# Patient Record
Sex: MALE | Race: BLACK OR AFRICAN AMERICAN | NOT HISPANIC OR LATINO | Employment: OTHER | URBAN - METROPOLITAN AREA
[De-identification: names, ages, dates, MRNs, and addresses within clinical notes are randomized per-mention and may not be internally consistent; named-entity substitution may affect disease eponyms.]

---

## 2017-01-31 ENCOUNTER — HOSPITAL ENCOUNTER (EMERGENCY)
Facility: HOSPITAL | Age: 62
Discharge: HOME | End: 2017-01-31
Attending: EMERGENCY MEDICINE
Payer: MEDICARE

## 2017-01-31 VITALS
RESPIRATION RATE: 18 BRPM | BODY MASS INDEX: 27.35 KG/M2 | WEIGHT: 220 LBS | OXYGEN SATURATION: 100 % | TEMPERATURE: 99 F | SYSTOLIC BLOOD PRESSURE: 141 MMHG | HEART RATE: 82 BPM | DIASTOLIC BLOOD PRESSURE: 78 MMHG | HEIGHT: 75 IN

## 2017-01-31 DIAGNOSIS — N18.6 ESRD (END STAGE RENAL DISEASE) ON DIALYSIS: ICD-10-CM

## 2017-01-31 DIAGNOSIS — R50.9 FEVER: ICD-10-CM

## 2017-01-31 DIAGNOSIS — N18.5 CHRONIC RENAL FAILURE SYNDROME, STAGE 5: Primary | ICD-10-CM

## 2017-01-31 DIAGNOSIS — Z99.2 ESRD (END STAGE RENAL DISEASE) ON DIALYSIS: ICD-10-CM

## 2017-01-31 DIAGNOSIS — F03.90 DEMENTIA WITHOUT BEHAVIORAL DISTURBANCE, UNSPECIFIED DEMENTIA TYPE: ICD-10-CM

## 2017-01-31 LAB
ALBUMIN SERPL BCP-MCNC: 3.7 G/DL
ALP SERPL-CCNC: 75 U/L
ALT SERPL W/O P-5'-P-CCNC: 5 U/L
ANION GAP SERPL CALC-SCNC: 14 MMOL/L
AST SERPL-CCNC: 16 U/L
BASOPHILS # BLD AUTO: 0.01 K/UL
BASOPHILS NFR BLD: 0.2 %
BILIRUB SERPL-MCNC: 1.4 MG/DL
BUN SERPL-MCNC: 19 MG/DL
CALCIUM SERPL-MCNC: 8.8 MG/DL
CHLORIDE SERPL-SCNC: 94 MMOL/L
CO2 SERPL-SCNC: 27 MMOL/L
CREAT SERPL-MCNC: 6.5 MG/DL
DIFFERENTIAL METHOD: ABNORMAL
EOSINOPHIL # BLD AUTO: 0 K/UL
EOSINOPHIL NFR BLD: 0 %
ERYTHROCYTE [DISTWIDTH] IN BLOOD BY AUTOMATED COUNT: 18.1 %
EST. GFR  (AFRICAN AMERICAN): 10 ML/MIN/1.73 M^2
EST. GFR  (NON AFRICAN AMERICAN): 8 ML/MIN/1.73 M^2
FLUAV AG SPEC QL IA: NEGATIVE
FLUBV AG SPEC QL IA: NEGATIVE
GLUCOSE SERPL-MCNC: 74 MG/DL
HCT VFR BLD AUTO: 33.2 %
HGB BLD-MCNC: 10.7 G/DL
LACTATE SERPL-SCNC: 1.1 MMOL/L
LYMPHOCYTES # BLD AUTO: 0.4 K/UL
LYMPHOCYTES NFR BLD: 6.6 %
MCH RBC QN AUTO: 30.2 PG
MCHC RBC AUTO-ENTMCNC: 32.2 %
MCV RBC AUTO: 94 FL
MONOCYTES # BLD AUTO: 0.4 K/UL
MONOCYTES NFR BLD: 7 %
NEUTROPHILS # BLD AUTO: 4.8 K/UL
NEUTROPHILS NFR BLD: 86.2 %
PLATELET # BLD AUTO: 133 K/UL
PMV BLD AUTO: 9.6 FL
POTASSIUM SERPL-SCNC: 4.3 MMOL/L
PROT SERPL-MCNC: 8.5 G/DL
RBC # BLD AUTO: 3.54 M/UL
SODIUM SERPL-SCNC: 135 MMOL/L
SPECIMEN SOURCE: NORMAL
TROPONIN I SERPL DL<=0.01 NG/ML-MCNC: 0.55 NG/ML
WBC # BLD AUTO: 5.57 K/UL

## 2017-01-31 PROCEDURE — 87077 CULTURE AEROBIC IDENTIFY: CPT

## 2017-01-31 PROCEDURE — 96365 THER/PROPH/DIAG IV INF INIT: CPT

## 2017-01-31 PROCEDURE — 87400 INFLUENZA A/B EACH AG IA: CPT | Mod: 59

## 2017-01-31 PROCEDURE — 99285 EMERGENCY DEPT VISIT HI MDM: CPT | Mod: 25

## 2017-01-31 PROCEDURE — 84484 ASSAY OF TROPONIN QUANT: CPT

## 2017-01-31 PROCEDURE — 63600175 PHARM REV CODE 636 W HCPCS: Performed by: EMERGENCY MEDICINE

## 2017-01-31 PROCEDURE — 85025 COMPLETE CBC W/AUTO DIFF WBC: CPT

## 2017-01-31 PROCEDURE — 87040 BLOOD CULTURE FOR BACTERIA: CPT | Mod: 59

## 2017-01-31 PROCEDURE — 87186 SC STD MICRODIL/AGAR DIL: CPT

## 2017-01-31 PROCEDURE — 80053 COMPREHEN METABOLIC PANEL: CPT

## 2017-01-31 PROCEDURE — 83605 ASSAY OF LACTIC ACID: CPT

## 2017-01-31 RX ADMIN — CEFTRIAXONE 1 G: 1 INJECTION, SOLUTION INTRAVENOUS at 09:01

## 2017-01-31 NOTE — ED AVS SNAPSHOT
[facility]  [address] [facility] [address]               David Hadley   2017  5:12 PM   ED    Description:  Male : 1955   Department:  [facility]            Your Care was Coordinated By:     Provider Role From To    Hans Hadley Jr., MD Attending Provider 17 1716 --      Reason for Visit     Fatigue           Diagnoses this Visit        Comments    Chronic renal failure syndrome, stage 5    -  Primary     Fever         ESRD (end stage renal disease) on dialysis         Dementia without behavioral disturbance, unspecified dementia type           ED Disposition     None           To Do List           Follow-up Information     Follow up with Isaac Fitch MD. Schedule an appointment as soon as possible for a visit in 1 day.    Specialty:  Family Medicine    Why:  for recheck as needed if unabel to make appt with your kidney specialist.- go to dialysis as previously scheduled this thursday     Contact information:    [address] [address] [address]  [phone]        [facility]     [facility] -  Assistance  Registered nurses in the [facility] provide clinical advisement, health education, appointment booking, and other advisory services.  Call for this free service at [phone].             Medications           Message regarding Medications     Verify the changes and/or additions to your medication regime listed below are the same as discussed with your clinician today.  If any of these changes or additions are incorrect, please notify your healthcare provider.             Verify that the below list of medications is an accurate representation of the medications you are currently taking.  If none reported, the list may be blank. If incorrect, please contact your healthcare provider. Carry this list with you in case of emergency.           Current Medications      "atorvastatin (LIPITOR) 20 MG tablet Take 1 tablet (20 mg total) by mouth once daily.    gabapentin (NEURONTIN) 100 MG capsule Take 1 capsule (100 mg total) by mouth 3 (three) times daily.    hydrocodone-acetaminophen 5-325mg (NORCO) 5-325 mg per tablet Take 1 tablet by mouth every 6 to 8 hours as needed for Pain.    losartan (COZAAR) 50 MG tablet Take by mouth. 1 tablet Oral Every day    metoprolol tartrate (LOPRESSOR) 25 MG tablet Take 1 tablet (25 mg total) by mouth 2 (two) times daily.    rivastigmine (EXELON) 4.6 mg/24 hr PT24 Place 1 patch onto the skin once daily.    SENSIPAR 30 mg Tab Take 1 tablet by mouth once daily.    sertraline (ZOLOFT) 50 MG tablet Take 1 tablet (50 mg total) by mouth once daily.    SPRYCEL 100 mg Tab Take 100 mg by mouth once daily.     trazodone (DESYREL) 50 MG tablet Take 1 tablet (50 mg total) by mouth every evening.           Clinical Reference Information           Your Vitals Were     BP Pulse Temp Resp Height Weight    153/75 (BP Location: Right arm, Patient Position: Sitting, BP Method: Automatic) 84 99.8 °F (37.7 °C) (Oral) 18 6' 3" (1.905 m) 99.8 kg (220 lb)    SpO2 BMI             99% 27.5 kg/m2         Allergies as of 1/31/2017        Reactions    Benadryl Decongestant Itching    Nothing with benadryl       Immunizations Administered on Date of Encounter - 1/31/2017     None      ED Micro, Lab, POCT     Start Ordered       Status Ordering Provider    01/31/17 1804 01/31/17 1811  Blood culture  Once      In process     01/31/17 1804 01/31/17 1811  Blood Culture #2 **CANNOT BE ORDERED STAT**  Once      In process     01/31/17 1804 01/31/17 1811  Lactic acid, plasma  STAT      Final result     01/31/17 1734 01/31/17 1734  Influenza antigen Nasopharyngeal Swab  STAT      Final result     01/31/17 1733 01/31/17 1734  Urinalysis  STAT      Acknowledged     01/31/17 1704 01/31/17 1704  CBC auto differential  STAT      Final result     01/31/17 1704 01/31/17 1704  Comprehensive " metabolic panel  STAT      Final result     01/31/17 1704 01/31/17 1704  Troponin I  STAT      Final result       ED Imaging Orders     Start Ordered       Status Ordering Provider    01/31/17 1733 01/31/17 1734  X-Ray Chest PA And Lateral  1 time imaging      Final result         Discharge Instructions         Chronic Kidney Disease (CKD)    The role of the kidneys is to remove waste products and excess water from the blood.  When the kidneys do not function normally and waste products begin to build up in the blood, this is called chronic kidney disease (CKD). CKD is defined as the presence of kidney damage or a decrease in kidney function lasting 3 months or more. CKD allows excess water, waste, and toxic substances to build up in the body. This can eventually become life-threatening, requiring dialysis or a kidney transplant to stay alive. This most severe form is called end stage renal disease or ESRD.  Diabetes is the leading causes of chronic renal failure. Other causes include high blood pressure, hardening of the arteries (atherosclerosis), lupus, inflammation of the blood vessels (vasculitis), prior viral and bacterial infections, and others. Certain over-the-counter pain medicines can cause renal failure when taken often over a long period of time. These include aspirin, ibuprofen, and related anti-inflammatory medicines called NSAIDs (nonsteroidal anti-inflammatory drugs).  Home care  The following guidelines will help you care for yourself at home:  1. If you have diabetes, talk to your doctor about the quality of your blood sugar control and any adjustments needed to your diet, lifestyle, or medicines.  2. If you have high blood pressure:  ¨ Take prescribed medicine to lower your blood pressure to the recommended goal of less than 130/80.  ¨ Take up a regular exercise program that you enjoy. Check with your doctor to be sure your planned exercise program is right for you.  ¨ Reduce your salt (sodium)  intake. Your doctor can tell you how much salt per day is safe for you.  3. If you are overweight, talk to your doctor about a weight loss plan.  4. If you smoke, you must quit. Smoking worsens kidney disease. Talk to your doctor about ways to help you quit.  For more information, visit the following links:  ¨ www.[place].gov/sites/default/files/pdf/clearing-the-air-accessible.pdf  ¨ www.smokefree.gov  ¨ www.cancer.org/healthy/stayawayfromtobacco/guidetoquittingsmoking/  5. Most patients with chronic renal failure need to follow a special diet.  Be sure you understand yours. In general, you will need to restrict protein, salt, potassium, and phosphorus. You also need to limit fluid intake. A calcium supplement may be prescribed to protect your bones from osteoporosis.  6. CKD is a risk factor for heart disease. Talk to your doctor about any other risk factors you might have and what you can do to lessen them.  7. Talk to your doctor about any medicines you are taking to determine if they need to be reduced or stopped.  8. Avoid the following over-the-counter medicines, or consult your doctor before using:  ¨ Aspirin and nonsteroidal anti-inflammatory drugs (NSAIDs) such as ibuprofen or naproxen (short-term use of acetaminophen for fever or pain is okay)  ¨ Laxatives and antacids containing magnesium or aluminum  ¨ Fleet or phospho soda enemas containing phosphorus  ¨ Certain stomach acid-blocking medicine such as cimetidine or ranitidine   ¨ Decongestants containing pseudoephedrine   ¨ Herbal supplements  Follow-up care  Follow up with your doctor or as advised by our staff. Contact one of the following for more information:  · American Association of Kidney Patients (568) 080-3723 www.aakp.org  · National Kidney Foundation (818) 192-4677 www.kidney.org  · American Kidney Fund (058) 857-7706 www.kidneyfund.org  · National Kidney Disease Education Program (476) 4WOJYZD www.nkdep.nih.gov  [NOTE: If an X-ray, EKG  (cardiogram), or other diagnostic test was taken, another specialist will review it. You will be notified of any new findings that may affect your care.]  When to seek medical care  Get prompt medical attention or contact your doctor if any of the following occur:  · Nausea or vomiting  · Fever greater than 100.4°F (38°C)  · Severe weakness, dizziness, fainting, drowsiness, or confusion  · Chest pain or shortness of breath  · Unexpected weight gain or swelling in the legs, ankles, or around the eyes  · Heart beating fast, slow, or irregularly  · Decrease or absent urine output  © 3230-0374 [employer]. [address] [place] [address], [place] [address]. All rights reserved. This information is not intended as a substitute for professional medical care. Always follow your healthcare professional's instructions.          For Caregivers: Daily Care for Dementia Patients  Over time, people with dementia will need more and more help with daily tasks. These include eating meals, taking medicines, and getting enough exercise. They also include personal care needs, such as bathing and dressing. To reduce stress, make these activities part of a routine. Ask family and friends to lend a hand. And be aware that your loved ones abilities can change from day to day. If you have problems meeting your loved ones needs, its time to get help. Talk to a  or local support agency--such as a local Alzheimers Association chapter.      Gardening can be a pleasant way to keep your loved one active.   Activity and exercise  Regular activity is good for your loved ones body and mind. It may even help slow the progression of the disease. Keep to your loved ones old routines when possible. It also helps to:  · Do things together. Go for a walk, garden, or bake a cake. Basic, repetitive activities are good choices.  · Be active as often as possible. This releases pent-up energy, which can reduce restlessness and improve  sleep.  · Include social activities. Take your loved one to see friends and family. But try to keep things simple. Loud noises, crowds, or too many people talking at once can be upsetting.  Taking medicines  Be sure all prescribed medicines are taken as directed. These tips can help:  · Provide supervision if your loved one cannot safely take medicines alone.  · Set a routine so medicines are taken at the same time each day.  · Ensure that ALL medicines are taken. A pillbox can help you keep track.  · Plan ahead. Be sure to refill prescriptions before they run out.  Eating meals  At mealtime, serve healthy foods with plenty of fluids. These tips can also help:  · Keep meals simple. Too many choices can be overwhelming. Try to maintain a calm, quiet atmosphere while you eat.  · Place healthy snacks, such as fresh fruit, out where they can be seen.  · Watch eating habits. People with dementia may eat too little or too much. Talk to the healthcare provider if you have concerns.  · Try finger foods if regular meals become too difficult for your loved one to eat.  Dressing  People with dementia may have trouble choosing what to wear. Its OK if clothes dont always match. But if help is needed:  · Choose clothing that is easy to put on and take off. Use Velcro shoes or slippers.  · Lay out a fresh outfit each day. Place clothes in the order they should be put on.  · If more help is needed, hand over clothing items one at a time. Explain how each item should be put on.  · Put dirty clothes away so theyre not worn again.  Bathing and grooming  Getting your loved one to bathe can be a real challenge. Try these tips:  · Treat bathing as a routine activity. But be flexible. A daily bath is probably unrealistic.  · Prepare bath items ahead of time, and be sure to test the water temperature.  · Avoid leaving your loved one alone in the bath or shower.             · Try visiting a barbershop or beauty salon for help with hair  washing, hair styling, and shaving.  Using the toilet  In later stages, dementia patients may develop incontinence (trouble controlling the bladder or bowel). To ease problems:  · Set a routine for using the toilet (for example, every 3 hours) and stick to it.  · Limit beverages before bedtime to prevent accidents. A bedside commode may also help.  · Be understanding if accidents happen. Your loved one may be as upset as you.  · At one point it may be necessary to have them wear an adult diaper.    · Talk to a healthcare provider if incontinence develops suddenly. It may signal other health issues that can be treated.  When to call the healthcare provider  Call the healthcare provider if you notice a sudden change in your loved ones behavior or emotions. These changes may be due to dementia. But they could also signal other health problems that can be treated.   NOTE:This sheet is a summary. It may not cover all possible information. If you have questions about this medicine, talk to your doctor, pharmacist, or health care provider. Copyright© 2016 [employer]          For Caregivers: Safety Tips for Dementia Patients  The symptoms of dementia can sometimes lead to unsafe situations. Areas of special concern include driving and wandering away from home. You may also need to make changes in your loved ones living space. These can help protect your loved one even when you arent around.     Prevent driving by keeping your loved ones car keys in a secure place.   Discourage driving  Driving is often not safe for a person with dementia. It may even be illegal. Ask the healthcare provider whether its safe for your loved one to drive. If safety is in question, use these tips to prevent him or her from getting behind the wheel:  · Limit access to the car. Keep the keys with you or lock them away.  · Ask an authority figure, such as a healthcare provider or , to tell your loved one not to drive.  · Ask  your healthcare provider about contacting the Department of Motor Vehicles.  Watch for wandering  People with dementia may wander away from the house and get lost. To keep your loved one safer, try these tips:  · Have your loved one wear an ID bracelet at all times. You can also enroll your loved one in the Alzheimers Associations Safe Return program.  · Install door chimes so you know when exterior doors are being opened.  · Ask neighbors to call you if they see your loved one out alone.  · Go with your loved one if he or she insists on leaving the house. Dont argue or yell. Instead, use distraction or gentle hints to get him or her to return home.  · People with dementia often have a reversed sleep-wake cycle, meaning that they can be up all night and wander away when you least expect it.    Make living spaces safe  Keep your loved one safe by simplifying his or her living space. This means reducing clutter and removing hazards. You may also want to get advice from an occupational therapist (home safety expert). Keep in mind that some changes may not be needed right away. Focus on major safety concerns first.  In living spaces  Suggested safety steps include:  · Install smoke alarms and nightlights.  · Display emergency numbers and the home address near all phones.  · Install an answering machine so important messages arent missed.  · Reduce tripping hazards. Move electrical and phone cords out of the way. Place colored tape on the edges of steps.  In the bathroom  Suggested safety steps include:  · Store hair dryers, razors, and curling irons in a secure area.  · Remove poisons, such as  and nail polish remover.  · Keep medicines in a secure area, not in the medicine cabinet.  · Remove inside door locks so your loved one doesnt get locked inside.  In the kitchen  Suggested safety steps include:  · Unplug toasters and other appliances when not in use.  · Limit access to alcoholic beverages.  These can make symptoms much worse.  · Remove or cover knobs on stoves and other appliances.  · Check food for spoilage. Your loved one may not know when food has gone bad.  Other areas of the home  Suggested safety steps include:  · Lock up hazardous substances, such as bleach, pesticides, and paint thinners.  · Keep pool or hot tub areas closed off.  · Set the hot water heater below 120°F (48.8°C).  · Keep a spare key outside the house in case your loved one locks you out.  Prevent fraud  People with dementia may be easy prey for dishonest salespeople or money scams. Try placing a No Solicitations sign on your loved ones front door. Add his or her phone number to the [employer]s Do Not Call list ([phone]). You should also limit access to credit cards and cash.  Can my loved one live alone?  Early on, people with dementia can often handle daily tasks with little or no help. At some point, though, it will no longer be safe for them to be on their own. The timing for this is different for each person. But problems such as forgetting to eat, bathe, or take medicines can all be signs that more supervision is needed. If you have concerns, be sure to talk with your loved ones healthcare provider.      © 3776-6788 [employer]. [address] [place] [address]. All rights reserved. This information is not intended as a substitute for professional medical care. Always follow your healthcare professional's instructions.          Discharge References/Attachments     WEAKNESS (UNCERTAIN CAUSE) (ENGLISH)      [facility] Sign-Up     Activating your [facility] account is as easy as 1-2-3!     1) Visit [email or web address], select Sign Up Now, enter this activation code and your date of birth, then select Next.  [email or web address]  Expires: 3/17/2017  9:45 PM      2) Create a username and password to use when you visit [facility] in the future and select a security question in case you lose  your password and select Next.    3) Enter your e-mail address and click Sign Up!    Additional Information  If you have questions, please e-mail [email or web address] or call [phone] to talk to our [facility] staff. Remember, [facility] is NOT to be used for urgent needs. For medical emergencies, dial 911.         Smoking Cessation     If you would like to quit smoking:   You may be eligible for free services if you are a [place] resident and started smoking cigarettes before September 1, 1988.  Call the Smoking Cessation Trust (SCT) toll free at (789) 733-5749 or (292) 043-9620.   Call 0-312-QUIT-NOW if you do not meet the above criteria.             [facility] complies with applicable Federal civil rights laws and does not discriminate on the basis of race, color, national origin, age, disability, or sex.        Language Assistance Services     ATTENTION: Language assistance services are available, free of charge. Please call [phone].      ATENCIÓN: Si habla español, tiene a dsa disposición servicios gratuitos de asistencia lingüística. Llame al [phone].     CHÚ Ý: N?u b?n nói Ti?ng Vi?t, có các d?ch v? h? tr? ngôn ng? mi?n phí dành cho b?n. G?i s? [phone].

## 2017-01-31 NOTE — ED NOTES
Bed: 09  Expected date:   Expected time:   Means of arrival:   Comments:  David brasher when clean

## 2017-01-31 NOTE — ED PROVIDER NOTES
SCRIBE #1 NOTE: I, Dickson Vigil, am scribing for, and in the presence of, Hans Hadley Jr., MD. I have scribed the entire note.      History      Chief Complaint   Patient presents with    Fatigue     pt sent by family for eval of weakness per [facility] report; CBG 60       Review of patient's allergies indicates:   Allergen Reactions    Benadryl decongestant Itching     Nothing with benadryl         HPI   HPI    1/31/2017, 5:32 PM   History obtained from the patient      History of Present Illness: David Hadley is a 61 y.o. male patient who presents to the Emergency Department for fatigue which onset gradually PTA. Symptoms are constant and moderate in severity. Pt was sent here by the family for evaluation of weakness per [facility] report. No mitigating or exacerbating factors reported. Associated sxs include fever. Patient denies any abdominal pain, n/v/d, cough, congestion, CP, SOB, numbness, HA, lightheadedness, dizziness, and all other sxs at this time. No further complaints or concerns at this time.         Arrival mode: [facility]    PCP: Isaac Fitch MD       Past Medical History:  Past Medical History   Diagnosis Date    After-cataract, unspecified - Left Eye 11/27/2013    Allergy     Arthritis     Cancer     CHF (congestive heart failure)     Chronic kidney disease      chemo/ dialias    Diabetes mellitus     Hypertension     Myocardial infarction        Past Surgical History:  Past Surgical History   Procedure Laterality Date    Cataract extraction      Coronary artery bypass graft  3-2014         Family History:  Family History   Problem Relation Age of Onset    Colon cancer Sister     Cancer Brother      colon    Pancreatic cancer Brother     Prostate cancer Father     Glaucoma Mother        Social History:  Social History     Social History Main Topics    Smoking status: Former Smoker     Types: Cigarettes     Quit date: 1/28/2000    Smokeless tobacco: Never Used    Alcohol use No    Drug use:  No    Sexual activity: Not Currently     Birth control/ protection: None       ROS   Review of Systems   Constitutional: Positive for fatigue and fever. Negative for chills.   HENT: Negative for sore throat.    Respiratory: Negative for shortness of breath.    Cardiovascular: Negative for chest pain.   Gastrointestinal: Negative for abdominal pain, diarrhea, nausea and vomiting.   Genitourinary: Negative for dysuria.   Musculoskeletal: Negative for back pain.   Skin: Negative for rash.   Neurological: Negative for dizziness, weakness, light-headedness and headaches.   Hematological: Does not bruise/bleed easily.       Physical Exam    Initial Vitals   BP Pulse Resp Temp SpO2   01/31/17 1656 01/31/17 1656 01/31/17 1656 01/31/17 1656 01/31/17 1656   170/80 98 16 100.3 °F (37.9 °C) 98 %      Physical Exam  Nursing Notes and Vital Signs Reviewed.  Constitutional: Patient is in no acute distress. Awake and alert. Well-developed and well-nourished.  Head: Atraumatic. Normocephalic.  Eyes: PERRL. EOM intact. Conjunctivae are not pale. No scleral icterus.  ENT: Mucous membranes are moist. Oropharynx is clear and symmetric.    Neck: Supple. Full ROM. No lymphadenopathy.  Cardiovascular: Regular rate. Regular rhythm. No murmurs, rubs, or gallops. Distal pulses are 2+ and symmetric.  Pulmonary/Chest: No respiratory distress. Clear to auscultation bilaterally. No wheezing, rales, or rhonchi.  Abdominal: Soft and non-distended.  There is no tenderness.  No rebound, guarding, or rigidity.  Good bowel sounds.    Genitourinary: No CVA tenderness  Musculoskeletal: Moves all extremities. No obvious deformities. No edema. No calf tenderness.  Skin: Warm and dry.  Neurological:  Alert, awake, and appropriate.  Normal speech.  No acute focal neurological deficits are appreciated.  Psychiatric: Normal affect. Good eye contact. Appropriate in content.    ED Course    Procedures  ED Vital Signs:  Vitals:    01/31/17 1656 01/31/17 2006  "  BP: (!) 170/80 (!) 157/86   Pulse: 98 87   Resp: 16 18   Temp: 100.3 °F (37.9 °C) 99.8 °F (37.7 °C)   TempSrc: Oral Oral   SpO2: 98% 100%   Weight:  99.8 kg (220 lb)   Height:  6' 3" (1.905 m)       Abnormal Lab Results:  Labs Reviewed   CBC W/ AUTO DIFFERENTIAL - Abnormal; Notable for the following:        Result Value    RBC 3.54 (*)     Hemoglobin 10.7 (*)     Hematocrit 33.2 (*)     RDW 18.1 (*)     Platelets 133 (*)     Lymph # 0.4 (*)     Gran% 86.2 (*)     Lymph% 6.6 (*)     All other components within normal limits   COMPREHENSIVE METABOLIC PANEL - Abnormal; Notable for the following:     Sodium 135 (*)     Chloride 94 (*)     Creatinine 6.5 (*)     Total Protein 8.5 (*)     Total Bilirubin 1.4 (*)     ALT 5 (*)     eGFR if  10 (*)     eGFR if non  8 (*)     All other components within normal limits   TROPONIN I - Abnormal; Notable for the following:     Troponin I 0.551 (*)     All other components within normal limits   CULTURE, BLOOD   CULTURE, BLOOD   INFLUENZA A AND B ANTIGEN   LACTIC ACID, PLASMA   URINALYSIS        All Lab Results:  Results for orders placed or performed during the hospital encounter of 01/31/17   CBC auto differential   Result Value Ref Range    WBC 5.57 3.90 - 12.70 K/uL    RBC 3.54 (L) 4.60 - 6.20 M/uL    Hemoglobin 10.7 (L) 14.0 - 18.0 g/dL    Hematocrit 33.2 (L) 40.0 - 54.0 %    MCV 94 82 - 98 fL    MCH 30.2 27.0 - 31.0 pg    MCHC 32.2 32.0 - 36.0 %    RDW 18.1 (H) 11.5 - 14.5 %    Platelets 133 (L) 150 - 350 K/uL    MPV 9.6 9.2 - 12.9 fL    Gran # 4.8 1.8 - 7.7 K/uL    Lymph # 0.4 (L) 1.0 - 4.8 K/uL    Mono # 0.4 0.3 - 1.0 K/uL    Eos # 0.0 0.0 - 0.5 K/uL    Baso # 0.01 0.00 - 0.20 K/uL    Gran% 86.2 (H) 38.0 - 73.0 %    Lymph% 6.6 (L) 18.0 - 48.0 %    Mono% 7.0 4.0 - 15.0 %    Eosinophil% 0.0 0.0 - 8.0 %    Basophil% 0.2 0.0 - 1.9 %    Differential Method Automated    Comprehensive metabolic panel   Result Value Ref Range    Sodium 135 (L) 136 " - 145 mmol/L    Potassium 4.3 3.5 - 5.1 mmol/L    Chloride 94 (L) 95 - 110 mmol/L    CO2 27 23 - 29 mmol/L    Glucose 74 70 - 110 mg/dL    BUN, Bld 19 8 - 23 mg/dL    Creatinine 6.5 (H) 0.5 - 1.4 mg/dL    Calcium 8.8 8.7 - 10.5 mg/dL    Total Protein 8.5 (H) 6.0 - 8.4 g/dL    Albumin 3.7 3.5 - 5.2 g/dL    Total Bilirubin 1.4 (H) 0.1 - 1.0 mg/dL    Alkaline Phosphatase 75 55 - 135 U/L    AST 16 10 - 40 U/L    ALT 5 (L) 10 - 44 U/L    Anion Gap 14 8 - 16 mmol/L    eGFR if African American 10 (A) >60 mL/min/1.73 m^2    eGFR if non African American 8 (A) >60 mL/min/1.73 m^2   Troponin I   Result Value Ref Range    Troponin I 0.551 (H) 0.000 - 0.026 ng/mL   Influenza antigen Nasopharyngeal Swab   Result Value Ref Range    Influenza A Ag, EIA Negative Negative    Influenza B Ag, EIA Negative Negative    Flu A & B Source Nasopharyngeal Swab    Lactic acid, plasma   Result Value Ref Range    Lactate (Lactic Acid) 1.1 0.5 - 2.2 mmol/L         Imaging Results:  Imaging Results         X-Ray Chest PA And Lateral (Final result) Result time:  01/31/17 18:43:59    Final result by Elton Dodson Jr., MD (01/31/17 18:43:59)    Impression:     Lower lung volumes compared to the previous exam.  There is cardiomegaly with a small amount left-sided pleural-parenchymal disease.  This could relate to a pneumonic process although heart failure may also be responsible.      Electronically signed by: ELTON DODSON M.D.  Date:     01/31/17  Time:    18:43     Narrative:    EXAM: [id number] CHEST PA AND LATERAL    CLINICAL HISTORY: Fever.    COMPARISON: Previous PA and lateral views of the chest from February 2016 were reviewed.    FINDINGS: Again demonstrated are postoperative changes from a prior median sternotomy.  Lung volumes are diminished compared to the previous study.  The heart is mildly enlarged.  There is abnormal opacity involving the left lower chest with a small left pleural effusion.  No pneumothorax.  No acute osseous  abnormalities are evident.                      The Emergency Provider reviewed the vital signs and test results, which are outlined above.    ED Discussion     9:40 PM: Reassessed pt at this time.  Pt states his condition has improved at this time. Pt is a dialysis pt with [facility]. Instructed pt to f/u with [facility]. Discussed with pt all pertinent ED information and results. Discussed pt dx and plan of tx. Gave pt all f/u and return to the ED instructions. All questions and concerns were addressed at this time. Pt expresses understanding of information and instructions, and is comfortable with plan to discharge. Pt is stable for discharge.    Pre-hypertension/Hypertension: The pt has been informed that they may have pre-hypertension or hypertension based on a blood pressure reading in the ED. I recommend that the pt call the PCP listed on their discharge instructions or a physician of their choice this week to arrange f/u for further evaluation of possible pre-hypertension or hypertension.       ED Medication(s):  Medications - No data to display    New Prescriptions    No medications on file             Medical Decision Making    Medical Decision Making:   Clinical Tests:   Lab Tests: Ordered and Reviewed  Radiological Study: Ordered and Reviewed           Scribe Attestation:   Scribe #1: I performed the above scribed service and the documentation accurately describes the services I performed. I attest to the accuracy of the note.    Attending:   Physician Attestation Statement for Scribe #1: I, Hans Hadley Jr., MD, personally performed the services described in this documentation, as scribed by Dickson Vigil, in my presence, and it is both accurate and complete.          Clinical Impression       ICD-10-CM ICD-9-CM   1. Chronic renal failure syndrome, stage 5 N18.5 585.5   2. Fever R50.9 780.60   3. ESRD (end stage renal disease) on dialysis N18.6 585.6    Z99.2 V45.11   4. Dementia without  behavioral disturbance, unspecified dementia type F03.90 294.20       Disposition:   Disposition: Discharged  Condition: Stable         Hans Hadley Jr., MD  02/01/17 0043

## 2017-02-01 ENCOUNTER — HOSPITAL ENCOUNTER (EMERGENCY)
Facility: HOSPITAL | Age: 62
Discharge: HOME | End: 2017-02-02
Attending: EMERGENCY MEDICINE
Payer: MEDICARE

## 2017-02-01 ENCOUNTER — TELEPHONE (OUTPATIENT)
Dept: EMERGENCY MEDICINE | Facility: HOSPITAL | Age: 62
End: 2017-02-01

## 2017-02-01 DIAGNOSIS — N18.6 ESRD (END STAGE RENAL DISEASE) ON DIALYSIS: Primary | ICD-10-CM

## 2017-02-01 DIAGNOSIS — Z99.2 ESRD (END STAGE RENAL DISEASE) ON DIALYSIS: Primary | ICD-10-CM

## 2017-02-01 DIAGNOSIS — R78.81 BACTEREMIA: ICD-10-CM

## 2017-02-01 LAB
ANION GAP SERPL CALC-SCNC: 14 MMOL/L
BASOPHILS # BLD AUTO: 0 K/UL
BASOPHILS NFR BLD: 0 %
BUN SERPL-MCNC: 41 MG/DL
CALCIUM SERPL-MCNC: 9.1 MG/DL
CHLORIDE SERPL-SCNC: 93 MMOL/L
CO2 SERPL-SCNC: 26 MMOL/L
CREAT SERPL-MCNC: 9 MG/DL
DIFFERENTIAL METHOD: ABNORMAL
EOSINOPHIL # BLD AUTO: 0 K/UL
EOSINOPHIL NFR BLD: 0.3 %
ERYTHROCYTE [DISTWIDTH] IN BLOOD BY AUTOMATED COUNT: 18 %
EST. GFR  (AFRICAN AMERICAN): 6 ML/MIN/1.73 M^2
EST. GFR  (NON AFRICAN AMERICAN): 6 ML/MIN/1.73 M^2
GLUCOSE SERPL-MCNC: 136 MG/DL
HCT VFR BLD AUTO: 31.9 %
HGB BLD-MCNC: 10.5 G/DL
LACTATE SERPL-SCNC: 0.8 MMOL/L
LYMPHOCYTES # BLD AUTO: 0.8 K/UL
LYMPHOCYTES NFR BLD: 12 %
MCH RBC QN AUTO: 30.4 PG
MCHC RBC AUTO-ENTMCNC: 32.9 %
MCV RBC AUTO: 93 FL
MONOCYTES # BLD AUTO: 1.1 K/UL
MONOCYTES NFR BLD: 15.2 %
NEUTROPHILS # BLD AUTO: 5 K/UL
NEUTROPHILS NFR BLD: 72.5 %
PLATELET # BLD AUTO: 125 K/UL
PMV BLD AUTO: 10.4 FL
POTASSIUM SERPL-SCNC: 4.5 MMOL/L
RBC # BLD AUTO: 3.45 M/UL
SODIUM SERPL-SCNC: 133 MMOL/L
WBC # BLD AUTO: 6.93 K/UL

## 2017-02-01 PROCEDURE — 96365 THER/PROPH/DIAG IV INF INIT: CPT

## 2017-02-01 PROCEDURE — 83605 ASSAY OF LACTIC ACID: CPT

## 2017-02-01 PROCEDURE — 85025 COMPLETE CBC W/AUTO DIFF WBC: CPT

## 2017-02-01 PROCEDURE — 25000003 PHARM REV CODE 250: Performed by: EMERGENCY MEDICINE

## 2017-02-01 PROCEDURE — 80048 BASIC METABOLIC PNL TOTAL CA: CPT

## 2017-02-01 PROCEDURE — 63600175 PHARM REV CODE 636 W HCPCS: Performed by: EMERGENCY MEDICINE

## 2017-02-01 PROCEDURE — 99284 EMERGENCY DEPT VISIT MOD MDM: CPT | Mod: 25

## 2017-02-01 RX ADMIN — VANCOMYCIN HYDROCHLORIDE 1250 MG: 1 INJECTION, POWDER, LYOPHILIZED, FOR SOLUTION INTRAVENOUS at 10:02

## 2017-02-01 NOTE — ED AVS SNAPSHOT
[facility] -   [address] [facility] [address]               David Hadley   2017  9:15 PM   ED    Description:  Male : 1955   Department:  [facility]           Your Care was Coordinated By:     Provider Role From To    Hans Hadley Jr., MD Attending Provider 17 [id number] --      Reason for Visit     Abnormal Lab           Diagnoses this Visit        Comments    ESRD (end stage renal disease) on dialysis    -  Primary     Bacteremia           ED Disposition     None           To Do List           Follow-up Information     Go to Rinku Sarmiento MD.    Specialty:  Nephrology    Why:  Go to dialysis in [facility] as previously scheduled notfiy Dr. Guardado office that you recievbed a dose of vancomycin here tonight    Contact information:    [address]  [facility] [address]  [phone]        [facility]     [facility] Care [facility] -  Assistance  Registered nurses in the [facility] provide clinical advisement, health education, appointment booking, and other advisory services.  Call for this free service at [phone].             Medications           Message regarding Medications     Verify the changes and/or additions to your medication regime listed below are the same as discussed with your clinician today.  If any of these changes or additions are incorrect, please notify your healthcare provider.        These medications were administered today        Dose Freq    vancomycin (VANCOCIN) 1,250 mg in dextrose 5 % 250 mL IVPB 1,250 mg Once    Sig: Inject 1,250 mg into the vein once.    Class: Normal    Route: Intravenous           Verify that the below list of medications is an accurate representation of the medications you are currently taking.  If none reported, the list may be blank. If incorrect, please contact your healthcare provider. Carry this list with you in case of  "emergency.           Current Medications     atorvastatin (LIPITOR) 20 MG tablet Take 1 tablet (20 mg total) by mouth once daily.    gabapentin (NEURONTIN) 100 MG capsule Take 1 capsule (100 mg total) by mouth 3 (three) times daily.    hydrocodone-acetaminophen 5-325mg (NORCO) 5-325 mg per tablet Take 1 tablet by mouth every 6 to 8 hours as needed for Pain.    losartan (COZAAR) 50 MG tablet Take by mouth. 1 tablet Oral Every day    metoprolol tartrate (LOPRESSOR) 25 MG tablet Take 1 tablet (25 mg total) by mouth 2 (two) times daily.    rivastigmine (EXELON) 4.6 mg/24 hr PT24 Place 1 patch onto the skin once daily.    SENSIPAR 30 mg Tab Take 1 tablet by mouth once daily.    sertraline (ZOLOFT) 50 MG tablet Take 1 tablet (50 mg total) by mouth once daily.    SPRYCEL 100 mg Tab Take 100 mg by mouth once daily.     trazodone (DESYREL) 50 MG tablet Take 1 tablet (50 mg total) by mouth every evening.    vancomycin (VANCOCIN) 1,250 mg in dextrose 5 % 250 mL IVPB Inject 1,250 mg into the vein once.           Clinical Reference Information           Your Vitals Were     BP Pulse Temp Resp Height Weight    175/80 80 99.1 °F (37.3 °C) (Oral) 18 6' 2" (1.88 m) 99.8 kg (220 lb)    SpO2 BMI             99% 28.25 kg/m2         Allergies as of 2/1/2017        Reactions    Benadryl Decongestant Itching    Nothing with benadryl       Immunizations Administered on Date of Encounter - 2/1/2017     None      ED Micro, Lab, POCT     Start Ordered       Status Ordering Provider    02/01/17 2258 02/01/17 2258  CBC auto differential  STAT      Final result     02/01/17 2206 02/01/17 2205  Basic metabolic panel  STAT      Final result     02/01/17 2206 02/01/17 2205  Lactic acid, plasma  STAT      Final result     02/01/17 2205 02/01/17 2205    STAT,   Status:  Canceled      Canceled       ED Imaging Orders     None        Discharge Instructions         Chronic Kidney Disease (CKD)    The role of the kidneys is to remove waste products and " excess water from the blood.  When the kidneys do not function normally and waste products begin to build up in the blood, this is called chronic kidney disease (CKD). CKD is defined as the presence of kidney damage or a decrease in kidney function lasting 3 months or more. CKD allows excess water, waste, and toxic substances to build up in the body. This can eventually become life-threatening, requiring dialysis or a kidney transplant to stay alive. This most severe form is called end stage renal disease or ESRD.  Diabetes is the leading causes of chronic renal failure. Other causes include high blood pressure, hardening of the arteries (atherosclerosis), lupus, inflammation of the blood vessels (vasculitis), prior viral and bacterial infections, and others. Certain over-the-counter pain medicines can cause renal failure when taken often over a long period of time. These include aspirin, ibuprofen, and related anti-inflammatory medicines called NSAIDs (nonsteroidal anti-inflammatory drugs).  Home care  The following guidelines will help you care for yourself at home:  1. If you have diabetes, talk to your doctor about the quality of your blood sugar control and any adjustments needed to your diet, lifestyle, or medicines.  2. If you have high blood pressure:  ¨ Take prescribed medicine to lower your blood pressure to the recommended goal of less than 130/80.  ¨ Take up a regular exercise program that you enjoy. Check with your doctor to be sure your planned exercise program is right for you.  ¨ Reduce your salt (sodium) intake. Your doctor can tell you how much salt per day is safe for you.  3. If you are overweight, talk to your doctor about a weight loss plan.  4. If you smoke, you must quit. Smoking worsens kidney disease. Talk to your doctor about ways to help you quit.  For more information, visit the following  links:  ¨ www.[employer].gov/sites/default/files/pdf/clearing-the-air-accessible.pdf  ¨ www.smokefree.gov  ¨ www.cancer.org/healthy/stayawayfromtobacco/guidetoquittingsmoking/  5. Most patients with chronic renal failure need to follow a special diet.  Be sure you understand yours. In general, you will need to restrict protein, salt, potassium, and phosphorus. You also need to limit fluid intake. A calcium supplement may be prescribed to protect your bones from osteoporosis.  6. CKD is a risk factor for heart disease. Talk to your doctor about any other risk factors you might have and what you can do to lessen them.  7. Talk to your doctor about any medicines you are taking to determine if they need to be reduced or stopped.  8. Avoid the following over-the-counter medicines, or consult your doctor before using:  ¨ Aspirin and nonsteroidal anti-inflammatory drugs (NSAIDs) such as ibuprofen or naproxen (short-term use of acetaminophen for fever or pain is okay)  ¨ Laxatives and antacids containing magnesium or aluminum  ¨ Fleet or phospho soda enemas containing phosphorus  ¨ Certain stomach acid-blocking medicine such as cimetidine or ranitidine   ¨ Decongestants containing pseudoephedrine   ¨ Herbal supplements  Follow-up care  Follow up with your doctor or as advised by our staff. Contact one of the following for more information:  · American Association of Kidney Patients (050) 465-7723 www.aakp.org  · National Kidney Foundation (857) 771-6432 www.kidney.org  · American Kidney Fund (895) 153-8474 www.kidneyfund.org  · National Kidney Disease Education Program (460) 4WGZVOR www.nkdep.nih.gov  [NOTE: If an X-ray, EKG (cardiogram), or other diagnostic test was taken, another specialist will review it. You will be notified of any new findings that may affect your care.]  When to seek medical care  Get prompt medical attention or contact your doctor if any of the following occur:  · Nausea or vomiting  · Fever greater  than 100.4°F (38°C)  · Severe weakness, dizziness, fainting, drowsiness, or confusion  · Chest pain or shortness of breath  · Unexpected weight gain or swelling in the legs, ankles, or around the eyes  · Heart beating fast, slow, or irregularly  · Decrease or absent urine output  © 20007711-6095 [employer]. [address] [place] [address]. All rights reserved. This information is not intended as a substitute for professional medical care. Always follow your healthcare professional's instructions.          Bacteremia, Suspected (Adult)  Your fever today is probably due to a viral illness. However, sometimes fever can be an early sign of a more serious bacterial infection. Bacteremia is a bacterial infection that has spread to the bloodstream. This is serious because it can spread to other organs (including the kidneys, brain, and lungs).  Your healthcare provider will perform tests (cultures) to check for bacteremia. Until the test results are known you should watch for the signs listed below.  Causes  Bacteremia usually starts with a typical infection, but it then spreads to the blood. Almost any type of infection can cause bacteremia, including a urinary tract infection, skin infection, gastrointestinal problem, surgical complication, or pneumonia.  Symptoms  At first symptoms may seem like any typical infection or illness, but then they worsen. Symptoms of bacteremia can include:  · Fever and chills  · Loss of appetite  · Nausea or vomiting  · Trouble breathing or fast breathing  · Fast heart rate  · Feeling lightheaded or faint  · Skin rashes or blotches  Home care  Follow these guidelines when caring for yourself at home.  · Rest at home for the first 2 to 3 days. When resuming activity, don't let yourself become overly tired.  · You can take acetaminophen or ibuprofen for pain, unless you were given a different pain medicine to use. (Note: If you have chronic liver or kidney disease or have  ever had a stomach ulcer or gastrointestinal bleeding, talk with your healthcare provider before using these medicines. Also talk to your provider if you are taking medicine to prevent blood clots.) Aspirin should never be given to anyone younger than 18 years of age who is ill with a viral infection or fever. It may cause severe liver or brain damage.  · If you were given antibiotics, take them until they are used up, or your healthcare provider tells you to stop. It is important to finish the antibiotics even though you feel better. This is to make sure the infection has cleared.  · Your appetite may be poor, so a light diet is fine. Avoid dehydration by drinking 6 to 8 glasses of fluid per day (such as water, soft drinks, sports drinks, juices, tea, or soup).  Follow-up care  Follow up with your healthcare provider, or as advised.  · If a culture was done, you will be notified if your treatment needs to be changed. You can call as directed for the results.  · If X-rays, a CT, or an ultrasound were done, a specialist will review them. You will be notified of any findings that may affect your care.  Call 911  Contact emergency services right away if any of these occur:  · Trouble breathing or swallowing, or wheezing  · Chest pain  · Confusion or sudden change in behavior  · Extreme drowsiness or trouble awakening  · Fainting or loss of consciousness  · Rapid heart rate  · Low blood pressure  · Vomiting blood, or large amounts of blood in stool  · Seizure  When to seek medical advice  Call your health care provider right away if any of these occur:  · Cough with lots of colored sputum (mucus), or blood in your sputum  · Severe headache  · Severe face, neck, throat, or ear pain  · Pain in the right lower abdomen  · Weakness, dizziness, repeated vomiting, or diarrhea  · Joint pain or a new rash  · Burning when urinating  · Fever of 100.4°F (38°C) or higher  © 0639-8289 [employer]. [address] [place] [address]  Road, [place] [address]. All rights reserved. This information is not intended as a substitute for professional medical care. Always follow your healthcare professional's instructions.          [facility] Sign-Up     Activating your [facility] account is as easy as 1-2-3!     1) Visit [email or web address], select Sign Up Now, enter this activation code and your date of birth, then select Next.  [email or web address]  Expires: 3/17/2017  9:45 PM      2) Create a username and password to use when you visit [facility] in the future and select a security question in case you lose your password and select Next.    3) Enter your e-mail address and click Sign Up!    Additional Information  If you have questions, please e-mail [email or web address] or call [phone] to talk to our [facility] staff. Remember, [facility] is NOT to be used for urgent needs. For medical emergencies, dial 911.         Smoking Cessation     If you would like to quit smoking:   You may be eligible for free services if you are a [place] resident and started smoking cigarettes before September 1, 1988.  Call the Smoking Cessation Trust ([facility]) toll free at (000) 743-9657 or (766) 453-4021.   Call 5-075-QUIT-NOW if you do not meet the above criteria.             [facility] complies with applicable Federal civil rights laws and does not discriminate on the basis of race, color, national origin, age, disability, or sex.        Language Assistance Services     ATTENTION: Language assistance services are available, free of charge. Please call [phone].      ATENCIÓN: Si habla español, tiene a das disposición servicios gratuitos de asistencia lingüística. Llame al [phone].     CHÚ Ý: N?u b?n nói Ti?ng Vi?t, có các d?ch v? h? tr? ngôn ng? mi?n phí dành cho b?n. G?i s? [phone].

## 2017-02-01 NOTE — DISCHARGE INSTRUCTIONS
Chronic Kidney Disease (CKD)    The role of the kidneys is to remove waste products and excess water from the blood.  When the kidneys do not function normally and waste products begin to build up in the blood, this is called chronic kidney disease (CKD). CKD is defined as the presence of kidney damage or a decrease in kidney function lasting 3 months or more. CKD allows excess water, waste, and toxic substances to build up in the body. This can eventually become life-threatening, requiring dialysis or a kidney transplant to stay alive. This most severe form is called end stage renal disease or ESRD.  Diabetes is the leading causes of chronic renal failure. Other causes include high blood pressure, hardening of the arteries (atherosclerosis), lupus, inflammation of the blood vessels (vasculitis), prior viral and bacterial infections, and others. Certain over-the-counter pain medicines can cause renal failure when taken often over a long period of time. These include aspirin, ibuprofen, and related anti-inflammatory medicines called NSAIDs (nonsteroidal anti-inflammatory drugs).  Home care  The following guidelines will help you care for yourself at home:  1. If you have diabetes, talk to your doctor about the quality of your blood sugar control and any adjustments needed to your diet, lifestyle, or medicines.  2. If you have high blood pressure:  ¨ Take prescribed medicine to lower your blood pressure to the recommended goal of less than 130/80.  ¨ Take up a regular exercise program that you enjoy. Check with your doctor to be sure your planned exercise program is right for you.  ¨ Reduce your salt (sodium) intake. Your doctor can tell you how much salt per day is safe for you.  3. If you are overweight, talk to your doctor about a weight loss plan.  4. If you smoke, you must quit. Smoking worsens kidney disease. Talk to your doctor about ways to help you quit.  For more information, visit the following  links:  ¨ www.[employer].gov/sites/default/files/pdf/clearing-the-air-accessible.pdf  ¨ www.smokefree.gov  ¨ www.cancer.org/healthy/stayawayfromtobacco/guidetoquittingsmoking/  5. Most patients with chronic renal failure need to follow a special diet.  Be sure you understand yours. In general, you will need to restrict protein, salt, potassium, and phosphorus. You also need to limit fluid intake. A calcium supplement may be prescribed to protect your bones from osteoporosis.  6. CKD is a risk factor for heart disease. Talk to your doctor about any other risk factors you might have and what you can do to lessen them.  7. Talk to your doctor about any medicines you are taking to determine if they need to be reduced or stopped.  8. Avoid the following over-the-counter medicines, or consult your doctor before using:  ¨ Aspirin and nonsteroidal anti-inflammatory drugs (NSAIDs) such as ibuprofen or naproxen (short-term use of acetaminophen for fever or pain is okay)  ¨ Laxatives and antacids containing magnesium or aluminum  ¨ Fleet or phospho soda enemas containing phosphorus  ¨ Certain stomach acid-blocking medicine such as cimetidine or ranitidine   ¨ Decongestants containing pseudoephedrine   ¨ Herbal supplements  Follow-up care  Follow up with your doctor or as advised by our staff. Contact one of the following for more information:  · American Association of Kidney Patients (053) 183-3939 www.aakp.org  · National Kidney Foundation (319) 768-8823 www.kidney.org  · American Kidney Fund (460) 720-8098 www.kidneyfund.org  · National Kidney Disease Education Program (649) 4IJDNUW www.nkdep.nih.gov  [NOTE: If an X-ray, EKG (cardiogram), or other diagnostic test was taken, another specialist will review it. You will be notified of any new findings that may affect your care.]  When to seek medical care  Get prompt medical attention or contact your doctor if any of the following occur:  · Nausea or vomiting  · Fever greater  than 100.4°F (38°C)  · Severe weakness, dizziness, fainting, drowsiness, or confusion  · Chest pain or shortness of breath  · Unexpected weight gain or swelling in the legs, ankles, or around the eyes  · Heart beating fast, slow, or irregularly  · Decrease or absent urine output  © [id number] [employer]. [address] [place] [address], [place] [address]. All rights reserved. This information is not intended as a substitute for professional medical care. Always follow your healthcare professional's instructions.          For Caregivers: Daily Care for Dementia Patients  Over time, people with dementia will need more and more help with daily tasks. These include eating meals, taking medicines, and getting enough exercise. They also include personal care needs, such as bathing and dressing. To reduce stress, make these activities part of a routine. Ask family and friends to lend a hand. And be aware that your loved ones abilities can change from day to day. If you have problems meeting your loved ones needs, its time to get help. Talk to a  or local support agency--such as a local Alzheimers Association chapter.      Gardening can be a pleasant way to keep your loved one active.   Activity and exercise  Regular activity is good for your loved ones body and mind. It may even help slow the progression of the disease. Keep to your loved ones old routines when possible. It also helps to:  · Do things together. Go for a walk, garden, or bake a cake. Basic, repetitive activities are good choices.  · Be active as often as possible. This releases pent-up energy, which can reduce restlessness and improve sleep.  · Include social activities. Take your loved one to see friends and family. But try to keep things simple. Loud noises, crowds, or too many people talking at once can be upsetting.  Taking medicines  Be sure all prescribed medicines are taken as directed. These tips can help:  · Provide  supervision if your loved one cannot safely take medicines alone.  · Set a routine so medicines are taken at the same time each day.  · Ensure that ALL medicines are taken. A pillbox can help you keep track.  · Plan ahead. Be sure to refill prescriptions before they run out.  Eating meals  At mealtime, serve healthy foods with plenty of fluids. These tips can also help:  · Keep meals simple. Too many choices can be overwhelming. Try to maintain a calm, quiet atmosphere while you eat.  · Place healthy snacks, such as fresh fruit, out where they can be seen.  · Watch eating habits. People with dementia may eat too little or too much. Talk to the healthcare provider if you have concerns.  · Try finger foods if regular meals become too difficult for your loved one to eat.  Dressing  People with dementia may have trouble choosing what to wear. Its OK if clothes dont always match. But if help is needed:  · Choose clothing that is easy to put on and take off. Use Velcro shoes or slippers.  · Lay out a fresh outfit each day. Place clothes in the order they should be put on.  · If more help is needed, hand over clothing items one at a time. Explain how each item should be put on.  · Put dirty clothes away so theyre not worn again.  Bathing and grooming  Getting your loved one to bathe can be a real challenge. Try these tips:  · Treat bathing as a routine activity. But be flexible. A daily bath is probably unrealistic.  · Prepare bath items ahead of time, and be sure to test the water temperature.  · Avoid leaving your loved one alone in the bath or shower.             · Try visiting a barbershop or [employer] salon for help with hair washing, hair styling, and shaving.  Using the toilet  In later stages, dementia patients may develop incontinence (trouble controlling the bladder or bowel). To ease problems:  · Set a routine for using the toilet (for example, every 3 hours) and stick to it.  · Limit beverages before bedtime to  prevent accidents. A bedside commode may also help.  · Be understanding if accidents happen. Your loved one may be as upset as you.  · At one point it may be necessary to have them wear an adult diaper.    · Talk to a healthcare provider if incontinence develops suddenly. It may signal other health issues that can be treated.  When to call the healthcare provider  Call the healthcare provider if you notice a sudden change in your loved ones behavior or emotions. These changes may be due to dementia. But they could also signal other health problems that can be treated.   NOTE:This sheet is a summary. It may not cover all possible information. If you have questions about this medicine, talk to your doctor, pharmacist, or health care provider. Copyright© 2016 [employer]          For Caregivers: Safety Tips for Dementia Patients  The symptoms of dementia can sometimes lead to unsafe situations. Areas of special concern include driving and wandering away from home. You may also need to make changes in your loved ones living space. These can help protect your loved one even when you arent around.     Prevent driving by keeping your loved ones car keys in a secure place.   Discourage driving  Driving is often not safe for a person with dementia. It may even be illegal. Ask the healthcare provider whether its safe for your loved one to drive. If safety is in question, use these tips to prevent him or her from getting behind the wheel:  · Limit access to the car. Keep the keys with you or lock them away.  · Ask an authority figure, such as a healthcare provider or , to tell your loved one not to drive.  · Ask your healthcare provider about contacting the Department of Motor Vehicles.  Watch for wandering  People with dementia may wander away from the house and get lost. To keep your loved one safer, try these tips:  · Have your loved one wear an ID bracelet at all times. You can also enroll your loved  one in the Alzheimers Associations Safe Return program.  · Install door chimes so you know when exterior doors are being opened.  · Ask neighbors to call you if they see your loved one out alone.  · Go with your loved one if he or she insists on leaving the house. Dont argue or yell. Instead, use distraction or gentle hints to get him or her to return home.  · People with dementia often have a reversed sleep-wake cycle, meaning that they can be up all night and wander away when you least expect it.    Make living spaces safe  Keep your loved one safe by simplifying his or her living space. This means reducing clutter and removing hazards. You may also want to get advice from an occupational therapist (home safety expert). Keep in mind that some changes may not be needed right away. Focus on major safety concerns first.  In living spaces  Suggested safety steps include:  · Install smoke alarms and nightlights.  · Display emergency numbers and the home address near all phones.  · Install an answering machine so important messages arent missed.  · Reduce tripping hazards. Move electrical and phone cords out of the way. Place colored tape on the edges of steps.  In the bathroom  Suggested safety steps include:  · Store hair dryers, razors, and curling irons in a secure area.  · Remove poisons, such as  and nail polish remover.  · Keep medicines in a secure area, not in the medicine cabinet.  · Remove inside door locks so your loved one doesnt get locked inside.  In the kitchen  Suggested safety steps include:  · Unplug toasters and other appliances when not in use.  · Limit access to alcoholic beverages. These can make symptoms much worse.  · Remove or cover knobs on stoves and other appliances.  · Check food for spoilage. Your loved one may not know when food has gone bad.  Other areas of the home  Suggested safety steps include:  · Lock up hazardous substances, such as bleach, pesticides, and paint  thinners.  · Keep pool or hot tub areas closed off.  · Set the hot water heater below 120°F (48.8°C).  · Keep a spare key outside the house in case your loved one locks you out.  Prevent fraud  People with dementia may be easy prey for dishonest salespeople or money scams. Try placing a No Solicitations sign on your loved ones front door. Add his or her phone number to the [employer]s Do Not Call list ([phone]). You should also limit access to credit cards and cash.  Can my loved one live alone?  Early on, people with dementia can often handle daily tasks with little or no help. At some point, though, it will no longer be safe for them to be on their own. The timing for this is different for each person. But problems such as forgetting to eat, bathe, or take medicines can all be signs that more supervision is needed. If you have concerns, be sure to talk with your loved ones healthcare provider.      © 0809-2714 [employer]. [address] [place] [address], [place] [address]. All rights reserved. This information is not intended as a substitute for professional medical care. Always follow your healthcare professional's instructions.

## 2017-02-02 VITALS
OXYGEN SATURATION: 100 % | RESPIRATION RATE: 18 BRPM | DIASTOLIC BLOOD PRESSURE: 99 MMHG | HEIGHT: 74 IN | HEART RATE: 82 BPM | TEMPERATURE: 99 F | WEIGHT: 220 LBS | SYSTOLIC BLOOD PRESSURE: 180 MMHG | BODY MASS INDEX: 28.23 KG/M2

## 2017-02-02 NOTE — ED NOTES
MD Hadley notified of BP. Pt states that he did not take his nightly BP medicine. Pt educated to take nightly BP medicine when he gets home. MD Hadley states to continue with discharge.

## 2017-02-02 NOTE — DISCHARGE INSTRUCTIONS
Chronic Kidney Disease (CKD)    The role of the kidneys is to remove waste products and excess water from the blood.  When the kidneys do not function normally and waste products begin to build up in the blood, this is called chronic kidney disease (CKD). CKD is defined as the presence of kidney damage or a decrease in kidney function lasting 3 months or more. CKD allows excess water, waste, and toxic substances to build up in the body. This can eventually become life-threatening, requiring dialysis or a kidney transplant to stay alive. This most severe form is called end stage renal disease or ESRD.  Diabetes is the leading causes of chronic renal failure. Other causes include high blood pressure, hardening of the arteries (atherosclerosis), lupus, inflammation of the blood vessels (vasculitis), prior viral and bacterial infections, and others. Certain over-the-counter pain medicines can cause renal failure when taken often over a long period of time. These include aspirin, ibuprofen, and related anti-inflammatory medicines called NSAIDs (nonsteroidal anti-inflammatory drugs).  Home care  The following guidelines will help you care for yourself at home:  1. If you have diabetes, talk to your doctor about the quality of your blood sugar control and any adjustments needed to your diet, lifestyle, or medicines.  2. If you have high blood pressure:  ¨ Take prescribed medicine to lower your blood pressure to the recommended goal of less than 130/80.  ¨ Take up a regular exercise program that you enjoy. Check with your doctor to be sure your planned exercise program is right for you.  ¨ Reduce your salt (sodium) intake. Your doctor can tell you how much salt per day is safe for you.  3. If you are overweight, talk to your doctor about a weight loss plan.  4. If you smoke, you must quit. Smoking worsens kidney disease. Talk to your doctor about ways to help you quit.  For more information, visit the following  links:  ¨ www.[employer].gov/sites/default/files/pdf/clearing-the-air-accessible.pdf  ¨ www.smokefree.gov  ¨ www.cancer.org/healthy/stayawayfromtobacco/guidetoquittingsmoking/  5. Most patients with chronic renal failure need to follow a special diet.  Be sure you understand yours. In general, you will need to restrict protein, salt, potassium, and phosphorus. You also need to limit fluid intake. A calcium supplement may be prescribed to protect your bones from osteoporosis.  6. CKD is a risk factor for heart disease. Talk to your doctor about any other risk factors you might have and what you can do to lessen them.  7. Talk to your doctor about any medicines you are taking to determine if they need to be reduced or stopped.  8. Avoid the following over-the-counter medicines, or consult your doctor before using:  ¨ Aspirin and nonsteroidal anti-inflammatory drugs (NSAIDs) such as ibuprofen or naproxen (short-term use of acetaminophen for fever or pain is okay)  ¨ Laxatives and antacids containing magnesium or aluminum  ¨ Fleet or phospho soda enemas containing phosphorus  ¨ Certain stomach acid-blocking medicine such as cimetidine or ranitidine   ¨ Decongestants containing pseudoephedrine   ¨ Herbal supplements  Follow-up care  Follow up with your doctor or as advised by our staff. Contact one of the following for more information:  · American Association of Kidney Patients (629) 970-2510 www.aakp.org  · National Kidney Foundation (074) 033-5675 www.kidney.org  · American Kidney Fund (753) 920-3918 www.kidneyfund.org  · National Kidney Disease Education Program (263) 4WLJHZV www.nkdep.nih.gov  [NOTE: If an X-ray, EKG (cardiogram), or other diagnostic test was taken, another specialist will review it. You will be notified of any new findings that may affect your care.]  When to seek medical care  Get prompt medical attention or contact your doctor if any of the following occur:  · Nausea or vomiting  · Fever greater  than 100.4°F (38°C)  · Severe weakness, dizziness, fainting, drowsiness, or confusion  · Chest pain or shortness of breath  · Unexpected weight gain or swelling in the legs, ankles, or around the eyes  · Heart beating fast, slow, or irregularly  · Decrease or absent urine output  © [id number] [employer]. [address] [place] [address]. All rights reserved. This information is not intended as a substitute for professional medical care. Always follow your healthcare professional's instructions.          Bacteremia, Suspected (Adult)  Your fever today is probably due to a viral illness. However, sometimes fever can be an early sign of a more serious bacterial infection. Bacteremia is a bacterial infection that has spread to the bloodstream. This is serious because it can spread to other organs (including the kidneys, brain, and lungs).  Your healthcare provider will perform tests (cultures) to check for bacteremia. Until the test results are known you should watch for the signs listed below.  Causes  Bacteremia usually starts with a typical infection, but it then spreads to the blood. Almost any type of infection can cause bacteremia, including a urinary tract infection, skin infection, gastrointestinal problem, surgical complication, or pneumonia.  Symptoms  At first symptoms may seem like any typical infection or illness, but then they worsen. Symptoms of bacteremia can include:  · Fever and chills  · Loss of appetite  · Nausea or vomiting  · Trouble breathing or fast breathing  · Fast heart rate  · Feeling lightheaded or faint  · Skin rashes or blotches  Home care  Follow these guidelines when caring for yourself at home.  · Rest at home for the first 2 to 3 days. When resuming activity, don't let yourself become overly tired.  · You can take acetaminophen or ibuprofen for pain, unless you were given a different pain medicine to use. (Note: If you have chronic liver or kidney disease or have  ever had a stomach ulcer or gastrointestinal bleeding, talk with your healthcare provider before using these medicines. Also talk to your provider if you are taking medicine to prevent blood clots.) Aspirin should never be given to anyone younger than 18 years of age who is ill with a viral infection or fever. It may cause severe liver or brain damage.  · If you were given antibiotics, take them until they are used up, or your healthcare provider tells you to stop. It is important to finish the antibiotics even though you feel better. This is to make sure the infection has cleared.  · Your appetite may be poor, so a light diet is fine. Avoid dehydration by drinking 6 to 8 glasses of fluid per day (such as water, soft drinks, sports drinks, juices, tea, or soup).  Follow-up care  Follow up with your healthcare provider, or as advised.  · If a culture was done, you will be notified if your treatment needs to be changed. You can call as directed for the results.  · If X-rays, a CT, or an ultrasound were done, a specialist will review them. You will be notified of any findings that may affect your care.  Call 911  Contact emergency services right away if any of these occur:  · Trouble breathing or swallowing, or wheezing  · Chest pain  · Confusion or sudden change in behavior  · Extreme drowsiness or trouble awakening  · Fainting or loss of consciousness  · Rapid heart rate  · Low blood pressure  · Vomiting blood, or large amounts of blood in stool  · Seizure  When to seek medical advice  Call your health care provider right away if any of these occur:  · Cough with lots of colored sputum (mucus), or blood in your sputum  · Severe headache  · Severe face, neck, throat, or ear pain  · Pain in the right lower abdomen  · Weakness, dizziness, repeated vomiting, or diarrhea  · Joint pain or a new rash  · Burning when urinating  · Fever of 100.4°F (38°C) or higher  © 7089-9928 [employer]. [address] [place] [address]  Road, [place] [address]. All rights reserved. This information is not intended as a substitute for professional medical care. Always follow your healthcare professional's instructions.

## 2017-02-02 NOTE — ED NOTES
Pt is resting in bed. NAD noted. AAO x 3. Bed in locked and low position, side rails up x 2, call light within reach, will continue to monitor.

## 2017-02-02 NOTE — ED PROVIDER NOTES
SCRIBE #1 NOTE: I, Chaparro Franklin, am scribing for, and in the presence of, Hans Hadley Jr., MD. I have scribed the entire note.      History      Chief Complaint   Patient presents with    Abnormal Lab     called and was told to go back to ER for + blood cultures       Review of patient's allergies indicates:   Allergen Reactions    Benadryl decongestant Itching     Nothing with benadryl         HPI   HPI    2/1/2017, 9:51 PM   History obtained from the patient      History of Present Illness: David Hadley is a 61 y.o. male patient who presents to the Emergency Department for positive blood culture. Pt was seen here yesterday for fatigue and instructed to f/u with [facility]. Pt's blood culture came back today positive for Staph and pt was called by ROSENDO Cotton to [facility] to be re-evaluated. Pt has no complaints of pain or sxs at this time. Pt also reports he is seen by Dr. Guardado (Nephrology) in [place]. No mitigating or exacerbating factors reported. No associated sxs reported. Patient denies any fever, chills, fatigue, CP, SOB, N/V/D, dysuria, weakness/numbness, dizziness, and all other sxs at this time. No further complaints or concerns at this time.       Arrival mode: Personal vehicle     PCP: Isaac Fitch MD       Past Medical History:  Past Medical History   Diagnosis Date    After-cataract, unspecified - Left Eye 11/27/2013    Allergy     Arthritis     Cancer     CHF (congestive heart failure)     Chronic kidney disease      chemo/ dialias    Diabetes mellitus     Hypertension     Myocardial infarction        Past Surgical History:  Past Surgical History   Procedure Laterality Date    Cataract extraction      Coronary artery bypass graft  3-2014         Family History:  Family History   Problem Relation Age of Onset    Colon cancer Sister     Cancer Brother      colon    Pancreatic cancer Brother     Prostate cancer Father     Glaucoma Mother        Social History:  Social History      Social History Main Topics    Smoking status: Former Smoker     Types: Cigarettes     Quit date: 1/28/2000    Smokeless tobacco: Never Used    Alcohol use No    Drug use: No    Sexual activity: Not Currently     Birth control/ protection: None       ROS   Review of Systems   Constitutional: Negative for chills and fever.   HENT: Negative for sore throat.    Respiratory: Negative for shortness of breath.    Cardiovascular: Negative for chest pain.   Gastrointestinal: Negative for nausea.   Genitourinary: Negative for dysuria.   Musculoskeletal: Negative for back pain.   Skin: Negative for rash.   Neurological: Negative for dizziness, weakness and numbness.   Hematological: Does not bruise/bleed easily.   All other systems reviewed and are negative.    Physical Exam    Initial Vitals   BP Pulse Resp Temp SpO2   02/01/17 2029 02/01/17 2029 02/01/17 2029 02/01/17 2029 02/01/17 2029   144/76 97 20 99.1 °F (37.3 °C) 98 %      Physical Exam  Nursing Notes and Vital Signs Reviewed.  Constitutional: Patient is in no acute distress. Awake and alert. Well-developed and well-nourished.  Head: Atraumatic. Normocephalic.  Eyes: PERRL. EOM intact. Conjunctivae are not pale. No scleral icterus.  ENT: Mucous membranes are moist. Oropharynx is clear and symmetric.    Neck: Supple. Full ROM. No lymphadenopathy.  Cardiovascular: Regular rate. Regular rhythm. No murmurs, rubs, or gallops. Distal pulses are 2+ and symmetric.  Pulmonary/Chest: No respiratory distress. Clear to auscultation bilaterally. No wheezing, rales, or rhonchi.  Abdominal: Soft and non-distended.  There is no tenderness.  No rebound, guarding, or rigidity.  Good bowel sounds.    Musculoskeletal: Moves all extremities. No obvious deformities. No edema. No calf tenderness.  LUE: AV graft to LUE with positive bruit.   Skin: Warm and dry.  Neurological:  Alert, awake, and appropriate.  Normal speech.  No acute focal neurological deficits are  "appreciated.  Psychiatric: Normal affect. Good eye contact. Appropriate in content.    ED Course    Procedures  ED Vital Signs:  Vitals:    02/01/17 2029 02/01/17 2230   BP: (!) 144/76 (!) 175/80   Pulse: 97 80   Resp: 20 18   Temp: 99.1 °F (37.3 °C)    TempSrc: Oral    SpO2: 98% 99%   Weight: 99.8 kg (220 lb)    Height: 6' 2" (1.88 m)        Abnormal Lab Results:  Labs Reviewed   BASIC METABOLIC PANEL - Abnormal; Notable for the following:        Result Value    Sodium 133 (*)     Chloride 93 (*)     Glucose 136 (*)     BUN, Bld 41 (*)     Creatinine 9.0 (*)     eGFR if  6 (*)     eGFR if non  6 (*)     All other components within normal limits   CBC W/ AUTO DIFFERENTIAL - Abnormal; Notable for the following:     RBC 3.45 (*)     Hemoglobin 10.5 (*)     Hematocrit 31.9 (*)     RDW 18.0 (*)     Platelets 125 (*)     Lymph # 0.8 (*)     Mono # 1.1 (*)     Lymph% 12.0 (*)     Mono% 15.2 (*)     All other components within normal limits   LACTIC ACID, PLASMA        All Lab Results:  Results for orders placed or performed during the hospital encounter of 02/01/17   Basic metabolic panel   Result Value Ref Range    Sodium 133 (L) 136 - 145 mmol/L    Potassium 4.5 3.5 - 5.1 mmol/L    Chloride 93 (L) 95 - 110 mmol/L    CO2 26 23 - 29 mmol/L    Glucose 136 (H) 70 - 110 mg/dL    BUN, Bld 41 (H) 8 - 23 mg/dL    Creatinine 9.0 (H) 0.5 - 1.4 mg/dL    Calcium 9.1 8.7 - 10.5 mg/dL    Anion Gap 14 8 - 16 mmol/L    eGFR if African American 6 (A) >60 mL/min/1.73 m^2    eGFR if non African American 6 (A) >60 mL/min/1.73 m^2   Lactic acid, plasma   Result Value Ref Range    Lactate (Lactic Acid) 0.8 0.5 - 2.2 mmol/L   CBC auto differential   Result Value Ref Range    WBC 6.93 3.90 - 12.70 K/uL    RBC 3.45 (L) 4.60 - 6.20 M/uL    Hemoglobin 10.5 (L) 14.0 - 18.0 g/dL    Hematocrit 31.9 (L) 40.0 - 54.0 %    MCV 93 82 - 98 fL    MCH 30.4 27.0 - 31.0 pg    MCHC 32.9 32.0 - 36.0 %    RDW 18.0 (H) 11.5 - 14.5 " %    Platelets 125 (L) 150 - 350 K/uL    MPV 10.4 9.2 - 12.9 fL    Gran # 5.0 1.8 - 7.7 K/uL    Lymph # 0.8 (L) 1.0 - 4.8 K/uL    Mono # 1.1 (H) 0.3 - 1.0 K/uL    Eos # 0.0 0.0 - 0.5 K/uL    Baso # 0.00 0.00 - 0.20 K/uL    Gran% 72.5 38.0 - 73.0 %    Lymph% 12.0 (L) 18.0 - 48.0 %    Mono% 15.2 (H) 4.0 - 15.0 %    Eosinophil% 0.3 0.0 - 8.0 %    Basophil% 0.0 0.0 - 1.9 %    Differential Method Automated                 The Emergency Provider reviewed the vital signs and test results, which are outlined above.    ED Discussion     10:15 PM: Dr. Hadley discussed pt's case with Rinku Guardado MD ([facility]) who recommends giving a dose of Vancomycin here in the ED and will dialyze patient tomorrow.     11:27 PM: Reassessed pt at this time. Pt is AAO x3, VSS, shows no signs of sepsis, and is in NAD. Pt's lactic acid and WBC lab results were within normal limits. Discussed with pt all pertinent ED information and results. Discussed pt dx of  and plan of tx. Gave pt all f/u and return to the ED instructions. All questions and concerns were addressed at this time. Pt expresses understanding of information and instructions, and is comfortable with plan to discharge. Pt is stable for discharge.      I discussed with patient and/or family/caretaker that evaluation in the ED does not suggest any emergent or life threatening medical conditions requiring immediate intervention beyond what was provided in the ED, and I believe patient is safe for discharge.  Regardless, an unremarkable evaluation in the ED does not preclude the development or presence of a serious of life threatening condition. As such, patient was instructed to return immediately for any worsening or change in current symptoms.  ED Medication(s):  Medications   vancomycin (VANCOCIN) 1,250 mg in dextrose 5 % 250 mL IVPB (1,250 mg Intravenous New Bag 2/1/17 1350)       New Prescriptions    No medications on file       Follow-up Information     Go to Rinku Sarmiento,  MD.    Specialty:  Nephrology    Why:  Go to dialysis in [facility] as previously scheduled justin Guardado office that you recievbed a dose of vancomycin here tonight    Contact information:    [address]  [facility] [place] [place] [facility] [address]  [phone]              Medical Decision Making    Medical Decision Making:   Clinical Tests:   Lab Tests: Ordered and Reviewed           Scribe Attestation:   Scribe #1: I performed the above scribed service and the documentation accurately describes the services I performed. I attest to the accuracy of the note.    Attending:   Physician Attestation Statement for Scribe #1: I, Hans Hadley Jr., MD, personally performed the services described in this documentation, as scribed by Chaparro Franklin, in my presence, and it is both accurate and complete.          Clinical Impression       ICD-10-CM ICD-9-CM   1. ESRD (end stage renal disease) on dialysis N18.6 585.6    Z99.2 V45.11   2. Bacteremia R78.81 790.7       Disposition:   Disposition: Discharged  Condition: Stable         Hans Hadley Jr., MD  02/02/17 0104

## 2017-02-03 LAB
BACTERIA BLD CULT: NORMAL

## 2017-04-03 ENCOUNTER — HOSPITAL ENCOUNTER (INPATIENT)
Facility: HOSPITAL | Age: 62
LOS: 11 days | DRG: 871 | End: 2017-04-14
Attending: EMERGENCY MEDICINE | Admitting: INTERNAL MEDICINE
Payer: MEDICARE

## 2017-04-03 DIAGNOSIS — E87.5 HYPERKALEMIA: ICD-10-CM

## 2017-04-03 DIAGNOSIS — N18.6 ESRD (END STAGE RENAL DISEASE) ON DIALYSIS: Primary | ICD-10-CM

## 2017-04-03 DIAGNOSIS — Z86.39 HISTORY OF DIABETES MELLITUS, TYPE II: ICD-10-CM

## 2017-04-03 DIAGNOSIS — E11.9 TYPE 2 DIABETES MELLITUS WITHOUT COMPLICATION, UNSPECIFIED LONG TERM INSULIN USE STATUS: ICD-10-CM

## 2017-04-03 DIAGNOSIS — I38 ENDOCARDITIS: ICD-10-CM

## 2017-04-03 DIAGNOSIS — Z99.2 ENCOUNTER FOR EXTRACORPOREAL DIALYSIS: ICD-10-CM

## 2017-04-03 DIAGNOSIS — H35.373 BILATERAL EPIRETINAL MEMBRANE: ICD-10-CM

## 2017-04-03 DIAGNOSIS — I10 ESSENTIAL HYPERTENSION: ICD-10-CM

## 2017-04-03 DIAGNOSIS — H47.9 CORTICAL VISUAL IMPAIRMENT: ICD-10-CM

## 2017-04-03 DIAGNOSIS — H04.129 DRY EYE: ICD-10-CM

## 2017-04-03 DIAGNOSIS — Z12.11 COLON CANCER SCREENING: ICD-10-CM

## 2017-04-03 DIAGNOSIS — F03.90 DEMENTIA WITHOUT BEHAVIORAL DISTURBANCE, UNSPECIFIED DEMENTIA TYPE: ICD-10-CM

## 2017-04-03 DIAGNOSIS — C92.11 CML IN REMISSION: ICD-10-CM

## 2017-04-03 DIAGNOSIS — N25.81 SECONDARY HYPERPARATHYROIDISM, RENAL: ICD-10-CM

## 2017-04-03 DIAGNOSIS — R78.81 BACTEREMIA: ICD-10-CM

## 2017-04-03 DIAGNOSIS — H26.40: ICD-10-CM

## 2017-04-03 DIAGNOSIS — Z99.2 ESRD (END STAGE RENAL DISEASE) ON DIALYSIS: Primary | ICD-10-CM

## 2017-04-03 DIAGNOSIS — R78.81 MRSA BACTEREMIA: ICD-10-CM

## 2017-04-03 DIAGNOSIS — E78.5 HYPERLIPIDEMIA, UNSPECIFIED HYPERLIPIDEMIA TYPE: ICD-10-CM

## 2017-04-03 DIAGNOSIS — Z95.1 S/P CABG X 1: ICD-10-CM

## 2017-04-03 DIAGNOSIS — Z98.49: ICD-10-CM

## 2017-04-03 DIAGNOSIS — B95.62 MRSA BACTEREMIA: ICD-10-CM

## 2017-04-03 LAB
ALBUMIN SERPL BCP-MCNC: 3.1 G/DL
ALBUMIN SERPL BCP-MCNC: 3.3 G/DL
ALP SERPL-CCNC: 84 U/L
ALP SERPL-CCNC: 87 U/L
ALT SERPL W/O P-5'-P-CCNC: 7 U/L
ALT SERPL W/O P-5'-P-CCNC: 9 U/L
ANION GAP SERPL CALC-SCNC: 13 MMOL/L
APTT BLDCRRT: 32.7 SEC
AST SERPL-CCNC: 13 U/L
AST SERPL-CCNC: 13 U/L
BACTERIA #/AREA URNS HPF: ABNORMAL /HPF
BASOPHILS # BLD AUTO: 0.02 K/UL
BASOPHILS # BLD AUTO: 0.03 K/UL
BASOPHILS NFR BLD: 0.2 %
BASOPHILS NFR BLD: 0.4 %
BILIRUB SERPL-MCNC: 0.8 MG/DL
BILIRUB SERPL-MCNC: 0.9 MG/DL
BILIRUB UR QL STRIP: ABNORMAL
BUN SERPL-MCNC: 28 MG/DL
BUN SERPL-MCNC: 33 MG/DL
BUN SERPL-MCNC: 33 MG/DL
CALCIUM SERPL-MCNC: 8.8 MG/DL
CALCIUM SERPL-MCNC: 9 MG/DL
CALCIUM SERPL-MCNC: 9 MG/DL
CHLORIDE SERPL-SCNC: 95 MMOL/L
CHLORIDE SERPL-SCNC: 95 MMOL/L
CHLORIDE SERPL-SCNC: 96 MMOL/L
CLARITY UR: CLEAR
CO2 SERPL-SCNC: 25 MMOL/L
CO2 SERPL-SCNC: 26 MMOL/L
CO2 SERPL-SCNC: 26 MMOL/L
COLOR UR: YELLOW
CREAT SERPL-MCNC: 8.1 MG/DL
CREAT SERPL-MCNC: 8.7 MG/DL
CREAT SERPL-MCNC: 8.7 MG/DL
DIASTOLIC DYSFUNCTION: YES
DIFFERENTIAL METHOD: ABNORMAL
DIFFERENTIAL METHOD: ABNORMAL
EOSINOPHIL # BLD AUTO: 0 K/UL
EOSINOPHIL # BLD AUTO: 0 K/UL
EOSINOPHIL NFR BLD: 0.1 %
EOSINOPHIL NFR BLD: 0.1 %
ERYTHROCYTE [DISTWIDTH] IN BLOOD BY AUTOMATED COUNT: 18.2 %
ERYTHROCYTE [DISTWIDTH] IN BLOOD BY AUTOMATED COUNT: 18.4 %
EST. GFR  (AFRICAN AMERICAN): 7 ML/MIN/1.73 M^2
EST. GFR  (NON AFRICAN AMERICAN): 6 ML/MIN/1.73 M^2
ESTIMATED PA SYSTOLIC PRESSURE: 24.72
GLOBAL PERICARDIAL EFFUSION: ABNORMAL
GLUCOSE SERPL-MCNC: 115 MG/DL
GLUCOSE SERPL-MCNC: 115 MG/DL
GLUCOSE SERPL-MCNC: 80 MG/DL
GLUCOSE UR QL STRIP: NEGATIVE
HCT VFR BLD AUTO: 40.7 %
HCT VFR BLD AUTO: 40.8 %
HGB BLD-MCNC: 13.1 G/DL
HGB BLD-MCNC: 13.2 G/DL
HGB UR QL STRIP: ABNORMAL
HYALINE CASTS #/AREA URNS LPF: 0 /LPF
INR PPP: 1.2
KETONES UR QL STRIP: NEGATIVE
LEUKOCYTE ESTERASE UR QL STRIP: ABNORMAL
LYMPHOCYTES # BLD AUTO: 0.6 K/UL
LYMPHOCYTES # BLD AUTO: 1.2 K/UL
LYMPHOCYTES NFR BLD: 12.5 %
LYMPHOCYTES NFR BLD: 7.7 %
MAGNESIUM SERPL-MCNC: 2 MG/DL
MCH RBC QN AUTO: 28.5 PG
MCH RBC QN AUTO: 28.8 PG
MCHC RBC AUTO-ENTMCNC: 32.1 %
MCHC RBC AUTO-ENTMCNC: 32.4 %
MCV RBC AUTO: 89 FL
MCV RBC AUTO: 89 FL
MICROSCOPIC COMMENT: ABNORMAL
MONOCYTES # BLD AUTO: 0.6 K/UL
MONOCYTES # BLD AUTO: 0.9 K/UL
MONOCYTES NFR BLD: 8.1 %
MONOCYTES NFR BLD: 9.4 %
NEUTROPHILS # BLD AUTO: 6.1 K/UL
NEUTROPHILS # BLD AUTO: 7.2 K/UL
NEUTROPHILS NFR BLD: 77.8 %
NEUTROPHILS NFR BLD: 83.7 %
NITRITE UR QL STRIP: NEGATIVE
PH UR STRIP: 7 [PH] (ref 5–8)
PHOSPHATE SERPL-MCNC: 3.7 MG/DL
PLATELET # BLD AUTO: 160 K/UL
PLATELET # BLD AUTO: 183 K/UL
PMV BLD AUTO: 10 FL
PMV BLD AUTO: 10 FL
POTASSIUM SERPL-SCNC: 4.5 MMOL/L
POTASSIUM SERPL-SCNC: 4.5 MMOL/L
POTASSIUM SERPL-SCNC: 4.9 MMOL/L
PROT SERPL-MCNC: 8.4 G/DL
PROT SERPL-MCNC: 8.7 G/DL
PROT UR QL STRIP: ABNORMAL
PROTHROMBIN TIME: 12.7 SEC
RBC # BLD AUTO: 4.58 M/UL
RBC # BLD AUTO: 4.59 M/UL
RBC #/AREA URNS HPF: 2 /HPF (ref 0–4)
RETIRED EF AND QEF - SEE NOTES: 45 (ref 55–65)
SODIUM SERPL-SCNC: 134 MMOL/L
SP GR UR STRIP: 1.01 (ref 1–1.03)
TRICUSPID VALVE REGURGITATION: ABNORMAL
URN SPEC COLLECT METH UR: ABNORMAL
UROBILINOGEN UR STRIP-ACNC: 1 EU/DL
WBC # BLD AUTO: 7.31 K/UL
WBC # BLD AUTO: 9.19 K/UL
WBC #/AREA URNS HPF: 10 /HPF (ref 0–5)

## 2017-04-03 PROCEDURE — 83735 ASSAY OF MAGNESIUM: CPT

## 2017-04-03 PROCEDURE — 25000003 PHARM REV CODE 250: Performed by: NURSE PRACTITIONER

## 2017-04-03 PROCEDURE — 84100 ASSAY OF PHOSPHORUS: CPT

## 2017-04-03 PROCEDURE — 85730 THROMBOPLASTIN TIME PARTIAL: CPT

## 2017-04-03 PROCEDURE — 99285 EMERGENCY DEPT VISIT HI MDM: CPT | Mod: 25

## 2017-04-03 PROCEDURE — 96367 TX/PROPH/DG ADDL SEQ IV INF: CPT

## 2017-04-03 PROCEDURE — 63600175 PHARM REV CODE 636 W HCPCS: Performed by: EMERGENCY MEDICINE

## 2017-04-03 PROCEDURE — 36415 COLL VENOUS BLD VENIPUNCTURE: CPT

## 2017-04-03 PROCEDURE — 11000001 HC ACUTE MED/SURG PRIVATE ROOM

## 2017-04-03 PROCEDURE — 93306 TTE W/DOPPLER COMPLETE: CPT

## 2017-04-03 PROCEDURE — 25000003 PHARM REV CODE 250: Performed by: EMERGENCY MEDICINE

## 2017-04-03 PROCEDURE — 93306 TTE W/DOPPLER COMPLETE: CPT | Mod: 26,,, | Performed by: INTERNAL MEDICINE

## 2017-04-03 PROCEDURE — 85025 COMPLETE CBC W/AUTO DIFF WBC: CPT | Mod: 91

## 2017-04-03 PROCEDURE — 81000 URINALYSIS NONAUTO W/SCOPE: CPT

## 2017-04-03 PROCEDURE — 87040 BLOOD CULTURE FOR BACTERIA: CPT | Mod: 59

## 2017-04-03 PROCEDURE — 87077 CULTURE AEROBIC IDENTIFY: CPT

## 2017-04-03 PROCEDURE — 96365 THER/PROPH/DIAG IV INF INIT: CPT

## 2017-04-03 PROCEDURE — 80053 COMPREHEN METABOLIC PANEL: CPT

## 2017-04-03 PROCEDURE — 99223 1ST HOSP IP/OBS HIGH 75: CPT | Mod: ,,, | Performed by: INTERNAL MEDICINE

## 2017-04-03 PROCEDURE — 80053 COMPREHEN METABOLIC PANEL: CPT | Mod: 91

## 2017-04-03 PROCEDURE — 85610 PROTHROMBIN TIME: CPT

## 2017-04-03 PROCEDURE — 87186 SC STD MICRODIL/AGAR DIL: CPT

## 2017-04-03 RX ORDER — ATORVASTATIN CALCIUM 10 MG/1
20 TABLET, FILM COATED ORAL DAILY
Status: DISCONTINUED | OUTPATIENT
Start: 2017-04-04 | End: 2017-04-14 | Stop reason: HOSPADM

## 2017-04-03 RX ORDER — ONDANSETRON 2 MG/ML
4 INJECTION INTRAMUSCULAR; INTRAVENOUS EVERY 12 HOURS PRN
Status: DISCONTINUED | OUTPATIENT
Start: 2017-04-03 | End: 2017-04-14 | Stop reason: HOSPADM

## 2017-04-03 RX ORDER — GABAPENTIN 100 MG/1
100 CAPSULE ORAL 3 TIMES DAILY
Status: DISCONTINUED | OUTPATIENT
Start: 2017-04-03 | End: 2017-04-14 | Stop reason: HOSPADM

## 2017-04-03 RX ORDER — POLYETHYLENE GLYCOL 3350 17 G/17G
17 POWDER, FOR SOLUTION ORAL DAILY
Status: DISCONTINUED | OUTPATIENT
Start: 2017-04-04 | End: 2017-04-14 | Stop reason: HOSPADM

## 2017-04-03 RX ORDER — IBUPROFEN 200 MG
24 TABLET ORAL
Status: DISCONTINUED | OUTPATIENT
Start: 2017-04-03 | End: 2017-04-14 | Stop reason: HOSPADM

## 2017-04-03 RX ORDER — IBUPROFEN 200 MG
16 TABLET ORAL
Status: DISCONTINUED | OUTPATIENT
Start: 2017-04-03 | End: 2017-04-14 | Stop reason: HOSPADM

## 2017-04-03 RX ORDER — POLYETHYLENE GLYCOL 3350 17 G/17G
17 POWDER, FOR SOLUTION ORAL DAILY
Status: DISCONTINUED | OUTPATIENT
Start: 2017-04-04 | End: 2017-04-03 | Stop reason: SDUPTHER

## 2017-04-03 RX ORDER — LOSARTAN POTASSIUM 50 MG/1
50 TABLET ORAL DAILY
Status: DISCONTINUED | OUTPATIENT
Start: 2017-04-04 | End: 2017-04-12

## 2017-04-03 RX ORDER — AMOXICILLIN 250 MG
1 CAPSULE ORAL 2 TIMES DAILY
Status: DISCONTINUED | OUTPATIENT
Start: 2017-04-03 | End: 2017-04-14 | Stop reason: HOSPADM

## 2017-04-03 RX ORDER — ACETAMINOPHEN 325 MG/1
650 TABLET ORAL EVERY 8 HOURS PRN
Status: DISCONTINUED | OUTPATIENT
Start: 2017-04-03 | End: 2017-04-14 | Stop reason: HOSPADM

## 2017-04-03 RX ORDER — METOPROLOL TARTRATE 25 MG/1
25 TABLET, FILM COATED ORAL 2 TIMES DAILY
Status: DISCONTINUED | OUTPATIENT
Start: 2017-04-03 | End: 2017-04-14 | Stop reason: HOSPADM

## 2017-04-03 RX ORDER — PANTOPRAZOLE SODIUM 40 MG/1
40 TABLET, DELAYED RELEASE ORAL DAILY
Status: DISCONTINUED | OUTPATIENT
Start: 2017-04-04 | End: 2017-04-14 | Stop reason: HOSPADM

## 2017-04-03 RX ORDER — HEPARIN SODIUM 5000 [USP'U]/ML
5000 INJECTION, SOLUTION INTRAVENOUS; SUBCUTANEOUS EVERY 8 HOURS
Status: DISCONTINUED | OUTPATIENT
Start: 2017-04-03 | End: 2017-04-14 | Stop reason: HOSPADM

## 2017-04-03 RX ORDER — RIVASTIGMINE 4.6 MG/24H
1 PATCH, EXTENDED RELEASE TRANSDERMAL DAILY
Status: DISCONTINUED | OUTPATIENT
Start: 2017-04-03 | End: 2017-04-14 | Stop reason: HOSPADM

## 2017-04-03 RX ORDER — SERTRALINE HYDROCHLORIDE 50 MG/1
50 TABLET, FILM COATED ORAL DAILY
Status: DISCONTINUED | OUTPATIENT
Start: 2017-04-04 | End: 2017-04-14 | Stop reason: HOSPADM

## 2017-04-03 RX ORDER — GLUCAGON 1 MG
1 KIT INJECTION
Status: DISCONTINUED | OUTPATIENT
Start: 2017-04-03 | End: 2017-04-14 | Stop reason: HOSPADM

## 2017-04-03 RX ORDER — TRAZODONE HYDROCHLORIDE 50 MG/1
50 TABLET ORAL NIGHTLY PRN
Status: DISCONTINUED | OUTPATIENT
Start: 2017-04-03 | End: 2017-04-14 | Stop reason: HOSPADM

## 2017-04-03 RX ADMIN — TRAZODONE HYDROCHLORIDE 50 MG: 50 TABLET ORAL at 09:04

## 2017-04-03 RX ADMIN — RIVASTIGMINE TRANSDERMAL SYSTEM 1 PATCH: 4.6 PATCH, EXTENDED RELEASE TRANSDERMAL at 08:04

## 2017-04-03 RX ADMIN — CEFTAZIDIME 500 MG: 1 INJECTION, POWDER, FOR SOLUTION INTRAMUSCULAR; INTRAVENOUS at 02:04

## 2017-04-03 RX ADMIN — STANDARDIZED SENNA CONCENTRATE AND DOCUSATE SODIUM 1 TABLET: 8.6; 5 TABLET, FILM COATED ORAL at 09:04

## 2017-04-03 RX ADMIN — GABAPENTIN 100 MG: 100 CAPSULE ORAL at 09:04

## 2017-04-03 RX ADMIN — METOPROLOL TARTRATE 25 MG: 25 TABLET ORAL at 09:04

## 2017-04-03 RX ADMIN — HEPARIN SODIUM 5000 UNITS: 5000 INJECTION, SOLUTION INTRAVENOUS; SUBCUTANEOUS at 09:04

## 2017-04-03 RX ADMIN — VANCOMYCIN HYDROCHLORIDE 1500 MG: 100 INJECTION, POWDER, LYOPHILIZED, FOR SOLUTION INTRAVENOUS at 01:04

## 2017-04-03 NOTE — CONSULTS
Nephrology consult note:  Date of consult: 4/3/17  Reason for consult: ESRD on HD, being admitted for positive blood cx's  Referring physician: Dr. Maximilian Mendoza    David Hadley is a 61 y.o. male for whom nephrology consult has been requested to evaluate and give opinion.     HPI: Pt was seen and examined. H/o reviewed. Discussed with ER and with Dr. Choudhury (ID specialist). Discussed also with pt's wife on the phone. Pt is a 62 y/o male with ESRD on chronic HD q TTS, who was called in by Dr. Choudhury to come to ER to be admitted for positive blood cx's c/w GPC in clusters. Records were reviewed. Pt had MRSA on blood Cx in Jan/Feb 2017. Source is not known. No cough, no SOB, no diarrhea, no open wounds, no catheters, no wounds, no lesions or abscesses. No fever, no bone pain, no sx's at all. No tooth problems. Pt has been on HD about 10 years, and gets dialyzed regularly in [place], [place] (Dr. Rinku Guardado). His L UE AVF is not bothering him.    PAST MEDICAL HISTORY:  He  has a past medical history of After-cataract, unspecified - Left Eye (11/27/2013); Allergy; Arthritis; Cancer; CHF (congestive heart failure); ESRD on TTS HD, Dementia; Diabetes mellitus; Hypertension; and Myocardial infarction.    PAST SURGICAL HISTORY:  He  has a past surgical history that includes Cataract extraction and Coronary artery bypass graft (3-2014).    SOCIAL HISTORY:  He  reports that he quit smoking about 17 years ago. His smoking use included Cigarettes. He has never used smokeless tobacco. He reports that he does not drink alcohol or use illicit drugs.    FAMILY MEDICAL HISTORY:  His family history includes Cancer in his brother; Colon cancer in his sister; Glaucoma in his mother; Pancreatic cancer in his brother; Prostate cancer in his father.    Review of patient's allergies indicates:   Allergen Reactions    Benadryl decongestant Itching     Nothing with benadryl         ceftAZIDime (FORTAZ) IVPB  500 mg Intravenous Q24H    [START ON  "4/5/2017] vancomycin 1 g in dextrose 5 % 250 mL IVPB (ready to mix system)  1 g Intravenous Q48H       Prior to Admission medications    Medication Sig Start Date End Date Taking? Authorizing Provider   atorvastatin (LIPITOR) 20 MG tablet Take 1 tablet (20 mg total) by mouth once daily. 10/28/16  Yes Isaac Fitch MD   FOLIC ACID/VIT BCOMP,C (JOSAFAT-JANICE ORAL) Take by mouth.   Yes Historical Provider, MD   gabapentin (NEURONTIN) 100 MG capsule Take 1 capsule (100 mg total) by mouth 3 (three) times daily. 10/28/16 4/3/17 Yes Isaac Fitch MD   hydrocodone-acetaminophen 5-325mg (NORCO) 5-325 mg per tablet Take 1 tablet by mouth every 6 to 8 hours as needed for Pain. 10/28/16  Yes Isaac Fitch MD   losartan (COZAAR) 50 MG tablet Take by mouth. 1 tablet Oral Every day   Yes Historical Provider, MD   metoprolol tartrate (LOPRESSOR) 25 MG tablet Take 1 tablet (25 mg total) by mouth 2 (two) times daily. 6/29/16 6/29/17 Yes Isaac Fitch MD   rivastigmine (EXELON) 4.6 mg/24 hr PT24 Place 1 patch onto the skin once daily. 12/21/16 12/21/17 Yes Isaac Fitch MD   SENSIPAR 30 mg Tab Take 1 tablet by mouth once daily. 6/29/16  Yes Historical Provider, MD   sertraline (ZOLOFT) 50 MG tablet Take 1 tablet (50 mg total) by mouth once daily. 12/21/16 12/21/17 Yes Isaac Fitch MD   SPRYCEL 100 mg Tab Take 100 mg by mouth once daily.  4/20/15  Yes Historical Provider, MD   trazodone (DESYREL) 50 MG tablet Take 1 tablet (50 mg total) by mouth every evening. 10/28/16 10/28/17 Yes Isaac Fitch MD        REVIEW OF SYSTEMS:  Patient has no fever, fatigue, visual changes, chest pain, edema, cough, dyspnea, nausea, vomiting, constipation, diarrhea, arthralgias, pruritis, dizziness, weakness, depression, confusion.    No intake or output data in the 24 hours ending 04/03/17 1634    PHYSICAL EXAM:   height is 6' 2" (1.88 m) and weight is 97.1 kg (214 lb). His oral temperature is 97.4 °F (36.3 °C). His blood pressure is 153/94 (abnormal) and " his pulse is 66. His respiration is 18 and oxygen saturation is 98%.   Gen: WDWN male in no apparent distress  Psych: Normal mood and affect  Skin: No rashes or ulcers  Eyes: Normal conjunctiva and lids, PERRLA  ENT: Normal hearing   Neck: No JVD  Chest: Clear with no rales, rhonchi, wheezing with normal effort  CV: Regular with no murmurs, gallops or rubs  Abd: Soft, nontender, no distension, positive bowel sounds  Ext: No cyanosis, clubbing or edema  L UE AVF: + aneurysm, no erythema, + thrill, +bruits      Recent Labs  Lab 04/03/17  1200   *   K 4.9   CL 96   CO2 25   BUN 28*   CREATININE 8.1*   CALCIUM 8.8       Recent Labs  Lab 04/03/17  1200   WBC 9.19   HGB 13.2*   HCT 40.7        Outside Blood CX's: + for GPC in clusters    IMPRESSION AND RECOMMENDATIONS: 60 y/o male with a h/o of ESRD, on chronic HD, with recurrent positive blood cx's and no apparent, obvious source for the bacteremia:    1. Renal: ESRD pt, q TTS HD schedule in [place], [place].   normal  Not acidemic  Good O2 sat  No acute indications for HD today  Will provide routine HD tomorrow    2. Bacteremia: Positive blood cx's for GPC in clusters, likely Staph aureus  Had MRSA in blood Cx in Jan/Feb 2017  Source not known  No obvious wounds, no abscess, no foreign bodies or catheters, hear surgery done in 2015,   DDX: endocarditis  Discussed with Dr. Choudhury  Will do echocardiography  Vancomycin IV to keep 15-20 level    3. comorbidities noted: DM, HTN.  BP slightly elevated.  Meds reviewed  Will monitor closely after starting outpt meds.    Plans and recommendations:  As discussed above   Opportunity for questions and discussion provided.  Total time spent 70 minutes including time needed to review the records, the   patient evaluation, documentation, face-to-face discussion with the patient,   more than 50% of the time was spent on coordination of care and counseling.    Level V visit.    Leisa Coleman MD

## 2017-04-03 NOTE — ED PROVIDER NOTES
"SCRIBE #1 NOTE: I, Sea Dutta, am scribing for, and in the presence of, Maximilian Mendoza MD. I have scribed the entire note.      History      Chief Complaint   Patient presents with    General Illness     pts states Dr. Wyatt wanted him to come in for antibotics for an infection in his blood       Review of patient's allergies indicates:   Allergen Reactions    Benadryl decongestant Itching     Nothing with benadryl         HPI   HPI    4/3/2017, 11:37 AM   History obtained from the patient      History of Present Illness: David Hadley is a 61 y.o. male patient who presents to the Emergency Department for "for further evaluation of infection that was noticed in his blood today via Dr. Choudhury." Pt states he was sent here for IV abx via Dr. Choudhury. Pt is denying any sxs at this time. Pt states he is due for dialysis tomorrow. Patient denies any fever, N/V/D, chills, abd pain, CP, SOB, weakness/numbness, dizziness, lightheadedness, HA  and all other sxs at this time. No further complaints or concerns at this time.     Arrival mode: Personal vehicle     PCP: Isaac Fitch MD       Past Medical History:  Past Medical History:   Diagnosis Date    After-cataract, unspecified - Left Eye 11/27/2013    Allergy     Arthritis     Cancer     CHF (congestive heart failure)     Chronic kidney disease     chemo/ dialias    Dementia     Diabetes mellitus     Hypertension     Myocardial infarction        Past Surgical History:  Past Surgical History:   Procedure Laterality Date    CATARACT EXTRACTION      CORONARY ARTERY BYPASS GRAFT  3-2014         Family History:  Family History   Problem Relation Age of Onset    Colon cancer Sister     Cancer Brother      colon    Pancreatic cancer Brother     Prostate cancer Father     Glaucoma Mother        Social History:  Social History     Social History Main Topics    Smoking status: Former Smoker     Types: Cigarettes     Quit date: 1/28/2000    Smokeless tobacco: " Never Used    Alcohol use No    Drug use: No    Sexual activity: Not Currently     Birth control/ protection: None       ROS   Review of Systems   Constitutional: Negative for chills and fever.   HENT: Negative for congestion and sore throat.    Respiratory: Negative for chest tightness and shortness of breath.    Cardiovascular: Negative for chest pain.   Gastrointestinal: Negative for abdominal pain, nausea and vomiting.   Musculoskeletal: Negative for back pain and neck pain.   Skin: Negative for rash.   Neurological: Negative for dizziness, numbness and headaches.   Psychiatric/Behavioral: Negative for agitation and confusion.   All other systems reviewed and are negative.      Physical Exam    Initial Vitals   BP Pulse Resp Temp SpO2   04/03/17 1129 04/03/17 1129 04/03/17 1129 04/03/17 1129 04/03/17 1129   140/83 66 18 97.4 °F (36.3 °C) 94 %      Physical Exam  Nursing Notes and Vital Signs Reviewed.  Constitutional: Patient is in no apparent distress. Awake and alert. Well-developed and well-nourished.  Head: Atraumatic. Normocephalic.  Eyes: PERRL. EOM intact. Conjunctivae are not pale. No scleral icterus.  ENT: Mucous membranes are moist. Oropharynx is clear and symmetric.    Neck: Supple. Full ROM. No lymphadenopathy.  Cardiovascular: Regular rate. Regular rhythm. No murmurs, rubs, or gallops. Distal pulses are 2+ and symmetric.  Pulmonary/Chest: No respiratory distress. Clear to auscultation bilaterally. No wheezing, rales, or rhonchi.  Abdominal: Soft and non-distended.  There is no tenderness.  No rebound, guarding, or rigidity. Good bowel sounds.  Musculoskeletal: Moves all extremities. No obvious deformities. No edema. No calf tenderness. Shunt noted to the LUE with a positive thrill.  Skin: Warm and dry.  Neurological:  Alert, awake, and appropriate.  Normal speech.  No acute focal neurological deficits are appreciated.  Psychiatric: Normal affect. Good eye contact. Appropriate in content.    ED  "Course    Procedures  ED Vital Signs:  Vitals:    04/03/17 1129 04/03/17 1352   BP: (!) 140/83 (!) 153/94   Pulse: 66 66   Resp: 18    Temp: 97.4 °F (36.3 °C)    TempSrc: Oral    SpO2: (!) 94% 99%   Weight: 97.1 kg (214 lb)    Height: 6' 2" (1.88 m)        Abnormal Lab Results:  Labs Reviewed   CBC W/ AUTO DIFFERENTIAL - Abnormal; Notable for the following:        Result Value    RBC 4.58 (*)     Hemoglobin 13.2 (*)     RDW 18.2 (*)     Gran% 77.8 (*)     Lymph% 12.5 (*)     All other components within normal limits   COMPREHENSIVE METABOLIC PANEL - Abnormal; Notable for the following:     Sodium 134 (*)     BUN, Bld 28 (*)     Creatinine 8.1 (*)     Total Protein 8.7 (*)     Albumin 3.3 (*)     ALT 7 (*)     eGFR if  7 (*)     eGFR if non  6 (*)     All other components within normal limits        All Lab Results:  Results for orders placed or performed during the hospital encounter of 04/03/17   CBC auto differential   Result Value Ref Range    WBC 9.19 3.90 - 12.70 K/uL    RBC 4.58 (L) 4.60 - 6.20 M/uL    Hemoglobin 13.2 (L) 14.0 - 18.0 g/dL    Hematocrit 40.7 40.0 - 54.0 %    MCV 89 82 - 98 fL    MCH 28.8 27.0 - 31.0 pg    MCHC 32.4 32.0 - 36.0 %    RDW 18.2 (H) 11.5 - 14.5 %    Platelets 160 150 - 350 K/uL    MPV 10.0 9.2 - 12.9 fL    Gran # 7.2 1.8 - 7.7 K/uL    Lymph # 1.2 1.0 - 4.8 K/uL    Mono # 0.9 0.3 - 1.0 K/uL    Eos # 0.0 0.0 - 0.5 K/uL    Baso # 0.02 0.00 - 0.20 K/uL    Gran% 77.8 (H) 38.0 - 73.0 %    Lymph% 12.5 (L) 18.0 - 48.0 %    Mono% 9.4 4.0 - 15.0 %    Eosinophil% 0.1 0.0 - 8.0 %    Basophil% 0.2 0.0 - 1.9 %    Differential Method Automated    Comprehensive metabolic panel   Result Value Ref Range    Sodium 134 (L) 136 - 145 mmol/L    Potassium 4.9 3.5 - 5.1 mmol/L    Chloride 96 95 - 110 mmol/L    CO2 25 23 - 29 mmol/L    Glucose 80 70 - 110 mg/dL    BUN, Bld 28 (H) 8 - 23 mg/dL    Creatinine 8.1 (H) 0.5 - 1.4 mg/dL    Calcium 8.8 8.7 - 10.5 mg/dL    Total " Protein 8.7 (H) 6.0 - 8.4 g/dL    Albumin 3.3 (L) 3.5 - 5.2 g/dL    Total Bilirubin 0.9 0.1 - 1.0 mg/dL    Alkaline Phosphatase 87 55 - 135 U/L    AST 13 10 - 40 U/L    ALT 7 (L) 10 - 44 U/L    Anion Gap 13 8 - 16 mmol/L    eGFR if African American 7 (A) >60 mL/min/1.73 m^2    eGFR if non African American 6 (A) >60 mL/min/1.73 m^2                 The Emergency Provider reviewed the vital signs and test results, which are outlined above.    ED Discussion     11:48 AM: Re-evaluated pt. I have discussed test results, shared treatment plan, and the need for admission with patient and family at bedside. Pt and family express understanding at this time and agree with all information. All questions answered. Pt and family have no further questions or concerns at this time. Pt is ready for admit.    11:51 AM: Dr. Mendoza discussed the pt's case with Dr. Choudhury (Infectious disease) who recommends giving pt Fortaz and vancomycin and ordering an echo.    12:45 PM: Dr. Mendoza discussed the pt's case with Dr. Coleman (Nephrology) who agrees with plan of care and admission.    1:11 PM: Discussed case with Shayy Alexander ([facility], [facility]). Dr. Duarte agrees with current care and management of pt and accepts admission.   Admitting Service: Hospital medicine   Admitting Physician: Dr. Duarte  Admit to: Tele        ED Medication(s):  Medications   ceftAZIDime (FORTAZ) 500 mg in dextrose 5 % 50 mL IVPB (500 mg Intravenous New Bag 4/3/17 1437)   ondansetron injection 4 mg (not administered)   promethazine (PHENERGAN) 6.25 mg in dextrose 5 % 50 mL IVPB (not administered)   acetaminophen tablet 650 mg (not administered)   vancomycin (VANCOCIN) 1,500 mg in dextrose 5 % 250 mL IVPB (0 mg Intravenous Stopped 4/3/17 1433)       New Prescriptions    No medications on file             Medical Decision Making    Medical Decision Making:   Clinical Tests:   Lab Tests: Reviewed and Ordered           Scribe Attestation:   Scribe #1: I  performed the above scribed service and the documentation accurately describes the services I performed. I attest to the accuracy of the note.    Attending:   Physician Attestation Statement for Scribe #1: I, Maximilian Mendoza MD, personally performed the services described in this documentation, as scribed by Sea Dutta, in my presence, and it is both accurate and complete.          Clinical Impression       ICD-10-CM ICD-9-CM   1. ESRD (end stage renal disease) on dialysis N18.6 585.6    Z99.2 V45.11   2. Bacteremia R78.81 790.7       Disposition:   Disposition: Admitted (Tele)  Condition: Fair         Maximilian Mendoza MD  04/03/17 1442

## 2017-04-03 NOTE — CONSULTS
Clinical Pharmacy Consult Note: Vancomycin     Pharmacy consulted by Dr. Mendoza to dose vancomycin for treatment of bacteremia.   Estimated Creatinine Clearance: 10.9 mL/min (based on Cr of 8.1).   ESRD w/ HD scheduled for tomorrow, 4/4/17.     Ideal BW: 82.2 Kg <-- will use for dosing weight.   Actual BW: 97.1 Kg    WBC: 9.19   Tmax/24h: 97.4     Historical culture on 1/31/17:       Methicillin resistant staphylococcus aureus         CULTURE, BLOOD         Clindamycin >4  Resistant         Erythromycin >4  Resistant         Oxacillin >2  Resistant         Penicillin 8  Resistant         Tetracycline <=4  Sensitive         Trimeth/Sulfa <=0.5/9.5  Sensitive         Vancomycin 1  Sensitive       Based on Estimated Creatinine Clearance: 10.9 mL/min (based on Cr of 8.1). and weight of 82.2 Kg, pharmacy will initiate vancomycin pulse dosing per protocol.  A vancomycin 1000 mg IV every 48 hours placeholder dose has been entered.     Random level due 04/04/17 at 0430.     Pharmacy will continue to follow patients clinical status, renal function, C&S, and adjust dose as necessary to maintain trough levels between 15 and 20 mcg/mL.     Thank you for allowing us to participate in this patient's care.     Joan Juarez   4/3/2017 4:15 PM

## 2017-04-03 NOTE — H&P
"[facility] Hospital Medicine  History & Physical    Patient Name: David Hadley  MRN: [id number]  Admission Date: 4/3/2017  Attending Physician: Dr. Conner Duarte  Primary Care Provider: Isaac Fitch MD         Patient information was obtained from patient, past medical records and ER records.     Subjective:     Principal Problem:MRSA bacteremia    Chief Complaint:   Chief Complaint   Patient presents with    General Illness     pts states Dr. Wyatt wanted him to come in for antibotics for an infection in his blood        HPI: David Hadley is a 61 y.o. male patient with PMHx of ESRD on HD q TTS, HTN, HLP, who presented to the ER for c/o "for further evaluation of infection that was noticed in his blood today via Dr. Choudhury." Dr. Choudhuyr reported positive blood cx's c/w GPC in clusters. Records were reviewed. Pt had MRSA on blood Cx in Jan/Feb 2017. Source is not known.Pt stated he was sent here for IV abx via Dr. Choudhury. Pt is denying any sxs at this time. Pt states he is due for dialysis tomorrow. Patient denies any fever, N/V/D, chills, abd pain, CP, SOB, weakness/numbness, dizziness, lightheadedness, HA  and all other sxs at this time. No tooth problems. Pt has been on HD about 10 years, and gets dialyzed regularly in [place], [place] (Dr. Rinku Guardado). His L UE AVF is not bothering him.    Past Medical History:   Diagnosis Date    After-cataract, unspecified - Left Eye 11/27/2013    Allergy     Arthritis     Cancer     CHF (congestive heart failure)     Chronic kidney disease     chemo/ dialias    Dementia     Diabetes mellitus     Hypertension     Myocardial infarction        Past Surgical History:   Procedure Laterality Date    CATARACT EXTRACTION      CORONARY ARTERY BYPASS GRAFT  3-2014       Review of patient's allergies indicates:   Allergen Reactions    Benadryl decongestant Itching     Nothing with benadryl        No current facility-administered medications on file prior to " encounter.      Current Outpatient Prescriptions on File Prior to Encounter   Medication Sig    atorvastatin (LIPITOR) 20 MG tablet Take 1 tablet (20 mg total) by mouth once daily.    gabapentin (NEURONTIN) 100 MG capsule Take 1 capsule (100 mg total) by mouth 3 (three) times daily.    hydrocodone-acetaminophen 5-325mg (NORCO) 5-325 mg per tablet Take 1 tablet by mouth every 6 to 8 hours as needed for Pain.    losartan (COZAAR) 50 MG tablet Take by mouth. 1 tablet Oral Every day    metoprolol tartrate (LOPRESSOR) 25 MG tablet Take 1 tablet (25 mg total) by mouth 2 (two) times daily.    rivastigmine (EXELON) 4.6 mg/24 hr PT24 Place 1 patch onto the skin once daily.    SENSIPAR 30 mg Tab Take 1 tablet by mouth once daily.    sertraline (ZOLOFT) 50 MG tablet Take 1 tablet (50 mg total) by mouth once daily.    SPRYCEL 100 mg Tab Take 100 mg by mouth once daily.     trazodone (DESYREL) 50 MG tablet Take 1 tablet (50 mg total) by mouth every evening.     Family History     Problem Relation (Age of Onset)    Cancer Brother    Colon cancer Sister    Glaucoma Mother    Pancreatic cancer Brother    Prostate cancer Father        Social History Main Topics    Smoking status: Former Smoker     Types: Cigarettes     Quit date: 1/28/2000    Smokeless tobacco: Never Used    Alcohol use No    Drug use: No    Sexual activity: Not Currently     Birth control/ protection: None     Review of Systems   Constitutional: Negative.  Negative for activity change, appetite change, chills, diaphoresis, fatigue, fever and unexpected weight change.   HENT: Negative for congestion, ear discharge, ear pain, facial swelling, hearing loss, postnasal drip, sore throat, tinnitus, trouble swallowing and voice change.    Eyes: Negative for photophobia, pain, discharge, redness, itching and visual disturbance.   Respiratory: Negative for cough, choking, chest tightness, shortness of breath, wheezing and stridor.    Cardiovascular: Negative  for chest pain, palpitations and leg swelling.   Gastrointestinal: Negative for abdominal distention, abdominal pain, blood in stool, constipation, diarrhea, nausea and vomiting.   Endocrine: Negative for cold intolerance, heat intolerance, polydipsia, polyphagia and polyuria.   Genitourinary: Negative for difficulty urinating, flank pain, frequency, hematuria and urgency.   Musculoskeletal: Negative for arthralgias, back pain, gait problem and myalgias.   Skin: Negative for color change, pallor, rash and wound.   Neurological: Negative for dizziness, tremors, seizures, syncope, facial asymmetry, speech difficulty, weakness, light-headedness, numbness and headaches.   Hematological: Negative for adenopathy. Does not bruise/bleed easily.   Psychiatric/Behavioral: Negative for agitation, confusion, hallucinations and suicidal ideas. The patient is not nervous/anxious.    All other systems reviewed and are negative.    Objective:     Vital Signs (Most Recent):  Temp: 97.4 °F (36.3 °C) (04/03/17 1129)  Pulse: 66 (04/03/17 1630)  Resp: 18 (04/03/17 1129)  BP: (!) 153/94 (04/03/17 1630)  SpO2: 98 % (04/03/17 1630) Vital Signs (24h Range):  Temp:  [97.4 °F (36.3 °C)] 97.4 °F (36.3 °C)  Pulse:  [66] 66  Resp:  [18] 18  SpO2:  [94 %-99 %] 98 %  BP: (140-153)/(83-94) 153/94     Weight: 97.1 kg (214 lb)  Body mass index is 27.48 kg/(m^2).    Physical Exam   Constitutional: He is oriented to person, place, and time. He appears well-developed and well-nourished.   HENT:   Head: Normocephalic and atraumatic.   Nose: Nose normal.   Eyes: Conjunctivae and EOM are normal. Pupils are equal, round, and reactive to light.   Neck: Normal range of motion. Neck supple. No JVD present. No tracheal deviation present. No thyromegaly present.   Cardiovascular: Normal rate, regular rhythm, normal heart sounds and intact distal pulses.  Exam reveals no gallop and no friction rub.    No murmur heard.  Pulmonary/Chest: Effort normal. No stridor.  No respiratory distress. He has no wheezes. He has no rales. He exhibits no tenderness.   Abdominal: Soft. Bowel sounds are normal. He exhibits no distension. There is no tenderness. There is no rebound and no guarding.   Musculoskeletal: Normal range of motion. He exhibits no edema, tenderness or deformity.   Neurological: He is alert and oriented to person, place, and time. He has normal reflexes. No cranial nerve deficit. Coordination normal.   Skin: Skin is warm and dry. No rash noted. No erythema. No pallor.   L UE AVF   Psychiatric: He has a normal mood and affect. Severe Dementia, disoriented to time and place. Nursing note and vitals reviewed.       Significant Labs:    2mo ago     Blood Culture, Routine Gram stain aer bottle: Gram positive cocci in clusters resembling Staph   Blood Culture, Routine Gram stain mike bottle: Gram positive cocci in clusters resembling Staph    Blood Culture, Routine Results called to and read back by: Lula Marinelli RN 02/01/2017 16:22   Blood Culture, Routine METHICILLIN RESISTANT STAPHYLOCOCCUS AUREUS  For susceptibility see order #[id number]        CBC:   Recent Labs  Lab 04/03/17  1200   WBC 9.19   HGB 13.2*   HCT 40.7        CMP:   Recent Labs  Lab 04/03/17  1200   *   K 4.9   CL 96   CO2 25   GLU 80   BUN 28*   CREATININE 8.1*   CALCIUM 8.8   PROT 8.7*   ALBUMIN 3.3*   BILITOT 0.9   ALKPHOS 87   AST 13   ALT 7*   ANIONGAP 13   EGFRNONAA 6*       Significant Imaging: I have reviewed all pertinent imaging results/findings within the past 24 hours.   Imaging Results     None        Assessment/Plan:     * MRSA bacteremia  Bacteremia: Positive blood cx's for GPC in clusters, likely Staph aureus:Had MRSA in blood Cx in Jan/Feb 2017  Source not known  No obvious wounds, no abscess, no foreign bodies or catheters, hear surgery done in 2015,   DDX: endocarditis  Discussed with Dr. Choudhury  Will do echocardiography  Vancomycin IV to keep 15-20 level      ESRD (end stage  renal disease) on dialysis  Renal: ESRD pt, q TTS HD schedule in [place].  K normal  Not acidemic  Good O2 sat  No acute indications for HD today  Will provide routine HD 4/4/17      VTE Risk Mitigation     None        Maricel Mccartney NP  Department of Hospital Medicine   [facility]

## 2017-04-03 NOTE — SUBJECTIVE & OBJECTIVE
Past Medical History:   Diagnosis Date    After-cataract, unspecified - Left Eye 11/27/2013    Allergy     Arthritis     Cancer     CHF (congestive heart failure)     Chronic kidney disease     chemo/ dialias    Dementia     Diabetes mellitus     Hypertension     Myocardial infarction        Past Surgical History:   Procedure Laterality Date    CATARACT EXTRACTION      CORONARY ARTERY BYPASS GRAFT  3-2014       Review of patient's allergies indicates:   Allergen Reactions    Benadryl decongestant Itching     Nothing with benadryl        No current facility-administered medications on file prior to encounter.      Current Outpatient Prescriptions on File Prior to Encounter   Medication Sig    atorvastatin (LIPITOR) 20 MG tablet Take 1 tablet (20 mg total) by mouth once daily.    gabapentin (NEURONTIN) 100 MG capsule Take 1 capsule (100 mg total) by mouth 3 (three) times daily.    hydrocodone-acetaminophen 5-325mg (NORCO) 5-325 mg per tablet Take 1 tablet by mouth every 6 to 8 hours as needed for Pain.    losartan (COZAAR) 50 MG tablet Take by mouth. 1 tablet Oral Every day    metoprolol tartrate (LOPRESSOR) 25 MG tablet Take 1 tablet (25 mg total) by mouth 2 (two) times daily.    rivastigmine (EXELON) 4.6 mg/24 hr PT24 Place 1 patch onto the skin once daily.    SENSIPAR 30 mg Tab Take 1 tablet by mouth once daily.    sertraline (ZOLOFT) 50 MG tablet Take 1 tablet (50 mg total) by mouth once daily.    SPRYCEL 100 mg Tab Take 100 mg by mouth once daily.     trazodone (DESYREL) 50 MG tablet Take 1 tablet (50 mg total) by mouth every evening.     Family History     Problem Relation (Age of Onset)    Cancer Brother    Colon cancer Sister    Glaucoma Mother    Pancreatic cancer Brother    Prostate cancer Father        Social History Main Topics    Smoking status: Former Smoker     Types: Cigarettes     Quit date: 1/28/2000    Smokeless tobacco: Never Used    Alcohol use No    Drug use: No     Sexual activity: Not Currently     Birth control/ protection: None     Review of Systems   Constitutional: Negative.  Negative for activity change, appetite change, chills, diaphoresis, fatigue, fever and unexpected weight change.   HENT: Negative for congestion, ear discharge, ear pain, facial swelling, hearing loss, postnasal drip, sore throat, tinnitus, trouble swallowing and voice change.    Eyes: Negative for photophobia, pain, discharge, redness, itching and visual disturbance.   Respiratory: Negative for cough, choking, chest tightness, shortness of breath, wheezing and stridor.    Cardiovascular: Negative for chest pain, palpitations and leg swelling.   Gastrointestinal: Negative for abdominal distention, abdominal pain, blood in stool, constipation, diarrhea, nausea and vomiting.   Endocrine: Negative for cold intolerance, heat intolerance, polydipsia, polyphagia and polyuria.   Genitourinary: Negative for difficulty urinating, flank pain, frequency, hematuria and urgency.   Musculoskeletal: Negative for arthralgias, back pain, gait problem and myalgias.   Skin: Negative for color change, pallor, rash and wound.   Neurological: Negative for dizziness, tremors, seizures, syncope, facial asymmetry, speech difficulty, weakness, light-headedness, numbness and headaches.   Hematological: Negative for adenopathy. Does not bruise/bleed easily.   Psychiatric/Behavioral: Negative for agitation, confusion, hallucinations and suicidal ideas. The patient is not nervous/anxious.    All other systems reviewed and are negative.    Objective:     Vital Signs (Most Recent):  Temp: 97.4 °F (36.3 °C) (04/03/17 1129)  Pulse: 66 (04/03/17 1630)  Resp: 18 (04/03/17 1129)  BP: (!) 153/94 (04/03/17 1630)  SpO2: 98 % (04/03/17 1630) Vital Signs (24h Range):  Temp:  [97.4 °F (36.3 °C)] 97.4 °F (36.3 °C)  Pulse:  [66] 66  Resp:  [18] 18  SpO2:  [94 %-99 %] 98 %  BP: (140-153)/(83-94) 153/94     Weight: 97.1 kg (214 lb)  Body mass  index is 27.48 kg/(m^2).    Physical Exam   Constitutional: He is oriented to person, place, and time. He appears well-developed and well-nourished.   HENT:   Head: Normocephalic and atraumatic.   Nose: Nose normal.   Eyes: Conjunctivae and EOM are normal. Pupils are equal, round, and reactive to light.   Neck: Normal range of motion. Neck supple. No JVD present. No tracheal deviation present. No thyromegaly present.   Cardiovascular: Normal rate, regular rhythm, normal heart sounds and intact distal pulses.  Exam reveals no gallop and no friction rub.    No murmur heard.  Pulmonary/Chest: Effort normal. No stridor. No respiratory distress. He has no wheezes. He has no rales. He exhibits no tenderness.   Abdominal: Soft. Bowel sounds are normal. He exhibits no distension. There is no tenderness. There is no rebound and no guarding.   Musculoskeletal: Normal range of motion. He exhibits no edema, tenderness or deformity.   Neurological: He is alert and oriented to person, place, and time. He has normal reflexes. No cranial nerve deficit. Coordination normal.   Skin: Skin is warm and dry. No rash noted. No erythema. No pallor.   L UE AVF   Psychiatric: He has a normal mood and affect. His behavior is normal. Judgment and thought content normal.   Nursing note and vitals reviewed.       Significant Labs:    2mo ago     Blood Culture, Routine Gram stain aer bottle: Gram positive cocci in clusters resembling Staph   Blood Culture, Routine Gram stain mike bottle: Gram positive cocci in clusters resembling Staph    Blood Culture, Routine Results called to and read back by: Lula Marinelli RN 02/01/2017 16:22   Blood Culture, Routine METHICILLIN RESISTANT STAPHYLOCOCCUS AUREUS  For susceptibility see order #[id number]        CBC:   Recent Labs  Lab 04/03/17  1200   WBC 9.19   HGB 13.2*   HCT 40.7        CMP:   Recent Labs  Lab 04/03/17  1200   *   K 4.9   CL 96   CO2 25   GLU 80   BUN 28*   CREATININE 8.1*    CALCIUM 8.8   PROT 8.7*   ALBUMIN 3.3*   BILITOT 0.9   ALKPHOS 87   AST 13   ALT 7*   ANIONGAP 13   EGFRNONAA 6*       Significant Imaging: I have reviewed all pertinent imaging results/findings within the past 24 hours.   Imaging Results     None

## 2017-04-03 NOTE — IP AVS SNAPSHOT
[address] [place] [address]           Patient Discharge Instructions   Our goal is to set you up for success. This packet includes information on your condition, medications, and your home care.  It will help you care for yourself to prevent having to return to the hospital.     Please ask your nurse if you have any questions.      There are many details to remember when preparing to leave the hospital. Here is what you will need to do:    1. Take your medicine. If you are prescribed medications, review your Medication List on the following pages. You may have new medications to  at the pharmacy and others that you'll need to stop taking. Review the instructions for how and when to take your medications. Talk with your doctor or nurses if you are unsure of what to do.     2. Go to your follow-up appointments. Specific follow-up information is listed in the following pages. Your may be contacted by a nurse or clinical provider about future appointments. Be sure we have all of the phone numbers to reach you. Please contact your provider's office if you are unable to make an appointment.     3. Watch for warning signs. Your doctor or nurse will give you detailed warning signs to watch for and when to call for assistance. These instructions may also include educational information about your condition. If you experience any of warning signs to your health, call your doctor.               ** Verify the list of medication(s) below is accurate and up to date. Carry this with you in case of emergency. If your medications have changed, please notify your healthcare provider.             Medication List      START taking these medications        Additional Info                      amlodipine 5 MG tablet   Commonly known as:  NORVASC   Quantity:  30 tablet   Refills:  0   Dose:  5 mg    Last time this was given:  5 mg on 4/14/2017  8:35 AM   Instructions:  Take 1  tablet (5 mg total) by mouth once daily.     Begin Date    AM    Noon    PM    Bedtime       sodium bicarbonate 650 MG tablet   Refills:  0   Dose:  1300 mg    Last time this was given:  1,300 mg on 4/14/2017  6:00 AM   Instructions:  Take 2 tablets (1,300 mg total) by mouth 3 (three) times daily.     Begin Date    AM    Noon    PM    Bedtime       VANCOMYCIN 1 G/250 ML D5W (READY TO MIX SYSTEM)   Refills:  0   Dose:  1 g   Indications:  Bacteremia    Last time this was given:  750 mg on 4/13/2017  3:00 PM   Instructions:  Inject 250 mLs (1 g total) into the vein every Mon, Wed, Fri.     Begin Date    AM    Noon    PM    Bedtime         CONTINUE taking these medications        Additional Info                      atorvastatin 20 MG tablet   Commonly known as:  LIPITOR   Quantity:  90 tablet   Refills:  3   Dose:  20 mg    Last time this was given:  20 mg on 4/14/2017  8:34 AM   Instructions:  Take 1 tablet (20 mg total) by mouth once daily.     Begin Date    AM    Noon    PM    Bedtime       gabapentin 100 MG capsule   Commonly known as:  NEURONTIN   Quantity:  270 capsule   Refills:  3   Dose:  100 mg    Last time this was given:  100 mg on 4/14/2017  1:37 PM   Instructions:  Take 1 capsule (100 mg total) by mouth 3 (three) times daily.     Begin Date    AM    Noon    PM    Bedtime       hydrocodone-acetaminophen 5-325mg 5-325 mg per tablet   Commonly known as:  NORCO   Quantity:  60 tablet   Refills:  0   Dose:  1 tablet    Instructions:  Take 1 tablet by mouth every 6 to 8 hours as needed for Pain.     Begin Date    AM    Noon    PM    Bedtime       losartan 50 MG tablet   Commonly known as:  COZAAR   Refills:  0    Last time this was given:  50 mg on 4/11/2017  9:16 AM   Instructions:  Take by mouth. 1 tablet Oral Every day     Begin Date    AM    Noon    PM    Bedtime       metoprolol tartrate 25 MG tablet   Commonly known as:  LOPRESSOR   Quantity:  60 tablet   Refills:  0   Dose:  25 mg    Last time this was  given:  25 mg on 4/14/2017  8:34 AM   Instructions:  Take 1 tablet (25 mg total) by mouth 2 (two) times daily.     Begin Date    AM    Noon    PM    Bedtime       JOSAFAT-JANICE ORAL   Refills:  0    Instructions:  Take by mouth.     Begin Date    AM    Noon    PM    Bedtime       rivastigmine 4.6 mg/24 hr Pt24   Commonly known as:  EXELON   Quantity:  30 patch   Refills:  11   Dose:  1 patch    Last time this was given:  1 patch on 4/14/2017  8:34 AM   Instructions:  Place 1 patch onto the skin once daily.     Begin Date    AM    Noon    PM    Bedtime       SENSIPAR 30 MG Tab   Refills:  0   Dose:  1 tablet   Generic drug:  cinacalcet    Instructions:  Take 1 tablet by mouth once daily.     Begin Date    AM    Noon    PM    Bedtime       sertraline 50 MG tablet   Commonly known as:  ZOLOFT   Quantity:  30 tablet   Refills:  11   Dose:  50 mg    Last time this was given:  50 mg on 4/14/2017  8:34 AM   Instructions:  Take 1 tablet (50 mg total) by mouth once daily.     Begin Date    AM    Noon    PM    Bedtime       SPRYCEL 100 mg   Refills:  0   Dose:  100 mg   Generic drug:  dasatinib    Instructions:  Take 100 mg by mouth once daily.     Begin Date    AM    Noon    PM    Bedtime       trazodone 50 MG tablet   Commonly known as:  DESYREL   Quantity:  26 tablet   Refills:  11   Dose:  50 mg    Last time this was given:  50 mg on 4/12/2017  9:51 PM   Instructions:  Take 1 tablet (50 mg total) by mouth every evening.     Begin Date    AM    Noon    PM    Bedtime            Where to Get Your Medications      These medications were sent to [facility] [address] [place] [id number]     Phone:  [phone]     amlodipine 5 MG tablet         You can get these medications from any pharmacy     You don't need a prescription for these medications     sodium bicarbonate 650 MG tablet         Information about where to get these medications is not yet  available     ! Ask your nurse or doctor about these medications     VANCOMYCIN 1 G/250 ML D5W (READY TO MIX SYSTEM)                  Please bring to all follow up appointments:    1. A copy of your discharge instructions.  2. All medicines you are currently taking in their original bottles.  3. Identification and insurance card.    Please arrive 15 minutes ahead of scheduled appointment time.    Please call 24 hours in advance if you must reschedule your appointment and/or time.        Your Scheduled Appointments     Apr 19, 2017 11:20 AM [facility] Follow Up with Isaac Fitch MD   [facility] ([facility])    [address] [facility] [address] [phone]              Follow-up Information     Follow up with [facility].    Contact information:    [address] [place], [place] [address]        Follow up with Isaac Fitch MD. Schedule an appointment as soon as possible for a visit in 3 days.    Specialty:  Family Medicine    Contact information:    [address] [address] [address] [phone]          Follow up with Ganga Choudhury MD. Schedule an appointment as soon as possible for a visit in 6 weeks.    Specialty:  Infectious Diseases    Contact information:    [address] [facility] [address] [phone]          Discharge Instructions     Future Orders    Activity as tolerated     Activity as tolerated     Diet general     Questions:    Total calories:      Fat restriction, if any:      Protein restriction, if any:      Na restriction, if any:      Fluid restriction:      Additional restrictions:      Diet general     Questions:    Total calories:      Fat restriction, if any:      Protein restriction, if any:      Na restriction, if any:      Fluid restriction:      Additional restrictions:          Discharge Instructions         Vancomycin injection  What is this medicine?  VANCOMYCIN (clint smith MYCOURTNEY sin) is a glycopeptide  antibiotic. It is used to treat certain kinds of bacterial infections. It will not work for colds, flu, or other viral infections.  How should I use this medicine?  This medicine is infused into a vein. It is usually given by a health care provider in a hospital or clinic.  If you receive this medicine at home, you will receive special instructions. Take your medicine at regular intervals. Do not take your medicine more often than directed. Take all of your medicine as directed even if you think you are better. Do not skip doses or stop your medicine early.  It is important that you put your used needles and syringes in a special sharps container. Do not put them in a trash can. If you do not have a sharps container, call your pharmacist or healthcare provider to get one.  Talk to your pediatrician regarding the use of this medicine in children. While this drug may be prescribed for even very young infants for selected conditions, precautions do apply.  What side effects may I notice from receiving this medicine?  Side effects that you should report to your doctor or health care professional as soon as possible:  · allergic reactions like skin rash, itching or hives, swelling of the face, lips, or tongue  · breathing difficulty, wheezing  · change in amount, color of urine  · change in hearing  · chest pain  · dizziness  · fever, chills  · flushing of the face and neck (reddening)  · low blood pressure  · redness, blistering, peeling or loosening of the skin, including inside the mouth  · unusual bleeding or bruising  · unusually weak or tired  Side effects that usually do not require medical attention (report to your doctor or health care professional if they continue or are bothersome):  · nausea, vomiting  · pain, swelling where injected  · stomach cramps  What may interact with this medicine?  · amphotericin B  · anesthetics  · bacitracin  · birth control pills  · cisplatin  · colistin  · diuretics  · other  aminoglycoside antibiotics  · polymyxin B  What if I miss a dose?  If you miss a dose, take it as soon as you can. If it is almost time for your next dose, take only that dose. Do not take double or extra doses.  Where should I keep my medicine?  Keep out of the reach of children.  You will be instructed on how to store this medicine, if needed. Throw away any unused medicine after the expiration date on the label.  What should I tell my health care provider before I take this medicine?  They need to know if you have any of these conditions:  · dehydration  · hearing loss  · kidney disease  · other chronic illness  · an unusual or allergic reaction to vancomycin, other medicines, foods, dyes, or preservatives  · pregnant or trying to get pregnant  · breast-feeding  What should I watch for while using this medicine?  Tell your doctor or health care professional if your symptoms do not improve or if you get new symptoms. Your condition and lab work will be monitored while you are taking this medicine.  Do not treat diarrhea with over the counter products. Contact your doctor if you have diarrhea that lasts more than 2 days or if it is severe and watery.  Date Last Reviewed:   NOTE:This sheet is a summary. It may not cover all possible information. If you have questions about this medicine, talk to your doctor, pharmacist, or health care provider. Copyright© 2016 [employer]        Sodium Bicarbonate tablets  What is this medicine?  SODIUM BICARBONATE (JOSELO wendy um; bygarry davis) is an antacid. It is used to treat acid indigestion and heartburn caused by too much acid in the stomach.  How should I use this medicine?  Take this medicine by mouth. Dissolve it in a glass of water before taking. Do not take tablets whole. Follow the directions on the package label. Take your medicine at regular intervals. Do not take it more often than directed.  Talk to your pediatrician regarding the use of this medicine in children.  Special care may be needed.  Patients over 60 years old may have a stronger reaction and need a smaller dose.  What side effects may I notice from receiving this medicine?  Side effects that you should report to your doctor or health care professional as soon as possible:  · allergic reactions like skin rash, itching or hives, swelling of the face, lips, or tongue  · confusion  · dizziness  · muscle pain  · nausea, vomiting  · seizures  · swelling of the feet and legs  · unusually weak or tired  Side effects that usually do not require medical attention (report to your doctor or health care professional if they continue or are bothersome):  · bloating and stomach gas  · increased thirst  · stomach cramps  What may interact with this medicine?  Do not take this medicine with any of the following medications:  · ammonium chloride  This medicine may also interact with the following medications:  · antibiotics like ciprofloxacin, doxycycline, tetracycline  · aspirin and aspirin-like medicines  · delavirdine  · ephedra, Ma Manning  · itraconazole, ketoconazole  · lithium  · methenamine  · norfloxacin  · quinidine  · quinine  · stimulants like amphetamine, dexmethylphenidate, and others  · tolmetin  What if I miss a dose?  If you miss a dose, take it as soon as you can. If it is almost time for your next dose, take only that dose. Do not take double or extra doses.  Where should I keep my medicine?  Keep out of the reach of children.  Store at room temperature between 15 and 30 degrees C (59 and 86 degrees F). Keep container tightly closed. Throw away any unused medicine after the expiration date.  What should I tell my health care provider before I take this medicine?  They need to know if you have any of these conditions:  · Cushing's syndrome  · heart problems  · kidney disease  · low levels of calcium or potassium in the blood  · low salt or sodium diet  · stomach ulcer  · an unusual or allergic reaction to sodium  bicarbonate, other medicines, foods, dyes, or preservatives  · pregnant or trying to get pregnant  · breast-feeding  What should I watch for while using this medicine?  Tell your doctor or healthcare professional if your symptoms do not start to get better or if they get worse. Do not take this medicine for more than 2 weeks unless your doctor directs you to. See your doctor if your symptoms return. You may have a serious medical condition.  Date Last Reviewed:   NOTE:This sheet is a summary. It may not cover all possible information. If you have questions about this medicine, talk to your doctor, pharmacist, or health care provider. Copyright© 2016 [employer]        Aliskiren; Amlodipine oral tablets  What is this medicine?  ALISKIREN; AMLODIPINE (a lis ADONAY kristie; am BERNADETTE di peen) is a combination of a renin inhibitor and a calcium channel blocker. This medicine is used to treat high blood pressure.  How should I use this medicine?  Take this medicine by mouth with a glass of water. Follow the directions on your prescription label. You may take this medicine with or without food, but try to take it the same way every time. Take your medicine at regular intervals. Do not take it more often than directed. Do not stop taking except on your doctor's advice.  Talk to your pediatrician regarding the use of this medicine in children. Special care may be needed.  What side effects may I notice from receiving this medicine?  Side effects that you should report to your doctor or health care professional as soon as possible:  · allergic reactions like skin rash or hives, swelling of the hands, feet, face, lips, throat, or tongue  · breathing problems  · chest pain  · fast, irregular heartbeat  · feeling faint or lightheaded, falls  · low blood pressure  · seizures  · swelling of ankles, feet, hands  Side effects that usually do not require medical attention (Report these to your doctor or health care professional if they  continue or are bothersome.):  · cough  · diarrhea  · dry mouth  · facial flushing  · nausea, vomiting  · upset stomach  · weak or tired  What may interact with this medicine?  · atorvastatin  · certain medicines for fungal infections like ketoconazole and itraconazole  · cyclosporine  · diuretics  · medicines for blood pressure  · potassium supplements  · salt substitutes with potassium  What if I miss a dose?  If you miss a dose, take it as soon as you can. If it is almost time for your next dose, take only that dose. Do not take double or extra doses.  Where should I keep my medicine?  Keep out of the reach of children.  Store at room temperature between 15 and 30 degrees C (59 and 86 degrees F). Protect from heat and moisture. Throw away any unused medicine after the expiration date.  What should I tell my health care provider before I take this medicine?  They need to know if you have any of these conditions:  · dehydration  · diabetes  · heart problems like heart failure or aortic stenosis  · kidney disease  · liver disease  · an unusual or allergic reaction to aliskiren, amlodipine, other medicines, foods, dyes, or preservatives  · pregnant or trying to get pregnant  · breast-feeding  What should I watch for while using this medicine?  Visit your doctor or health care professional for regular checks on your progress. Check your blood pressure as directed. Ask your doctor or health care professional what your blood pressure should be and when you should contact him or her.  If you have diabetes and are taking a medicine called an angiotensin-receptor-blocker (ARB) or angiotensin-converting-enzyme-inhibitor (ACE inhibitor), do not take this medicine. Talk to your doctor or health care professional for more information.  Women should inform their doctor if they wish to become pregnant or think they might be pregnant. There is a potential for serious side effects to an unborn child. Talk to your health care  professional or pharmacist for more information.  This medicine may make you feel confused, dizzy or lightheaded. Do not drive, use machinery, or do anything that needs mental alertness until you know how this medicine affects you. To reduce the risk of dizzy or fainting spells, do not sit or stand up quickly, especially if you are an older patient. Avoid alcoholic drinks; they can make you more dizzy.  Date Last Reviewed:   NOTE:This sheet is a summary. It may not cover all possible information. If you have questions about this medicine, talk to your doctor, pharmacist, or health care provider. Copyright© 2016 [employer]        Methicillin-Resistant Staphylococcus aureus (MRSA)  What is MRSA?  Staphylococcus aureus bacteria or staph are common germs. They are normally found on the skin or in the nose of many people. Usually, the bacteria cause no problem. Occasionally, they can cause mild skin infections. Or severe infections of the skin, lungs, blood, or other organs or tissues may develop. Some staph infections can be easily treated with antibiotics. But one type of staph infection, methicillin-resistant staphylococcus aureus (MRSA) cannot. It is called methicillin-resistant because the antibiotic, methicillin, which used to be effective treatment, no longer works. MRSA is common in hospitals and nursing homes or long-term care facilities. It is also spreading among healthy children and adults outside the healthcare system. A person may be a carrier. Or he or she may have the infection.  · Colonization. When a person carries the MRSA bacteria but is healthy, it's called being colonized. This person can spread MRSA to others but has no infection.  · Infection. When a person gets sick because of the bacteria, it's called being infected with MRSA. This person can also spread MRSA to others. If not treated properly, MRSA infections can be very serious and even cause death.    What are the risk factors for  MRSA?  Anyone can get MRSA, although there are factors that increase the risk. Some of these include:  · Recent or lengthy hospital stay  · Having a surgical wound or intravenous (IV) line  · Having a weakened immune system due to a medical condition or its treatment  · Living in a nursing home or long-term care facility  · Recent antibiotic therapy  · Diabetes  · Kidney dialysis  · HIV infection  · Injection drug use or sharing needles  · [facility] or longterm time  · Living in any crowded facility, such as a dormitory  ·  service  · Sharing sports equipment, razors, or other sharp objects  How does MRSA spread?  · People who are colonized with MRSA have MRSA in their noses or on their skin. Though they may not be sick themselves, they can spread the germs to others.  · In hospitals and long-term care facilities, MRSA can spread from patient to patient on the hands of healthcare workers. It can also spread on objects, such as cart or door handles and bedrails.  · Outside healthcare settings, MRSA usually spreads through skin-to-skin contact, shared towels or athletic equipment, or through close contact with an infected person.  What are the symptoms of MRSA infection?  MRSA skin infections start as small red bumps on the skin that look like pimples or spider bites. The small bumps usually get larger and become swollen, painful, warm to the touch, and filled with pus. Fever may be present. MRSA can also start in other ways. And it can spread deeper into the body where it can cause one or more of the following:  · Infections in bones, muscles, and other tissues  · Pneumonia, an infection in one or both lungs  · Infection of a surgical wound  · Infection in the bloodstream (bacteremia)  · Infection of the lining of the heart (endocarditis)  · Infection of the urinary tract (bladder and kidneys)  How is MRSA diagnosed?  A sample of blood, urine, or infected tissue may be taken to diagnose a MRSA infection. A swab of  the inside of the nose is taken to diagnose colonization. The sample is then sent to a laboratory and tested for MRSA. if the infection involves bone, joint, or other organs, a blood test may be done. Imaging studies, such as an X-ray or CT scan, may also be needed.  How is MRSA treated?  MRSA infections are usually treated with antibiotics. It may be given by mouth in pill form or into a vein (intravenous or IV). If a skin abscess is present, it may be drained.   Patients who test positive for MRSA colonization may undergo a process called decolonization. A topical antibiotic is applied inside the nose or in the nostrils to kill the bacteria. A special soap may be used to cleanse the skin.  Can MRSA be prevented?  Hospitals and nursing homes help prevent MRSA by doing the following:  · Handwashing. This is the single most important way to prevent the spread of germs. Healthcare workers should wash their hands with soap and water or an alcohol-based hand  before and after treating each patient. They also should clean their hands after touching any surface that may be contaminated.  · Protective clothing. Healthcare workers and visitors may wear gloves and a gown when entering the room of a patient with MRSA. They remove these items before leaving.  · Private rooms. Patients with MRSA infections are placed in private rooms or in a room with others who have the same infection.  · Personal care items. Patients with MRSA may have their own patient care items, such as thermometers and stethoscopes.  · Monitoring. Hospitals monitor the spread of MRSA and educate all staff on the best ways to prevent it.  Patients can help prevent MRSA by doing the following:  · Ask all hospital staff to wash their hands before touching you. Dont be afraid to speak up!  · Wash your own hands frequently with soap and water. Or use an alcohol-based hand gel.  · Ask that stethoscopes and other instruments be wiped with alcohol before  they are used on you.  · Be sure youre tested for MRSA if you have a skin infection.  If you are taking care of someone with MRSA:  · Wash your hands well with soap and water before and after any contact with the person.  · Wear gloves when changing a bandage or touching an infected wound. Discard gloves after each use. Then wash your hands well.  · Wash the patient's bed linens, towels, and clothing in hot water with detergent or liquid bleach.  Everyone can help prevent MRSA by doing the following:  · Wash your hands often with warm water and soap.  ¨ Rub your hands together.  ¨ Clean the whole hand, under your nails, between your fingers, and up the wrists.  ¨ Wash for at least 15 to 20 seconds.  ¨ Rinse, letting the water run down your fingers, not up your wrists.  ¨ Dry your hands well. Use a paper towel or turn off the faucet and open the door.  · If soap and water aren't available, use an alcohol-based hand .  ¨ Squeeze about a tablespoon of  into the palm of one hand.  ¨ Rub your hands together briskly, cleaning the backs of your hands, the palms, between your fingers, and up the wrists.  ¨ Rub until the  is gone and your hands are completely dry.  · Keep cuts and scrapes clean and covered until they heal.  · Avoid contact with the wounds or bandages of others.  · Avoid sharing towels, razors, clothing, and athletic equipment.  Date Last Reviewed: 10/1/2016  © 8110-6892 [employer]. [address] [place] [address]. All rights reserved. This information is not intended as a substitute for professional medical care. Always follow your healthcare professional's instructions.            Primary Diagnosis     Your primary diagnosis was:  Bloodstream Infection      Admission Information     Date & Time Provider Department CSN    4/3/2017 11:31 AM Jose Walker MD [facility] [id number]      Care Providers     Provider Role Specialty Primary  "office phone    Jose Walker MD Attending Provider Internal Medicine [phone]    Leisa Coleman MD Consulting Physician  Nephrology [phone]    Ganga Choudhury MD Consulting Physician  Infectious Diseases [phone]    Jose Walker MD Team Attending  Internal Medicine [phone]      Important Medicare Message          Most Recent Value    Important Message from Medicare Regarding Discharge Appeal Rights  Given to patient/caregiver, Explained to patient/caregiver, Signed/date by patient/caregiver yes 04/12/2017 1200      Your Vitals Were     BP Pulse Temp Resp Height Weight    112/61 (BP Location: Right arm, Patient Position: Lying, BP Method: Automatic) 59 97.7 °F (36.5 °C) (Oral) 16 6' 2" (1.88 m) 92.7 kg (204 lb 5.9 oz)    SpO2 BMI             100% 26.24 kg/m2         Recent Lab Values        8/28/2007 9/4/2007 10/8/2008 11/6/2008 9/15/2009 5/10/2011 11/8/2011 8/5/2016      1:19 PM  7:32 AM  7:51 AM  8:33 AM 11:05 AM  9:00 AM  8:36 AM  9:51 AM    A1C 5.7 5.7 5.7 5.6 6.0 5.7 4.6 4.4 (L)    Comment for A1C at  9:51 AM on 8/5/2016:  According to ADA guidelines, hemoglobin A1C <7.0% represents  optimal control in non-pregnant diabetic patients.  Different  metrics may apply to specific populations.   Standards of Medical Care in Diabetes - 2016.  For the purpose of screening for the presence of diabetes:  <5.7%     Consistent with the absence of diabetes  5.7-6.4%  Consistent with increasing risk for diabetes   (prediabetes)  >or=6.5%  Consistent with diabetes  Currently no consensus exists for use of hemoglobin A1C  for diagnosis of diabetes for children.        Pending Labs     Order Current Status    Blood culture Preliminary result    Blood culture Preliminary result      Allergies as of 4/14/2017        Reactions    Benadryl Decongestant Itching    Nothing with benadryl       [facility]     [facility] Nurse Care Line - 24/7 Assistance  Unless otherwise directed by your " provider, please contact [facility], our nurse care line that is available for 24/7 assistance.     Registered nurses in the [facility] provide clinical advisement, health education, appointment booking, and other advisory services.  Call for this free service at [phone].        Advance Directives     An advance directive is a document which, in the event you are no longer able to make decisions for yourself, tells your healthcare team what kind of treatment you do or do not want to receive, or who you would like to make those decisions for you.  If you do not currently have an advance directive, [facility] encourages you to create one.  For more information call:  [phone]), [phone] ([phone]),  or log on to [email or web address].        Smoking Cessation     If you would like to quit smoking:   You may be eligible for free services if you are a [place] resident and started smoking cigarettes before September 1, 1988.  Call the Smoking Cessation Trust (SCT) toll free at (019) 407-7743 or (165) 803-0440.   Call 2-575-QUIT-NOW if you do not meet the above criteria.   Contact us via email: [email or web address]   View our website for more information: [email or web address]        Language Assistance Services     ATTENTION: Language assistance services are available, free of charge. Please call [phone].      ATENCIÓN: Si habla español, tiene a das disposición servicios gratuitos de asistencia lingüística. Llame al [phone].     CHÚ Ý: N?u b?n nói Ti?ng Vi?t, có các d?ch v? h? tr? ngôn ng? mi?n phí dành cho b?n. G?i s? [phone].        Chronic Kindey Disease Education             Diabetes Discharge Instructions                                   [facility] Sign-Up     Activating your [facility] account is as easy as 1-2-3!     1) Visit [email or web address], select Sign Up Now, enter this activation code and your date of birth, then select  Next.  [email or web address]  Expires: 5/22/2017  3:03 PM      2) Create a username and password to use when you visit [facility] in the future and select a security question in case you lose your password and select Next.    3) Enter your e-mail address and click Sign Up!    Additional Information  If you have questions, please e-mail [email or web address] or call [phone] to talk to our [facility] staff. Remember, [facility] is NOT to be used for urgent needs. For medical emergencies, dial 911.          [facility] complies with applicable Federal civil rights laws and does not discriminate on the basis of race, color, national origin, age, disability, or sex.

## 2017-04-03 NOTE — ED NOTES
Pt resting in ER stretcher, aaox4, rr e/u, NAD noted. Bed low and locked, call light in reach, side rails up x2. Sister at bedside.  Sister and pt verbalized understanding of status and POC; denies further needs. Will continue to monitor.

## 2017-04-03 NOTE — ASSESSMENT & PLAN NOTE
Bacteremia: Positive blood cx's for GPC in clusters, likely Staph aureus:Had MRSA in blood Cx in Jan/Feb 2017  Source not known  No obvious wounds, no abscess, no foreign bodies or catheters, hear surgery done in 2015,   DDX: endocarditis  Discussed with Dr. Choudhury  Will do echocardiography  Vancomycin IV to keep 15-20 level

## 2017-04-03 NOTE — ED NOTES
Pt resting in ER stretcher, awake and alert, rr e/u, NAD noted. Bed low and locked, call light in reach, side rails up x2. Dinner tray has been delivered.  Sister at bedside.  Pt verbalized understanding of status and POC; denies further needs. Will continue to monitor.

## 2017-04-03 NOTE — ASSESSMENT & PLAN NOTE
Renal: ESRD pt, q TTS HD schedule in [place] normal  Not acidemic  Good O2 sat  No acute indications for HD today  Will provide routine HD 4/4/17

## 2017-04-04 LAB — VANCOMYCIN SERPL-MCNC: 16 UG/ML

## 2017-04-04 PROCEDURE — 25000003 PHARM REV CODE 250: Performed by: NURSE PRACTITIONER

## 2017-04-04 PROCEDURE — 63600175 PHARM REV CODE 636 W HCPCS: Performed by: EMERGENCY MEDICINE

## 2017-04-04 PROCEDURE — 80100016 HC MAINTENANCE HEMODIALYSIS

## 2017-04-04 PROCEDURE — 80202 ASSAY OF VANCOMYCIN: CPT

## 2017-04-04 PROCEDURE — 25000003 PHARM REV CODE 250: Performed by: EMERGENCY MEDICINE

## 2017-04-04 PROCEDURE — 96372 THER/PROPH/DIAG INJ SC/IM: CPT

## 2017-04-04 PROCEDURE — 99233 SBSQ HOSP IP/OBS HIGH 50: CPT | Mod: ,,, | Performed by: INTERNAL MEDICINE

## 2017-04-04 PROCEDURE — 11000001 HC ACUTE MED/SURG PRIVATE ROOM

## 2017-04-04 PROCEDURE — 36415 COLL VENOUS BLD VENIPUNCTURE: CPT

## 2017-04-04 RX ADMIN — GABAPENTIN 100 MG: 100 CAPSULE ORAL at 10:04

## 2017-04-04 RX ADMIN — POLYETHYLENE GLYCOL 3350 17 G: 17 POWDER, FOR SOLUTION ORAL at 08:04

## 2017-04-04 RX ADMIN — GABAPENTIN 100 MG: 100 CAPSULE ORAL at 02:04

## 2017-04-04 RX ADMIN — TRAZODONE HYDROCHLORIDE 50 MG: 50 TABLET ORAL at 10:04

## 2017-04-04 RX ADMIN — GABAPENTIN 100 MG: 100 CAPSULE ORAL at 05:04

## 2017-04-04 RX ADMIN — CEFTAZIDIME 500 MG: 1 INJECTION, POWDER, FOR SOLUTION INTRAMUSCULAR; INTRAVENOUS at 04:04

## 2017-04-04 RX ADMIN — SERTRALINE HYDROCHLORIDE 50 MG: 50 TABLET ORAL at 08:04

## 2017-04-04 RX ADMIN — HEPARIN SODIUM 5000 UNITS: 5000 INJECTION, SOLUTION INTRAVENOUS; SUBCUTANEOUS at 02:04

## 2017-04-04 RX ADMIN — METOPROLOL TARTRATE 25 MG: 25 TABLET ORAL at 10:04

## 2017-04-04 RX ADMIN — ATORVASTATIN CALCIUM 20 MG: 10 TABLET, FILM COATED ORAL at 08:04

## 2017-04-04 RX ADMIN — STANDARDIZED SENNA CONCENTRATE AND DOCUSATE SODIUM 1 TABLET: 8.6; 5 TABLET, FILM COATED ORAL at 10:04

## 2017-04-04 RX ADMIN — STANDARDIZED SENNA CONCENTRATE AND DOCUSATE SODIUM 1 TABLET: 8.6; 5 TABLET, FILM COATED ORAL at 08:04

## 2017-04-04 RX ADMIN — LOSARTAN POTASSIUM 50 MG: 50 TABLET, FILM COATED ORAL at 08:04

## 2017-04-04 RX ADMIN — HEPARIN SODIUM 5000 UNITS: 5000 INJECTION, SOLUTION INTRAVENOUS; SUBCUTANEOUS at 05:04

## 2017-04-04 RX ADMIN — HEPARIN SODIUM 5000 UNITS: 5000 INJECTION, SOLUTION INTRAVENOUS; SUBCUTANEOUS at 10:04

## 2017-04-04 RX ADMIN — PANTOPRAZOLE SODIUM 40 MG: 40 TABLET, DELAYED RELEASE ORAL at 08:04

## 2017-04-04 RX ADMIN — METOPROLOL TARTRATE 25 MG: 25 TABLET ORAL at 08:04

## 2017-04-04 RX ADMIN — RIVASTIGMINE TRANSDERMAL SYSTEM 1 PATCH: 4.6 PATCH, EXTENDED RELEASE TRANSDERMAL at 08:04

## 2017-04-04 NOTE — PROGRESS NOTES
"Renal f/u note:  Admit Date: 4/3/2017    Follow-up For:  ESRD on HD and MRSA bacteremia     Subjective:      Interval History: Pt was seen and examined. No c/o's today, no new issues.  Reviewed ID note  No fever, no SOB, no cough, no diarrhea, no open wounds, no catheters.    Objective:      Vital Signs Range (Last 24H):Blood pressure 139/74, pulse 71, temperature 99.5 °F (37.5 °C), temperature source Oral, resp. rate 18, height 6' 2" (1.88 m), weight 97.1 kg (214 lb), SpO2 97 %.    Physical Exam   Constitutional: Patient is oriented to person, place, and time. Appears well-developed and well-nourished.   Head: Normocephalic and atraumatic. Mucous membranes moist.  Neck: No JVD  Cardiovascular: Normal rate and regular rhythm.  No rubs, no murmurs, s1 and s2 audible  Pulmonary/Chest: Effort normal and clear to auscultation bilaterally. No respiratory distress.   Abdomen: Soft. Non-tender and non-distended. Bowel sounds are normal.   Neurological: Moves all extremities.  Skin: Warm and dry. No rashes.  Extremities: No edema.  L UE AVF    Medications:   atorvastatin  20 mg Oral Daily    ceftAZIDime (FORTAZ) IVPB  500 mg Intravenous Q24H    gabapentin  100 mg Oral TID    heparin (porcine)  5,000 Units Subcutaneous Q8H    losartan  50 mg Oral Daily    metoprolol tartrate  25 mg Oral BID    pantoprazole  40 mg Oral Daily    polyethylene glycol  17 g Oral Daily    rivastigmine  1 patch Transdermal Daily    senna-docusate 8.6-50 mg  1 tablet Oral BID    sertraline  50 mg Oral Daily    [START ON 4/6/2017] vancomycin 1 g in dextrose 5 % 250 mL IVPB (ready to mix system)  1 g Intravenous Q48H       Laboratory Data:  Reviewed and noted in plan where applicable. Please see chart for full laboratory data.    Lab Results   Component Value Date    WBC 7.31 04/03/2017    HGB 13.1 (L) 04/03/2017    HCT 40.8 04/03/2017    MCV 89 04/03/2017     04/03/2017         Recent Labs  Lab 04/03/17  2141   *  115*   NA " 134*  134*   K 4.5  4.5   CL 95  95   CO2 26  26   BUN 33*  33*   CREATININE 8.7*  8.7*   CALCIUM 9.0  9.0   MG 2.0     Vancomycin level 16    Microbiology Results (last 7 days)     Procedure Component Value Units Date/Time    Blood culture [[id number]] Collected:  04/03/17 1925    Order Status:  Sent Specimen:  Blood from Peripheral, Hand, Right Updated:  04/04/17 0335    Blood culture [[id number]] Collected:  04/03/17 1940    Order Status:  Sent Specimen:  Blood from Peripheral, Hand, Right Updated:  04/04/17 0330          Radiology:  Reviewed and noted in plan where applicable.  Please see chart for full radiology data.    Echo (TTE):  CONCLUSIONS     1 - Biatrial enlargement.     2 - Eccentric hypertrophy.     3 - Mildly depressed left ventricular systolic function (EF 45-50%).     4 - Left ventricular diastolic dysfunction. Grade III    5 - Low normal to mildly depressed right ventricular systolic function .     6 - The estimated PA systolic pressure is 25 mmHg.     7 - Mild tricuspid regurgitation.     8 - Small pericardial effusion.     9 - No intracavity mass visualized.      Active Hospital Problems    Diagnosis  POA    *MRSA bacteremia [R78.81]  Yes    Dementia without behavioral disturbance [F03.90]  Yes    ESRD (end stage renal disease) on dialysis [N18.6, Z99.2]  Not Applicable      Resolved Hospital Problems    Diagnosis Date Resolved POA   No resolved problems to display.       A/P  62 y/o male with a h/o of ESRD, on chronic HD, with recurrent positive blood cx's and no apparent, obvious source for the bacteremia:     1. Renal: ESRD pt, q TTS HD schedule in [place], [place].  [place] normal  Not acidemic  Good O2 sat  No acute indications for HD today  Will provide routine HD today     2. Bacteremia: Positive blood cx's for GPC in clusters, likely Staph aureus  Had MRSA in blood Cx in Jan/Feb 2017  Source not known  TTE unremarkable, no vegetations  May need SALAZAR  No obvious wounds, no abscess, no foreign  bodies or catheters, hear surgery done in 2015,   Continue Vancomycin IV to keep 15-20 level  Level is currently therapeutic     3. comorbidities noted: DM, HTN.  BP slightly elevated.  Meds reviewed  Will monitor closely after starting outpt meds.     Plans and recommendations:  As discussed above   Opportunity for questions and discussion provided.  May need SALAZAR  Will discuss with ID.     NILDA Sung

## 2017-04-04 NOTE — SUBJECTIVE & OBJECTIVE
Interval History: evidence of Dementia. Denies any pain. ID following. Bld Cx pending.    Review of Systems   Constitutional: Negative.  Negative for activity change, appetite change, chills, diaphoresis, fatigue, fever and unexpected weight change.   HENT: Negative for congestion, ear discharge, ear pain, facial swelling, hearing loss, postnasal drip, sore throat, tinnitus, trouble swallowing and voice change.    Eyes: Negative for photophobia, pain, discharge, redness, itching and visual disturbance.   Respiratory: Negative for cough, choking, chest tightness, shortness of breath, wheezing and stridor.    Cardiovascular: Negative for chest pain, palpitations and leg swelling.   Gastrointestinal: Negative for abdominal distention, abdominal pain, blood in stool, constipation, diarrhea, nausea and vomiting.   Endocrine: Negative for cold intolerance, heat intolerance, polydipsia, polyphagia and polyuria.   Genitourinary: Negative for difficulty urinating, flank pain, frequency, hematuria and urgency.   Musculoskeletal: Negative for arthralgias, back pain, gait problem and myalgias.   Skin: Negative for color change, pallor, rash and wound.   Neurological: Negative for dizziness, tremors, seizures, syncope, facial asymmetry, speech difficulty, weakness, light-headedness, numbness and headaches.   Hematological: Negative for adenopathy. Does not bruise/bleed easily.   Psychiatric/Behavioral: Negative for agitation, confusion, hallucinations and suicidal ideas. The patient is not nervous/anxious.    All other systems reviewed and are negative.    Objective:     ECHO  CONCLUSIONS     1 - Biatrial enlargement.     2 - Eccentric hypertrophy.     3 - Mildly depressed left ventricular systolic function (EF 45-50%).     4 - Left ventricular diastolic dysfunction. Grade III    5 - Low normal to mildly depressed right ventricular systolic function .     6 - The estimated PA systolic pressure is 25 mmHg.     7 - Mild tricuspid  regurgitation.     8 - Small pericardial effusion.     9 - No intracavity mass visualized.    Vital Signs (Most Recent):  Temp: 97.2 °F (36.2 °C) (04/04/17 1442)  Pulse: 66 (04/04/17 1700)  Resp: 18 (04/04/17 1700)  BP: 139/85 (04/04/17 1700)  SpO2: 98 % (04/04/17 1244) Vital Signs (24h Range):  Temp:  [97.2 °F (36.2 °C)-99.5 °F (37.5 °C)] 97.2 °F (36.2 °C)  Pulse:  [64-89] 66  Resp:  [16-18] 18  SpO2:  [95 %-98 %] 98 %  BP: (101-177)/() 139/85     Weight: 97.1 kg (214 lb)  Body mass index is 27.48 kg/(m^2).  No intake or output data in the 24 hours ending 04/04/17 1728   Physical Exam   Constitutional: He appears well-developed and well-nourished.   HENT:   Head: Normocephalic and atraumatic.   Nose: Nose normal.   Eyes: Conjunctivae and EOM are normal. Pupils are equal, round, and reactive to light.   Neck: Normal range of motion. Neck supple. No JVD present. No tracheal deviation present. No thyromegaly present.   Cardiovascular: Normal rate, regular rhythm, normal heart sounds and intact distal pulses.  Exam reveals no gallop and no friction rub.    No murmur heard.  Pulmonary/Chest: Effort normal. No stridor. No respiratory distress. He has no wheezes. He has no rales. He exhibits no tenderness.   Abdominal: Soft. Bowel sounds are normal. He exhibits no distension. There is no tenderness. There is no rebound and no guarding.   Musculoskeletal: Normal range of motion. He exhibits no edema, tenderness or deformity.   Neurological: He is alert. He has normal reflexes. No cranial nerve deficit. Coordination normal.   Evidence of Dementia   Skin: Skin is warm and dry. No rash noted. No erythema. No pallor.   L UE AVF   Psychiatric: He has a normal mood and affect. His behavior is normal. Judgment and thought content normal.   Nursing note and vitals reviewed.      Significant Labs:   CBC:   Recent Labs  Lab 04/03/17  1200 04/03/17  2141   WBC 9.19 7.31   HGB 13.2* 13.1*   HCT 40.7 40.8    183     CMP:    Recent Labs  Lab 04/03/17  1200 04/03/17  2141   * 134*  134*   K 4.9 4.5  4.5   CL 96 95  95   CO2 25 26  26   GLU 80 115*  115*   BUN 28* 33*  33*   CREATININE 8.1* 8.7*  8.7*   CALCIUM 8.8 9.0  9.0   PROT 8.7* 8.4   ALBUMIN 3.3* 3.1*   BILITOT 0.9 0.8   ALKPHOS 87 84   AST 13 13   ALT 7* 9*   ANIONGAP 13 13  13   EGFRNONAA 6* 6*  6*       Significant Imaging: I have reviewed all pertinent imaging results/findings within the past 24 hours.   Imaging Results         X-Ray Chest PA And Lateral (Final result) Result time:  04/03/17 18:52:43    Final result by Cornelius Jackson MD (04/03/17 18:52:43)    Impression:           1.  Left right basilar atelectasis last consolidation, concerning for pneumonia.  Other considerations would include interstitial pulmonary edema.  Clinical correlation is advised.  2.  Stable findings as noted above.      Electronically signed by: CORNELIUS JACKSON MD  Date:     04/03/17  Time:    18:52     Narrative:    PA and Lateral Chest x-ray    Clinical Indication: bacteremia.  Fever    Findings:    Comparisons are made to 01/31/2017.  Left rib and right basilar atelectasis/consolidation and effusions.      The upper lungs are clear. The cardiac silhouette size is enlarged. The trachea is midline and the mediastinal width is normal. Negative for pneumothorax. Pulmonary vasculature is normal. Negative for osseous abnormalities. There are degenerative changes of spine and both shoulder girdles. There are calcifications of the aortic knob and tortuosity of the descending thoracic aorta. There are changes of a prior median sternotomy and CABG changes.

## 2017-04-04 NOTE — NURSING
Attempted to assess for diabetes educational needs following chart review  Patient unable to participate due to dementia  Family member at bedside reports his wife, Nakia Hadley, is mainly responsible for his care.  She thinks they have what they need to care for him  Diabetes is not a new diagnosis     If any diabetes educational needs are identified, please consult

## 2017-04-04 NOTE — ASSESSMENT & PLAN NOTE
Bacteremia: Positive blood cx's for GPC in clusters, likely Staph aureus:Had MRSA in blood Cx in Jan/Feb 2017  Source not known  No obvious wounds, no abscess, no foreign bodies or catheters, hear surgery done in 2015,   DDX: endocarditis  Discussed with Dr. Choudhury  ECHO  Vancomycin IV to keep 15-20 level

## 2017-04-04 NOTE — PROGRESS NOTES
"[facility] Hospital Medicine  Progress Note    Patient Name: David Hadley  MRN: [id number]  Patient Class: IP- Inpatient   Admission Date: 4/3/2017  Length of Stay: 1 days  Attending Physician: Conner Duarte MD  Primary Care Provider: Isaac Fitch MD        Subjective:     Principal Problem:MRSA bacteremia    HPI:  David Hadley is a 61 y.o. male patient with PMHx of ESRD on HD q TTS, HTN, HLP, who presented to the ER for c/o "for further evaluation of infection that was noticed in his blood today via Dr. Choudhury." Dr. Choudhury reported positive blood cx's c/w GPC in clusters. Records were reviewed. Pt had MRSA on blood Cx in Jan/Feb 2017. Source is not known.Pt stated he was sent here for IV abx via Dr. Choudhury. Pt is denying any sxs at this time. Pt states he is due for dialysis tomorrow. Patient denies any fever, N/V/D, chills, abd pain, CP, SOB, weakness/numbness, dizziness, lightheadedness, HA  and all other sxs at this time. No tooth problems. Pt has been on HD about 10 years, and gets dialyzed regularly in [place], [place] (Dr. Rinku Guardado). His L UE AVF is not bothering him.    Hospital Course:  ID consulted. Blood cultures pending.    Interval History: evidence of Dementia. Denies any pain. ID following. Bld Cx pending.    Review of Systems   Constitutional: Negative.  Negative for activity change, appetite change, chills, diaphoresis, fatigue, fever and unexpected weight change.   HENT: Negative for congestion, ear discharge, ear pain, facial swelling, hearing loss, postnasal drip, sore throat, tinnitus, trouble swallowing and voice change.    Eyes: Negative for photophobia, pain, discharge, redness, itching and visual disturbance.   Respiratory: Negative for cough, choking, chest tightness, shortness of breath, wheezing and stridor.    Cardiovascular: Negative for chest pain, palpitations and leg swelling.   Gastrointestinal: Negative for abdominal distention, abdominal pain, blood in stool, " constipation, diarrhea, nausea and vomiting.   Endocrine: Negative for cold intolerance, heat intolerance, polydipsia, polyphagia and polyuria.   Genitourinary: Negative for difficulty urinating, flank pain, frequency, hematuria and urgency.   Musculoskeletal: Negative for arthralgias, back pain, gait problem and myalgias.   Skin: Negative for color change, pallor, rash and wound.   Neurological: Negative for dizziness, tremors, seizures, syncope, facial asymmetry, speech difficulty, weakness, light-headedness, numbness and headaches.   Hematological: Negative for adenopathy. Does not bruise/bleed easily.   Psychiatric/Behavioral: Negative for agitation, confusion, hallucinations and suicidal ideas. The patient is not nervous/anxious.    All other systems reviewed and are negative.    Objective:     ECHO  CONCLUSIONS     1 - Biatrial enlargement.     2 - Eccentric hypertrophy.     3 - Mildly depressed left ventricular systolic function (EF 45-50%).     4 - Left ventricular diastolic dysfunction. Grade III    5 - Low normal to mildly depressed right ventricular systolic function .     6 - The estimated PA systolic pressure is 25 mmHg.     7 - Mild tricuspid regurgitation.     8 - Small pericardial effusion.     9 - No intracavity mass visualized.    Vital Signs (Most Recent):  Temp: 97.2 °F (36.2 °C) (04/04/17 1442)  Pulse: 66 (04/04/17 1700)  Resp: 18 (04/04/17 1700)  BP: 139/85 (04/04/17 1700)  SpO2: 98 % (04/04/17 1244) Vital Signs (24h Range):  Temp:  [97.2 °F (36.2 °C)-99.5 °F (37.5 °C)] 97.2 °F (36.2 °C)  Pulse:  [64-89] 66  Resp:  [16-18] 18  SpO2:  [95 %-98 %] 98 %  BP: (101-177)/() 139/85     Weight: 97.1 kg (214 lb)  Body mass index is 27.48 kg/(m^2).  No intake or output data in the 24 hours ending 04/04/17 1728   Physical Exam   Constitutional: He appears well-developed and well-nourished.   HENT:   Head: Normocephalic and atraumatic.   Nose: Nose normal.   Eyes: Conjunctivae and EOM are normal.  Pupils are equal, round, and reactive to light.   Neck: Normal range of motion. Neck supple. No JVD present. No tracheal deviation present. No thyromegaly present.   Cardiovascular: Normal rate, regular rhythm, normal heart sounds and intact distal pulses.  Exam reveals no gallop and no friction rub.    No murmur heard.  Pulmonary/Chest: Effort normal. No stridor. No respiratory distress. He has no wheezes. He has no rales. He exhibits no tenderness.   Abdominal: Soft. Bowel sounds are normal. He exhibits no distension. There is no tenderness. There is no rebound and no guarding.   Musculoskeletal: Normal range of motion. He exhibits no edema, tenderness or deformity.   Neurological: He is alert. He has normal reflexes. No cranial nerve deficit. Coordination normal.   Evidence of Dementia   Skin: Skin is warm and dry. No rash noted. No erythema. No pallor.   L UE AVF   Psychiatric: He has a normal mood and affect. His behavior is normal. Judgment and thought content normal.   Nursing note and vitals reviewed.      Significant Labs:   CBC:   Recent Labs  Lab 04/03/17  1200 04/03/17  2141   WBC 9.19 7.31   HGB 13.2* 13.1*   HCT 40.7 40.8    183     CMP:   Recent Labs  Lab 04/03/17  1200 04/03/17  2141   * 134*  134*   K 4.9 4.5  4.5   CL 96 95  95   CO2 25 26  26   GLU 80 115*  115*   BUN 28* 33*  33*   CREATININE 8.1* 8.7*  8.7*   CALCIUM 8.8 9.0  9.0   PROT 8.7* 8.4   ALBUMIN 3.3* 3.1*   BILITOT 0.9 0.8   ALKPHOS 87 84   AST 13 13   ALT 7* 9*   ANIONGAP 13 13  13   EGFRNONAA 6* 6*  6*       Significant Imaging: I have reviewed all pertinent imaging results/findings within the past 24 hours.   Imaging Results         X-Ray Chest PA And Lateral (Final result) Result time:  04/03/17 18:52:43    Final result by Cornelius Jackson MD (04/03/17 18:52:43)    Impression:           1.  Left right basilar atelectasis last consolidation, concerning for pneumonia.  Other considerations would include  interstitial pulmonary edema.  Clinical correlation is advised.  2.  Stable findings as noted above.      Electronically signed by: DAVID GUTIERREZ MD  Date:     04/03/17  Time:    18:52     Narrative:    PA and Lateral Chest x-ray    Clinical Indication: bacteremia.  Fever    Findings:    Comparisons are made to 01/31/2017.  Left rib and right basilar atelectasis/consolidation and effusions.      The upper lungs are clear. The cardiac silhouette size is enlarged. The trachea is midline and the mediastinal width is normal. Negative for pneumothorax. Pulmonary vasculature is normal. Negative for osseous abnormalities. There are degenerative changes of spine and both shoulder girdles. There are calcifications of the aortic knob and tortuosity of the descending thoracic aorta. There are changes of a prior median sternotomy and CABG changes.            Assessment/Plan:      * MRSA bacteremia  Bacteremia: Positive blood cx's for GPC in clusters, likely Staph aureus:Had MRSA in blood Cx in Jan/Feb 2017  Source not known  No obvious wounds, no abscess, no foreign bodies or catheters, hear surgery done in 2015,   DDX: endocarditis  Discussed with Dr. Choudhury  ECHO  Vancomycin IV to keep 15-20 level      ESRD (end stage renal disease) on dialysis  Renal: ESRD pt, q TTS HD schedule in [place], [place].  K normal  Not acidemic  Good O2 sat  No acute indications for HD today  Will provide routine HD 4/4/17: TTS      Dementia without behavioral disturbance  monitor      VTE Risk Mitigation         Ordered     heparin (porcine) injection 5,000 Units  Every 8 hours     Route:  Subcutaneous        04/03/17 2007     Medium Risk of VTE  Once      04/03/17 2007          Maricel Mccartney NP  Department of Hospital Medicine   [facility]

## 2017-04-04 NOTE — ED NOTES
Hospitalist Otilia Marley, BENJAMIN, at bedside.    Pt family contact:  Wife, Nakia Hadley  [phone]

## 2017-04-04 NOTE — PROGRESS NOTES
HD today, pt tolerated well with no complications. Net UF 3L. LUAF working fine. Fortaz given per order.

## 2017-04-04 NOTE — PLAN OF CARE
Call received from patient's wife requesting information to start a medicaid application. She stated that they are using public transportation for dialysis. She is meeting with me tomorrow to assist with the application.     04/04/17 1526   Discharge Assessment   Assessment Type Discharge Planning Reassessment   Confirmed/corrected address and phone number on facesheet? Yes   Assessment information obtained from? Caregiver   Communicated expected length of stay with patient/caregiver yes   Type of Healthcare Directive Received Other (Comment)   Prior to hospitilization cognitive status: Alert/Oriented   Prior to hospitalization functional status: Independent;Assistive Equipment   Current cognitive status: Alert/Oriented   Current Functional Status: Assistive Equipment;Needs Assistance   Arrived From home or self-care   Lives With spouse   Able to Return to Prior Arrangements yes   Is patient able to care for self after discharge? Yes   How many people do you have in your home that can help with your care after discharge? 1   Who are your caregiver(s) and their phone number(s)? Nakia Hadley ( spouse ) [phone]   Patient's perception of discharge disposition home or selfcare;home health   Readmission Within The Last 30 Days no previous admission in last 30 days   Patient currently being followed by outpatient case management? No   Patient currently receives home health services? No   Does the patient currently use HME? No   Patient currently receives private duty nursing? No   Patient currently receives any other outside agency services? No   Equipment Currently Used at Home none   Do you have any problems affording any of your prescribed medications? No   Is the patient taking medications as prescribed? yes   Do you have any financial concerns preventing you from receiving the healthcare you need? No   Does the patient have transportation to healthcare appointments? Yes   Transportation Available other (see  comments)  (uses public transportation for dialysis)   If yes, what is the name of the dialysis unit? [facility]   Does the patient receive outpatient dialysis? Yes   Does the patient receive services at the [facility]? No   Are there any open cases? No   Discharge Plan A Home;Home with family   Discharge Plan B Home;Home with family;Home Health   Patient/Family In Agreement With Plan yes

## 2017-04-04 NOTE — CONSULTS
[facility]  Infectious Disease  Consult Note    Patient Name: David Hadley  MRN: [id number]  Admission Date: 4/3/2017  Hospital Length of Stay: 1 days  Attending Physician: Conner Duarte MD  Primary Care Provider: Isaac Fitch MD     Isolation Status: Contact    Patient information was obtained from patient and ER records.      Consults  Assessment/Plan:     * MRSA bacteremia  Source is unclear at this time.  Will follow cardiac echo,   Repeat blood cultures  Follow ID of cultures done at his HD unit   Will use vanco  post HD for now     ESRD (end stage renal disease) on dialysis  HD as per nephrology       Thank you for your consult. I will follow-up with patient. Please contact us if you have any additional questions.    Ganga Choudhury MD  Infectious Disease  [facility]    Subjective:     Principal Problem: MRSA bacteremia    HPI: 61 year old woman with history of ESRD on HD -TTS , hypertension, s/p CVA with residual aphasia  who was seen at the office last week for MRSA bacteremia . Blood culture done on 01/31 was positive for MRSA. He was given IV vanco at his HD unit .  Clinic visit note on  03/29-    61 year old man with ESRD, s/p previous CVA with residual aphasia who was seen in the hospital on 02/02/2017 for MRSA bacteremia. He gets HD at [facility]  and was on  vanco for 3/2/17 stopped on 3/18/17, 1500mg. Blood cultures done 3/2/17 was negative..On 2/18 pt was given 1g of vanco.  I called and spoke to Zeniamyrna Cantu from [place] [facility].  He was accompanied by his niece for this visit and we also got some history by talking to the wife on the phone.  Pt denies fever or chills.      At that visit, I called his HD center and request request blood cultures-they were done on 03/31 and they are now positive  for GPC. He denies any history of fever or curve .  He has left upper extremity AVF .    He was advised to come back for persistent positive blood  cultures.        Past Medical History:   Diagnosis Date    After-cataract, unspecified - Left Eye 11/27/2013    Allergy     Arthritis     Cancer     CHF (congestive heart failure)     Chronic kidney disease     chemo/ dialias    Dementia     Diabetes mellitus     Hypertension     Myocardial infarction        Past Surgical History:   Procedure Laterality Date    CATARACT EXTRACTION      CORONARY ARTERY BYPASS GRAFT  3-2014       Review of patient's allergies indicates:   Allergen Reactions    Benadryl decongestant Itching     Nothing with benadryl        Medications:  Prescriptions Prior to Admission   Medication Sig    atorvastatin (LIPITOR) 20 MG tablet Take 1 tablet (20 mg total) by mouth once daily.    FOLIC ACID/VIT BCOMP,C (JOSAFAT-JANICE ORAL) Take by mouth.    gabapentin (NEURONTIN) 100 MG capsule Take 1 capsule (100 mg total) by mouth 3 (three) times daily.    hydrocodone-acetaminophen 5-325mg (NORCO) 5-325 mg per tablet Take 1 tablet by mouth every 6 to 8 hours as needed for Pain.    losartan (COZAAR) 50 MG tablet Take by mouth. 1 tablet Oral Every day    metoprolol tartrate (LOPRESSOR) 25 MG tablet Take 1 tablet (25 mg total) by mouth 2 (two) times daily.    rivastigmine (EXELON) 4.6 mg/24 hr PT24 Place 1 patch onto the skin once daily.    SENSIPAR 30 mg Tab Take 1 tablet by mouth once daily.    sertraline (ZOLOFT) 50 MG tablet Take 1 tablet (50 mg total) by mouth once daily.    SPRYCEL 100 mg Tab Take 100 mg by mouth once daily.     trazodone (DESYREL) 50 MG tablet Take 1 tablet (50 mg total) by mouth every evening.     Antibiotics     Start     Stop Route Frequency Ordered    04/03/17 1300  ceftAZIDime (FORTAZ) 500 mg in dextrose 5 % 50 mL IVPB      -- IV Every 24 hours (non-standard times) 04/03/17 1150    04/05/17 2100  vancomycin 1 g in dextrose 5 % 250 mL IVPB (ready to mix system)      -- IV Every 48 hours (non-standard times) 04/03/17 1631        Antifungals     None         Antivirals     None           Immunization History   Administered Date(s) Administered    Influenza A (H1N1) 2009 Monovalent - IM 11/09/2009    Pneumococcal Conjugate - 13 Valent 02/18/2016    Pneumococcal Polysaccharide - 23 Valent 09/15/2009    Tdap 09/15/2009    Zoster 09/15/2009       Family History     Problem Relation (Age of Onset)    Cancer Brother    Colon cancer Sister    Glaucoma Mother    Pancreatic cancer Brother    Prostate cancer Father        Social History     Social History    Marital status:      Spouse name: N/A    Number of children: 1    Years of education: N/A     Social History Main Topics    Smoking status: Former Smoker     Types: Cigarettes     Quit date: 1/28/2000    Smokeless tobacco: Never Used    Alcohol use No    Drug use: No    Sexual activity: Not Currently     Birth control/ protection: None     Other Topics Concern    None     Social History Narrative     Review of Systems   Unable to perform ROS: Dementia (He did not talk much in the room-possible aphasia)   Constitutional: Negative for activity change and appetite change.   Eyes: Negative for discharge.   Respiratory: Negative for apnea.    Endocrine: Negative for cold intolerance.     Objective:     Vital Signs (Most Recent):  Temp: 98.8 °F (37.1 °C) (04/04/17 0109)  Pulse: 73 (04/04/17 0109)  Resp: 18 (04/04/17 0109)  BP: (!) 152/87 (04/04/17 0109)  SpO2: 95 % (04/04/17 0109) Vital Signs (24h Range):  Temp:  [97.4 °F (36.3 °C)-98.8 °F (37.1 °C)] 98.8 °F (37.1 °C)  Pulse:  [66-79] 73  Resp:  [18] 18  SpO2:  [94 %-99 %] 95 %  BP: (140-177)/(83-98) 152/87     Weight: 97.1 kg (214 lb)  Body mass index is 27.48 kg/(m^2).    Estimated Creatinine Clearance: 10.1 mL/min (based on Cr of 8.7).    Physical Exam   Constitutional: He appears well-developed.   HENT:   Head: Normocephalic and atraumatic.   Nose: Nose normal.   Eyes: Conjunctivae and EOM are normal. Pupils are equal, round, and reactive to light.    Neck: Normal range of motion. Neck supple. No JVD present. No tracheal deviation present. No thyromegaly present.   Cardiovascular: Normal rate, regular rhythm, normal heart sounds and intact distal pulses.  Exam reveals no gallop and no friction rub.    No murmur heard.  Pulmonary/Chest: Effort normal. No stridor. No respiratory distress. He has no wheezes. He has no rales. He exhibits no tenderness.   Abdominal: Soft. Bowel sounds are normal. He exhibits no distension. There is no tenderness. There is no rebound and no guarding.   Musculoskeletal: Normal range of motion. He exhibits no edema, tenderness or deformity.   Neurological: He is alert. He has normal reflexes. No cranial nerve deficit. Coordination normal.   He did not talk much    Skin: Skin is warm and dry. No rash noted. No erythema. No pallor.   L UE AVF   Nursing note and vitals reviewed.      Significant Labs:   Blood Culture:   Recent Labs  Lab 01/31/17  1830 01/31/17  1835   LABBLOO Gram stain aer bottle: Gram positive cocci in clusters resembling Staph   Results called to and read back by: Lula Marinelli RN  02/01/2017  16:22  METHICILLIN RESISTANT STAPHYLOCOCCUS AUREUS Gram stain aer bottle: Gram positive cocci in clusters resembling Staph  Gram stain mike bottle: Gram positive cocci in clusters resembling Staph   Results called to and read back by: Lula Marinelli RN  02/01/2017  16:22  METHICILLIN RESISTANT STAPHYLOCOCCUS AUREUSFor susceptibility see order #[id number]     BMP:   Recent Labs  Lab 04/03/17  2141   *  115*   *  134*   K 4.5  4.5   CL 95  95   CO2 26  26   BUN 33*  33*   CREATININE 8.7*  8.7*   CALCIUM 9.0  9.0   MG 2.0     CBC:   Recent Labs  Lab 04/03/17  1200 04/03/17  2141   WBC 9.19 7.31   HGB 13.2* 13.1*   HCT 40.7 40.8    183     All pertinent labs within the past 24 hours have been reviewed.    Significant Imaging: I have reviewed all pertinent imaging results/findings within the past 24  hours.

## 2017-04-04 NOTE — ASSESSMENT & PLAN NOTE
Source is unclear at this time.  Will follow cardiac echo,   Repeat blood cultures  Follow ID of cultures done at his HD unit   Will use vanco  post HD for now

## 2017-04-04 NOTE — ASSESSMENT & PLAN NOTE
Renal: ESRD pt, q TTS HD schedule in [place] normal  Not acidemic  Good O2 sat  No acute indications for HD today  Will provide routine HD 4/4/17: TTS

## 2017-04-04 NOTE — PLAN OF CARE
Patient currently in dialysis, Initial assessment to be completed later this afternoon once patient returns to hsi room.     04/04/17 1508   Discharge Assessment   Assessment Type Discharge Planning Assessment   Assessment information obtained from? Medical Record   Expected Length of Stay (days) (tbd)   Able to Return to Prior Arrangements unable to determine at this time (comments)   Is patient able to care for self after discharge? Unable to determine at this time (comments)   Readmission Within The Last 30 Days no previous admission in last 30 days   On Dialysis? Yes

## 2017-04-05 LAB — VANCOMYCIN SERPL-MCNC: 11.7 UG/ML

## 2017-04-05 PROCEDURE — 11000001 HC ACUTE MED/SURG PRIVATE ROOM

## 2017-04-05 PROCEDURE — 63600175 PHARM REV CODE 636 W HCPCS: Performed by: INTERNAL MEDICINE

## 2017-04-05 PROCEDURE — 25000003 PHARM REV CODE 250: Performed by: NURSE PRACTITIONER

## 2017-04-05 PROCEDURE — 96372 THER/PROPH/DIAG INJ SC/IM: CPT

## 2017-04-05 PROCEDURE — 36415 COLL VENOUS BLD VENIPUNCTURE: CPT

## 2017-04-05 PROCEDURE — 99233 SBSQ HOSP IP/OBS HIGH 50: CPT | Mod: ,,, | Performed by: INTERNAL MEDICINE

## 2017-04-05 PROCEDURE — 99223 1ST HOSP IP/OBS HIGH 75: CPT | Mod: ,,, | Performed by: INTERNAL MEDICINE

## 2017-04-05 PROCEDURE — 80202 ASSAY OF VANCOMYCIN: CPT

## 2017-04-05 RX ADMIN — METOPROLOL TARTRATE 25 MG: 25 TABLET ORAL at 10:04

## 2017-04-05 RX ADMIN — GABAPENTIN 100 MG: 100 CAPSULE ORAL at 05:04

## 2017-04-05 RX ADMIN — GABAPENTIN 100 MG: 100 CAPSULE ORAL at 10:04

## 2017-04-05 RX ADMIN — METOPROLOL TARTRATE 25 MG: 25 TABLET ORAL at 09:04

## 2017-04-05 RX ADMIN — PANTOPRAZOLE SODIUM 40 MG: 40 TABLET, DELAYED RELEASE ORAL at 09:04

## 2017-04-05 RX ADMIN — RIVASTIGMINE TRANSDERMAL SYSTEM 1 PATCH: 4.6 PATCH, EXTENDED RELEASE TRANSDERMAL at 09:04

## 2017-04-05 RX ADMIN — ATORVASTATIN CALCIUM 20 MG: 10 TABLET, FILM COATED ORAL at 09:04

## 2017-04-05 RX ADMIN — POLYETHYLENE GLYCOL 3350 17 G: 17 POWDER, FOR SOLUTION ORAL at 09:04

## 2017-04-05 RX ADMIN — Medication 1 G: at 05:04

## 2017-04-05 RX ADMIN — HEPARIN SODIUM 5000 UNITS: 5000 INJECTION, SOLUTION INTRAVENOUS; SUBCUTANEOUS at 10:04

## 2017-04-05 RX ADMIN — STANDARDIZED SENNA CONCENTRATE AND DOCUSATE SODIUM 1 TABLET: 8.6; 5 TABLET, FILM COATED ORAL at 09:04

## 2017-04-05 RX ADMIN — HEPARIN SODIUM 5000 UNITS: 5000 INJECTION, SOLUTION INTRAVENOUS; SUBCUTANEOUS at 06:04

## 2017-04-05 RX ADMIN — HEPARIN SODIUM 5000 UNITS: 5000 INJECTION, SOLUTION INTRAVENOUS; SUBCUTANEOUS at 02:04

## 2017-04-05 RX ADMIN — GABAPENTIN 100 MG: 100 CAPSULE ORAL at 02:04

## 2017-04-05 RX ADMIN — SERTRALINE HYDROCHLORIDE 50 MG: 50 TABLET ORAL at 09:04

## 2017-04-05 RX ADMIN — LOSARTAN POTASSIUM 50 MG: 50 TABLET, FILM COATED ORAL at 09:04

## 2017-04-05 RX ADMIN — STANDARDIZED SENNA CONCENTRATE AND DOCUSATE SODIUM 1 TABLET: 8.6; 5 TABLET, FILM COATED ORAL at 10:04

## 2017-04-05 NOTE — ASSESSMENT & PLAN NOTE
Source is unclear at this time.   cardiac echo did not show endocarditis .Will plan for SALAZAR .Will use vanco  post HD for now .  Will do ultrasound of the site of the LUE fistula.

## 2017-04-05 NOTE — CONSULTS
Contacted [place] [employer] . Patient's wife has arrived and request to initiate a medicaid application. [place] to complete application.

## 2017-04-05 NOTE — CONSULTS
Clinical Pharmacy Progress Note: Vancomycin     Consulted by Dr. Coleman to dose vancomycin for this patient for the treatment of Staph aureus bacteremia. Goal trough: 15-20 mcg/mL.     Please note: A vancomycin 1000 mg IV every 48 hours dose has been entered as a placeholder dose only. Thank you!      Labs: CBC/BMP ordered for tomorrow morning @ 0430.   Estimated CrCl: 10.1 mL/min (ESRD w/ HD on Tues/Thurs/Sat)   WBC: 7.31   Tmax/24h: 98.6     Recent Levels:   Vancomycin, random [[id number]] Collected: 04/05/17 0759        Specimen: Blood from Blood Updated: 04/05/17 0943        Vancomycin, Random 11.7 ug/mL        Vancomycin, random [[id number]] Collected: 04/04/17 0501       Specimen: Blood from Blood Updated: 04/04/17 0616        Vancomycin, Random 16.0 ug/mL       Goal trough: 15-20 mcg/mL   Dx: Staph aureus bacteremia, susceptibility pending. Patient has recent history of MRSA bacteremia (vanc sensitive) on 1/31/17.     Plan: Patient received vancomycin 1000 mg IV. A random pre-HD level has been ordered for tomorrow morning.     Next level due: Vancomycin random level due 04/06/17 @ 0430 with AM CBC/BMP.      Thank you for allowing us to participate in this patient's care.      Joan Juarez, PharmD 4/5/2017 5:19 PM

## 2017-04-05 NOTE — SUBJECTIVE & OBJECTIVE
Interval History:  61 year old man with persistent bacteremia with MRSA.  Blood culture on 04/01-GPC     Review of Systems   Unable to perform ROS: Dementia   Constitutional: Negative for activity change and appetite change.   Eyes: Negative for discharge.   Respiratory: Negative for apnea.    Endocrine: Negative for cold intolerance.     Objective:     Vital Signs (Most Recent):  Temp: 97.9 °F (36.6 °C) (04/05/17 0010)  Pulse: 80 (04/05/17 0010)  Resp: 18 (04/05/17 0010)  BP: (!) 142/74 (04/05/17 0010)  SpO2: 97 % (04/05/17 0010) Vital Signs (24h Range):  Temp:  [97.2 °F (36.2 °C)-99.5 °F (37.5 °C)] 97.9 °F (36.6 °C)  Pulse:  [64-89] 80  Resp:  [16-18] 18  SpO2:  [97 %-98 %] 97 %  BP: (101-160)/() 142/74     Weight: 97.1 kg (214 lb)  Body mass index is 27.48 kg/(m^2).    Estimated Creatinine Clearance: 10.1 mL/min (based on Cr of 8.7).    Physical Exam   Constitutional: He appears well-developed.   HENT:   Head: Normocephalic and atraumatic.   Nose: Nose normal.   Eyes: Conjunctivae and EOM are normal. Pupils are equal, round, and reactive to light.   Neck: Normal range of motion. Neck supple. No JVD present. No tracheal deviation present. No thyromegaly present.   Cardiovascular: Normal rate, regular rhythm, normal heart sounds and intact distal pulses.  Exam reveals no gallop and no friction rub.    No murmur heard.  Pulmonary/Chest: Effort normal. No stridor. No respiratory distress. He has no wheezes. He has no rales. He exhibits no tenderness.   Abdominal: Soft. Bowel sounds are normal. He exhibits no distension. There is no tenderness. There is no rebound and no guarding.   Musculoskeletal: Normal range of motion. He exhibits no edema, tenderness or deformity.   Neurological: He is alert. He has normal reflexes. No cranial nerve deficit. Coordination normal.   He did not talk much    Skin: Skin is warm and dry. No rash noted. No erythema. No pallor.   L UE AVF   Nursing note and vitals  reviewed.      Significant Labs:   Blood Culture:   Recent Labs  Lab 01/31/17  1830 01/31/17  1835 04/03/17  1925 04/03/17  1940   LABBLOO Gram stain aer bottle: Gram positive cocci in clusters resembling Staph   Results called to and read back by: Lula Marinelli RN  02/01/2017  16:22  METHICILLIN RESISTANT STAPHYLOCOCCUS AUREUS Gram stain aer bottle: Gram positive cocci in clusters resembling Staph  Gram stain mike bottle: Gram positive cocci in clusters resembling Staph   Results called to and read back by: Lula Marinelli RN  02/01/2017  16:22  METHICILLIN RESISTANT STAPHYLOCOCCUS AUREUSFor susceptibility see order #[id number] Gram stain aer bottle: Gram positive cocci in clusters resembling Staph  Results called to and read back by:Sammi Perry RN 04/04/2017  20:14 Gram stain aer bottle: Gram positive cocci in clusters resembling Staph  Results called to and read back by:Sammi Perry RN 04/04/2017  20:13     BMP:   Recent Labs  Lab 04/03/17  2141   *  115*   *  134*   K 4.5  4.5   CL 95  95   CO2 26  26   BUN 33*  33*   CREATININE 8.7*  8.7*   CALCIUM 9.0  9.0   MG 2.0     CBC:   Recent Labs  Lab 04/03/17  1200 04/03/17  2141   WBC 9.19 7.31   HGB 13.2* 13.1*   HCT 40.7 40.8    183       Significant Imaging: I have reviewed all pertinent imaging results/findings within the past 24 hours.

## 2017-04-05 NOTE — ASSESSMENT & PLAN NOTE
Bacteremia: Positive blood cx's for GPC in clusters, likely Staph aureus:Had MRSA in blood Cx in Jan/Feb 2017  Source not known  No obvious wounds, no abscess, no foreign bodies or catheters, hear surgery done in 2015,   DDX: endocarditis  Discussed with Dr. Choudhury  ECHO: SALAZAR 4/6/17  Vancomycin IV to keep 15-20 level

## 2017-04-05 NOTE — CONSULTS
[facility] -   Cardiology  Consult Note    Patient Name: David Hadley  MRN: [id number]  Admission Date: 4/3/2017  Hospital Length of Stay: 2 days  Code Status: Full Code   Attending Provider: Conner Duarte MD   Consulting Provider: Sandor Lazo MD  Primary Care Physician: Isaac Fitch MD  Principal Problem:MRSA bacteremia    Patient information was obtained from patient and ER records.     Inpatient consult to Cardiology  Consult performed by: SANDOR LAZO  Consult ordered by: KASSIE CHOUDHURY        Subjective:     Chief Complaint:  MRSA bacteremis     HPI: 62 yo ,male,PMH CAD s/p cabg, HTN, HLD, recurrent MRSA bacteremia, DM, ESRD on HD via left arm fistula and dementia.  He was called in by Dr. Choudhury to come to ER to be admitted for positive blood cx's c/w GPC in [facility]. Records were reviewed. Pt had MRSA on blood Cx in Jan/Feb 2017. Source is not known. No cough, no SOB, no diarrhea, no open wounds, no catheters, no wounds, no lesions or abscesses. No fever, no bone pain, no sx's at all. No tooth problems. Pt has been on HD about 10 years, and gets dialyzed regularly in [place], [place] (Dr. Rinku Guardado). His L UE AVF is not bothering him.  Echo on 04/03 showed normal EF and no mass or veg visualized.   Denied chest pain, dyspnea, difficult for swallowing and active bleeding,.    Past Medical History:   Diagnosis Date    After-cataract, unspecified - Left Eye 11/27/2013    Allergy     Arthritis     Cancer     CHF (congestive heart failure)     Chronic kidney disease     chemo/ dialias    Dementia     Diabetes mellitus     Hypertension     Myocardial infarction        Past Surgical History:   Procedure Laterality Date    CATARACT EXTRACTION      CORONARY ARTERY BYPASS GRAFT  3-2014       Review of patient's allergies indicates:   Allergen Reactions    Benadryl decongestant Itching     Nothing with benadryl        No current facility-administered medications on file prior to encounter.       Current Outpatient Prescriptions on File Prior to Encounter   Medication Sig    atorvastatin (LIPITOR) 20 MG tablet Take 1 tablet (20 mg total) by mouth once daily.    gabapentin (NEURONTIN) 100 MG capsule Take 1 capsule (100 mg total) by mouth 3 (three) times daily.    hydrocodone-acetaminophen 5-325mg (NORCO) 5-325 mg per tablet Take 1 tablet by mouth every 6 to 8 hours as needed for Pain.    losartan (COZAAR) 50 MG tablet Take by mouth. 1 tablet Oral Every day    metoprolol tartrate (LOPRESSOR) 25 MG tablet Take 1 tablet (25 mg total) by mouth 2 (two) times daily.    rivastigmine (EXELON) 4.6 mg/24 hr PT24 Place 1 patch onto the skin once daily.    SENSIPAR 30 mg Tab Take 1 tablet by mouth once daily.    sertraline (ZOLOFT) 50 MG tablet Take 1 tablet (50 mg total) by mouth once daily.    SPRYCEL 100 mg Tab Take 100 mg by mouth once daily.     trazodone (DESYREL) 50 MG tablet Take 1 tablet (50 mg total) by mouth every evening.     Family History     Problem Relation (Age of Onset)    Cancer Brother    Colon cancer Sister    Glaucoma Mother    Pancreatic cancer Brother    Prostate cancer Father        Social History Main Topics    Smoking status: Former Smoker     Types: Cigarettes     Quit date: 1/28/2000    Smokeless tobacco: Never Used    Alcohol use No    Drug use: No    Sexual activity: Not Currently     Birth control/ protection: None     Review of Systems   Constitution: Negative for decreased appetite, diaphoresis, fever, weakness, malaise/fatigue and night sweats.   HENT: Negative for headaches and nosebleeds.    Eyes: Negative for blurred vision and double vision.   Cardiovascular: Negative for chest pain, claudication, dyspnea on exertion, irregular heartbeat, leg swelling, near-syncope, orthopnea, palpitations, paroxysmal nocturnal dyspnea and syncope.   Respiratory: Negative for cough, shortness of breath, sleep disturbances due to breathing, snoring, sputum production and wheezing.     Endocrine: Negative for cold intolerance and polyuria.   Hematologic/Lymphatic: Does not bruise/bleed easily.   Skin: Negative for rash.   Musculoskeletal: Negative for back pain, falls, joint pain, joint swelling and neck pain.   Gastrointestinal: Negative for abdominal pain, heartburn, nausea and vomiting.   Genitourinary: Negative for dysuria, frequency and hematuria.   Neurological: Negative for difficulty with concentration, dizziness, focal weakness, light-headedness, numbness and seizures.   Psychiatric/Behavioral: Negative for depression, memory loss and substance abuse. The patient does not have insomnia.    Allergic/Immunologic: Negative for HIV exposure and hives.     Objective:     Vital Signs (Most Recent):  Temp: 98.6 °F (37 °C) (04/05/17 0929)  Pulse: 66 (04/05/17 0929)  Resp: 18 (04/05/17 0929)  BP: 120/71 (04/05/17 0929)  SpO2: 98 % (04/05/17 0929) Vital Signs (24h Range):  Temp:  [97.2 °F (36.2 °C)-98.6 °F (37 °C)] 98.6 °F (37 °C)  Pulse:  [64-89] 66  Resp:  [16-18] 18  SpO2:  [96 %-98 %] 98 %  BP: (101-160)/() 120/71     Weight: 97.1 kg (214 lb)  Body mass index is 27.48 kg/(m^2).    SpO2: 98 %  O2 Device (Oxygen Therapy): room air      Intake/Output Summary (Last 24 hours) at 04/05/17 1035  Last data filed at 04/05/17 0500   Gross per 24 hour   Intake              720 ml   Output             3500 ml   Net            -2780 ml       Lines/Drains/Airways     Drain                 Hemodialysis AV Fistula Left upper arm -- days                Physical Exam   Constitutional: He is oriented to person, place, and time. He appears well-nourished.   S/p left arm fistula   HENT:   Head: Normocephalic.   Eyes: Pupils are equal, round, and reactive to light.   Neck: Normal carotid pulses and no JVD present. Carotid bruit is not present. No thyromegaly present.   Cardiovascular: Normal rate, regular rhythm, normal heart sounds and normal pulses.   No extrasystoles are present. PMI is not displaced.   Exam reveals no gallop and no S3.    No murmur heard.  Pulmonary/Chest: Breath sounds normal. No stridor. No respiratory distress.   Abdominal: Soft. Bowel sounds are normal. There is no tenderness. There is no rebound.   Musculoskeletal: Normal range of motion.   Neurological: He is alert and oriented to person, place, and time.   Skin: Skin is intact. No rash noted.   Psychiatric: His behavior is normal.       Significant Labs:   BMP:   Recent Labs  Lab 04/03/17  1200 04/03/17  2141   GLU 80 115*  115*   * 134*  134*   K 4.9 4.5  4.5   CL 96 95  95   CO2 25 26  26   BUN 28* 33*  33*   CREATININE 8.1* 8.7*  8.7*   CALCIUM 8.8 9.0  9.0   MG  --  2.0   , CMP   Recent Labs  Lab 04/03/17  1200 04/03/17  2141   * 134*  134*   K 4.9 4.5  4.5   CL 96 95  95   CO2 25 26  26   GLU 80 115*  115*   BUN 28* 33*  33*   CREATININE 8.1* 8.7*  8.7*   CALCIUM 8.8 9.0  9.0   PROT 8.7* 8.4   ALBUMIN 3.3* 3.1*   BILITOT 0.9 0.8   ALKPHOS 87 84   AST 13 13   ALT 7* 9*   ANIONGAP 13 13  13   ESTGFRAFRICA 7* 7*  7*   EGFRNONAA 6* 6*  6*   , CBC   Recent Labs  Lab 04/03/17  1200 04/03/17  2141   WBC 9.19 7.31   HGB 13.2* 13.1*   HCT 40.7 40.8    183   , INR   Recent Labs  Lab 04/03/17  2141   INR 1.2   , Lipid Panel No results for input(s): CHOL, HDL, LDLCALC, TRIG, CHOLHDL in the last 48 hours. and Troponin No results for input(s): TROPONINI in the last 48 hours.    Significant Imaging: X-Ray: CXR: X-Ray Chest 1 View (CXR): No results found for this visit on 04/03/17.  Assessment and Plan:     Recurrent Bacteremia  CAD s/p cabg  DM  ESRD on HD via left arm fistula  Dementia  HTN  Negative ECHO for endocarditis    Plan:   SALAZAR in am  Keep NPO after Mn except meds  I have explained the risks, benefits, and alternatives of the procedure in detail with patient and family. The patient voices understanding and all questions have been addressed.  The patient agrees to proceed as planned.  .  Active  Diagnoses:    Diagnosis Date Noted POA    PRINCIPAL PROBLEM:  MRSA bacteremia [R78.81] 04/03/2017 Yes    Dementia without behavioral disturbance [F03.90] 07/29/2016 Yes    ESRD (end stage renal disease) on dialysis [N18.6, Z99.2] 06/26/2013 Not Applicable      Problems Resolved During this Admission:    Diagnosis Date Noted Date Resolved POA       VTE Risk Mitigation         Ordered     heparin (porcine) injection 5,000 Units  Every 8 hours     Route:  Subcutaneous        04/03/17 2007     Medium Risk of VTE  Once      04/03/17 2007          Thank you for your consult. I will follow-up with patient. Please contact us if you have any additional questions.    Cr Lazo MD  Cardiology   [facility]

## 2017-04-05 NOTE — SUBJECTIVE & OBJECTIVE
Interval History: Blood cultures positive staph aureus. SALAZAR 4/6/17  Review of Systems   Unable to perform ROS: Dementia   Constitutional: Negative for activity change and appetite change.   Eyes: Negative for discharge.   Respiratory: Negative for apnea.    Endocrine: Negative for cold intolerance.     Objective:     Vital Signs (Most Recent):  Temp: 98.6 °F (37 °C) (04/05/17 1607)  Pulse: 60 (04/05/17 1607)  Resp: 18 (04/05/17 1607)  BP: 130/69 (04/05/17 1607)  SpO2: 100 % (04/05/17 1607) Vital Signs (24h Range):  Temp:  [97.2 °F (36.2 °C)-98.6 °F (37 °C)] 98.6 °F (37 °C)  Pulse:  [60-84] 60  Resp:  [16-18] 18  SpO2:  [96 %-100 %] 100 %  BP: (120-149)/(65-84) 130/69     Weight: 97.1 kg (214 lb)  Body mass index is 27.48 kg/(m^2).    Intake/Output Summary (Last 24 hours) at 04/05/17 1800  Last data filed at 04/05/17 1455   Gross per 24 hour   Intake              620 ml   Output                0 ml   Net              620 ml      Physical Exam   Constitutional: He appears well-developed.   HENT:   Head: Normocephalic and atraumatic.   Nose: Nose normal.   Eyes: Conjunctivae and EOM are normal. Pupils are equal, round, and reactive to light.   Neck: Normal range of motion. Neck supple. No JVD present. No tracheal deviation present. No thyromegaly present.   Cardiovascular: Normal rate, regular rhythm, normal heart sounds and intact distal pulses.  Exam reveals no gallop and no friction rub.    No murmur heard.  Pulmonary/Chest: Effort normal. No stridor. No respiratory distress. He has no wheezes. He has no rales. He exhibits no tenderness.   Abdominal: Soft. Bowel sounds are normal. He exhibits no distension. There is no tenderness. There is no rebound and no guarding.   Musculoskeletal: Normal range of motion. He exhibits no edema, tenderness or deformity.   Neurological: He is alert. He has normal reflexes. No cranial nerve deficit. Coordination normal.   He did not talk much    Skin: Skin is warm and dry. No rash  noted. No erythema. No pallor.   L UE AVF   Nursing note and vitals reviewed.      Significant Labs:   BMP:   Recent Labs  Lab 04/03/17  2141   *  115*   *  134*   K 4.5  4.5   CL 95  95   CO2 26  26   BUN 33*  33*   CREATININE 8.7*  8.7*   CALCIUM 9.0  9.0   MG 2.0     CBC:   Recent Labs  Lab 04/03/17  2141   WBC 7.31   HGB 13.1*   HCT 40.8          Significant Imaging: I have reviewed all pertinent imaging results/findings within the past 24 hours.   Imaging Results         US Soft Tissue Misc (Final result) Result time:  04/05/17 13:55:51    Final result by Mar Yadav MD (04/05/17 13:55:51)    Impression:         No sonographic evidence of abscess.      Electronically signed by: MAR YADAV MD  Date:     04/05/17  Time:    13:55     Narrative:    Exam: US SOFT TISSUE MISC    Clinical History:    Left arm cellulitis near the AV fistula.    Findings:  No suspicious fluid collection identified adjacent to the AV fistula in the left upper arm.            X-Ray Chest PA And Lateral (Final result) Result time:  04/03/17 18:52:43    Final result by Cornelius Jackson MD (04/03/17 18:52:43)    Impression:           1.  Left right basilar atelectasis last consolidation, concerning for pneumonia.  Other considerations would include interstitial pulmonary edema.  Clinical correlation is advised.  2.  Stable findings as noted above.      Electronically signed by: CORNELIUS JACKSON MD  Date:     04/03/17  Time:    18:52     Narrative:    PA and Lateral Chest x-ray    Clinical Indication: bacteremia.  Fever    Findings:    Comparisons are made to 01/31/2017.  Left rib and right basilar atelectasis/consolidation and effusions.      The upper lungs are clear. The cardiac silhouette size is enlarged. The trachea is midline and the mediastinal width is normal. Negative for pneumothorax. Pulmonary vasculature is normal. Negative for osseous abnormalities. There are degenerative changes of spine and both  shoulder girdles. There are calcifications of the aortic knob and tortuosity of the descending thoracic aorta. There are changes of a prior median sternotomy and CABG changes.

## 2017-04-05 NOTE — PROGRESS NOTES
[facility]  Infectious Disease  Progress Note    Patient Name: David Hadley  MRN: [id number]  Admission Date: 4/3/2017  Length of Stay: 2 days  Attending Physician: Conner Duarte MD  Primary Care Provider: Isaac Fitch MD    Isolation Status: Contact  Assessment/Plan:      * MRSA bacteremia  Source is unclear at this time.   cardiac echo did not show endocarditis .Will plan for SALAZAR .Will use vanco  post HD for now .  Will do ultrasound of the site of the LUE fistula.    ESRD (end stage renal disease) on dialysis  HD as per nephrology     Dementia without behavioral disturbance  Follow primary team      Anticipated Disposition:     Thank you for your consult. I will follow-up with patient. Please contact us if you have any additional questions.    Ganga Choudhury MD  Infectious Disease  [facility]    Subjective:     Principal Problem:MRSA bacteremia    HPI: 61 year old woman with history of ESRD on HD -TTS , hypertension, s/p CVA with residual aphasia  who was seen at the office last week for MRSA bacteremia . Blood culture done on 01/31 was positive for MRSA. He was given IV vanco at his HD unit .  Clinic visit note on  03/29-    61 year old man with ESRD, s/p previous CVA with residual aphasia who was seen in the hospital on 02/02/2017 for MRSA bacteremia. He gets HD at [facility]  and was on  vanco for 3/2/17 stopped on 3/18/17, 1500mg. Blood cultures done 3/2/17 was negative..On 2/18 pt was given 1g of vanco.  I called and spoke to Zeniamyrna Cantu from [place] [facility].  He was accompanied by his niece for this visit and we also got some history by talking to the wife on the phone.  Pt denies fever or chills.      At that visit, I called his HD center and request request blood cultures-they were done on 03/31 and they are now positive  for GPC. He denies any history of fever or curve .  He has left upper extremity AVF .    He was advised to come back for  persistent positive blood cultures.      Interval History:  61 year old man with persistent bacteremia with MRSA.  Blood culture on 04/01-GPC     Review of Systems   Unable to perform ROS: Dementia   Constitutional: Negative for activity change and appetite change.   Eyes: Negative for discharge.   Respiratory: Negative for apnea.    Endocrine: Negative for cold intolerance.     Objective:     Vital Signs (Most Recent):  Temp: 97.9 °F (36.6 °C) (04/05/17 0010)  Pulse: 80 (04/05/17 0010)  Resp: 18 (04/05/17 0010)  BP: (!) 142/74 (04/05/17 0010)  SpO2: 97 % (04/05/17 0010) Vital Signs (24h Range):  Temp:  [97.2 °F (36.2 °C)-99.5 °F (37.5 °C)] 97.9 °F (36.6 °C)  Pulse:  [64-89] 80  Resp:  [16-18] 18  SpO2:  [97 %-98 %] 97 %  BP: (101-160)/() 142/74     Weight: 97.1 kg (214 lb)  Body mass index is 27.48 kg/(m^2).    Estimated Creatinine Clearance: 10.1 mL/min (based on Cr of 8.7).    Physical Exam   Constitutional: He appears well-developed.   HENT:   Head: Normocephalic and atraumatic.   Nose: Nose normal.   Eyes: Conjunctivae and EOM are normal. Pupils are equal, round, and reactive to light.   Neck: Normal range of motion. Neck supple. No JVD present. No tracheal deviation present. No thyromegaly present.   Cardiovascular: Normal rate, regular rhythm, normal heart sounds and intact distal pulses.  Exam reveals no gallop and no friction rub.    No murmur heard.  Pulmonary/Chest: Effort normal. No stridor. No respiratory distress. He has no wheezes. He has no rales. He exhibits no tenderness.   Abdominal: Soft. Bowel sounds are normal. He exhibits no distension. There is no tenderness. There is no rebound and no guarding.   Musculoskeletal: Normal range of motion. He exhibits no edema, tenderness or deformity.   Neurological: He is alert. He has normal reflexes. No cranial nerve deficit. Coordination normal.   He did not talk much    Skin: Skin is warm and dry. No rash noted. No erythema. No pallor.   L UE AVF    Nursing note and vitals reviewed.      Significant Labs:   Blood Culture:   Recent Labs  Lab 01/31/17  1830 01/31/17  1835 04/03/17  1925 04/03/17  1940   LABBLOO Gram stain aer bottle: Gram positive cocci in clusters resembling Staph   Results called to and read back by: Lula Marinelli RN  02/01/2017  16:22  METHICILLIN RESISTANT STAPHYLOCOCCUS AUREUS Gram stain aer bottle: Gram positive cocci in clusters resembling Staph  Gram stain mike bottle: Gram positive cocci in clusters resembling Staph   Results called to and read back by: Lula Marinelli RN  02/01/2017  16:22  METHICILLIN RESISTANT STAPHYLOCOCCUS AUREUSFor susceptibility see order #[id number] Gram stain aer bottle: Gram positive cocci in clusters resembling Staph  Results called to and read back by:Sammi Perry RN 04/04/2017  20:14 Gram stain aer bottle: Gram positive cocci in clusters resembling Staph  Results called to and read back by:Sammi Perry RN 04/04/2017  20:13     BMP:   Recent Labs  Lab 04/03/17  2141   *  115*   *  134*   K 4.5  4.5   CL 95  95   CO2 26  26   BUN 33*  33*   CREATININE 8.7*  8.7*   CALCIUM 9.0  9.0   MG 2.0     CBC:   Recent Labs  Lab 04/03/17  1200 04/03/17  2141   WBC 9.19 7.31   HGB 13.2* 13.1*   HCT 40.7 40.8    183       Significant Imaging: I have reviewed all pertinent imaging results/findings within the past 24 hours.

## 2017-04-05 NOTE — PROGRESS NOTES
"[facility] Hospital Medicine  Progress Note    Patient Name: David Hadley  MRN: [id number]  Patient Class: IP- Inpatient   Admission Date: 4/3/2017  Length of Stay: 2 days  Attending Physician: Conner Duarte MD  Primary Care Provider: Isaac Fitch MD        Subjective:     Principal Problem:MRSA bacteremia    HPI:  David Hadley is a 61 y.o. male patient with PMHx of ESRD on HD q TTS, HTN, HLP, who presented to the ER for c/o "for further evaluation of infection that was noticed in his blood today via Dr. Choudhury." Dr. Choudhury reported positive blood cx's c/w GPC in clusters. Records were reviewed. Pt had MRSA on blood Cx in Jan/Feb 2017. Source is not known.Pt stated he was sent here for IV abx via Dr. Choudhury. Pt is denying any sxs at this time. Pt states he is due for dialysis tomorrow. Patient denies any fever, N/V/D, chills, abd pain, CP, SOB, weakness/numbness, dizziness, lightheadedness, HA  and all other sxs at this time. No tooth problems. Pt has been on HD about 10 years, and gets dialyzed regularly in [place], [place] (Dr. Rinku Guardado). His L UE AVF is not bothering him.    Hospital Course:  ID consulted. Blood cultures showed Staphylcoccus aureus. Cardiology consulted and SALAZAR scheduled 4/6/17.    Interval History: Blood cultures positive staph aureus. SALAZAR 4/6/17  Review of Systems   Unable to perform ROS: Dementia   Constitutional: Negative for activity change and appetite change.   Eyes: Negative for discharge.   Respiratory: Negative for apnea.    Endocrine: Negative for cold intolerance.     Objective:     Vital Signs (Most Recent):  Temp: 98.6 °F (37 °C) (04/05/17 1607)  Pulse: 60 (04/05/17 1607)  Resp: 18 (04/05/17 1607)  BP: 130/69 (04/05/17 1607)  SpO2: 100 % (04/05/17 1607) Vital Signs (24h Range):  Temp:  [97.2 °F (36.2 °C)-98.6 °F (37 °C)] 98.6 °F (37 °C)  Pulse:  [60-84] 60  Resp:  [16-18] 18  SpO2:  [96 %-100 %] 100 %  BP: (120-149)/(65-84) 130/69     Weight: 97.1 kg (214 lb)  Body " mass index is 27.48 kg/(m^2).    Intake/Output Summary (Last 24 hours) at 04/05/17 1800  Last data filed at 04/05/17 1455   Gross per 24 hour   Intake              620 ml   Output                0 ml   Net              620 ml      Physical Exam   Constitutional: He appears well-developed.   HENT:   Head: Normocephalic and atraumatic.   Nose: Nose normal.   Eyes: Conjunctivae and EOM are normal. Pupils are equal, round, and reactive to light.   Neck: Normal range of motion. Neck supple. No JVD present. No tracheal deviation present. No thyromegaly present.   Cardiovascular: Normal rate, regular rhythm, normal heart sounds and intact distal pulses.  Exam reveals no gallop and no friction rub.    No murmur heard.  Pulmonary/Chest: Effort normal. No stridor. No respiratory distress. He has no wheezes. He has no rales. He exhibits no tenderness.   Abdominal: Soft. Bowel sounds are normal. He exhibits no distension. There is no tenderness. There is no rebound and no guarding.   Musculoskeletal: Normal range of motion. He exhibits no edema, tenderness or deformity.   Neurological: He is alert. He has normal reflexes. No cranial nerve deficit. Coordination normal.   He did not talk much    Skin: Skin is warm and dry. No rash noted. No erythema. No pallor.   L UE AVF   Nursing note and vitals reviewed.      Significant Labs:   BMP:   Recent Labs  Lab 04/03/17  2141   *  115*   *  134*   K 4.5  4.5   CL 95  95   CO2 26  26   BUN 33*  33*   CREATININE 8.7*  8.7*   CALCIUM 9.0  9.0   MG 2.0     CBC:   Recent Labs  Lab 04/03/17  2141   WBC 7.31   HGB 13.1*   HCT 40.8          Significant Imaging: I have reviewed all pertinent imaging results/findings within the past 24 hours.   Imaging Results         US Soft Tissue Misc (Final result) Result time:  04/05/17 13:55:51    Final result by Jay Yadav MD (04/05/17 13:55:51)    Impression:         No sonographic evidence of abscess.      Electronically  signed by: MAR ZAPATA MD  Date:     04/05/17  Time:    13:55     Narrative:    Exam: US SOFT TISSUE MISC    Clinical History:    Left arm cellulitis near the AV fistula.    Findings:  No suspicious fluid collection identified adjacent to the AV fistula in the left upper arm.            X-Ray Chest PA And Lateral (Final result) Result time:  04/03/17 18:52:43    Final result by Cornelius Jackson MD (04/03/17 18:52:43)    Impression:           1.  Left right basilar atelectasis last consolidation, concerning for pneumonia.  Other considerations would include interstitial pulmonary edema.  Clinical correlation is advised.  2.  Stable findings as noted above.      Electronically signed by: CORNELIUS JACKSON MD  Date:     04/03/17  Time:    18:52     Narrative:    PA and Lateral Chest x-ray    Clinical Indication: bacteremia.  Fever    Findings:    Comparisons are made to 01/31/2017.  Left rib and right basilar atelectasis/consolidation and effusions.      The upper lungs are clear. The cardiac silhouette size is enlarged. The trachea is midline and the mediastinal width is normal. Negative for pneumothorax. Pulmonary vasculature is normal. Negative for osseous abnormalities. There are degenerative changes of spine and both shoulder girdles. There are calcifications of the aortic knob and tortuosity of the descending thoracic aorta. There are changes of a prior median sternotomy and CABG changes.            Assessment/Plan:      * MRSA bacteremia  Bacteremia: Positive blood cx's for GPC in clusters, likely Staph aureus:Had MRSA in blood Cx in Jan/Feb 2017  Source not known  No obvious wounds, no abscess, no foreign bodies or catheters, hear surgery done in 2015,   DDX: endocarditis  Discussed with Dr. Choudhury  ECHO: SALAZAR 4/6/17  Vancomycin IV to keep 15-20 level      ESRD (end stage renal disease) on dialysis  Renal: ESRD pt, q TTS HD schedule in [place], [place].  K normal  Not acidemic  Good O2 sat  No acute indications for HD  today  Will provide routine HD 4/4/17: TTS      Dementia without behavioral disturbance  monitor      VTE Risk Mitigation         Ordered     heparin (porcine) injection 5,000 Units  Every 8 hours     Route:  Subcutaneous        04/03/17 2007     Medium Risk of VTE  Once      04/03/17 2007          Maricel Mccartney NP  Department of Hospital Medicine   [facility]

## 2017-04-05 NOTE — PLAN OF CARE
Problem: Patient Care Overview  Goal: Plan of Care Review  Outcome: Ongoing (interventions implemented as appropriate)  Pt remains free of injury, pt denies presence of pain, spouse at bedside due to patient dementia , no s/s of distress. Result of  Blood cultures grew Gram + Cruz resembling Staph.  24 hour chart check completed. Will continue to monitor.

## 2017-04-05 NOTE — PROGRESS NOTES
"Renal f/u note:  Admit Date: 4/3/2017    Follow-up For: ESRD on HD and bacteremia     Subjective:      Interval History: Pt was seen and examined. Discussed with primary team. No new issues, no c/o's  No fever, no SOB, no cough, no diarrhea.  Reviewed ID and cardiology notes.    Objective:      Vital Signs Range (Last 24H):Blood pressure 120/71, pulse 66, temperature 98.6 °F (37 °C), temperature source Oral, resp. rate 18, height 6' 2" (1.88 m), weight 97.1 kg (214 lb), SpO2 98 %.    Physical Exam   Constitutional: Patient is oriented to person, place, and time.   Cardiovascular: Normal rate and regular rhythm.    Pulmonary/Chest: Effort normal and clear to auscultation bilaterally. No respiratory distress.   Abdomen: Soft. Non-tender and non-distended. Bowel sounds are normal.   Neurological: Moves all extremities.  Skin: Warm and dry. No rashes.  Extremities: No edema.    Medications:   atorvastatin  20 mg Oral Daily    gabapentin  100 mg Oral TID    heparin (porcine)  5,000 Units Subcutaneous Q8H    losartan  50 mg Oral Daily    metoprolol tartrate  25 mg Oral BID    pantoprazole  40 mg Oral Daily    polyethylene glycol  17 g Oral Daily    rivastigmine  1 patch Transdermal Daily    senna-docusate 8.6-50 mg  1 tablet Oral BID    sertraline  50 mg Oral Daily    [START ON 4/6/2017] vancomycin 1 g in dextrose 5 % 250 mL IVPB (ready to mix system)  1 g Intravenous Q48H           Laboratory Data:  Reviewed and noted in plan where applicable. Please see chart for full laboratory data.    Lab Results   Component Value Date    WBC 7.31 04/03/2017    HGB 13.1 (L) 04/03/2017    HCT 40.8 04/03/2017    MCV 89 04/03/2017     04/03/2017     BMP  Lab Results   Component Value Date     (L) 04/03/2017     (L) 04/03/2017    K 4.5 04/03/2017    K 4.5 04/03/2017    CL 95 04/03/2017    CL 95 04/03/2017    CO2 26 04/03/2017    CO2 26 04/03/2017    BUN 33 (H) 04/03/2017    BUN 33 (H) 04/03/2017    " CREATININE 8.7 (H) 04/03/2017    CREATININE 8.7 (H) 04/03/2017    CALCIUM 9.0 04/03/2017    CALCIUM 9.0 04/03/2017    ANIONGAP 13 04/03/2017    ANIONGAP 13 04/03/2017    ESTGFRAFRICA 7 (A) 04/03/2017    ESTGFRAFRICA 7 (A) 04/03/2017    EGFRNONAA 6 (A) 04/03/2017    EGFRNONAA 6 (A) 04/03/2017     vanc level 11    Radiology:  Reviewed and noted in plan where applicable.  Please see chart for full radiology data.    Active Hospital Problems    Diagnosis  POA    *MRSA bacteremia [R78.81]  Yes    Dementia without behavioral disturbance [F03.90]  Yes    ESRD (end stage renal disease) on dialysis [N18.6, Z99.2]  Not Applicable      Resolved Hospital Problems    Diagnosis Date Resolved POA   No resolved problems to display.       A/P 60 y/o male with a h/o of ESRD, on chronic HD, with recurrent positive blood cx's and no apparent, obvious source for the bacteremia:      1. Renal: ESRD pt, q TTS HD schedule in [place], [place].  Tolerating HD well, continue HD  Next HD tomorrow  K normal  Not acidemic  Good O2 sat  No acute indications for HD today     2. Bacteremia: Positive blood cx's for Staph aureus  Had MRSA in blood Cx in Jan/Feb 2017  Source not known  TTE unremarkable, no vegetations  SALAZAR planned for am  No obvious wounds, no abscess, no foreign bodies or catheters, hear surgery done in 2015,   Noted vancomycin level is not subtherapeutic, unsure why??  Discussing with pharmacy  Will give additional dose 1 g x 1 IV today  Repeat level in am  Vancomycin level to keep 15-20 level      3. comorbidities noted: DM, HTN.  BP slightly elevated.  Meds reviewed  Will monitor closely after starting outpt meds.      Plans and recommendations:  As discussed above   Vancomycin 1 g IV x 1 today, to add to 1 g IV q TTS with HD  Discussed with pharmacy  Vancomycin is to be given by HD nurse during reinfusion, q TTS  Will consult pharmacy to dose and to provide to HD NILDA Higgins

## 2017-04-05 NOTE — NURSING
Attempted to start an IV, unsuccessful. Charge nurse attempted as well, unsuccessful. ER nurse notified to assist.

## 2017-04-06 ENCOUNTER — SURGERY (OUTPATIENT)
Age: 62
End: 2017-04-06

## 2017-04-06 ENCOUNTER — ANESTHESIA (OUTPATIENT)
Dept: CARDIOLOGY | Facility: HOSPITAL | Age: 62
DRG: 871 | End: 2017-04-06
Payer: MEDICARE

## 2017-04-06 ENCOUNTER — ANESTHESIA EVENT (OUTPATIENT)
Dept: CARDIOLOGY | Facility: HOSPITAL | Age: 62
DRG: 871 | End: 2017-04-06
Payer: MEDICARE

## 2017-04-06 VITALS — RESPIRATION RATE: 12 BRPM

## 2017-04-06 LAB
ANION GAP SERPL CALC-SCNC: 9 MMOL/L
BACTERIA BLD CULT: NORMAL
BASOPHILS # BLD AUTO: 0.03 K/UL
BASOPHILS NFR BLD: 0.7 %
BUN SERPL-MCNC: 33 MG/DL
CALCIUM SERPL-MCNC: 8.8 MG/DL
CHLORIDE SERPL-SCNC: 97 MMOL/L
CO2 SERPL-SCNC: 27 MMOL/L
CREAT SERPL-MCNC: 9.1 MG/DL
DIFFERENTIAL METHOD: ABNORMAL
EOSINOPHIL # BLD AUTO: 0.1 K/UL
EOSINOPHIL NFR BLD: 2.1 %
ERYTHROCYTE [DISTWIDTH] IN BLOOD BY AUTOMATED COUNT: 18 %
EST. GFR  (AFRICAN AMERICAN): 6 ML/MIN/1.73 M^2
EST. GFR  (NON AFRICAN AMERICAN): 6 ML/MIN/1.73 M^2
GLUCOSE SERPL-MCNC: 75 MG/DL
HCT VFR BLD AUTO: 36.8 %
HGB BLD-MCNC: 11.8 G/DL
LYMPHOCYTES # BLD AUTO: 0.9 K/UL
LYMPHOCYTES NFR BLD: 21.1 %
MCH RBC QN AUTO: 28.2 PG
MCHC RBC AUTO-ENTMCNC: 32.1 %
MCV RBC AUTO: 88 FL
MITRAL VALVE REGURGITATION: NORMAL
MONOCYTES # BLD AUTO: 0.8 K/UL
MONOCYTES NFR BLD: 19.5 %
NEUTROPHILS # BLD AUTO: 2.4 K/UL
NEUTROPHILS NFR BLD: 56.6 %
PLATELET # BLD AUTO: 198 K/UL
PMV BLD AUTO: 9.6 FL
POTASSIUM SERPL-SCNC: 5.6 MMOL/L
RBC # BLD AUTO: 4.18 M/UL
RETIRED EF AND QEF - SEE NOTES: 55 (ref 55–65)
SODIUM SERPL-SCNC: 133 MMOL/L
TRICUSPID VALVE REGURGITATION: NORMAL
VANCOMYCIN SERPL-MCNC: 20.7 UG/ML
WBC # BLD AUTO: 4.26 K/UL

## 2017-04-06 PROCEDURE — 25000003 PHARM REV CODE 250: Performed by: NURSE PRACTITIONER

## 2017-04-06 PROCEDURE — 37000008 HC ANESTHESIA 1ST 15 MINUTES: Performed by: INTERNAL MEDICINE

## 2017-04-06 PROCEDURE — 87040 BLOOD CULTURE FOR BACTERIA: CPT

## 2017-04-06 PROCEDURE — 85025 COMPLETE CBC W/AUTO DIFF WBC: CPT

## 2017-04-06 PROCEDURE — 93312 ECHO TRANSESOPHAGEAL: CPT | Performed by: INTERNAL MEDICINE

## 2017-04-06 PROCEDURE — 37000009 HC ANESTHESIA EA ADD 15 MINS: Performed by: INTERNAL MEDICINE

## 2017-04-06 PROCEDURE — 93320 DOPPLER ECHO COMPLETE: CPT | Mod: 26,,, | Performed by: INTERNAL MEDICINE

## 2017-04-06 PROCEDURE — 93320 DOPPLER ECHO COMPLETE: CPT | Performed by: INTERNAL MEDICINE

## 2017-04-06 PROCEDURE — 93312 ECHO TRANSESOPHAGEAL: CPT | Mod: 26,,, | Performed by: INTERNAL MEDICINE

## 2017-04-06 PROCEDURE — 36415 COLL VENOUS BLD VENIPUNCTURE: CPT

## 2017-04-06 PROCEDURE — 87077 CULTURE AEROBIC IDENTIFY: CPT

## 2017-04-06 PROCEDURE — 99233 SBSQ HOSP IP/OBS HIGH 50: CPT | Mod: ,,, | Performed by: INTERNAL MEDICINE

## 2017-04-06 PROCEDURE — 80048 BASIC METABOLIC PNL TOTAL CA: CPT

## 2017-04-06 PROCEDURE — 96372 THER/PROPH/DIAG INJ SC/IM: CPT

## 2017-04-06 PROCEDURE — 63600175 PHARM REV CODE 636 W HCPCS: Performed by: NURSE ANESTHETIST, CERTIFIED REGISTERED

## 2017-04-06 PROCEDURE — 87186 SC STD MICRODIL/AGAR DIL: CPT

## 2017-04-06 PROCEDURE — 11000001 HC ACUTE MED/SURG PRIVATE ROOM

## 2017-04-06 PROCEDURE — 25000003 PHARM REV CODE 250: Performed by: NURSE ANESTHETIST, CERTIFIED REGISTERED

## 2017-04-06 PROCEDURE — 80100016 HC MAINTENANCE HEMODIALYSIS

## 2017-04-06 PROCEDURE — 80202 ASSAY OF VANCOMYCIN: CPT

## 2017-04-06 RX ORDER — PROPOFOL 10 MG/ML
VIAL (ML) INTRAVENOUS
Status: DISCONTINUED | OUTPATIENT
Start: 2017-04-06 | End: 2017-04-06

## 2017-04-06 RX ORDER — SODIUM CHLORIDE 9 MG/ML
INJECTION, SOLUTION INTRAVENOUS CONTINUOUS PRN
Status: DISCONTINUED | OUTPATIENT
Start: 2017-04-06 | End: 2017-04-06

## 2017-04-06 RX ORDER — LIDOCAINE HYDROCHLORIDE 20 MG/ML
INJECTION, SOLUTION EPIDURAL; INFILTRATION; INTRACAUDAL; PERINEURAL
Status: DISCONTINUED | OUTPATIENT
Start: 2017-04-06 | End: 2017-04-06

## 2017-04-06 RX ADMIN — PROPOFOL 30 MG: 10 INJECTION, EMULSION INTRAVENOUS at 08:04

## 2017-04-06 RX ADMIN — SERTRALINE HYDROCHLORIDE 50 MG: 50 TABLET ORAL at 11:04

## 2017-04-06 RX ADMIN — POLYETHYLENE GLYCOL 3350 17 G: 17 POWDER, FOR SOLUTION ORAL at 11:04

## 2017-04-06 RX ADMIN — HEPARIN SODIUM 5000 UNITS: 5000 INJECTION, SOLUTION INTRAVENOUS; SUBCUTANEOUS at 09:04

## 2017-04-06 RX ADMIN — GABAPENTIN 100 MG: 100 CAPSULE ORAL at 09:04

## 2017-04-06 RX ADMIN — PANTOPRAZOLE SODIUM 40 MG: 40 TABLET, DELAYED RELEASE ORAL at 11:04

## 2017-04-06 RX ADMIN — ATORVASTATIN CALCIUM 20 MG: 10 TABLET, FILM COATED ORAL at 11:04

## 2017-04-06 RX ADMIN — METOPROLOL TARTRATE 25 MG: 25 TABLET ORAL at 09:04

## 2017-04-06 RX ADMIN — LOSARTAN POTASSIUM 50 MG: 50 TABLET, FILM COATED ORAL at 11:04

## 2017-04-06 RX ADMIN — GABAPENTIN 100 MG: 100 CAPSULE ORAL at 05:04

## 2017-04-06 RX ADMIN — PROPOFOL 100 MG: 10 INJECTION, EMULSION INTRAVENOUS at 08:04

## 2017-04-06 RX ADMIN — LIDOCAINE HYDROCHLORIDE 40 MG: 20 INJECTION, SOLUTION EPIDURAL; INFILTRATION; INTRACAUDAL; PERINEURAL at 08:04

## 2017-04-06 RX ADMIN — RIVASTIGMINE TRANSDERMAL SYSTEM 1 PATCH: 4.6 PATCH, EXTENDED RELEASE TRANSDERMAL at 11:04

## 2017-04-06 RX ADMIN — STANDARDIZED SENNA CONCENTRATE AND DOCUSATE SODIUM 1 TABLET: 8.6; 5 TABLET, FILM COATED ORAL at 09:04

## 2017-04-06 RX ADMIN — HEPARIN SODIUM 5000 UNITS: 5000 INJECTION, SOLUTION INTRAVENOUS; SUBCUTANEOUS at 05:04

## 2017-04-06 RX ADMIN — SODIUM CHLORIDE: 0.9 INJECTION, SOLUTION INTRAVENOUS at 08:04

## 2017-04-06 RX ADMIN — HEPARIN SODIUM 5000 UNITS: 5000 INJECTION, SOLUTION INTRAVENOUS; SUBCUTANEOUS at 02:04

## 2017-04-06 NOTE — ANESTHESIA PREPROCEDURE EVALUATION
04/06/2017  David Hadley is a 61 y.o., male.    [facility] Anesthesia Evaluation    I have reviewed the Patient Summary Reports.    I have reviewed the Nursing Notes.   I have reviewed the Medications.     Review of Systems  Anesthesia Hx:  No problems with previous Anesthesia  Denies Family Hx of Anesthesia complications.   Denies Personal Hx of Anesthesia complications.   Social:  Non-Smoker, No Alcohol Use 1 ppd for about 20 years.   Cardiovascular:   Exercise tolerance: good Hypertension, well controlled Past MI  CHF 2012 CABG  HLD    4/3/2012    EF 45%    CONCLUSIONS     1 - Biatrial enlargement.     2 - Eccentric hypertrophy.     3 - Mildly depressed left ventricular systolic function (EF 45-50%).     4 - Left ventricular diastolic dysfunction. Grade III    5 - Low normal to mildly depressed right ventricular systolic function .     6 - The estimated PA systolic pressure is 25 mmHg.     7 - Mild tricuspid regurgitation.     8 - Small pericardial effusion.     9 - No intracavity mass visualized.   Pulmonary:   Snore   Renal/:   Chronic Renal Disease, Dialysis, ESRD TTS   Neurological:   2003 TIA Dementia mild    Endocrine:   Diabetes, well controlled, type 2        Physical Exam  General:  Well nourished    Airway/Jaw/Neck:  Airway Findings: Mallampati: II                Anesthesia Plan  Type of Anesthesia, risks & benefits discussed:  Anesthesia Type:  MAC  Patient's Preference:   Intra-op Monitoring Plan:   Intra-op Monitoring Plan Comments:   Post Op Pain Control Plan:   Post Op Pain Control Plan Comments:   Induction:   IV  Beta Blocker:         Informed Consent: Patient understands risks and agrees with Anesthesia plan.  Questions answered. Anesthesia consent signed with patient.  ASA Score: 2     Day of Surgery Review of History & Physical: I have interviewed and examined the patient. I have reviewed  the patient's H&P dated: 01/28/15. There are no significant changes.  H&P update referred to the provider.         Ready For Surgery From Anesthesia Perspective.

## 2017-04-06 NOTE — OP NOTE
Surgeon/Physician: Cr Lazo MD   Pre Op Diagnosis: Bacteremia, +MRSA, rule out endocarditis.   Post OP Diagnosis: No intracardiac vegetations visualized.   Procedure Performed: Transesophageal echocardiogram   Procedure Description: The risks, benefits, and alternatives of the procedure expalined in detail with patient and family. The patient voices understanding and all questions have been addressed. The patient agrees to proceed as planned.   The patient was sedated by Versed and Fentanyl. The probe was advanced via throat into esophagus smoothly. The patient tolerated the procedure well and there was no complications. The probed was withdrawal at the end of study. The patient was hemodynamically stable.   Estimated Blood Loss: none.   Findings / Operative Note:   No intracardiac vegetation visualized. Mild MR. Dilated RA. Calcified mitral and aortic valves.   The ID service was notified.     Ok to transfer to floor once hemodynamically stable.

## 2017-04-06 NOTE — SUBJECTIVE & OBJECTIVE
Interval History: 61 year old man with ESRD and persistent MRSA bacteremia .  SALAZAR did not show any evidence of endocarditis .  Left upper extremity ultrasound -did not show any abscess .     Review of Systems   Unable to perform ROS: Dementia   Constitutional: Negative for activity change and appetite change.   Eyes: Negative for discharge.   Respiratory: Negative for apnea.    Endocrine: Negative for cold intolerance.     Objective:     Vital Signs (Most Recent):  Temp: 97.7 °F (36.5 °C) (04/06/17 0455)  Pulse: (!) 58 (04/06/17 0455)  Resp: 18 (04/06/17 0455)  BP: (!) 142/81 (04/06/17 0455)  SpO2: 97 % (04/06/17 0455) Vital Signs (24h Range):  Temp:  [97.7 °F (36.5 °C)-98.6 °F (37 °C)] 97.7 °F (36.5 °C)  Pulse:  [58-83] 58  Resp:  [16-18] 18  SpO2:  [96 %-100 %] 97 %  BP: (110-142)/(64-81) 142/81     Weight: 97.1 kg (214 lb)  Body mass index is 27.48 kg/(m^2).    Estimated Creatinine Clearance: 10.1 mL/min (based on Cr of 8.7).    Physical Exam   Constitutional: He appears well-developed.   HENT:   Head: Normocephalic and atraumatic.   Nose: Nose normal.   Eyes: Conjunctivae and EOM are normal. Pupils are equal, round, and reactive to light.   Neck: Normal range of motion. Neck supple. No JVD present. No tracheal deviation present. No thyromegaly present.   Cardiovascular: Normal rate, regular rhythm, normal heart sounds and intact distal pulses.  Exam reveals no gallop and no friction rub.    No murmur heard.  Pulmonary/Chest: Effort normal. No stridor. No respiratory distress. He has no wheezes. He has no rales. He exhibits no tenderness.   Abdominal: Soft. Bowel sounds are normal. He exhibits no distension. There is no tenderness. There is no rebound and no guarding.   Musculoskeletal: Normal range of motion. He exhibits no edema, tenderness or deformity.   Neurological: He is alert. He has normal reflexes. No cranial nerve deficit. Coordination normal.   He did not talk much    Skin: Skin is warm and dry. No  rash noted. No erythema. No pallor.   L UE AVF   Nursing note and vitals reviewed.      Significant Labs:   Blood Culture:   Recent Labs  Lab 01/31/17  1830 01/31/17  1835 04/03/17  1925 04/03/17  1940   LABBLOO Gram stain aer bottle: Gram positive cocci in clusters resembling Staph   Results called to and read back by: Lula Marinelli RN  02/01/2017  16:22  METHICILLIN RESISTANT STAPHYLOCOCCUS AUREUS Gram stain aer bottle: Gram positive cocci in clusters resembling Staph  Gram stain mike bottle: Gram positive cocci in clusters resembling Staph   Results called to and read back by: Lula Marinelli RN  02/01/2017  16:22  METHICILLIN RESISTANT STAPHYLOCOCCUS AUREUSFor susceptibility see order #[id number] Gram stain aer bottle: Gram positive cocci in clusters resembling Staph  Results called to and read back by:Sammi Perry RN 04/04/2017  20:14  Gram stain mike bottle: Gram positive cocci in clusters resembling Staph   04/05/2017  08:56   Results previously called.  STAPHYLOCOCCUS AUREUSSusceptibility pendingID consult required at [facility]. [facility], as it reduces mortality in S.aureus bacteremia. Gram stain aer bottle: Gram positive cocci in clusters resembling Staph  Results called to and read back by:Sammi Perry RN 04/04/2017  20:13  Gram stain mike bottle: Gram positive cocci in clusters resembling Staph   04/05/2017  04:44  STAPHYLOCOCCUS AUREUSID consult required at [facility]. [facility], as it reduces mortality in S.aureus bacteremia.For susceptibility see order #[id number]     BMP: No results for input(s): GLU, NA, K, CL, CO2, BUN, CREATININE, CALCIUM, MG in the last 48 hours.  CBC: No results for input(s): WBC, HGB, HCT, PLT in the last 48 hours.    Significant Imaging: I have reviewed all pertinent imaging results/findings within the past 24 hours.

## 2017-04-06 NOTE — PLAN OF CARE
Problem: Patient Care Overview  Goal: Plan of Care Review  Outcome: Ongoing (interventions implemented as appropriate)  Fall prevention precautions maintained, pt remained free of falls throughout shift, call bell and personal items within reach. 24 hour chart check completed. Will continue to monitor

## 2017-04-06 NOTE — ASSESSMENT & PLAN NOTE
Source is unclear at this time.    Will plan for SALAZAR.  Soft tissue ultrasound of LUE did not show any abscess.  Continue vanco post HD  Repeat blood culture.

## 2017-04-06 NOTE — PLAN OF CARE
Problem: Patient Care Overview  Goal: Plan of Care Review  Outcome: Ongoing (interventions implemented as appropriate)  Free from injuries/fall, repositions self in bed. Denies pain. Contact precautions maintained. HD tomorrow. NPO at midnight for procedure. POC and meds reviewed w/ pt, pt verbalizes understanding. Chart check done. Will cont to monitor.

## 2017-04-06 NOTE — ANESTHESIA POSTPROCEDURE EVALUATION
"Anesthesia Post Evaluation    Patient: David Hadley    Procedure(s) Performed: Procedure(s) (LRB):  TRANSESOPHAGEAL ECHOCARDIOGRAM (SALAZAR) (N/A)    Final Anesthesia Type: MAC  Patient location during evaluation: PACU  Patient participation: Yes- Able to Participate  Level of consciousness: awake and alert and oriented  Post-procedure vital signs: reviewed and stable  Pain management: adequate  Airway patency: patent  PONV status at discharge: No PONV  Anesthetic complications: no      Cardiovascular status: blood pressure returned to baseline, hemodynamically stable and stable  Respiratory status: unassisted, room air and spontaneous ventilation  Hydration status: euvolemic  Follow-up not needed.        Visit Vitals    BP (!) 142/81 (BP Location: Right arm, Patient Position: Lying, BP Method: Automatic)    Pulse (!) 58    Temp 36.5 °C (97.7 °F) (Axillary)    Resp 18    Ht 6' 2" (1.88 m)    Wt 96.5 kg (212 lb 12.8 oz)    SpO2 97%    BMI 27.32 kg/m2       Pain/Armando Score: Pain Assessment Performed: Yes (4/6/2017  5:10 AM)  Presence of Pain: denies (4/6/2017  5:10 AM)      "

## 2017-04-06 NOTE — TRANSFER OF CARE
"Anesthesia Transfer of Care Note    Patient: David Hadley    Procedure(s) Performed: Procedure(s) (LRB):  TRANSESOPHAGEAL ECHOCARDIOGRAM (SALAZAR) (N/A)    Patient location: PACU    Anesthesia Type: MAC    Transport from OR: Transported from OR on room air with adequate spontaneous ventilation    Post pain: adequate analgesia    Post assessment: no apparent anesthetic complications and tolerated procedure well    Post vital signs: stable    Level of consciousness: awake and responds to stimulation    Nausea/Vomiting: no nausea/vomiting    Complications: none          Last vitals:   Visit Vitals    BP (!) 142/81 (BP Location: Right arm, Patient Position: Lying, BP Method: Automatic)    Pulse (!) 58    Temp 36.5 °C (97.7 °F) (Axillary)    Resp 18    Ht 6' 2" (1.88 m)    Wt 96.5 kg (212 lb 12.8 oz)    SpO2 97%    BMI 27.32 kg/m2     "

## 2017-04-06 NOTE — H&P (VIEW-ONLY)
[facility] -   Cardiology  Consult Note    Patient Name: David Hadley  MRN: [id number]  Admission Date: 4/3/2017  Hospital Length of Stay: 2 days  Code Status: Full Code   Attending Provider: Conner Duarte MD   Consulting Provider: Sandor Lazo MD  Primary Care Physician: Isaac Fitch MD  Principal Problem:MRSA bacteremia    Patient information was obtained from patient and ER records.     Inpatient consult to Cardiology  Consult performed by: SANDOR LAZO  Consult ordered by: KASSIE CHOUDHURY        Subjective:     Chief Complaint:  MRSA bacteremis     HPI: 60 yo ,male,PMH CAD s/p cabg, HTN, HLD, recurrent MRSA bacteremia, DM, ESRD on HD via left arm fistula and dementia.  He was called in by Dr. Choudhury to come to ER to be admitted for positive blood cx's c/w GPC in [facility]. Records were reviewed. Pt had MRSA on blood Cx in Jan/Feb 2017. Source is not known. No cough, no SOB, no diarrhea, no open wounds, no catheters, no wounds, no lesions or abscesses. No fever, no bone pain, no sx's at all. No tooth problems. Pt has been on HD about 10 years, and gets dialyzed regularly in [place], [place] (Dr. Rinku Guardado). His L UE AVF is not bothering him.  Echo on 04/03 showed normal EF and no mass or veg visualized.   Denied chest pain, dyspnea, difficult for swallowing and active bleeding,.    Past Medical History:   Diagnosis Date    After-cataract, unspecified - Left Eye 11/27/2013    Allergy     Arthritis     Cancer     CHF (congestive heart failure)     Chronic kidney disease     chemo/ dialias    Dementia     Diabetes mellitus     Hypertension     Myocardial infarction        Past Surgical History:   Procedure Laterality Date    CATARACT EXTRACTION      CORONARY ARTERY BYPASS GRAFT  3-2014       Review of patient's allergies indicates:   Allergen Reactions    Benadryl decongestant Itching     Nothing with benadryl        No current facility-administered medications on file prior to encounter.       Current Outpatient Prescriptions on File Prior to Encounter   Medication Sig    atorvastatin (LIPITOR) 20 MG tablet Take 1 tablet (20 mg total) by mouth once daily.    gabapentin (NEURONTIN) 100 MG capsule Take 1 capsule (100 mg total) by mouth 3 (three) times daily.    hydrocodone-acetaminophen 5-325mg (NORCO) 5-325 mg per tablet Take 1 tablet by mouth every 6 to 8 hours as needed for Pain.    losartan (COZAAR) 50 MG tablet Take by mouth. 1 tablet Oral Every day    metoprolol tartrate (LOPRESSOR) 25 MG tablet Take 1 tablet (25 mg total) by mouth 2 (two) times daily.    rivastigmine (EXELON) 4.6 mg/24 hr PT24 Place 1 patch onto the skin once daily.    SENSIPAR 30 mg Tab Take 1 tablet by mouth once daily.    sertraline (ZOLOFT) 50 MG tablet Take 1 tablet (50 mg total) by mouth once daily.    SPRYCEL 100 mg Tab Take 100 mg by mouth once daily.     trazodone (DESYREL) 50 MG tablet Take 1 tablet (50 mg total) by mouth every evening.     Family History     Problem Relation (Age of Onset)    Cancer Brother    Colon cancer Sister    Glaucoma Mother    Pancreatic cancer Brother    Prostate cancer Father        Social History Main Topics    Smoking status: Former Smoker     Types: Cigarettes     Quit date: 1/28/2000    Smokeless tobacco: Never Used    Alcohol use No    Drug use: No    Sexual activity: Not Currently     Birth control/ protection: None     Review of Systems   Constitution: Negative for decreased appetite, diaphoresis, fever, weakness, malaise/fatigue and night sweats.   HENT: Negative for headaches and nosebleeds.    Eyes: Negative for blurred vision and double vision.   Cardiovascular: Negative for chest pain, claudication, dyspnea on exertion, irregular heartbeat, leg swelling, near-syncope, orthopnea, palpitations, paroxysmal nocturnal dyspnea and syncope.   Respiratory: Negative for cough, shortness of breath, sleep disturbances due to breathing, snoring, sputum production and wheezing.     Endocrine: Negative for cold intolerance and polyuria.   Hematologic/Lymphatic: Does not bruise/bleed easily.   Skin: Negative for rash.   Musculoskeletal: Negative for back pain, falls, joint pain, joint swelling and neck pain.   Gastrointestinal: Negative for abdominal pain, heartburn, nausea and vomiting.   Genitourinary: Negative for dysuria, frequency and hematuria.   Neurological: Negative for difficulty with concentration, dizziness, focal weakness, light-headedness, numbness and seizures.   Psychiatric/Behavioral: Negative for depression, memory loss and substance abuse. The patient does not have insomnia.    Allergic/Immunologic: Negative for HIV exposure and hives.     Objective:     Vital Signs (Most Recent):  Temp: 98.6 °F (37 °C) (04/05/17 0929)  Pulse: 66 (04/05/17 0929)  Resp: 18 (04/05/17 0929)  BP: 120/71 (04/05/17 0929)  SpO2: 98 % (04/05/17 0929) Vital Signs (24h Range):  Temp:  [97.2 °F (36.2 °C)-98.6 °F (37 °C)] 98.6 °F (37 °C)  Pulse:  [64-89] 66  Resp:  [16-18] 18  SpO2:  [96 %-98 %] 98 %  BP: (101-160)/() 120/71     Weight: 97.1 kg (214 lb)  Body mass index is 27.48 kg/(m^2).    SpO2: 98 %  O2 Device (Oxygen Therapy): room air      Intake/Output Summary (Last 24 hours) at 04/05/17 1035  Last data filed at 04/05/17 0500   Gross per 24 hour   Intake              720 ml   Output             3500 ml   Net            -2780 ml       Lines/Drains/Airways     Drain                 Hemodialysis AV Fistula Left upper arm -- days                Physical Exam   Constitutional: He is oriented to person, place, and time. He appears well-nourished.   S/p left arm fistula   HENT:   Head: Normocephalic.   Eyes: Pupils are equal, round, and reactive to light.   Neck: Normal carotid pulses and no JVD present. Carotid bruit is not present. No thyromegaly present.   Cardiovascular: Normal rate, regular rhythm, normal heart sounds and normal pulses.   No extrasystoles are present. PMI is not displaced.   Exam reveals no gallop and no S3.    No murmur heard.  Pulmonary/Chest: Breath sounds normal. No stridor. No respiratory distress.   Abdominal: Soft. Bowel sounds are normal. There is no tenderness. There is no rebound.   Musculoskeletal: Normal range of motion.   Neurological: He is alert and oriented to person, place, and time.   Skin: Skin is intact. No rash noted.   Psychiatric: His behavior is normal.       Significant Labs:   BMP:   Recent Labs  Lab 04/03/17  1200 04/03/17  2141   GLU 80 115*  115*   * 134*  134*   K 4.9 4.5  4.5   CL 96 95  95   CO2 25 26  26   BUN 28* 33*  33*   CREATININE 8.1* 8.7*  8.7*   CALCIUM 8.8 9.0  9.0   MG  --  2.0   , CMP   Recent Labs  Lab 04/03/17  1200 04/03/17  2141   * 134*  134*   K 4.9 4.5  4.5   CL 96 95  95   CO2 25 26  26   GLU 80 115*  115*   BUN 28* 33*  33*   CREATININE 8.1* 8.7*  8.7*   CALCIUM 8.8 9.0  9.0   PROT 8.7* 8.4   ALBUMIN 3.3* 3.1*   BILITOT 0.9 0.8   ALKPHOS 87 84   AST 13 13   ALT 7* 9*   ANIONGAP 13 13  13   ESTGFRAFRICA 7* 7*  7*   EGFRNONAA 6* 6*  6*   , CBC   Recent Labs  Lab 04/03/17  1200 04/03/17  2141   WBC 9.19 7.31   HGB 13.2* 13.1*   HCT 40.7 40.8    183   , INR   Recent Labs  Lab 04/03/17  2141   INR 1.2   , Lipid Panel No results for input(s): CHOL, HDL, LDLCALC, TRIG, CHOLHDL in the last 48 hours. and Troponin No results for input(s): TROPONINI in the last 48 hours.    Significant Imaging: X-Ray: CXR: X-Ray Chest 1 View (CXR): No results found for this visit on 04/03/17.  Assessment and Plan:     Recurrent Bacteremia  CAD s/p cabg  DM  ESRD on HD via left arm fistula  Dementia  HTN  Negative ECHO for endocarditis    Plan:   SALAZAR in am  Keep NPO after Mn except meds  I have explained the risks, benefits, and alternatives of the procedure in detail with patient and family. The patient voices understanding and all questions have been addressed.  The patient agrees to proceed as planned.  .  Active  Diagnoses:    Diagnosis Date Noted POA    PRINCIPAL PROBLEM:  MRSA bacteremia [R78.81] 04/03/2017 Yes    Dementia without behavioral disturbance [F03.90] 07/29/2016 Yes    ESRD (end stage renal disease) on dialysis [N18.6, Z99.2] 06/26/2013 Not Applicable      Problems Resolved During this Admission:    Diagnosis Date Noted Date Resolved POA       VTE Risk Mitigation         Ordered     heparin (porcine) injection 5,000 Units  Every 8 hours     Route:  Subcutaneous        04/03/17 2007     Medium Risk of VTE  Once      04/03/17 2007          Thank you for your consult. I will follow-up with patient. Please contact us if you have any additional questions.    Cr Lazo MD  Cardiology   [facility]

## 2017-04-06 NOTE — INTERVAL H&P NOTE
The patient has been examined and the H&P has been reviewed:    I concur with the findings and no changes have occurred since H&P was written.    Anesthesia/Surgery risks, benefits and alternative options discussed and understood by patient/family.          Active Hospital Problems    Diagnosis  POA    *MRSA bacteremia [R78.81]  Yes    Dementia without behavioral disturbance [F03.90]  Yes    ESRD (end stage renal disease) on dialysis [N18.6, Z99.2]  Not Applicable      Resolved Hospital Problems    Diagnosis Date Resolved POA   No resolved problems to display.

## 2017-04-06 NOTE — CONSULTS
Pharmacy Vancomycin Consult Note    WBC=4.26  Tmax-98.6  CrCL=9.7ml/min Scr=9.1  Dialysis patient. Dialysis schedule Tu,Th,Sat    Cultures: Blood-staph aureus  Dx. Bacteremia    Vanc pre-dialysis random level=20.7mcg/ml  Per protocol patient should receive a vanc 500mg post-dialysis dose.  Vanc 1 gram Q48 is a placeholder dose only.     Next pre-dialysis random level due 4/8 with Am labs.    Thank you for allowing us to participate in this patient's care.    Osiris Pereyra, Pharm D 4/6/2017 8:50 AM

## 2017-04-06 NOTE — PROGRESS NOTES
4 people attempted to draw blood cultures, able to get am labs and 1 set of blood cultures, Dr. Choudhury notified

## 2017-04-06 NOTE — PLAN OF CARE
Problem: Patient Care Overview  Goal: Plan of Care Review  Outcome: Ongoing (interventions implemented as appropriate)  Fall and contact precautions maintained, pt free from injuries/fall, assisted in ambulating to the bathroom. Pt had HD, stephanie well. Denies pain, nausea, vomiting. AAO x2. POC and meds reviewed w/ family and pt, family verbalizes understanding. Chart check done. Will cont to monitor.

## 2017-04-06 NOTE — ANESTHESIA RELEASE NOTE
"Anesthesia Release from PACU Note    Patient: David Hadley    Procedure(s) Performed: Procedure(s) (LRB):  TRANSESOPHAGEAL ECHOCARDIOGRAM (SALAZAR) (N/A)    Anesthesia type: MAC    Post pain: Adequate analgesia    Post assessment: no apparent anesthetic complications, tolerated procedure well and no evidence of recall    Last Vitals:   Visit Vitals    BP (!) 142/81 (BP Location: Right arm, Patient Position: Lying, BP Method: Automatic)    Pulse (!) 58    Temp 36.5 °C (97.7 °F) (Axillary)    Resp 18    Ht 6' 2" (1.88 m)    Wt 96.5 kg (212 lb 12.8 oz)    SpO2 97%    BMI 27.32 kg/m2       Post vital signs: stable    Level of consciousness: awake, alert  and oriented    Nausea/Vomiting: no nausea/no vomiting    Complications: none    Airway Patency: patent    Respiratory: unassisted, spontaneous ventilation, room air    Cardiovascular: stable and blood pressure at baseline    Hydration: euvolemic  "

## 2017-04-06 NOTE — PROGRESS NOTES
[facility]  Infectious Disease  Progress Note    Patient Name: David Hadley  MRN: [id number]  Admission Date: 4/3/2017  Length of Stay: 3 days  Attending Physician: Conner Duarte MD  Primary Care Provider: Isaac Fitch MD    Isolation Status: Contact  Assessment/Plan:      * MRSA bacteremia  Source is unclear at this time.    Will plan for SALAZAR.  Soft tissue ultrasound of LUE did not show any abscess.  Continue vanco post HD  Repeat blood culture.    ESRD (end stage renal disease) on dialysis  HD as per nephrology       Anticipated Disposition:     Thank you for your consult. I will follow-up with patient. Please contact us if you have any additional questions.    Ganga Choudhury MD  Infectious Disease  [facility]    Subjective:     Principal Problem:MRSA bacteremia    HPI: 61 year old woman with history of ESRD on HD -TTS , hypertension, s/p CVA with residual aphasia  who was seen at the office last week for MRSA bacteremia . Blood culture done on 01/31 was positive for MRSA. He was given IV vanco at his HD unit .  Clinic visit note on  03/29-    61 year old man with ESRD, s/p previous CVA with residual aphasia who was seen in the hospital on 02/02/2017 for MRSA bacteremia. He gets HD at [facility]  and was on  vanco for 3/2/17 stopped on 3/18/17, 1500mg. Blood cultures done 3/2/17 was negative..On 2/18 pt was given 1g of vanco.  I called and spoke to Zeniamyrna Cantu from [facility].  He was accompanied by his niece for this visit and we also got some history by talking to the wife on the phone.  Pt denies fever or chills.      At that visit, I called his HD center and request request blood cultures-they were done on 03/31 and they are now positive  for GPC. He denies any history of fever or curve .  He has left upper extremity AVF .    He was advised to come back for persistent positive blood cultures.      Interval History: 61 year old man with ESRD and  persistent MRSA bacteremia .  SALAZAR did not show any evidence of endocarditis .  Left upper extremity ultrasound -did not show any abscess .     Review of Systems   Unable to perform ROS: Dementia   Constitutional: Negative for activity change and appetite change.   Eyes: Negative for discharge.   Respiratory: Negative for apnea.    Endocrine: Negative for cold intolerance.     Objective:     Vital Signs (Most Recent):  Temp: 97.7 °F (36.5 °C) (04/06/17 0455)  Pulse: (!) 58 (04/06/17 0455)  Resp: 18 (04/06/17 0455)  BP: (!) 142/81 (04/06/17 0455)  SpO2: 97 % (04/06/17 0455) Vital Signs (24h Range):  Temp:  [97.7 °F (36.5 °C)-98.6 °F (37 °C)] 97.7 °F (36.5 °C)  Pulse:  [58-83] 58  Resp:  [16-18] 18  SpO2:  [96 %-100 %] 97 %  BP: (110-142)/(64-81) 142/81     Weight: 97.1 kg (214 lb)  Body mass index is 27.48 kg/(m^2).    Estimated Creatinine Clearance: 10.1 mL/min (based on Cr of 8.7).    Physical Exam   Constitutional: He appears well-developed.   HENT:   Head: Normocephalic and atraumatic.   Nose: Nose normal.   Eyes: Conjunctivae and EOM are normal. Pupils are equal, round, and reactive to light.   Neck: Normal range of motion. Neck supple. No JVD present. No tracheal deviation present. No thyromegaly present.   Cardiovascular: Normal rate, regular rhythm, normal heart sounds and intact distal pulses.  Exam reveals no gallop and no friction rub.    No murmur heard.  Pulmonary/Chest: Effort normal. No stridor. No respiratory distress. He has no wheezes. He has no rales. He exhibits no tenderness.   Abdominal: Soft. Bowel sounds are normal. He exhibits no distension. There is no tenderness. There is no rebound and no guarding.   Musculoskeletal: Normal range of motion. He exhibits no edema, tenderness or deformity.   Neurological: He is alert. He has normal reflexes. No cranial nerve deficit. Coordination normal.   He did not talk much    Skin: Skin is warm and dry. No rash noted. No erythema. No pallor.   L UE AVF    Nursing note and vitals reviewed.      Significant Labs:   Blood Culture:   Recent Labs  Lab 01/31/17  1830 01/31/17  1835 04/03/17  1925 04/03/17  1940   LABBLOO Gram stain aer bottle: Gram positive cocci in clusters resembling Staph   Results called to and read back by: Lula Marinelli RN  02/01/2017  16:22  METHICILLIN RESISTANT STAPHYLOCOCCUS AUREUS Gram stain aer bottle: Gram positive cocci in clusters resembling Staph  Gram stain mike bottle: Gram positive cocci in clusters resembling Staph   Results called to and read back by: Lula Marinelli RN  02/01/2017  16:22  METHICILLIN RESISTANT STAPHYLOCOCCUS AUREUSFor susceptibility see order #[id number] Gram stain aer bottle: Gram positive cocci in clusters resembling Staph  Results called to and read back by:aSmmi Perry RN 04/04/2017  20:14  Gram stain mike bottle: Gram positive cocci in clusters resembling Staph   04/05/2017  08:56   Results previously called.  STAPHYLOCOCCUS AUREUSSusceptibility pendingID consult required at [facility], as it reduces mortality in S.aureus bacteremia. Gram stain aer bottle: Gram positive cocci in clusters resembling Staph  Results called to and read back by:Sammi Perry RN 04/04/2017  20:13  Gram stain mike bottle: Gram positive cocci in clusters resembling Staph   04/05/2017  04:44  STAPHYLOCOCCUS AUREUSID consult required at [facility], as it reduces mortality in S.aureus bacteremia.For susceptibility see order #[id number]     BMP: No results for input(s): GLU, NA, K, CL, CO2, BUN, CREATININE, CALCIUM, MG in the last 48 hours.  CBC: No results for input(s): WBC, HGB, HCT, PLT in the last 48 hours.    Significant Imaging: I have reviewed all pertinent imaging results/findings within the past 24 hours.

## 2017-04-06 NOTE — PROGRESS NOTES
"Renal f/u note:  Admit Date: 4/3/2017    Follow-up For:  ESRD on chronic HD, MRSA bacteremia     Subjective:      Interval History: Pt was seen and examined. No new c/o's, remained stable last pm.  No fever, no SOB, no cough, no diarrhea  S/p SALAZAR this am, results pending    Objective:      Vital Signs Range (Last 24H):Blood pressure 139/74, pulse (!) 58, temperature 97.3 °F (36.3 °C), temperature source Oral, resp. rate 18, height 6' 2" (1.88 m), weight 96.5 kg (212 lb 12.8 oz), SpO2 97 %.    Physical Exam   Constitutional: Patient is oriented to person, place, and time.   Cardiovascular: Normal rate and regular rhythm.    Pulmonary/Chest: Effort normal and clear to auscultation bilaterally. No respiratory distress.   Abdomen: Soft. Non-tender and non-distended. Bowel sounds are normal.   Neurological: Moves all extremities.  Skin: Warm and dry. No rashes.  Extremities: No edema.    Medications:   atorvastatin  20 mg Oral Daily    gabapentin  100 mg Oral TID    heparin (porcine)  5,000 Units Subcutaneous Q8H    losartan  50 mg Oral Daily    metoprolol tartrate  25 mg Oral BID    pantoprazole  40 mg Oral Daily    polyethylene glycol  17 g Oral Daily    rivastigmine  1 patch Transdermal Daily    senna-docusate 8.6-50 mg  1 tablet Oral BID    sertraline  50 mg Oral Daily    [START ON 4/8/2017] vancomycin 1 g in dextrose 5 % 250 mL IVPB (ready to mix system)  1 g Intravenous Q48H    vancomycin (VANCOCIN) IVPB  500 mg Intravenous Once       Laboratory Data:  Reviewed and noted in plan where applicable. Please see chart for full laboratory data.    Lab Results   Component Value Date    WBC 4.26 04/06/2017    HGB 11.8 (L) 04/06/2017    HCT 36.8 (L) 04/06/2017    MCV 88 04/06/2017     04/06/2017         Recent Labs  Lab 04/06/17  0618   GLU 75   *   K 5.6*   CL 97   CO2 27   BUN 33*   CREATININE 9.1*   CALCIUM 8.8       Microbiology Results (last 7 days)     Procedure Component Value Units Date/Time "    Blood culture [[id number]] Collected:  04/06/17 0618    Order Status:  Sent Specimen:  Blood Updated:  04/06/17 0958    Narrative:       Collection has been rescheduled by WARREN at 4/6/2017 06:10 Reason:   Unable to collect  Collection has been rescheduled by WARREN at 4/6/2017 06:10 Reason:   Unable to collect    Blood culture [[id number]] Collected:  04/03/17 1940    Order Status:  Completed Specimen:  Blood from Peripheral, Hand, Right Updated:  04/06/17 0951     Blood Culture, Routine Gram stain aer bottle: Gram positive cocci in clusters resembling Staph     Blood Culture, Routine Results called to and read back by:Sammi Perry RN 04/04/2017  20:13     Blood Culture, Routine Gram stain mike bottle: Gram positive cocci in clusters resembling Staph      Blood Culture, Routine 04/05/2017  04:44     Blood Culture, Routine --     METHICILLIN RESISTANT STAPHYLOCOCCUS AUREUS  ID consult required at [facility], as it reduces mortality   in S.aureus bacteremia.  For susceptibility see order #[id number]      Blood culture [[id number]]  (Susceptibility) Collected:  04/03/17 1925    Order Status:  Completed Specimen:  Blood from Peripheral, Hand, Right Updated:  04/06/17 0951     Blood Culture, Routine Gram stain aer bottle: Gram positive cocci in clusters resembling Staph     Blood Culture, Routine Results called to and read back by:Sammi Perry RN 04/04/2017  20:14     Blood Culture, Routine Gram stain mike bottle: Gram positive cocci in clusters resembling Staph      Blood Culture, Routine 04/05/2017  08:56      Blood Culture, Routine Results previously called.     Blood Culture, Routine --     METHICILLIN RESISTANT STAPHYLOCOCCUS AUREUS  ID consult required at [facility], as it reduces mortality   in S.aureus bacteremia.      Blood culture [[id number]]     Order Status:  Canceled Specimen:  Blood           Radiology:  Reviewed and noted in plan where applicable.  Please see chart for full radiology data.    AVF  us:  No sonographic evidence of abscess    TTE results pending    ASSESSMENT/PLAN:     Active Hospital Problems    Diagnosis  POA    *MRSA bacteremia [R78.81]  Yes    Dementia without behavioral disturbance [F03.90]  Yes    ESRD (end stage renal disease) on dialysis [N18.6, Z99.2]  Not Applicable      Resolved Hospital Problems    Diagnosis Date Resolved POA   No resolved problems to display.       A/P 60 y/o male with a h/o of ESRD, on chronic HD, with recurrent positive blood cx's and no apparent, obvious source for the bacteremia:      1. Renal: ESRD pt, q TTS HD schedule in [place], [place].  Tolerating HD well, continue HD  Next HD today      2. Bacteremia: Positive blood cx's for Staph aureus  Had MRSA in blood Cx in Jan/Feb 2017  Source not known  TTE unremarkable, no vegetations  SALAZAR done results pending   AVF us no abscess  No obvious wounds, no abscess, no foreign bodies or catheters, hear surgery done in 2015,   Vancomycin level is therapeutic  Continue Vancomycin 1 g q HD treatment, TTS      3. comorbidities noted: DM, HTN.  BP slightly elevated.  Meds reviewed  Will monitor closely after starting outpt meds.      Plans and recommendations:  As discussed above   Vancomycin 1 g IV q TTS with HD  Discussed with pharmacy yesterday  Vancomycin is to be given by HD nurse during reinfusion, q TTS      NILDA Sung

## 2017-04-07 LAB
ALBUMIN SERPL BCP-MCNC: 2.6 G/DL
ANION GAP SERPL CALC-SCNC: 9 MMOL/L
BASOPHILS # BLD AUTO: 0.03 K/UL
BASOPHILS NFR BLD: 0.7 %
BUN SERPL-MCNC: 30 MG/DL
CALCIUM SERPL-MCNC: 8.6 MG/DL
CHLORIDE SERPL-SCNC: 98 MMOL/L
CO2 SERPL-SCNC: 24 MMOL/L
CREAT SERPL-MCNC: 8.5 MG/DL
CRP SERPL-MCNC: 42.1 MG/L
DIFFERENTIAL METHOD: ABNORMAL
EOSINOPHIL # BLD AUTO: 0.1 K/UL
EOSINOPHIL NFR BLD: 2.4 %
ERYTHROCYTE [DISTWIDTH] IN BLOOD BY AUTOMATED COUNT: 17.8 %
ERYTHROCYTE [SEDIMENTATION RATE] IN BLOOD BY WESTERGREN METHOD: 14 MM/HR
EST. GFR  (AFRICAN AMERICAN): 7 ML/MIN/1.73 M^2
EST. GFR  (NON AFRICAN AMERICAN): 6 ML/MIN/1.73 M^2
GLUCOSE SERPL-MCNC: 77 MG/DL
HCT VFR BLD AUTO: 39.3 %
HGB BLD-MCNC: 12.4 G/DL
LYMPHOCYTES # BLD AUTO: 1.4 K/UL
LYMPHOCYTES NFR BLD: 31.8 %
MCH RBC QN AUTO: 27.7 PG
MCHC RBC AUTO-ENTMCNC: 31.6 %
MCV RBC AUTO: 88 FL
MONOCYTES # BLD AUTO: 0.5 K/UL
MONOCYTES NFR BLD: 11.8 %
NEUTROPHILS # BLD AUTO: 2.3 K/UL
NEUTROPHILS NFR BLD: 53.3 %
PHOSPHATE SERPL-MCNC: 3.5 MG/DL
PLATELET # BLD AUTO: 203 K/UL
PMV BLD AUTO: 9.6 FL
POTASSIUM SERPL-SCNC: 5.3 MMOL/L
RBC # BLD AUTO: 4.47 M/UL
SODIUM SERPL-SCNC: 131 MMOL/L
WBC # BLD AUTO: 4.24 K/UL

## 2017-04-07 PROCEDURE — 11000001 HC ACUTE MED/SURG PRIVATE ROOM

## 2017-04-07 PROCEDURE — 85025 COMPLETE CBC W/AUTO DIFF WBC: CPT

## 2017-04-07 PROCEDURE — 85651 RBC SED RATE NONAUTOMATED: CPT

## 2017-04-07 PROCEDURE — 86140 C-REACTIVE PROTEIN: CPT

## 2017-04-07 PROCEDURE — 36415 COLL VENOUS BLD VENIPUNCTURE: CPT

## 2017-04-07 PROCEDURE — 25000003 PHARM REV CODE 250: Performed by: NURSE PRACTITIONER

## 2017-04-07 PROCEDURE — 80069 RENAL FUNCTION PANEL: CPT

## 2017-04-07 PROCEDURE — 99233 SBSQ HOSP IP/OBS HIGH 50: CPT | Mod: ,,, | Performed by: INTERNAL MEDICINE

## 2017-04-07 PROCEDURE — 96372 THER/PROPH/DIAG INJ SC/IM: CPT

## 2017-04-07 RX ORDER — ALBUMIN HUMAN 250 G/1000ML
12.5 SOLUTION INTRAVENOUS
Status: DISCONTINUED | OUTPATIENT
Start: 2017-04-08 | End: 2017-04-10

## 2017-04-07 RX ORDER — HEPARIN SODIUM 1000 [USP'U]/ML
1000 INJECTION, SOLUTION INTRAVENOUS; SUBCUTANEOUS
Status: DISCONTINUED | OUTPATIENT
Start: 2017-04-08 | End: 2017-04-10

## 2017-04-07 RX ADMIN — HEPARIN SODIUM 5000 UNITS: 5000 INJECTION, SOLUTION INTRAVENOUS; SUBCUTANEOUS at 10:04

## 2017-04-07 RX ADMIN — METOPROLOL TARTRATE 25 MG: 25 TABLET ORAL at 08:04

## 2017-04-07 RX ADMIN — HEPARIN SODIUM 5000 UNITS: 5000 INJECTION, SOLUTION INTRAVENOUS; SUBCUTANEOUS at 01:04

## 2017-04-07 RX ADMIN — STANDARDIZED SENNA CONCENTRATE AND DOCUSATE SODIUM 1 TABLET: 8.6; 5 TABLET, FILM COATED ORAL at 08:04

## 2017-04-07 RX ADMIN — GABAPENTIN 100 MG: 100 CAPSULE ORAL at 10:04

## 2017-04-07 RX ADMIN — HEPARIN SODIUM 5000 UNITS: 5000 INJECTION, SOLUTION INTRAVENOUS; SUBCUTANEOUS at 05:04

## 2017-04-07 RX ADMIN — POLYETHYLENE GLYCOL 3350 17 G: 17 POWDER, FOR SOLUTION ORAL at 08:04

## 2017-04-07 RX ADMIN — SERTRALINE HYDROCHLORIDE 50 MG: 50 TABLET ORAL at 08:04

## 2017-04-07 RX ADMIN — LOSARTAN POTASSIUM 50 MG: 50 TABLET, FILM COATED ORAL at 08:04

## 2017-04-07 RX ADMIN — RIVASTIGMINE TRANSDERMAL SYSTEM 1 PATCH: 4.6 PATCH, EXTENDED RELEASE TRANSDERMAL at 08:04

## 2017-04-07 RX ADMIN — GABAPENTIN 100 MG: 100 CAPSULE ORAL at 01:04

## 2017-04-07 RX ADMIN — PANTOPRAZOLE SODIUM 40 MG: 40 TABLET, DELAYED RELEASE ORAL at 08:04

## 2017-04-07 RX ADMIN — GABAPENTIN 100 MG: 100 CAPSULE ORAL at 05:04

## 2017-04-07 RX ADMIN — ATORVASTATIN CALCIUM 20 MG: 10 TABLET, FILM COATED ORAL at 08:04

## 2017-04-07 NOTE — PHYSICIAN QUERY
"PT Name: David Hadley  MR #: [id number]    Physician Query Form - Hematology Clarification      CDS/: Shahnaz Bolden RN               Contact information:[email or web address]    This form is a permanent document in the medical record.      Query Date: April 7, 2017    By submitting this query, we are merely seeking further clarification of documentation. Please utilize your independent clinical judgment when addressing the question(s) below.    The Medical record contains the following:   Indicators  Supporting Clinical Findings Location in Medical Record   x "Anemia" documented  Anemia: Hgb above goal for dialysis patient 4/7 Nephrology PN   x H & H = Hgb 13.2 > 11.8  HCT 40.7 > 36.8 4/3 > 4/6  lab    BP =                     HR=      "GI bleeding" documented      Acute bleeding (Non GI site)      Transfusion(s)      Treatment:      Other:        Provider, please specify diagnosis or diagnoses associated with above clinical findings.      [  ] Anemia of chronic disease ( Specify chronic disease)      [  ] CKD      [X  ] Other (Specify) ___ESRD____________________________     [  ] Clinically Undetermined     [  ] Other Hematological Diagnosis (please specify): _________________________________    [  ] Clinically Undetermined       Please document in your progress notes daily for the duration of treatment, until resolved, and include in your discharge summary.                                                                                                      "

## 2017-04-07 NOTE — PROGRESS NOTES
Clinical Pharmacy Progress Note: Vancomycin      Labs:      Estimated CrCl: 10.3 mL/min (ESRD w/ HD Tues/Thurs/Sat)   WBC: 4.24   SCr: 8.5   Tmax/24h: 98.5       Cultures:   Blood collected on 4/3: MRSA, vanc-sensitive x 1 tube, MRSA x 1 tube  Blood collected on 4/6: G+ cocci in clusters resembling staph       Goal trough: 15-20 mcg/mL  Dx: S aureus bacteremia    Please note: vancomycin 1000 mg IV every 48 hours is entered as a placeholder dose only. Pharmacy will continue to dose according to pre-HD levels per protocol.     Next level due: Pre-HD random level due 04/08/17 @ 0430 with AM labs.      Thank you for allowing us to participate in this patient's care.      Joan Juarez, PharmD 4/7/2017 8:06 AM

## 2017-04-07 NOTE — PLAN OF CARE
Problem: Patient Care Overview  Goal: Plan of Care Review  Outcome: Ongoing (interventions implemented as appropriate)  Remains free from injury at this time. Respirations even and unlabored. No c/o pain at this time. 24 hour chart review complete. Able to verbalize needs and wants. Family at bedside. Bed locked and in lowest position. Call light within reach. Side rails up x2. Will note any changes.

## 2017-04-07 NOTE — PROGRESS NOTES
[facility]  Infectious Disease  Progress Note    Patient Name: David Hadley  MRN: [id number]  Admission Date: 4/3/2017  Length of Stay: 4 days  Attending Physician: Conner Duarte MD  Primary Care Provider: Isaac Ftich MD    Isolation Status: Contact  Assessment/Plan:      * MRSA bacteremia  Source is unclear at this time.  SALAZAR and LUE soft tissue ultrasound did not show any obvious vegetation.  Will plan for indium in am and send ESR and CRP.  He has mild back pain .  Will image the back if ESR and CRP are elevated.      ESRD (end stage renal disease) on dialysis  HD as per nephrology       Anticipated Disposition:     Thank you for your consult. I will follow-up with patient. Please contact us if you have any additional questions.    Ganga Choudhury MD  Infectious Disease  [facility]    Subjective:     Principal Problem:MRSA bacteremia    HPI: 61 year old woman with history of ESRD on HD -TTS , hypertension, s/p CVA with residual aphasia  who was seen at the office last week for MRSA bacteremia . Blood culture done on 01/31 was positive for MRSA. He was given IV vanco at his HD unit .  Clinic visit note on  03/29-    61 year old man with ESRD, s/p previous CVA with residual aphasia who was seen in the hospital on 02/02/2017 for MRSA bacteremia. He gets HD at [facility]  and was on  vanco for 3/2/17 stopped on 3/18/17, 1500mg. Blood cultures done 3/2/17 was negative..On 2/18 pt was given 1g of vanco.  I called and spoke to Zenia Cnatu from [facility].  He was accompanied by his niece for this visit and we also got some history by talking to the wife on the phone.  Pt denies fever or chills.      At that visit, I called his HD center and request request blood cultures-they were done on 03/31 and they are now positive  for GPC. He denies any history of fever or curve .  He has left upper extremity AVF .    He was advised to come back for persistent positive  blood cultures.      Interval History: 61 year old man with ESRD and persistent MRSA bacteremia .  SALAZAR did not show any evidence of endocarditis .  Left upper extremity ultrasound -did not show any abscess .     Repeat blood culture is still positive.    Review of Systems   Unable to perform ROS: Dementia   Constitutional: Negative for activity change and appetite change.   Eyes: Negative for discharge.   Respiratory: Negative for apnea.    Endocrine: Negative for cold intolerance.     Objective:     Vital Signs (Most Recent):  Temp: 97.4 °F (36.3 °C) (04/07/17 0500)  Pulse: (!) 58 (04/07/17 0500)  Resp: 18 (04/07/17 0500)  BP: 128/85 (04/07/17 0500)  SpO2: 97 % (04/07/17 0500) Vital Signs (24h Range):  Temp:  [97.3 °F (36.3 °C)-98.5 °F (36.9 °C)] 97.4 °F (36.3 °C)  Pulse:  [57-68] 58  Resp:  [0-18] 18  SpO2:  [96 %-98 %] 97 %  BP: (120-180)/(74-99) 128/85     Weight: 91.9 kg (202 lb 8 oz)  Body mass index is 26 kg/(m^2).    Estimated Creatinine Clearance: 10.3 mL/min (based on Cr of 8.5).    Physical Exam   Constitutional: He appears well-developed.   HENT:   Head: Normocephalic and atraumatic.   Nose: Nose normal.   Eyes: Conjunctivae and EOM are normal. Pupils are equal, round, and reactive to light.   Neck: Normal range of motion. Neck supple. No JVD present. No tracheal deviation present. No thyromegaly present.   Cardiovascular: Normal rate, regular rhythm, normal heart sounds and intact distal pulses.  Exam reveals no gallop and no friction rub.    No murmur heard.  Pulmonary/Chest: Effort normal. No stridor. No respiratory distress. He has no wheezes. He has no rales. He exhibits no tenderness.   Abdominal: Soft. Bowel sounds are normal. He exhibits no distension. There is no tenderness. There is no rebound and no guarding.   Musculoskeletal: Normal range of motion. He exhibits no edema, tenderness or deformity.   Neurological: He is alert. He has normal reflexes. No cranial nerve deficit. Coordination  normal.   He did not talk much    Skin: Skin is warm and dry. No rash noted. No erythema. No pallor.   L UE AVF   Nursing note and vitals reviewed.      Significant Labs:   Blood Culture:     Recent Labs  Lab 01/31/17  1830 01/31/17  1835 04/03/17  1925 04/03/17  1940 04/06/17  0618   LABBLOO Gram stain aer bottle: Gram positive cocci in clusters resembling Staph   Results called to and read back by: Lula Marinelli RN  02/01/2017  16:22  METHICILLIN RESISTANT STAPHYLOCOCCUS AUREUS Gram stain aer bottle: Gram positive cocci in clusters resembling Staph  Gram stain mike bottle: Gram positive cocci in clusters resembling Staph   Results called to and read back by: Lula Marinelli RN  02/01/2017  16:22  METHICILLIN RESISTANT STAPHYLOCOCCUS AUREUSFor susceptibility see order #[id number] Gram stain aer bottle: Gram positive cocci in clusters resembling Staph  Results called to and read back by:Sammi Perry RN 04/04/2017  20:14  Gram stain mike bottle: Gram positive cocci in clusters resembling Staph   04/05/2017  08:56   Results previously called.  METHICILLIN RESISTANT STAPHYLOCOCCUS AUREUSID consult required at [facility], as it reduces mortality in S.aureus bacteremia. Gram stain aer bottle: Gram positive cocci in clusters resembling Staph  Results called to and read back by:Sammi Perry RN 04/04/2017  20:13  Gram stain mike bottle: Gram positive cocci in clusters resembling Staph   04/05/2017  04:44  METHICILLIN RESISTANT STAPHYLOCOCCUS AUREUSID consult required at [facility], as it reduces mortality in S.aureus bacteremia.For susceptibility see order #[id number] Gram stain aer bottle: Gram positive cocci in clusters resembling Staph   Results called to and read back by: Holly Mendoza RN  04/07/2017  02:25     BMP:     Recent Labs  Lab 04/07/17  0512   GLU 77   *   K 5.3*   CL 98   CO2 24   BUN 30*   CREATININE 8.5*   CALCIUM 8.6*     CBC:     Recent Labs  Lab 04/06/17  0618 04/07/17  0512    WBC 4.26 4.24   HGB 11.8* 12.4*   HCT 36.8* 39.3*    203       Significant Imaging: I have reviewed all pertinent imaging results/findings within the past 24 hours.

## 2017-04-07 NOTE — SUBJECTIVE & OBJECTIVE
Interval History: Blood cultures positive staph aureus. SALAZAR 4/6/17 negative for vegetation.  No symptoms.    Review of Systems   Unable to perform ROS: Dementia   Constitutional: Negative for activity change and appetite change.   Eyes: Negative for discharge.   Respiratory: Negative for apnea.    Endocrine: Negative for cold intolerance.     Objective:     Vital Signs (Most Recent):  Temp: 97.9 °F (36.6 °C) (04/06/17 1831)  Pulse: 62 (04/06/17 1831)  Resp: 16 (04/06/17 1831)  BP: (!) 180/99 (04/06/17 1831)  SpO2: 97 % (04/06/17 1302) Vital Signs (24h Range):  Temp:  [97.3 °F (36.3 °C)-97.9 °F (36.6 °C)] 97.9 °F (36.6 °C)  Pulse:  [57-83] 62  Resp:  [0-18] 16  SpO2:  [96 %-98 %] 97 %  BP: (110-180)/(64-99) 180/99     Weight: 96.5 kg (212 lb 12.8 oz)  Body mass index is 27.32 kg/(m^2).    Intake/Output Summary (Last 24 hours) at 04/06/17 2343  Last data filed at 04/06/17 1831   Gross per 24 hour   Intake              920 ml   Output             3500 ml   Net            -2580 ml      Physical Exam   Constitutional: He appears well-developed.   HENT:   Head: Normocephalic and atraumatic.   Nose: Nose normal.   Eyes: Conjunctivae and EOM are normal. Pupils are equal, round, and reactive to light.   Neck: Normal range of motion. Neck supple. No JVD present. No tracheal deviation present. No thyromegaly present.   Cardiovascular: Normal rate, regular rhythm, normal heart sounds and intact distal pulses.  Exam reveals no gallop and no friction rub.    No murmur heard.  Pulmonary/Chest: Effort normal. No stridor. No respiratory distress. He has no wheezes. He has no rales. He exhibits no tenderness.   Abdominal: Soft. Bowel sounds are normal. He exhibits no distension. There is no tenderness. There is no rebound and no guarding.   Musculoskeletal: Normal range of motion. He exhibits no edema, tenderness or deformity.   Neurological: He is alert. He has normal reflexes. No cranial nerve deficit. Coordination normal.   He did  not talk much    Skin: Skin is warm and dry. No rash noted. No erythema. No pallor.   L UE AVF   Nursing note and vitals reviewed.      Significant Labs:   BMP:     Recent Labs  Lab 04/06/17  0618   GLU 75   *   K 5.6*   CL 97   CO2 27   BUN 33*   CREATININE 9.1*   CALCIUM 8.8     CBC:     Recent Labs  Lab 04/06/17 0618   WBC 4.26   HGB 11.8*   HCT 36.8*          Significant Imaging: I have reviewed all pertinent imaging results/findings within the past 24 hours.   Imaging Results         US Soft Tissue Misc (Final result) Result time:  04/05/17 13:55:51    Final result by Mar Yadav MD (04/05/17 13:55:51)    Impression:         No sonographic evidence of abscess.      Electronically signed by: MAR YADAV MD  Date:     04/05/17  Time:    13:55     Narrative:    Exam: US SOFT TISSUE MISC    Clinical History:    Left arm cellulitis near the AV fistula.    Findings:  No suspicious fluid collection identified adjacent to the AV fistula in the left upper arm.            X-Ray Chest PA And Lateral (Final result) Result time:  04/03/17 18:52:43    Final result by Cornelius Jackson MD (04/03/17 18:52:43)    Impression:           1.  Left right basilar atelectasis last consolidation, concerning for pneumonia.  Other considerations would include interstitial pulmonary edema.  Clinical correlation is advised.  2.  Stable findings as noted above.      Electronically signed by: CORNELIUS JACKSON MD  Date:     04/03/17  Time:    18:52     Narrative:    PA and Lateral Chest x-ray    Clinical Indication: bacteremia.  Fever    Findings:    Comparisons are made to 01/31/2017.  Left rib and right basilar atelectasis/consolidation and effusions.      The upper lungs are clear. The cardiac silhouette size is enlarged. The trachea is midline and the mediastinal width is normal. Negative for pneumothorax. Pulmonary vasculature is normal. Negative for osseous abnormalities. There are degenerative changes of spine and both  shoulder girdles. There are calcifications of the aortic knob and tortuosity of the descending thoracic aorta. There are changes of a prior median sternotomy and CABG changes.

## 2017-04-07 NOTE — PLAN OF CARE
Problem: Patient Care Overview  Goal: Plan of Care Review  Outcome: Ongoing (interventions implemented as appropriate)  Fall and contact precautions maintained, pt free from falls/injuries. Denies pain. SB- NSR on tele. POC and meds reviewed w/ pt and family, pt and family verbalizes understanding. Chart check done. Will cont to monitor.

## 2017-04-07 NOTE — PROGRESS NOTES
"[facility] Hospital Medicine  Progress Note    Patient Name: David Hadley  MRN: [id number]  Patient Class: IP- Inpatient   Admission Date: 4/3/2017  Length of Stay: 3 days  Attending Physician: Conner Duarte MD  Primary Care Provider: Isaac Fitch MD        Subjective:     Principal Problem:MRSA bacteremia    HPI:  David Hadley is a 61 y.o. male patient with PMHx of ESRD on HD q TTS, HTN, HLP, who presented to the ER for c/o "for further evaluation of infection that was noticed in his blood today via Dr. Choudhury." Dr. Choudhury reported positive blood cx's c/w GPC in clusters. Records were reviewed. Pt had MRSA on blood Cx in Jan/Feb 2017. Source is not known.Pt stated he was sent here for IV abx via Dr. Choudhury. Pt is denying any sxs at this time. Pt states he is due for dialysis tomorrow. Patient denies any fever, N/V/D, chills, abd pain, CP, SOB, weakness/numbness, dizziness, lightheadedness, HA  and all other sxs at this time. No tooth problems. Pt has been on HD about 10 years, and gets dialyzed regularly in [place], [place] (Dr. Rinku Guardado). His L UE AVF is not bothering him.    Hospital Course:  ID consulted. Blood cultures showed Staphylcoccus aureus. Cardiology consulted and SALAZAR scheduled 4/6/17.    Interval History: Blood cultures positive staph aureus. SALAZAR 4/6/17 negative for vegetation.  No symptoms.    Review of Systems   Unable to perform ROS: Dementia   Constitutional: Negative for activity change and appetite change.   Eyes: Negative for discharge.   Respiratory: Negative for apnea.    Endocrine: Negative for cold intolerance.     Objective:     Vital Signs (Most Recent):  Temp: 97.9 °F (36.6 °C) (04/06/17 1831)  Pulse: 62 (04/06/17 1831)  Resp: 16 (04/06/17 1831)  BP: (!) 180/99 (04/06/17 1831)  SpO2: 97 % (04/06/17 1302) Vital Signs (24h Range):  Temp:  [97.3 °F (36.3 °C)-97.9 °F (36.6 °C)] 97.9 °F (36.6 °C)  Pulse:  [57-83] 62  Resp:  [0-18] 16  SpO2:  [96 %-98 %] 97 %  BP: " (110-180)/(64-99) 180/99     Weight: 96.5 kg (212 lb 12.8 oz)  Body mass index is 27.32 kg/(m^2).    Intake/Output Summary (Last 24 hours) at 04/06/17 2343  Last data filed at 04/06/17 1831   Gross per 24 hour   Intake              920 ml   Output             3500 ml   Net            -2580 ml      Physical Exam   Constitutional: He appears well-developed.   HENT:   Head: Normocephalic and atraumatic.   Nose: Nose normal.   Eyes: Conjunctivae and EOM are normal. Pupils are equal, round, and reactive to light.   Neck: Normal range of motion. Neck supple. No JVD present. No tracheal deviation present. No thyromegaly present.   Cardiovascular: Normal rate, regular rhythm, normal heart sounds and intact distal pulses.  Exam reveals no gallop and no friction rub.    No murmur heard.  Pulmonary/Chest: Effort normal. No stridor. No respiratory distress. He has no wheezes. He has no rales. He exhibits no tenderness.   Abdominal: Soft. Bowel sounds are normal. He exhibits no distension. There is no tenderness. There is no rebound and no guarding.   Musculoskeletal: Normal range of motion. He exhibits no edema, tenderness or deformity.   Neurological: He is alert. He has normal reflexes. No cranial nerve deficit. Coordination normal.   He did not talk much    Skin: Skin is warm and dry. No rash noted. No erythema. No pallor.   L UE AVF   Nursing note and vitals reviewed.      Significant Labs:   BMP:     Recent Labs  Lab 04/06/17  0618   GLU 75   *   K 5.6*   CL 97   CO2 27   BUN 33*   CREATININE 9.1*   CALCIUM 8.8     CBC:     Recent Labs  Lab 04/06/17  0618   WBC 4.26   HGB 11.8*   HCT 36.8*          Significant Imaging: I have reviewed all pertinent imaging results/findings within the past 24 hours.   Imaging Results         US Soft Tissue Misc (Final result) Result time:  04/05/17 13:55:51    Final result by Jay Yadav MD (04/05/17 13:55:51)    Impression:         No sonographic evidence of  abscess.      Electronically signed by: MAR ZAPATA MD  Date:     04/05/17  Time:    13:55     Narrative:    Exam: US SOFT TISSUE MISC    Clinical History:    Left arm cellulitis near the AV fistula.    Findings:  No suspicious fluid collection identified adjacent to the AV fistula in the left upper arm.            X-Ray Chest PA And Lateral (Final result) Result time:  04/03/17 18:52:43    Final result by Cornelius Jackson MD (04/03/17 18:52:43)    Impression:           1.  Left right basilar atelectasis last consolidation, concerning for pneumonia.  Other considerations would include interstitial pulmonary edema.  Clinical correlation is advised.  2.  Stable findings as noted above.      Electronically signed by: CORNELIUS JACKSON MD  Date:     04/03/17  Time:    18:52     Narrative:    PA and Lateral Chest x-ray    Clinical Indication: bacteremia.  Fever    Findings:    Comparisons are made to 01/31/2017.  Left rib and right basilar atelectasis/consolidation and effusions.      The upper lungs are clear. The cardiac silhouette size is enlarged. The trachea is midline and the mediastinal width is normal. Negative for pneumothorax. Pulmonary vasculature is normal. Negative for osseous abnormalities. There are degenerative changes of spine and both shoulder girdles. There are calcifications of the aortic knob and tortuosity of the descending thoracic aorta. There are changes of a prior median sternotomy and CABG changes.            Assessment/Plan:      * MRSA bacteremia  Bacteremia: Positive blood cx's for GPC in clusters, likely Staph aureus:Had MRSA in blood Cx in Jan/Feb 2017  Source not known  No obvious wounds, no abscess, no foreign bodies or catheters, hear surgery done in 2015,   DDX: endovascular infection  Discussed with Dr. Choudhury  ECHO: SALAZAR 4/6/17 negative  Vancomycin IV to keep 15-20 level    ESRD (end stage renal disease) on dialysis  Renal: ESRD pt, q TTS HD schedule in [place], [place].  K normal  Not  acidemic  Good O2 sat  No acute indications for HD today  Will provide routine HD 4/4/17: TTS    Dementia without behavioral disturbance  monitor    VTE Risk Mitigation         Ordered     heparin (porcine) injection 5,000 Units  Every 8 hours     Route:  Subcutaneous        04/03/17 2007     Medium Risk of VTE  Once      04/03/17 2007          Conner Duarte MD  Department of Hospital Medicine   [facility]

## 2017-04-07 NOTE — PROGRESS NOTES
Pt completed 3.25 hrs of 4 hour tx due to clot in chambers during tx.  3 L uf removal.  Pt tolerated tx well. Needles removed x2, hemostasis achieved.  Verbal and written report to nurse attending.

## 2017-04-07 NOTE — ASSESSMENT & PLAN NOTE
Source is unclear at this time.  SALAZAR and LUE soft tissue ultrasound did not show any obvious vegetation.  Will plan for indium in am and send ESR and CRP.  He has mild back pain .  Will image the back if ESR and CRP are elevated.

## 2017-04-07 NOTE — SUBJECTIVE & OBJECTIVE
Interval History: 61 year old man with ESRD and persistent MRSA bacteremia .  SALAZAR did not show any evidence of endocarditis .  Left upper extremity ultrasound -did not show any abscess .     Repeat blood culture is still positive.    Review of Systems   Unable to perform ROS: Dementia   Constitutional: Negative for activity change and appetite change.   Eyes: Negative for discharge.   Respiratory: Negative for apnea.    Endocrine: Negative for cold intolerance.     Objective:     Vital Signs (Most Recent):  Temp: 97.4 °F (36.3 °C) (04/07/17 0500)  Pulse: (!) 58 (04/07/17 0500)  Resp: 18 (04/07/17 0500)  BP: 128/85 (04/07/17 0500)  SpO2: 97 % (04/07/17 0500) Vital Signs (24h Range):  Temp:  [97.3 °F (36.3 °C)-98.5 °F (36.9 °C)] 97.4 °F (36.3 °C)  Pulse:  [57-68] 58  Resp:  [0-18] 18  SpO2:  [96 %-98 %] 97 %  BP: (120-180)/(74-99) 128/85     Weight: 91.9 kg (202 lb 8 oz)  Body mass index is 26 kg/(m^2).    Estimated Creatinine Clearance: 10.3 mL/min (based on Cr of 8.5).    Physical Exam   Constitutional: He appears well-developed.   HENT:   Head: Normocephalic and atraumatic.   Nose: Nose normal.   Eyes: Conjunctivae and EOM are normal. Pupils are equal, round, and reactive to light.   Neck: Normal range of motion. Neck supple. No JVD present. No tracheal deviation present. No thyromegaly present.   Cardiovascular: Normal rate, regular rhythm, normal heart sounds and intact distal pulses.  Exam reveals no gallop and no friction rub.    No murmur heard.  Pulmonary/Chest: Effort normal. No stridor. No respiratory distress. He has no wheezes. He has no rales. He exhibits no tenderness.   Abdominal: Soft. Bowel sounds are normal. He exhibits no distension. There is no tenderness. There is no rebound and no guarding.   Musculoskeletal: Normal range of motion. He exhibits no edema, tenderness or deformity.   Neurological: He is alert. He has normal reflexes. No cranial nerve deficit. Coordination normal.   He did not talk  much    Skin: Skin is warm and dry. No rash noted. No erythema. No pallor.   L UE AVF   Nursing note and vitals reviewed.      Significant Labs:   Blood Culture:     Recent Labs  Lab 01/31/17  1830 01/31/17  1835 04/03/17  1925 04/03/17  1940 04/06/17  0618   LABBLOO Gram stain aer bottle: Gram positive cocci in clusters resembling Staph   Results called to and read back by: Lula Marinelli RN  02/01/2017  16:22  METHICILLIN RESISTANT STAPHYLOCOCCUS AUREUS Gram stain aer bottle: Gram positive cocci in clusters resembling Staph  Gram stain mike bottle: Gram positive cocci in clusters resembling Staph   Results called to and read back by: Lula Marinelli RN  02/01/2017  16:22  METHICILLIN RESISTANT STAPHYLOCOCCUS AUREUSFor susceptibility see order #[id number] Gram stain aer bottle: Gram positive cocci in clusters resembling Staph  Results called to and read back by:Sammi Perry RN 04/04/2017  20:14  Gram stain mike bottle: Gram positive cocci in clusters resembling Staph   04/05/2017  08:56   Results previously called.  METHICILLIN RESISTANT STAPHYLOCOCCUS AUREUSID consult required at [facility], as it reduces mortality in S.aureus bacteremia. Gram stain aer bottle: Gram positive cocci in clusters resembling Staph  Results called to and read back by:Sammi Perry RN 04/04/2017  20:13  Gram stain mike bottle: Gram positive cocci in clusters resembling Staph   04/05/2017  04:44  METHICILLIN RESISTANT STAPHYLOCOCCUS AUREUSID consult required at [facility], as it reduces mortality in S.aureus bacteremia.For susceptibility see order #[id number] Gram stain aer bottle: Gram positive cocci in clusters resembling Staph   Results called to and read back by: Holly Mendoza RN  04/07/2017  02:25     BMP:     Recent Labs  Lab 04/07/17  0512   GLU 77   *   K 5.3*   CL 98   CO2 24   BUN 30*   CREATININE 8.5*   CALCIUM 8.6*     CBC:     Recent Labs  Lab 04/06/17  0618 04/07/17  0512   WBC 4.26 4.24   HGB 11.8*  12.4*   HCT 36.8* 39.3*    203       Significant Imaging: I have reviewed all pertinent imaging results/findings within the past 24 hours.

## 2017-04-07 NOTE — PHYSICIAN QUERY
"PT Name: David Hadley  MR #: [id number]    Physician Query Form - Heart  Condition Clarification     CDS/: Shahnaz Bolden RN              Contact information:[email or web address]  This form is a permanent document in the medical record.     Query Date: April 7, 2017    By submitting this query, we are merely seeking further clarification of documentation. Please utilize your independent clinical judgment when addressing the question(s) below.    The medical record contains the following   Indicators     Supporting Clinical Findings Location in Medical Record    BNP      EF      Radiology findings     x Echo Results - Biatrial enlargement.    - Eccentric hypertrophy.    - Mildly depressed left ventricular systolic function (EF 45-50%).    - Left ventricular diastolic dysfunction. Grade III    - Low normal to mildly depressed right ventricular systolic function .   - The estimated PA systolic pressure is 25 mmHg.    - Mild tricuspid regurgitation.    - Small pericardial effusion.    - No intracavity mass visualized.   4/3 ECHO    "Ascites" documented      "SOB" or "MONTANEZ" documented      "Hypoxia" documented     x Heart Failure documented PMH: CHF H/P    "Edema" documented      Diuretics/Meds Lopressor, cozaar Home Med list H/P    Treatment:      Other:          Provider, please specify diagnosis or diagnoses associated with above clinical findings.    [  ] Chronic Systolic Heart Failure (EF < 40)*   [  ] Chronic Diastolic Heart Failure (EF > 40)*  [  ] Chronic Combined Systolic and Diastolic Heart Failure  [ X ] Other Type of Heart Failure (please specify type): _____diastolic HF____________________  [  ] Heart Failure Ruled Out  [  ] Other (please specify): ___________________________________  [  ] Clinically Undetermined            *American Heart Association                                                                                                          Please document in your progress notes " daily for the duration of treatment until resolved and include in your discharge summary.

## 2017-04-07 NOTE — ASSESSMENT & PLAN NOTE
Bacteremia: Positive blood cx's for GPC in clusters, likely Staph aureus:Had MRSA in blood Cx in Jan/Feb 2017  Source not known  No obvious wounds, no abscess, no foreign bodies or catheters, hear surgery done in 2015,   DDX: endovascular infection  Discussed with Dr. Choudhury  ECHO: SALAZAR 4/6/17 negative  Vancomycin IV to keep 15-20 level

## 2017-04-08 LAB
ALBUMIN SERPL BCP-MCNC: 2.6 G/DL
ANION GAP SERPL CALC-SCNC: 10 MMOL/L
BUN SERPL-MCNC: 38 MG/DL
CALCIUM SERPL-MCNC: 8.4 MG/DL
CHLORIDE SERPL-SCNC: 98 MMOL/L
CO2 SERPL-SCNC: 24 MMOL/L
CREAT SERPL-MCNC: 9.7 MG/DL
EST. GFR  (AFRICAN AMERICAN): 6 ML/MIN/1.73 M^2
EST. GFR  (NON AFRICAN AMERICAN): 5 ML/MIN/1.73 M^2
GLUCOSE SERPL-MCNC: 75 MG/DL
PHOSPHATE SERPL-MCNC: 4.1 MG/DL
POTASSIUM SERPL-SCNC: 5.4 MMOL/L
SODIUM SERPL-SCNC: 132 MMOL/L
VANCOMYCIN SERPL-MCNC: 16.5 UG/ML

## 2017-04-08 PROCEDURE — 80202 ASSAY OF VANCOMYCIN: CPT

## 2017-04-08 PROCEDURE — 36415 COLL VENOUS BLD VENIPUNCTURE: CPT

## 2017-04-08 PROCEDURE — 80100016 HC MAINTENANCE HEMODIALYSIS

## 2017-04-08 PROCEDURE — 21400001 HC TELEMETRY ROOM

## 2017-04-08 PROCEDURE — 87040 BLOOD CULTURE FOR BACTERIA: CPT | Mod: 59

## 2017-04-08 PROCEDURE — 63600175 PHARM REV CODE 636 W HCPCS: Performed by: INTERNAL MEDICINE

## 2017-04-08 PROCEDURE — 96372 THER/PROPH/DIAG INJ SC/IM: CPT

## 2017-04-08 PROCEDURE — 80069 RENAL FUNCTION PANEL: CPT

## 2017-04-08 PROCEDURE — 99232 SBSQ HOSP IP/OBS MODERATE 35: CPT | Mod: ,,, | Performed by: INTERNAL MEDICINE

## 2017-04-08 PROCEDURE — 11000001 HC ACUTE MED/SURG PRIVATE ROOM

## 2017-04-08 PROCEDURE — 25000003 PHARM REV CODE 250: Performed by: INTERNAL MEDICINE

## 2017-04-08 PROCEDURE — 25000003 PHARM REV CODE 250: Performed by: NURSE PRACTITIONER

## 2017-04-08 RX ADMIN — PANTOPRAZOLE SODIUM 40 MG: 40 TABLET, DELAYED RELEASE ORAL at 08:04

## 2017-04-08 RX ADMIN — METOPROLOL TARTRATE 25 MG: 25 TABLET ORAL at 08:04

## 2017-04-08 RX ADMIN — METOPROLOL TARTRATE 25 MG: 25 TABLET ORAL at 09:04

## 2017-04-08 RX ADMIN — LOSARTAN POTASSIUM 50 MG: 50 TABLET, FILM COATED ORAL at 08:04

## 2017-04-08 RX ADMIN — GABAPENTIN 100 MG: 100 CAPSULE ORAL at 09:04

## 2017-04-08 RX ADMIN — GABAPENTIN 100 MG: 100 CAPSULE ORAL at 02:04

## 2017-04-08 RX ADMIN — RIVASTIGMINE TRANSDERMAL SYSTEM 1 PATCH: 4.6 PATCH, EXTENDED RELEASE TRANSDERMAL at 08:04

## 2017-04-08 RX ADMIN — GABAPENTIN 100 MG: 100 CAPSULE ORAL at 05:04

## 2017-04-08 RX ADMIN — SERTRALINE HYDROCHLORIDE 50 MG: 50 TABLET ORAL at 08:04

## 2017-04-08 RX ADMIN — HEPARIN SODIUM 5000 UNITS: 5000 INJECTION, SOLUTION INTRAVENOUS; SUBCUTANEOUS at 05:04

## 2017-04-08 RX ADMIN — STANDARDIZED SENNA CONCENTRATE AND DOCUSATE SODIUM 1 TABLET: 8.6; 5 TABLET, FILM COATED ORAL at 09:04

## 2017-04-08 RX ADMIN — ATORVASTATIN CALCIUM 20 MG: 10 TABLET, FILM COATED ORAL at 08:04

## 2017-04-08 RX ADMIN — VANCOMYCIN HYDROCHLORIDE 750 MG: 750 INJECTION, POWDER, LYOPHILIZED, FOR SOLUTION INTRAVENOUS at 02:04

## 2017-04-08 RX ADMIN — HEPARIN SODIUM 5000 UNITS: 5000 INJECTION, SOLUTION INTRAVENOUS; SUBCUTANEOUS at 02:04

## 2017-04-08 RX ADMIN — HEPARIN SODIUM 5000 UNITS: 5000 INJECTION, SOLUTION INTRAVENOUS; SUBCUTANEOUS at 09:04

## 2017-04-08 NOTE — PROGRESS NOTES
Hemodialysis completed.  Pt Tolerated well.  Net fluid removal: 3 L.  Treatment time 3.5hrs.  Left arm AVF was cannulated and used without difficulty.   Vancomycin 750mg was given at the end of dialysis.

## 2017-04-08 NOTE — SUBJECTIVE & OBJECTIVE
Interval History: Blood cultures positive staph aureus. SALAZAR 4/6/17 negative for vegetation.  No symptoms.    Review of Systems   Unable to perform ROS: Dementia   Constitutional: Negative for activity change and appetite change.   Eyes: Negative for discharge.   Respiratory: Negative for apnea.    Endocrine: Negative for cold intolerance.     Objective:     Vital Signs (Most Recent):  Temp: 97.8 °F (36.6 °C) (04/07/17 1604)  Pulse: 64 (04/07/17 1604)  Resp: 18 (04/07/17 1604)  BP: 129/68 (04/07/17 1604)  SpO2: 98 % (04/07/17 1604) Vital Signs (24h Range):  Temp:  [97.2 °F (36.2 °C)-98.5 °F (36.9 °C)] 97.8 °F (36.6 °C)  Pulse:  [54-67] 64  Resp:  [18] 18  SpO2:  [97 %-99 %] 98 %  BP: (125-137)/(68-85) 129/68     Weight: 91.9 kg (202 lb 8 oz)  Body mass index is 26 kg/(m^2).    Intake/Output Summary (Last 24 hours) at 04/07/17 2015  Last data filed at 04/07/17 1703   Gross per 24 hour   Intake              780 ml   Output                0 ml   Net              780 ml      Physical Exam   Constitutional: He appears well-developed.   HENT:   Head: Normocephalic and atraumatic.   Nose: Nose normal.   Eyes: Conjunctivae and EOM are normal. Pupils are equal, round, and reactive to light.   Neck: Normal range of motion. Neck supple. No JVD present. No tracheal deviation present. No thyromegaly present.   Cardiovascular: Normal rate, regular rhythm, normal heart sounds and intact distal pulses.  Exam reveals no gallop and no friction rub.    No murmur heard.  Pulmonary/Chest: Effort normal. No stridor. No respiratory distress. He has no wheezes. He has no rales. He exhibits no tenderness.   Abdominal: Soft. Bowel sounds are normal. He exhibits no distension. There is no tenderness. There is no rebound and no guarding.   Musculoskeletal: Normal range of motion. He exhibits no edema, tenderness or deformity.   Neurological: He is alert. He has normal reflexes. No cranial nerve deficit. Coordination normal.   He did not talk  much    Skin: Skin is warm and dry. No rash noted. No erythema. No pallor.   L UE AVF   Nursing note and vitals reviewed.      Significant Labs:   BMP:     Recent Labs  Lab 04/07/17  0512   GLU 77   *   K 5.3*   CL 98   CO2 24   BUN 30*   CREATININE 8.5*   CALCIUM 8.6*     CBC:     Recent Labs  Lab 04/06/17  0618 04/07/17  0512   WBC 4.26 4.24   HGB 11.8* 12.4*   HCT 36.8* 39.3*    203       Significant Imaging: I have reviewed all pertinent imaging results/findings within the past 24 hours.   Imaging Results         US Soft Tissue Misc (Final result) Result time:  04/05/17 13:55:51    Final result by Mar Yadav MD (04/05/17 13:55:51)    Impression:         No sonographic evidence of abscess.      Electronically signed by: MAR YADAV MD  Date:     04/05/17  Time:    13:55     Narrative:    Exam: US SOFT TISSUE MISC    Clinical History:    Left arm cellulitis near the AV fistula.    Findings:  No suspicious fluid collection identified adjacent to the AV fistula in the left upper arm.            X-Ray Chest PA And Lateral (Final result) Result time:  04/03/17 18:52:43    Final result by Cornelius Jackson MD (04/03/17 18:52:43)    Impression:           1.  Left right basilar atelectasis last consolidation, concerning for pneumonia.  Other considerations would include interstitial pulmonary edema.  Clinical correlation is advised.  2.  Stable findings as noted above.      Electronically signed by: CORNELIUS JACKSON MD  Date:     04/03/17  Time:    18:52     Narrative:    PA and Lateral Chest x-ray    Clinical Indication: bacteremia.  Fever    Findings:    Comparisons are made to 01/31/2017.  Left rib and right basilar atelectasis/consolidation and effusions.      The upper lungs are clear. The cardiac silhouette size is enlarged. The trachea is midline and the mediastinal width is normal. Negative for pneumothorax. Pulmonary vasculature is normal. Negative for osseous abnormalities. There are degenerative  changes of spine and both shoulder girdles. There are calcifications of the aortic knob and tortuosity of the descending thoracic aorta. There are changes of a prior median sternotomy and CABG changes.

## 2017-04-08 NOTE — ASSESSMENT & PLAN NOTE
Source is unclear at this time.  SALAZAR and LUE soft tissue ultrasound did not show any obvious vegetation.  Will plan for indium scan.  Continue vanco  Repeat blood culture

## 2017-04-08 NOTE — PROGRESS NOTES
"Subjective    Patient has no complaints/issues today. Awaiting hemodialysis.       Physical exam:  /77 (BP Location: Right arm, Patient Position: Lying, BP Method: Automatic)  Pulse (!) 59  Temp 98.7 °F (37.1 °C) (Oral)   Resp 18  Ht 6' 2" (1.88 m)  Wt 93 kg (205 lb)  SpO2 100%  BMI 26.32 kg/m2    HEENT: moist mucosal membranes  HEART: RRR, S1/S2, no rub  LUNGS: CTA, bilaterally, no wheezing  ABDOMEN: soft, nontender, not distended, bowel sounds present  EXTREMITIES: no LE edema  NEURO: A & O x 3    Labs:  Lab Results   Component Value Date    CREATININE 8.5 (H) 04/07/2017    BUN 30 (H) 04/07/2017     (L) 04/07/2017    K 5.3 (H) 04/07/2017    CL 98 04/07/2017    CO2 24 04/07/2017     Lab Results   Component Value Date     (H) 11/06/2008    CALCIUM 8.6 (L) 04/07/2017    PHOS 3.5 04/07/2017     Lab Results   Component Value Date    ALBUMIN 2.6 (L) 04/07/2017     Lab Results   Component Value Date    WBC 4.24 04/07/2017    HGB 12.4 (L) 04/07/2017    HCT 39.3 (L) 04/07/2017    MCV 88 04/07/2017     04/07/2017       Assessment and Plan:  61 year old male with h/o ESRD, anemia, HTN, DM2, CAD, CHF presented with MRSA bacteremia.       1. ESRD: TTS, next HD today, Time: 3.5  Hrs    2. Access: Left arm AVF.     3. Electrolytes: borderline hyperkalemia. Monitor.                            Borderline hyponatremia: monitor.                            Will follow up on today's labs.     4. Acid-base: no issues. Will follow up on today's labs.     5. Volume: no gross overload.     6. Anemia: Hgb above goal for dialysis patient, no SUNIL needed.     7. HTN: good BP control.     8. Medications: reviewed.     9. MRSA bacteremia: Continue Vancomycin.         "

## 2017-04-08 NOTE — PLAN OF CARE
Problem: Patient Care Overview  Goal: Plan of Care Review  Outcome: Ongoing (interventions implemented as appropriate)  Patient disoriented to place, time, and situation; able to be re-oriented.  VSS. Bruit and Thrill present to [facility]. Patient has dialysis every Tuesday, Thursday, and Saturday.  Will be prepared for dialysis today. Denies pain. Contact Isolation maintained for MRSA. Sinus bernardino to Normal Sinus on monitor. Fall precautions maintained.  Remains free of injury with bed low and wheels locked. 24 hour chart check completed.  Will continue to monitor.

## 2017-04-08 NOTE — ASSESSMENT & PLAN NOTE
Bacteremia: Positive blood cx's for GPC in clusters, likely Staph aureus:Had MRSA in blood Cx in Jan/Feb 2017  Source not known  No obvious wounds, no abscess, no foreign bodies or catheters, hear surgery done in 2015,   DDX: endovascular infection  Discussed with Dr. Choudhury  ECHO: SALAZAR 4/6/17 negative  Vancomycin IV to keep 15-20 level  Tagged WBC scan planned by ID for Monday 10 April.  ESR slightly elevated and CRP also elevated with back pain.  Consider MRI of the L and T spine.

## 2017-04-08 NOTE — PLAN OF CARE
Problem: Patient Care Overview  Goal: Plan of Care Review  Outcome: Ongoing (interventions implemented as appropriate)  Reviewed plan of care. Patient verbalized understanding and teach back.     12hr chart check complete.

## 2017-04-08 NOTE — SUBJECTIVE & OBJECTIVE
Interval History: 61 year old man with ESRD and persistent MRSA bacteremia .  SALAZAR did not show any evidence of endocarditis .  Left upper extremity ultrasound -did not show any abscess .     Repeat blood culture is still positive.  He is afebrile  .    Review of Systems   Unable to perform ROS: Dementia   Constitutional: Negative for activity change and appetite change.   Eyes: Negative for discharge.   Respiratory: Negative for apnea.    Endocrine: Negative for cold intolerance.     Objective:     Vital Signs (Most Recent):  Temp: 98.3 °F (36.8 °C) (04/08/17 1655)  Pulse: 60 (04/08/17 1655)  Resp: 18 (04/08/17 1655)  BP: 139/76 (04/08/17 1655)  SpO2: 100 % (04/08/17 1247) Vital Signs (24h Range):  Temp:  [97.4 °F (36.3 °C)-98.7 °F (37.1 °C)] 98.3 °F (36.8 °C)  Pulse:  [57-65] 60  Resp:  [18-20] 18  SpO2:  [94 %-100 %] 100 %  BP: (123-143)/(71-85) 139/76     Weight: 93 kg (205 lb)  Body mass index is 26.32 kg/(m^2).    Estimated Creatinine Clearance: 9.1 mL/min (based on Cr of 9.7).    Physical Exam   Constitutional: He appears well-developed.   HENT:   Head: Normocephalic and atraumatic.   Nose: Nose normal.   Eyes: Conjunctivae and EOM are normal. Pupils are equal, round, and reactive to light.   Neck: Normal range of motion. Neck supple. No JVD present. No tracheal deviation present. No thyromegaly present.   Cardiovascular: Normal rate, regular rhythm, normal heart sounds and intact distal pulses.  Exam reveals no gallop and no friction rub.    No murmur heard.  Pulmonary/Chest: Effort normal. No stridor. No respiratory distress. He has no wheezes. He has no rales. He exhibits no tenderness.   Abdominal: Soft. Bowel sounds are normal. He exhibits no distension. There is no tenderness. There is no rebound and no guarding.   Musculoskeletal: Normal range of motion. He exhibits no edema, tenderness or deformity.   Neurological: He is alert. He has normal reflexes. No cranial nerve deficit. Coordination normal.   He  did not talk much    Skin: Skin is warm and dry. No rash noted. No erythema. No pallor.   L UE AVF   Nursing note and vitals reviewed.      Significant Labs:   Blood Culture:     Recent Labs  Lab 01/31/17  1830 01/31/17  1835 04/03/17  1925 04/03/17  1940 04/06/17  0618   LABBLOO Gram stain aer bottle: Gram positive cocci in clusters resembling Staph   Results called to and read back by: Lula Marinelli RN  02/01/2017  16:22  METHICILLIN RESISTANT STAPHYLOCOCCUS AUREUS Gram stain aer bottle: Gram positive cocci in clusters resembling Staph  Gram stain mike bottle: Gram positive cocci in clusters resembling Staph   Results called to and read back by: Lula Marinelli RN  02/01/2017  16:22  METHICILLIN RESISTANT STAPHYLOCOCCUS AUREUSFor susceptibility see order #[id number] Gram stain aer bottle: Gram positive cocci in clusters resembling Staph  Results called to and read back by:Sammi Perry RN 04/04/2017  20:14  Gram stain mike bottle: Gram positive cocci in clusters resembling Staph   04/05/2017  08:56   Results previously called.  METHICILLIN RESISTANT STAPHYLOCOCCUS AUREUSID consult required at [facility]. [address], as it reduces mortality in S.aureus bacteremia. Gram stain aer bottle: Gram positive cocci in clusters resembling Staph  Results called to and read back by:Sammi Perry RN 04/04/2017  20:13  Gram stain mike bottle: Gram positive cocci in clusters resembling Staph   04/05/2017  04:44  METHICILLIN RESISTANT STAPHYLOCOCCUS AUREUSID consult required at [facility]. [address], as it reduces mortality in S.aureus bacteremia.For susceptibility see order #[id number] Gram stain aer bottle: Gram positive cocci in clusters resembling Staph   Results called to and read back by: Holly Mendoza RN  04/07/2017  02:25  STAPHYLOCOCCUS AUREUSSusceptibility pendingID consult required at [facility]. [facility], as it reduces mortality in S.aureus bacteremia.     BMP:     Recent Labs  Lab 04/08/17  0824   GLU 75   *   K 5.4*   CL  98   CO2 24   BUN 38*   CREATININE 9.7*   CALCIUM 8.4*     CBC:     Recent Labs  Lab 04/07/17  0512   WBC 4.24   HGB 12.4*   HCT 39.3*          Significant Imaging: I have reviewed all pertinent imaging results/findings within the past 24 hours.

## 2017-04-08 NOTE — PROGRESS NOTES
"[facility] Hospital Medicine  Progress Note    Patient Name: David Hadley  MRN: [id number]  Patient Class: IP- Inpatient   Admission Date: 4/3/2017  Length of Stay: 4 days  Attending Physician: Conner Duarte MD  Primary Care Provider: Isaac Fitch MD        Subjective:     Principal Problem:MRSA bacteremia    HPI:  David Hadley is a 61 y.o. male patient with PMHx of ESRD on HD q TTS, HTN, HLP, who presented to the ER for c/o "for further evaluation of infection that was noticed in his blood today via Dr. Choudhury." Dr. Choudhury reported positive blood cx's c/w GPC in clusters. Records were reviewed. Pt had MRSA on blood Cx in Jan/Feb 2017. Source is not known.Pt stated he was sent here for IV abx via Dr. Choudhury. Pt is denying any sxs at this time. Pt states he is due for dialysis tomorrow. Patient denies any fever, N/V/D, chills, abd pain, CP, SOB, weakness/numbness, dizziness, lightheadedness, HA  and all other sxs at this time. No tooth problems. Pt has been on HD about 10 years, and gets dialyzed regularly in [place], [place] (Dr. Rinku Guardado). His L UE AVF is not bothering him.    Hospital Course:  ID consulted. Blood cultures showed Staphylcoccus aureus. Cardiology consulted and SALAZAR scheduled 4/6/17 which did not show evidence of endocarditis.  Continued IV antibiotics and patient remained asymptomatic.  Planned for Indium tagged WBC scan Monday 10 April.    Interval History: Blood cultures positive staph aureus. SALAZAR 4/6/17 negative for vegetation.  No symptoms.    Review of Systems   Unable to perform ROS: Dementia   Constitutional: Negative for activity change and appetite change.   Eyes: Negative for discharge.   Respiratory: Negative for apnea.    Endocrine: Negative for cold intolerance.     Objective:     Vital Signs (Most Recent):  Temp: 97.8 °F (36.6 °C) (04/07/17 1604)  Pulse: 64 (04/07/17 1604)  Resp: 18 (04/07/17 1604)  BP: 129/68 (04/07/17 1604)  SpO2: 98 % (04/07/17 1604) Vital Signs (24h " Range):  Temp:  [97.2 °F (36.2 °C)-98.5 °F (36.9 °C)] 97.8 °F (36.6 °C)  Pulse:  [54-67] 64  Resp:  [18] 18  SpO2:  [97 %-99 %] 98 %  BP: (125-137)/(68-85) 129/68     Weight: 91.9 kg (202 lb 8 oz)  Body mass index is 26 kg/(m^2).    Intake/Output Summary (Last 24 hours) at 04/07/17 2015  Last data filed at 04/07/17 1703   Gross per 24 hour   Intake              780 ml   Output                0 ml   Net              780 ml      Physical Exam   Constitutional: He appears well-developed.   HENT:   Head: Normocephalic and atraumatic.   Nose: Nose normal.   Eyes: Conjunctivae and EOM are normal. Pupils are equal, round, and reactive to light.   Neck: Normal range of motion. Neck supple. No JVD present. No tracheal deviation present. No thyromegaly present.   Cardiovascular: Normal rate, regular rhythm, normal heart sounds and intact distal pulses.  Exam reveals no gallop and no friction rub.    No murmur heard.  Pulmonary/Chest: Effort normal. No stridor. No respiratory distress. He has no wheezes. He has no rales. He exhibits no tenderness.   Abdominal: Soft. Bowel sounds are normal. He exhibits no distension. There is no tenderness. There is no rebound and no guarding.   Musculoskeletal: Normal range of motion. He exhibits no edema, tenderness or deformity.   Neurological: He is alert. He has normal reflexes. No cranial nerve deficit. Coordination normal.   He did not talk much    Skin: Skin is warm and dry. No rash noted. No erythema. No pallor.   L UE AVF   Nursing note and vitals reviewed.      Significant Labs:   BMP:     Recent Labs  Lab 04/07/17  0512   GLU 77   *   K 5.3*   CL 98   CO2 24   BUN 30*   CREATININE 8.5*   CALCIUM 8.6*     CBC:     Recent Labs  Lab 04/06/17  0618 04/07/17  0512   WBC 4.26 4.24   HGB 11.8* 12.4*   HCT 36.8* 39.3*    203       Significant Imaging: I have reviewed all pertinent imaging results/findings within the past 24 hours.   Imaging Results         US Soft Tissue Misc  (Final result) Result time:  04/05/17 13:55:51    Final result by Mar Zapata MD (04/05/17 13:55:51)    Impression:         No sonographic evidence of abscess.      Electronically signed by: MAR ZAPATA MD  Date:     04/05/17  Time:    13:55     Narrative:    Exam: US SOFT TISSUE MISC    Clinical History:    Left arm cellulitis near the AV fistula.    Findings:  No suspicious fluid collection identified adjacent to the AV fistula in the left upper arm.            X-Ray Chest PA And Lateral (Final result) Result time:  04/03/17 18:52:43    Final result by Cornelius Jackson MD (04/03/17 18:52:43)    Impression:           1.  Left right basilar atelectasis last consolidation, concerning for pneumonia.  Other considerations would include interstitial pulmonary edema.  Clinical correlation is advised.  2.  Stable findings as noted above.      Electronically signed by: CORNELIUS JACKSON MD  Date:     04/03/17  Time:    18:52     Narrative:    PA and Lateral Chest x-ray    Clinical Indication: bacteremia.  Fever    Findings:    Comparisons are made to 01/31/2017.  Left rib and right basilar atelectasis/consolidation and effusions.      The upper lungs are clear. The cardiac silhouette size is enlarged. The trachea is midline and the mediastinal width is normal. Negative for pneumothorax. Pulmonary vasculature is normal. Negative for osseous abnormalities. There are degenerative changes of spine and both shoulder girdles. There are calcifications of the aortic knob and tortuosity of the descending thoracic aorta. There are changes of a prior median sternotomy and CABG changes.            Assessment/Plan:      * MRSA bacteremia  Bacteremia: Positive blood cx's for GPC in clusters, likely Staph aureus:Had MRSA in blood Cx in Jan/Feb 2017  Source not known  No obvious wounds, no abscess, no foreign bodies or catheters, hear surgery done in 2015,   DDX: endovascular infection  Discussed with Dr. Choudhury  ECHO: SALAZAR 4/6/17  negative  Vancomycin IV to keep 15-20 level  Tagged WBC scan planned by ID for Monday 10 April.  ESR slightly elevated and CRP also elevated with back pain.  Consider MRI of the L and T spine.    ESRD (end stage renal disease) on dialysis  Renal: ESRD pt, q TTS HD schedule in [place], [place].  K normal  Not acidemic  Good O2 sat  No acute indications for HD today  Will provide routine HD 4/4/17: TTS    Dementia without behavioral disturbance  monitor    VTE Risk Mitigation         Ordered     heparin (porcine) injection 5,000 Units  Every 8 hours     Route:  Subcutaneous        04/03/17 2007     Medium Risk of VTE  Once      04/03/17 2007          Conner Duarte MD  Department of Hospital Medicine   [facility]

## 2017-04-08 NOTE — PROGRESS NOTES
[facility]  Infectious Disease  Progress Note    Patient Name: David Hadley  MRN: [id number]  Admission Date: 4/3/2017  Length of Stay: 5 days  Attending Physician: Conner Duarte MD  Primary Care Provider: Isaac Fitch MD    Isolation Status: Contact  Assessment/Plan:      * MRSA bacteremia  Source is unclear at this time.  SALAZAR and LUE soft tissue ultrasound did not show any obvious vegetation.  Will plan for indium scan.  Continue vanco  Repeat blood culture    ESRD (end stage renal disease) on dialysis  HD as per nephrology       Anticipated Disposition:     Thank you for your consult. I will follow-up with patient. Please contact us if you have any additional questions.    Ganga Choudhury MD  Infectious Disease  [facility]    Subjective:     Principal Problem:MRSA bacteremia    HPI: 61 year old woman with history of ESRD on HD -TTS , hypertension, s/p CVA with residual aphasia  who was seen at the office last week for MRSA bacteremia . Blood culture done on 01/31 was positive for MRSA. He was given IV vanco at his HD unit .  Clinic visit note on  03/29-    61 year old man with ESRD, s/p previous CVA with residual aphasia who was seen in the hospital on 02/02/2017 for MRSA bacteremia. He gets HD at [facility]  and was on  vanco for 3/2/17 stopped on 3/18/17, 1500mg. Blood cultures done 3/2/17 was negative..On 2/18 pt was given 1g of vanco.  I called and spoke to Zenia Rosennchard from [facility].  He was accompanied by his niece for this visit and we also got some history by talking to the wife on the phone.  Pt denies fever or chills.      At that visit, I called his HD center and request request blood cultures-they were done on 03/31 and they are now positive  for GPC. He denies any history of fever or curve .  He has left upper extremity AVF .    He was advised to come back for persistent positive blood cultures.      Interval History: 61 year old man with  ESRD and persistent MRSA bacteremia .  SALAZAR did not show any evidence of endocarditis .  Left upper extremity ultrasound -did not show any abscess .     Repeat blood culture is still positive.  He is afebrile  .    Review of Systems   Unable to perform ROS: Dementia   Constitutional: Negative for activity change and appetite change.   Eyes: Negative for discharge.   Respiratory: Negative for apnea.    Endocrine: Negative for cold intolerance.     Objective:     Vital Signs (Most Recent):  Temp: 98.3 °F (36.8 °C) (04/08/17 1655)  Pulse: 60 (04/08/17 1655)  Resp: 18 (04/08/17 1655)  BP: 139/76 (04/08/17 1655)  SpO2: 100 % (04/08/17 1247) Vital Signs (24h Range):  Temp:  [97.4 °F (36.3 °C)-98.7 °F (37.1 °C)] 98.3 °F (36.8 °C)  Pulse:  [57-65] 60  Resp:  [18-20] 18  SpO2:  [94 %-100 %] 100 %  BP: (123-143)/(71-85) 139/76     Weight: 93 kg (205 lb)  Body mass index is 26.32 kg/(m^2).    Estimated Creatinine Clearance: 9.1 mL/min (based on Cr of 9.7).    Physical Exam   Constitutional: He appears well-developed.   HENT:   Head: Normocephalic and atraumatic.   Nose: Nose normal.   Eyes: Conjunctivae and EOM are normal. Pupils are equal, round, and reactive to light.   Neck: Normal range of motion. Neck supple. No JVD present. No tracheal deviation present. No thyromegaly present.   Cardiovascular: Normal rate, regular rhythm, normal heart sounds and intact distal pulses.  Exam reveals no gallop and no friction rub.    No murmur heard.  Pulmonary/Chest: Effort normal. No stridor. No respiratory distress. He has no wheezes. He has no rales. He exhibits no tenderness.   Abdominal: Soft. Bowel sounds are normal. He exhibits no distension. There is no tenderness. There is no rebound and no guarding.   Musculoskeletal: Normal range of motion. He exhibits no edema, tenderness or deformity.   Neurological: He is alert. He has normal reflexes. No cranial nerve deficit. Coordination normal.   He did not talk much    Skin: Skin is  warm and dry. No rash noted. No erythema. No pallor.   L UE AVF   Nursing note and vitals reviewed.      Significant Labs:   Blood Culture:     Recent Labs  Lab 01/31/17  1830 01/31/17  1835 04/03/17  1925 04/03/17  1940 04/06/17  0618   LABBLOO Gram stain aer bottle: Gram positive cocci in clusters resembling Staph   Results called to and read back by: Lula Marinelli RN  02/01/2017  16:22  METHICILLIN RESISTANT STAPHYLOCOCCUS AUREUS Gram stain aer bottle: Gram positive cocci in clusters resembling Staph  Gram stain mike bottle: Gram positive cocci in clusters resembling Staph   Results called to and read back by: Lula Marinelli RN  02/01/2017  16:22  METHICILLIN RESISTANT STAPHYLOCOCCUS AUREUSFor susceptibility see order #[id number] Gram stain aer bottle: Gram positive cocci in clusters resembling Staph  Results called to and read back by:Sammi Perry RN 04/04/2017  20:14  Gram stain mike bottle: Gram positive cocci in clusters resembling Staph   04/05/2017  08:56   Results previously called.  METHICILLIN RESISTANT STAPHYLOCOCCUS AUREUSID consult required at [facility]. [facility], as it reduces mortality in S.aureus bacteremia. Gram stain aer bottle: Gram positive cocci in clusters resembling Staph  Results called to and read back by:Sammi Perry RN 04/04/2017  20:13  Gram stain mike bottle: Gram positive cocci in clusters resembling Staph   04/05/2017  04:44  METHICILLIN RESISTANT STAPHYLOCOCCUS AUREUSID consult required at [facility]. [facility], as it reduces mortality in S.aureus bacteremia.For susceptibility see order #[id number] Gram stain aer bottle: Gram positive cocci in clusters resembling Staph   Results called to and read back by: Holly Mendoza RN  04/07/2017  02:25  STAPHYLOCOCCUS AUREUSSusceptibility pendingID consult required at [facility], as it reduces mortality in S.aureus bacteremia.     BMP:     Recent Labs  Lab 04/08/17  0824   GLU 75   *   K 5.4*   CL 98   CO2 24   BUN 38*   CREATININE  9.7*   CALCIUM 8.4*     CBC:     Recent Labs  Lab 04/07/17  0512   WBC 4.24   HGB 12.4*   HCT 39.3*          Significant Imaging: I have reviewed all pertinent imaging results/findings within the past 24 hours.

## 2017-04-08 NOTE — PROGRESS NOTES
Clinical Pharmacy Progress Note: Vancomycin     Estimated CrCl: 9.1 mL/min (ESRD w/ Tues/Thurs/Sat HD)   WBC: 4.24  SCr: 9.7 (pre-HD, patient to receive HD today)   Tmax/24h: 98.7     Level: Random level drawn 04/08/2017 @ 0824: 16.5 mcg/mL   Goal trough: 15-20 mcg/mL   Dx: MRSA bacteremia      Cultures:   Blood on 4/3- MRSA x 2 tubes, vanc- sensitive  Blood on 4/6- S. Aureus   Blood on 4/8- collected    Plan: Patient will receive vancomycin 750 mg IV post-HD. Patient is scheduled to receive HD around 1200 today.   A placeholder dose of vancomycin 1000 mg IV every 48 hours has been entered.    Next level due: Random level due 4/10/17 @ 0430.      Thank you for allowing us to participate in this patient's care.      Joan Juarez, PharmD 4/8/2017 10:53 AM

## 2017-04-09 LAB
ALBUMIN SERPL BCP-MCNC: 2.5 G/DL
ANION GAP SERPL CALC-SCNC: 6 MMOL/L
ANISOCYTOSIS BLD QL SMEAR: SLIGHT
BACTERIA BLD CULT: NORMAL
BASOPHILS # BLD AUTO: 0.02 K/UL
BASOPHILS NFR BLD: 0.4 %
BUN SERPL-MCNC: 26 MG/DL
BURR CELLS BLD QL SMEAR: ABNORMAL
CALCIUM SERPL-MCNC: 8.4 MG/DL
CHLORIDE SERPL-SCNC: 102 MMOL/L
CO2 SERPL-SCNC: 23 MMOL/L
CREAT SERPL-MCNC: 7.6 MG/DL
DACRYOCYTES BLD QL SMEAR: ABNORMAL
DIFFERENTIAL METHOD: ABNORMAL
EOSINOPHIL # BLD AUTO: 0.1 K/UL
EOSINOPHIL NFR BLD: 2.2 %
ERYTHROCYTE [DISTWIDTH] IN BLOOD BY AUTOMATED COUNT: 17.9 %
EST. GFR  (AFRICAN AMERICAN): 8 ML/MIN/1.73 M^2
EST. GFR  (NON AFRICAN AMERICAN): 7 ML/MIN/1.73 M^2
GLUCOSE SERPL-MCNC: 69 MG/DL
HCT VFR BLD AUTO: 35.7 %
HGB BLD-MCNC: 11.3 G/DL
HYPOCHROMIA BLD QL SMEAR: ABNORMAL
LYMPHOCYTES # BLD AUTO: 1.8 K/UL
LYMPHOCYTES NFR BLD: 38.5 %
MCH RBC QN AUTO: 27.6 PG
MCHC RBC AUTO-ENTMCNC: 31.7 %
MCV RBC AUTO: 87 FL
MONOCYTES # BLD AUTO: 0.5 K/UL
MONOCYTES NFR BLD: 11.8 %
NEUTROPHILS # BLD AUTO: 2.2 K/UL
NEUTROPHILS NFR BLD: 47.3 %
OVALOCYTES BLD QL SMEAR: ABNORMAL
PHOSPHATE SERPL-MCNC: 4.3 MG/DL
PLATELET # BLD AUTO: 196 K/UL
PLATELET BLD QL SMEAR: ABNORMAL
PMV BLD AUTO: 9.8 FL
POIKILOCYTOSIS BLD QL SMEAR: SLIGHT
POLYCHROMASIA BLD QL SMEAR: ABNORMAL
POTASSIUM SERPL-SCNC: 5.3 MMOL/L
RBC # BLD AUTO: 4.1 M/UL
SODIUM SERPL-SCNC: 131 MMOL/L
WBC # BLD AUTO: 4.57 K/UL

## 2017-04-09 PROCEDURE — 11000001 HC ACUTE MED/SURG PRIVATE ROOM

## 2017-04-09 PROCEDURE — 21400001 HC TELEMETRY ROOM

## 2017-04-09 PROCEDURE — 36415 COLL VENOUS BLD VENIPUNCTURE: CPT

## 2017-04-09 PROCEDURE — 85025 COMPLETE CBC W/AUTO DIFF WBC: CPT

## 2017-04-09 PROCEDURE — 99232 SBSQ HOSP IP/OBS MODERATE 35: CPT | Mod: ,,, | Performed by: INTERNAL MEDICINE

## 2017-04-09 PROCEDURE — 96372 THER/PROPH/DIAG INJ SC/IM: CPT

## 2017-04-09 PROCEDURE — 25000003 PHARM REV CODE 250: Performed by: NURSE PRACTITIONER

## 2017-04-09 PROCEDURE — 80069 RENAL FUNCTION PANEL: CPT

## 2017-04-09 RX ADMIN — RIVASTIGMINE TRANSDERMAL SYSTEM 1 PATCH: 4.6 PATCH, EXTENDED RELEASE TRANSDERMAL at 08:04

## 2017-04-09 RX ADMIN — SERTRALINE HYDROCHLORIDE 50 MG: 50 TABLET ORAL at 08:04

## 2017-04-09 RX ADMIN — HEPARIN SODIUM 5000 UNITS: 5000 INJECTION, SOLUTION INTRAVENOUS; SUBCUTANEOUS at 05:04

## 2017-04-09 RX ADMIN — STANDARDIZED SENNA CONCENTRATE AND DOCUSATE SODIUM 1 TABLET: 8.6; 5 TABLET, FILM COATED ORAL at 10:04

## 2017-04-09 RX ADMIN — HEPARIN SODIUM 5000 UNITS: 5000 INJECTION, SOLUTION INTRAVENOUS; SUBCUTANEOUS at 10:04

## 2017-04-09 RX ADMIN — GABAPENTIN 100 MG: 100 CAPSULE ORAL at 10:04

## 2017-04-09 RX ADMIN — ATORVASTATIN CALCIUM 20 MG: 10 TABLET, FILM COATED ORAL at 08:04

## 2017-04-09 RX ADMIN — POLYETHYLENE GLYCOL 3350 17 G: 17 POWDER, FOR SOLUTION ORAL at 08:04

## 2017-04-09 RX ADMIN — PANTOPRAZOLE SODIUM 40 MG: 40 TABLET, DELAYED RELEASE ORAL at 08:04

## 2017-04-09 RX ADMIN — GABAPENTIN 100 MG: 100 CAPSULE ORAL at 01:04

## 2017-04-09 RX ADMIN — METOPROLOL TARTRATE 25 MG: 25 TABLET ORAL at 10:04

## 2017-04-09 RX ADMIN — STANDARDIZED SENNA CONCENTRATE AND DOCUSATE SODIUM 1 TABLET: 8.6; 5 TABLET, FILM COATED ORAL at 08:04

## 2017-04-09 RX ADMIN — LOSARTAN POTASSIUM 50 MG: 50 TABLET, FILM COATED ORAL at 08:04

## 2017-04-09 RX ADMIN — METOPROLOL TARTRATE 25 MG: 25 TABLET ORAL at 08:04

## 2017-04-09 RX ADMIN — HEPARIN SODIUM 5000 UNITS: 5000 INJECTION, SOLUTION INTRAVENOUS; SUBCUTANEOUS at 01:04

## 2017-04-09 RX ADMIN — GABAPENTIN 100 MG: 100 CAPSULE ORAL at 05:04

## 2017-04-09 NOTE — PROGRESS NOTES
Clinical Pharmacy Progress Note: Vancomycin      Estimated CrCl: 11.6 (ESRD w/ HD on Tues/Thurs/Sat)   WBC: No CBC since 4/7. Pharmacy ordered CBC.   SCr: 7.6, HD yesterday     Cultures:   Blood on 4/8- NGTD x 2 tubes   Blood on 4/6- MRSA, vancomycin and Bactrim sensitive.   Blood on 4/3- MRSA x 2 tubes   Blood on 1/31- MRSA, vancomycin and Bactrim sensitive.      Tmax/24h: 98.3     Goal trough: 15-20 mcg/mL   Dx. MRSA bacteremia      Plan: Pharmacy will continue current dosing regimen of vancomycin doses post-HD per pre-HD random levels and protocol.     A vancomycin 1000 mg IV every Tues/Thurs/Sat placeholder dose has been entered.     Next level due: Random vancomycin level due 04/10/17 @ 0430 with AM labs.      Thank you for allowing us to participate in this patient's care.      Joan Juarez, PharmD 4/9/2017 11:30 AM

## 2017-04-09 NOTE — PLAN OF CARE
Problem: Patient Care Overview  Goal: Plan of Care Review  Outcome: Ongoing (interventions implemented as appropriate)     Reviewed plan of care, including indications and possible side effects of administered medications. Remains free from injury at this time. Respirations even and unlabored. Bed locked and in lowest position. Call light within reach. Side rails up x2.  Patient verbalized understanding and teach back. Patient disoriented to place, time, and situation. Sister at bed side.      12 hour chart check complete.

## 2017-04-09 NOTE — PROGRESS NOTES
"[facility] Hospital Medicine  Progress Note    Patient Name: David Hadley  MRN: [id number]  Patient Class: IP- Inpatient   Admission Date: 4/3/2017  Length of Stay: 5 days  Attending Physician: Conner Duarte MD  Primary Care Provider: Isaac Fitch MD        Subjective:     Principal Problem:MRSA bacteremia    HPI:  David Hadley is a 61 y.o. male patient with PMHx of ESRD on HD q TTS, HTN, HLP, who presented to the ER for c/o "for further evaluation of infection that was noticed in his blood today via Dr. Choudhury." Dr. Choudhury reported positive blood cx's c/w GPC in clusters. Records were reviewed. Pt had MRSA on blood Cx in Jan/Feb 2017. Source is not known.Pt stated he was sent here for IV abx via Dr. Choudhury. Pt is denying any sxs at this time. Pt states he is due for dialysis tomorrow. Patient denies any fever, N/V/D, chills, abd pain, CP, SOB, weakness/numbness, dizziness, lightheadedness, HA  and all other sxs at this time. No tooth problems. Pt has been on HD about 10 years, and gets dialyzed regularly in [place], [place] (Dr. Rinku Guardado). His L UE AVF is not bothering him.    Hospital Course:  ID consulted. Blood cultures showed Staphylcoccus aureus. Cardiology consulted and SALAZAR scheduled 4/6/17 which did not show evidence of endocarditis.  Continued IV antibiotics and patient remained asymptomatic.  Planned for Indium tagged WBC scan Monday 10 April.    Interval History: Blood cultures positive staph aureus. SALAZAR 4/6/17 negative for vegetation.  No symptoms.    Review of Systems   Unable to perform ROS: Dementia   Constitutional: Negative for activity change and appetite change.   Eyes: Negative for discharge.   Respiratory: Negative for apnea.    Endocrine: Negative for cold intolerance.     Objective:     Vital Signs (Most Recent):  Temp: 97.6 °F (36.4 °C) (04/08/17 2031)  Pulse: 65 (04/08/17 2031)  Resp: 17 (04/08/17 2031)  BP: (!) 151/79 (04/08/17 2031)  SpO2: 98 % (04/08/17 2031) Vital Signs " (24h Range):  Temp:  [97.4 °F (36.3 °C)-98.7 °F (37.1 °C)] 97.6 °F (36.4 °C)  Pulse:  [57-65] 65  Resp:  [17-20] 17  SpO2:  [95 %-100 %] 98 %  BP: (123-151)/(71-85) 151/79     Weight: 93 kg (205 lb)  Body mass index is 26.32 kg/(m^2).    Intake/Output Summary (Last 24 hours) at 04/08/17 2042  Last data filed at 04/08/17 1640   Gross per 24 hour   Intake              990 ml   Output             6500 ml   Net            -5510 ml      Physical Exam   Constitutional: He appears well-developed.   HENT:   Head: Normocephalic and atraumatic.   Nose: Nose normal.   Eyes: Conjunctivae and EOM are normal. Pupils are equal, round, and reactive to light.   Neck: Normal range of motion. Neck supple. No JVD present. No tracheal deviation present. No thyromegaly present.   Cardiovascular: Normal rate, regular rhythm, normal heart sounds and intact distal pulses.  Exam reveals no gallop and no friction rub.    No murmur heard.  Pulmonary/Chest: Effort normal. No stridor. No respiratory distress. He has no wheezes. He has no rales. He exhibits no tenderness.   Abdominal: Soft. Bowel sounds are normal. He exhibits no distension. There is no tenderness. There is no rebound and no guarding.   Musculoskeletal: Normal range of motion. He exhibits no edema, tenderness or deformity.   Neurological: He is alert. He has normal reflexes. No cranial nerve deficit. Coordination normal.   He did not talk much    Skin: Skin is warm and dry. No rash noted. No erythema. No pallor.   L UE AVF   Nursing note and vitals reviewed.      Significant Labs:   BMP:     Recent Labs  Lab 04/08/17  0824   GLU 75   *   K 5.4*   CL 98   CO2 24   BUN 38*   CREATININE 9.7*   CALCIUM 8.4*     CBC:     Recent Labs  Lab 04/07/17  0512   WBC 4.24   HGB 12.4*   HCT 39.3*          Significant Imaging: I have reviewed all pertinent imaging results/findings within the past 24 hours.   Imaging Results         US Soft Tissue Misc (Final result) Result time:   04/05/17 13:55:51    Final result by Mar Zapata MD (04/05/17 13:55:51)    Impression:         No sonographic evidence of abscess.      Electronically signed by: MAR ZAPATA MD  Date:     04/05/17  Time:    13:55     Narrative:    Exam: US SOFT TISSUE MISC    Clinical History:    Left arm cellulitis near the AV fistula.    Findings:  No suspicious fluid collection identified adjacent to the AV fistula in the left upper arm.            X-Ray Chest PA And Lateral (Final result) Result time:  04/03/17 18:52:43    Final result by Cornelius Jackson MD (04/03/17 18:52:43)    Impression:           1.  Left right basilar atelectasis last consolidation, concerning for pneumonia.  Other considerations would include interstitial pulmonary edema.  Clinical correlation is advised.  2.  Stable findings as noted above.      Electronically signed by: CORNELIUS JACKSON MD  Date:     04/03/17  Time:    18:52     Narrative:    PA and Lateral Chest x-ray    Clinical Indication: bacteremia.  Fever    Findings:    Comparisons are made to 01/31/2017.  Left rib and right basilar atelectasis/consolidation and effusions.      The upper lungs are clear. The cardiac silhouette size is enlarged. The trachea is midline and the mediastinal width is normal. Negative for pneumothorax. Pulmonary vasculature is normal. Negative for osseous abnormalities. There are degenerative changes of spine and both shoulder girdles. There are calcifications of the aortic knob and tortuosity of the descending thoracic aorta. There are changes of a prior median sternotomy and CABG changes.            Assessment/Plan:      * MRSA bacteremia  Bacteremia: Positive blood cx's for GPC in clusters, likely Staph aureus:Had MRSA in blood Cx in Jan/Feb 2017  Source not known  No obvious wounds, no abscess, no foreign bodies or catheters, hear surgery done in 2015,   DDX: endovascular infection  Discussed with Dr. Choudhury  ECHO: SALAZAR 4/6/17 negative  Vancomycin IV to keep 15-20  level  Tagged WBC scan planned by ID for Monday 10 April.  ESR slightly elevated and CRP also elevated with back pain.  Consider MRI of the L and T spine.    ESRD (end stage renal disease) on dialysis  Renal: ESRD pt, q TTS HD schedule in [place], [place].  K normal  Not acidemic  Good O2 sat  No acute indications for HD today  Will provide routine HD 4/4/17: TTS    Dementia without behavioral disturbance  monitor    VTE Risk Mitigation         Ordered     heparin (porcine) injection 5,000 Units  Every 8 hours     Route:  Subcutaneous        04/03/17 2007     Medium Risk of VTE  Once      04/03/17 2007          Conner Duarte MD  Department of Hospital Medicine   [facility]

## 2017-04-09 NOTE — PROGRESS NOTES
"[facility] Hospital Medicine  Progress Note    Patient Name: David Hadley  MRN: [id number]  Patient Class: IP- Inpatient   Admission Date: 4/3/2017  Length of Stay: 6 days  Attending Physician: Conner Duarte MD  Primary Care Provider: Isaac Fitch MD        Subjective:     Principal Problem:MRSA bacteremia    HPI:  David Hadley is a 61 y.o. male patient with PMHx of ESRD on HD q TTS, HTN, HLP, who presented to the ER for c/o "for further evaluation of infection that was noticed in his blood today via Dr. Choudhury." Dr. Choudhury reported positive blood cx's c/w GPC in clusters. Records were reviewed. Pt had MRSA on blood Cx in Jan/Feb 2017. Source is not known.Pt stated he was sent here for IV abx via Dr. Choudhury. Pt is denying any sxs at this time. Pt states he is due for dialysis tomorrow. Patient denies any fever, N/V/D, chills, abd pain, CP, SOB, weakness/numbness, dizziness, lightheadedness, HA  and all other sxs at this time. No tooth problems. Pt has been on HD about 10 years, and gets dialyzed regularly in [place], [place] (Dr. Rinku Guardado). His L UE AVF is not bothering him.    Hospital Course:  ID consulted. Blood cultures showed Staphylcoccus aureus. Cardiology consulted and SALAZAR scheduled 4/6/17 which did not show evidence of endocarditis.  Continued IV antibiotics and patient remained asymptomatic.  Planned for Indium tagged WBC scan Monday 10 April.    Interval History: Blood cultures positive staph aureus. SALAZAR 4/6/17 negative for vegetation.  No symptoms.    Review of Systems   Unable to perform ROS: Dementia   Constitutional: Negative for activity change and appetite change.   Eyes: Negative for discharge.   Respiratory: Negative for apnea.    Endocrine: Negative for cold intolerance.     Objective:     Vital Signs (Most Recent):  Temp: 98.4 °F (36.9 °C) (04/09/17 1658)  Pulse: (!) 54 (04/09/17 1727)  Resp: 18 (04/09/17 1658)  BP: 124/73 (04/09/17 1658)  SpO2: 99 % (04/09/17 1658) Vital Signs " (24h Range):  Temp:  [97.4 °F (36.3 °C)-98.4 °F (36.9 °C)] 98.4 °F (36.9 °C)  Pulse:  [54-66] 54  Resp:  [17-18] 18  SpO2:  [98 %-100 %] 99 %  BP: (124-151)/(73-84) 124/73     Weight: 93.2 kg (205 lb 7.5 oz)  Body mass index is 26.38 kg/(m^2).    Intake/Output Summary (Last 24 hours) at 04/09/17 1823  Last data filed at 04/09/17 1600   Gross per 24 hour   Intake             1000 ml   Output                0 ml   Net             1000 ml      Physical Exam   Constitutional: He appears well-developed.   HENT:   Head: Normocephalic and atraumatic.   Nose: Nose normal.   Eyes: Conjunctivae and EOM are normal. Pupils are equal, round, and reactive to light.   Neck: Normal range of motion. Neck supple. No JVD present. No tracheal deviation present. No thyromegaly present.   Cardiovascular: Normal rate, regular rhythm, normal heart sounds and intact distal pulses.  Exam reveals no gallop and no friction rub.    No murmur heard.  Pulmonary/Chest: Effort normal. No stridor. No respiratory distress. He has no wheezes. He has no rales. He exhibits no tenderness.   Abdominal: Soft. Bowel sounds are normal. He exhibits no distension. There is no tenderness. There is no rebound and no guarding.   Musculoskeletal: Normal range of motion. He exhibits no edema, tenderness or deformity.   Neurological: He is alert. He has normal reflexes. No cranial nerve deficit. Coordination normal.   He did not talk much    Skin: Skin is warm and dry. No rash noted. No erythema. No pallor.   L UE AVF   Nursing note and vitals reviewed.      Significant Labs:   BMP:     Recent Labs  Lab 04/09/17  0700   GLU 69*   *   K 5.3*      CO2 23   BUN 26*   CREATININE 7.6*   CALCIUM 8.4*     CBC:     Recent Labs  Lab 04/09/17  1132   WBC 4.57   HGB 11.3*   HCT 35.7*          Significant Imaging: I have reviewed all pertinent imaging results/findings within the past 24 hours.   Imaging Results         US Soft Tissue Misc (Final result)  Result time:  04/05/17 13:55:51    Final result by Mar Zapata MD (04/05/17 13:55:51)    Impression:         No sonographic evidence of abscess.      Electronically signed by: MAR ZAPATA MD  Date:     04/05/17  Time:    13:55     Narrative:    Exam: US SOFT TISSUE MISC    Clinical History:    Left arm cellulitis near the AV fistula.    Findings:  No suspicious fluid collection identified adjacent to the AV fistula in the left upper arm.            X-Ray Chest PA And Lateral (Final result) Result time:  04/03/17 18:52:43    Final result by Cornelius Jackson MD (04/03/17 18:52:43)    Impression:           1.  Left right basilar atelectasis last consolidation, concerning for pneumonia.  Other considerations would include interstitial pulmonary edema.  Clinical correlation is advised.  2.  Stable findings as noted above.      Electronically signed by: CORNELIUS JACKSON MD  Date:     04/03/17  Time:    18:52     Narrative:    PA and Lateral Chest x-ray    Clinical Indication: bacteremia.  Fever    Findings:    Comparisons are made to 01/31/2017.  Left rib and right basilar atelectasis/consolidation and effusions.      The upper lungs are clear. The cardiac silhouette size is enlarged. The trachea is midline and the mediastinal width is normal. Negative for pneumothorax. Pulmonary vasculature is normal. Negative for osseous abnormalities. There are degenerative changes of spine and both shoulder girdles. There are calcifications of the aortic knob and tortuosity of the descending thoracic aorta. There are changes of a prior median sternotomy and CABG changes.            Assessment/Plan:      * MRSA bacteremia  Bacteremia: Positive blood cx's for GPC in clusters, likely Staph aureus:Had MRSA in blood Cx in Jan/Feb 2017  Source not known  No obvious wounds, no abscess, no foreign bodies or catheters, hear surgery done in 2015,   DDX: endovascular infection  Discussed with Dr. Choudhury  ECHO: SALAZAR 4/6/17 negative  Vancomycin IV to  keep 15-20 level  Tagged WBC scan planned by ID for Monday 10 April.  ESR slightly elevated and CRP also elevated with back pain.  Consider MRI of the L and T spine.    ESRD (end stage renal disease) on dialysis  Renal: ESRD pt, q TTS HD schedule in [place].  K normal  Not acidemic  Good O2 sat  No acute indications for HD today  Will provide routine HD 4/4/17: TTS    Dementia without behavioral disturbance  monitor    VTE Risk Mitigation         Ordered     heparin (porcine) injection 5,000 Units  Every 8 hours     Route:  Subcutaneous        04/03/17 2007     Medium Risk of VTE  Once      04/03/17 2007          Conner Duarte MD  Department of Hospital Medicine   [facility]

## 2017-04-09 NOTE — PROGRESS NOTES
"Subjective    Patient has no complaints today.       Physical exam:  BP (!) 147/79 (BP Location: Right arm, Patient Position: Sitting, BP Method: Automatic)  Pulse (!) 55  Temp 97.4 °F (36.3 °C) (Oral)   Resp 18  Ht 6' 2" (1.88 m)  Wt 93.2 kg (205 lb 7.5 oz)  SpO2 99%  BMI 26.38 kg/m2    HEENT: moist mucosal membranes  HEART: RRR, S1/S2, no rub  LUNGS: CTA, bilaterally, no wheezing  ABDOMEN: soft, nontender, not distended, bowel sounds present  EXTREMITIES: no LE edema  NEURO: A & O x 3    Labs:  Lab Results   Component Value Date    CREATININE 7.6 (H) 04/09/2017    BUN 26 (H) 04/09/2017     (L) 04/09/2017    K 5.3 (H) 04/09/2017     04/09/2017    CO2 23 04/09/2017     Lab Results   Component Value Date     (H) 11/06/2008    CALCIUM 8.4 (L) 04/09/2017    PHOS 4.3 04/09/2017     Lab Results   Component Value Date    ALBUMIN 2.5 (L) 04/09/2017     Lab Results   Component Value Date    WBC 4.24 04/07/2017    HGB 12.4 (L) 04/07/2017    HCT 39.3 (L) 04/07/2017    MCV 88 04/07/2017     04/07/2017       Assessment and Plan:  61 year old male with h/o ESRD, anemia, HTN, DM2, CAD, CHF presented with MRSA bacteremia.       1. ESRD: TTS, next HD Tue. 4/11, Time: 3.5  Hrs    2. Access: Left arm AVF.     3. Electrolytes: borderline hyperkalemia. Monitor.                            Borderline hyponatremia: monitor.     4. Acid-base: no issues.     5. Volume: no gross overload.     6. Anemia: Hgb above goal for dialysis patient, no SUNIL needed.     7. HTN: good BP control.     8. Medications: reviewed.     9. MRSA bacteremia: Continue Vancomycin.         "

## 2017-04-09 NOTE — SUBJECTIVE & OBJECTIVE
Interval History: Blood cultures positive staph aureus. SALAZAR 4/6/17 negative for vegetation.  No symptoms.    Review of Systems   Unable to perform ROS: Dementia   Constitutional: Negative for activity change and appetite change.   Eyes: Negative for discharge.   Respiratory: Negative for apnea.    Endocrine: Negative for cold intolerance.     Objective:     Vital Signs (Most Recent):  Temp: 98.4 °F (36.9 °C) (04/09/17 1658)  Pulse: (!) 54 (04/09/17 1727)  Resp: 18 (04/09/17 1658)  BP: 124/73 (04/09/17 1658)  SpO2: 99 % (04/09/17 1658) Vital Signs (24h Range):  Temp:  [97.4 °F (36.3 °C)-98.4 °F (36.9 °C)] 98.4 °F (36.9 °C)  Pulse:  [54-66] 54  Resp:  [17-18] 18  SpO2:  [98 %-100 %] 99 %  BP: (124-151)/(73-84) 124/73     Weight: 93.2 kg (205 lb 7.5 oz)  Body mass index is 26.38 kg/(m^2).    Intake/Output Summary (Last 24 hours) at 04/09/17 1823  Last data filed at 04/09/17 1600   Gross per 24 hour   Intake             1000 ml   Output                0 ml   Net             1000 ml      Physical Exam   Constitutional: He appears well-developed.   HENT:   Head: Normocephalic and atraumatic.   Nose: Nose normal.   Eyes: Conjunctivae and EOM are normal. Pupils are equal, round, and reactive to light.   Neck: Normal range of motion. Neck supple. No JVD present. No tracheal deviation present. No thyromegaly present.   Cardiovascular: Normal rate, regular rhythm, normal heart sounds and intact distal pulses.  Exam reveals no gallop and no friction rub.    No murmur heard.  Pulmonary/Chest: Effort normal. No stridor. No respiratory distress. He has no wheezes. He has no rales. He exhibits no tenderness.   Abdominal: Soft. Bowel sounds are normal. He exhibits no distension. There is no tenderness. There is no rebound and no guarding.   Musculoskeletal: Normal range of motion. He exhibits no edema, tenderness or deformity.   Neurological: He is alert. He has normal reflexes. No cranial nerve deficit. Coordination normal.   He  did not talk much    Skin: Skin is warm and dry. No rash noted. No erythema. No pallor.   L UE AVF   Nursing note and vitals reviewed.      Significant Labs:   BMP:     Recent Labs  Lab 04/09/17  0700   GLU 69*   *   K 5.3*      CO2 23   BUN 26*   CREATININE 7.6*   CALCIUM 8.4*     CBC:     Recent Labs  Lab 04/09/17  1132   WBC 4.57   HGB 11.3*   HCT 35.7*          Significant Imaging: I have reviewed all pertinent imaging results/findings within the past 24 hours.   Imaging Results         US Soft Tissue Misc (Final result) Result time:  04/05/17 13:55:51    Final result by Mar Yadav MD (04/05/17 13:55:51)    Impression:         No sonographic evidence of abscess.      Electronically signed by: MRA YADAV MD  Date:     04/05/17  Time:    13:55     Narrative:    Exam: US SOFT TISSUE MISC    Clinical History:    Left arm cellulitis near the AV fistula.    Findings:  No suspicious fluid collection identified adjacent to the AV fistula in the left upper arm.            X-Ray Chest PA And Lateral (Final result) Result time:  04/03/17 18:52:43    Final result by Cornelius Jackson MD (04/03/17 18:52:43)    Impression:           1.  Left right basilar atelectasis last consolidation, concerning for pneumonia.  Other considerations would include interstitial pulmonary edema.  Clinical correlation is advised.  2.  Stable findings as noted above.      Electronically signed by: CORNELIUS JACKSON MD  Date:     04/03/17  Time:    18:52     Narrative:    PA and Lateral Chest x-ray    Clinical Indication: bacteremia.  Fever    Findings:    Comparisons are made to 01/31/2017.  Left rib and right basilar atelectasis/consolidation and effusions.      The upper lungs are clear. The cardiac silhouette size is enlarged. The trachea is midline and the mediastinal width is normal. Negative for pneumothorax. Pulmonary vasculature is normal. Negative for osseous abnormalities. There are degenerative changes of spine and  both shoulder girdles. There are calcifications of the aortic knob and tortuosity of the descending thoracic aorta. There are changes of a prior median sternotomy and CABG changes.

## 2017-04-09 NOTE — SUBJECTIVE & OBJECTIVE
Interval History: Blood cultures positive staph aureus. SALAZAR 4/6/17 negative for vegetation.  No symptoms.    Review of Systems   Unable to perform ROS: Dementia   Constitutional: Negative for activity change and appetite change.   Eyes: Negative for discharge.   Respiratory: Negative for apnea.    Endocrine: Negative for cold intolerance.     Objective:     Vital Signs (Most Recent):  Temp: 97.6 °F (36.4 °C) (04/08/17 2031)  Pulse: 65 (04/08/17 2031)  Resp: 17 (04/08/17 2031)  BP: (!) 151/79 (04/08/17 2031)  SpO2: 98 % (04/08/17 2031) Vital Signs (24h Range):  Temp:  [97.4 °F (36.3 °C)-98.7 °F (37.1 °C)] 97.6 °F (36.4 °C)  Pulse:  [57-65] 65  Resp:  [17-20] 17  SpO2:  [95 %-100 %] 98 %  BP: (123-151)/(71-85) 151/79     Weight: 93 kg (205 lb)  Body mass index is 26.32 kg/(m^2).    Intake/Output Summary (Last 24 hours) at 04/08/17 2042  Last data filed at 04/08/17 1640   Gross per 24 hour   Intake              990 ml   Output             6500 ml   Net            -5510 ml      Physical Exam   Constitutional: He appears well-developed.   HENT:   Head: Normocephalic and atraumatic.   Nose: Nose normal.   Eyes: Conjunctivae and EOM are normal. Pupils are equal, round, and reactive to light.   Neck: Normal range of motion. Neck supple. No JVD present. No tracheal deviation present. No thyromegaly present.   Cardiovascular: Normal rate, regular rhythm, normal heart sounds and intact distal pulses.  Exam reveals no gallop and no friction rub.    No murmur heard.  Pulmonary/Chest: Effort normal. No stridor. No respiratory distress. He has no wheezes. He has no rales. He exhibits no tenderness.   Abdominal: Soft. Bowel sounds are normal. He exhibits no distension. There is no tenderness. There is no rebound and no guarding.   Musculoskeletal: Normal range of motion. He exhibits no edema, tenderness or deformity.   Neurological: He is alert. He has normal reflexes. No cranial nerve deficit. Coordination normal.   He did not  talk much    Skin: Skin is warm and dry. No rash noted. No erythema. No pallor.   L UE AVF   Nursing note and vitals reviewed.      Significant Labs:   BMP:     Recent Labs  Lab 04/08/17  0824   GLU 75   *   K 5.4*   CL 98   CO2 24   BUN 38*   CREATININE 9.7*   CALCIUM 8.4*     CBC:     Recent Labs  Lab 04/07/17  0512   WBC 4.24   HGB 12.4*   HCT 39.3*          Significant Imaging: I have reviewed all pertinent imaging results/findings within the past 24 hours.   Imaging Results         US Soft Tissue Misc (Final result) Result time:  04/05/17 13:55:51    Final result by Mar Yadav MD (04/05/17 13:55:51)    Impression:         No sonographic evidence of abscess.      Electronically signed by: MAR YADAV MD  Date:     04/05/17  Time:    13:55     Narrative:    Exam: US SOFT TISSUE MISC    Clinical History:    Left arm cellulitis near the AV fistula.    Findings:  No suspicious fluid collection identified adjacent to the AV fistula in the left upper arm.            X-Ray Chest PA And Lateral (Final result) Result time:  04/03/17 18:52:43    Final result by Cornelius Jackson MD (04/03/17 18:52:43)    Impression:           1.  Left right basilar atelectasis last consolidation, concerning for pneumonia.  Other considerations would include interstitial pulmonary edema.  Clinical correlation is advised.  2.  Stable findings as noted above.      Electronically signed by: CORNELIUS JACKSON MD  Date:     04/03/17  Time:    18:52     Narrative:    PA and Lateral Chest x-ray    Clinical Indication: bacteremia.  Fever    Findings:    Comparisons are made to 01/31/2017.  Left rib and right basilar atelectasis/consolidation and effusions.      The upper lungs are clear. The cardiac silhouette size is enlarged. The trachea is midline and the mediastinal width is normal. Negative for pneumothorax. Pulmonary vasculature is normal. Negative for osseous abnormalities. There are degenerative changes of spine and both shoulder  girdles. There are calcifications of the aortic knob and tortuosity of the descending thoracic aorta. There are changes of a prior median sternotomy and CABG changes.

## 2017-04-10 PROBLEM — E87.5 HYPERKALEMIA: Status: ACTIVE | Noted: 2017-04-10

## 2017-04-10 LAB
ANION GAP SERPL CALC-SCNC: 9 MMOL/L
BASOPHILS # BLD AUTO: 0.02 K/UL
BASOPHILS NFR BLD: 0.4 %
BUN SERPL-MCNC: 37 MG/DL
CALCIUM SERPL-MCNC: 8.6 MG/DL
CHLORIDE SERPL-SCNC: 100 MMOL/L
CO2 SERPL-SCNC: 24 MMOL/L
CREAT SERPL-MCNC: 8.8 MG/DL
DIFFERENTIAL METHOD: ABNORMAL
EOSINOPHIL # BLD AUTO: 0.1 K/UL
EOSINOPHIL NFR BLD: 2.1 %
ERYTHROCYTE [DISTWIDTH] IN BLOOD BY AUTOMATED COUNT: 17.9 %
EST. GFR  (AFRICAN AMERICAN): 7 ML/MIN/1.73 M^2
EST. GFR  (NON AFRICAN AMERICAN): 6 ML/MIN/1.73 M^2
GLUCOSE SERPL-MCNC: 70 MG/DL
HCT VFR BLD AUTO: 38.6 %
HGB BLD-MCNC: 12.2 G/DL
LYMPHOCYTES # BLD AUTO: 1.8 K/UL
LYMPHOCYTES NFR BLD: 38.9 %
MCH RBC QN AUTO: 27.6 PG
MCHC RBC AUTO-ENTMCNC: 31.6 %
MCV RBC AUTO: 87 FL
MONOCYTES # BLD AUTO: 0.5 K/UL
MONOCYTES NFR BLD: 11.1 %
NEUTROPHILS # BLD AUTO: 2.2 K/UL
NEUTROPHILS NFR BLD: 47.5 %
PLATELET # BLD AUTO: 203 K/UL
PMV BLD AUTO: 9.4 FL
POCT GLUCOSE: 115 MG/DL (ref 70–110)
POCT GLUCOSE: 127 MG/DL (ref 70–110)
POCT GLUCOSE: 135 MG/DL (ref 70–110)
POCT GLUCOSE: 74 MG/DL (ref 70–110)
POCT GLUCOSE: 92 MG/DL (ref 70–110)
POCT GLUCOSE: 95 MG/DL (ref 70–110)
POTASSIUM SERPL-SCNC: 5.9 MMOL/L
RBC # BLD AUTO: 4.42 M/UL
SODIUM SERPL-SCNC: 133 MMOL/L
VANCOMYCIN SERPL-MCNC: 17.4 UG/ML
WBC # BLD AUTO: 4.68 K/UL

## 2017-04-10 PROCEDURE — 96372 THER/PROPH/DIAG INJ SC/IM: CPT

## 2017-04-10 PROCEDURE — 99233 SBSQ HOSP IP/OBS HIGH 50: CPT | Mod: ,,, | Performed by: INTERNAL MEDICINE

## 2017-04-10 PROCEDURE — 36415 COLL VENOUS BLD VENIPUNCTURE: CPT

## 2017-04-10 PROCEDURE — 85025 COMPLETE CBC W/AUTO DIFF WBC: CPT

## 2017-04-10 PROCEDURE — 87340 HEPATITIS B SURFACE AG IA: CPT

## 2017-04-10 PROCEDURE — 80048 BASIC METABOLIC PNL TOTAL CA: CPT

## 2017-04-10 PROCEDURE — 80100016 HC MAINTENANCE HEMODIALYSIS

## 2017-04-10 PROCEDURE — 21400001 HC TELEMETRY ROOM

## 2017-04-10 PROCEDURE — 25000003 PHARM REV CODE 250: Performed by: NURSE PRACTITIONER

## 2017-04-10 PROCEDURE — 80202 ASSAY OF VANCOMYCIN: CPT

## 2017-04-10 RX ORDER — HEPARIN SODIUM 1000 [USP'U]/ML
1000 INJECTION, SOLUTION INTRAVENOUS; SUBCUTANEOUS
Status: DISCONTINUED | OUTPATIENT
Start: 2017-04-10 | End: 2017-04-11

## 2017-04-10 RX ORDER — ALBUMIN HUMAN 250 G/1000ML
12.5 SOLUTION INTRAVENOUS
Status: DISCONTINUED | OUTPATIENT
Start: 2017-04-10 | End: 2017-04-11

## 2017-04-10 RX ADMIN — GABAPENTIN 100 MG: 100 CAPSULE ORAL at 05:04

## 2017-04-10 RX ADMIN — HEPARIN SODIUM 5000 UNITS: 5000 INJECTION, SOLUTION INTRAVENOUS; SUBCUTANEOUS at 01:04

## 2017-04-10 RX ADMIN — HEPARIN SODIUM 5000 UNITS: 5000 INJECTION, SOLUTION INTRAVENOUS; SUBCUTANEOUS at 05:04

## 2017-04-10 RX ADMIN — GABAPENTIN 100 MG: 100 CAPSULE ORAL at 09:04

## 2017-04-10 RX ADMIN — RIVASTIGMINE TRANSDERMAL SYSTEM 1 PATCH: 4.6 PATCH, EXTENDED RELEASE TRANSDERMAL at 09:04

## 2017-04-10 RX ADMIN — STANDARDIZED SENNA CONCENTRATE AND DOCUSATE SODIUM 1 TABLET: 8.6; 5 TABLET, FILM COATED ORAL at 09:04

## 2017-04-10 RX ADMIN — GABAPENTIN 100 MG: 100 CAPSULE ORAL at 01:04

## 2017-04-10 RX ADMIN — ATORVASTATIN CALCIUM 20 MG: 10 TABLET, FILM COATED ORAL at 09:04

## 2017-04-10 RX ADMIN — Medication 16 G: at 05:04

## 2017-04-10 RX ADMIN — PANTOPRAZOLE SODIUM 40 MG: 40 TABLET, DELAYED RELEASE ORAL at 09:04

## 2017-04-10 RX ADMIN — LOSARTAN POTASSIUM 50 MG: 50 TABLET, FILM COATED ORAL at 09:04

## 2017-04-10 RX ADMIN — METOPROLOL TARTRATE 25 MG: 25 TABLET ORAL at 09:04

## 2017-04-10 RX ADMIN — HEPARIN SODIUM 5000 UNITS: 5000 INJECTION, SOLUTION INTRAVENOUS; SUBCUTANEOUS at 09:04

## 2017-04-10 RX ADMIN — SERTRALINE HYDROCHLORIDE 50 MG: 50 TABLET ORAL at 09:04

## 2017-04-10 RX ADMIN — POLYETHYLENE GLYCOL 3350 17 G: 17 POWDER, FOR SOLUTION ORAL at 09:04

## 2017-04-10 NOTE — PROGRESS NOTES
"Subjective    Patient denies complaints today. He reports that Indium scan is planned for today.       Physical exam:  /88 (BP Location: Right arm, Patient Position: Lying, BP Method: Automatic)  Pulse 66  Temp 98.5 °F (36.9 °C) (Oral)   Resp 18  Ht 6' 2" (1.88 m)  Wt 94.4 kg (208 lb 1.6 oz)  SpO2 100%  BMI 26.72 kg/m2    HEENT: moist mucosal membranes  HEART: RRR, S1/S2, no rub  LUNGS: CTA, bilaterally, no wheezing  ABDOMEN: soft, nontender, not distended, bowel sounds present  EXTREMITIES: no LE edema  NEURO: A & O x 3    Labs:  Lab Results   Component Value Date    CREATININE 8.8 (H) 04/10/2017    BUN 37 (H) 04/10/2017     (L) 04/10/2017    K 5.9 (H) 04/10/2017     04/10/2017    CO2 24 04/10/2017     Lab Results   Component Value Date     (H) 11/06/2008    CALCIUM 8.6 (L) 04/10/2017    PHOS 4.3 04/09/2017     Lab Results   Component Value Date    ALBUMIN 2.5 (L) 04/09/2017     Lab Results   Component Value Date    WBC 4.68 04/10/2017    HGB 12.2 (L) 04/10/2017    HCT 38.6 (L) 04/10/2017    MCV 87 04/10/2017     04/10/2017       Assessment and Plan:  61 year old male with h/o ESRD, anemia, HTN, DM2, CAD, CHF presented with MRSA bacteremia.       1. ESRD: TTS, next HD today, Time: 3.5  Hrs    2. Access: Left arm AVF.     3. Electrolytes: Hyperkalemia. Will use 1K/2 K bath with HD.                            Borderline hyponatremia: monitor.     4. Acid-base: no issues.     5. Volume: no gross overload.     6. Anemia: Hgb above goal for dialysis patient, no SUNIL needed.     7. HTN: good BP control.     8. Medications: reviewed.     9. MRSA bacteremia: Continue Vancomycin. Indium scan today.         "

## 2017-04-10 NOTE — CONSULTS
Pharmacy Vancomycin Consult Note    WBC=4.68 Tmax=98.5  CRCl=10ml/min Scr=8.8  Dialysis patient: Tues,Thurs,Sat     Cultures: blood-MRSA-Vancomycin sensitive  Dx. MRSA bacteremia     Patient's dose is determined by pre-dialysis random.  Dialysis due tomorrow, so patient will not receive a dose today.  Next pre-dialysis random due 4/11 with AM labs.  Vancomycin 1 gram Every tues,thurs, sat is a placeholder dose.    Thank you for allowing us to participate in this patient's care.    Osiris Pereyra, Pharm D 4/10/2017 7:49 AM

## 2017-04-10 NOTE — PROGRESS NOTES
"[facility] Hospital Medicine  Progress Note    Patient Name: David Hadley  MRN: [id number]  Patient Class: IP- Inpatient   Admission Date: 4/3/2017  Length of Stay: 7 days  Attending Physician: oJse Walker, *  Primary Care Provider: Isaac Fitch MD        Subjective:     Principal Problem:MRSA bacteremia    HPI:  David Hadley is a 61 y.o. male patient with PMHx of ESRD on HD q TTS, HTN, HLP, who presented to the ER for c/o "for further evaluation of infection that was noticed in his blood today via Dr. Choudhury." Dr. Choudhury reported positive blood cx's c/w GPC in clusters. Records were reviewed. Pt had MRSA on blood Cx in Jan/Feb 2017. Source is not known.Pt stated he was sent here for IV abx via Dr. Choudhury. Pt is denying any sxs at this time. Pt states he is due for dialysis tomorrow. Patient denies any fever, N/V/D, chills, abd pain, CP, SOB, weakness/numbness, dizziness, lightheadedness, HA  and all other sxs at this time. No tooth problems. Pt has been on HD about 10 years, and gets dialyzed regularly in [place], [place] (Dr. Rinku Guardado). His L UE AVF is not bothering him.    Hospital Course:  ID consulted. Blood cultures showed Staphylcoccus aureus. Cardiology consulted and SALAZAR scheduled 4/6/17 which did not show evidence of endocarditis.  Continued IV antibiotics and patient remained asymptomatic.  Planned for Indium tagged WBC scan Monday 10 April. Repeat blood cultures drawn 4/8/17 1 of 2 positive for gram positive cocci. Indium scan 4/10/17.     Interval History:  1 of 2 bottle from 4/8/17 growing gram positive Cocci. Plans for Indium scan today. Patient is afebrile. Patient denies back pain or paraesthesias.     Review of Systems   Unable to perform ROS: Dementia      Objective:     Vital Signs (Most Recent):  Temp: 97.4 °F (36.3 °C) (04/10/17 1000)  Pulse: (!) 57 (04/10/17 1000)  Resp: 18 (04/10/17 1000)  BP: (!) 147/76 (04/10/17 1000)  SpO2: 97 % (04/10/17 0914) Vital Signs (24h " Range):  Temp:  [97.4 °F (36.3 °C)-98.5 °F (36.9 °C)] 97.4 °F (36.3 °C)  Pulse:  [54-66] 57  Resp:  [18] 18  SpO2:  [97 %-100 %] 97 %  BP: (124-147)/(73-88) 147/76     Weight: 94.4 kg (208 lb 1.6 oz)  Body mass index is 26.72 kg/(m^2).    Intake/Output Summary (Last 24 hours) at 04/10/17 1058  Last data filed at 04/10/17 0833   Gross per 24 hour   Intake              400 ml   Output                0 ml   Net              400 ml      Physical Exam   Constitutional: He appears well-developed.   HENT:   Head: Normocephalic and atraumatic.   Nose: Nose normal.   Eyes: Conjunctivae and EOM are normal. Pupils are equal, round, and reactive to light.   Neck: Normal range of motion. Neck supple. No JVD present. No tracheal deviation present. No thyromegaly present.   Cardiovascular: Normal rate, regular rhythm, normal heart sounds and intact distal pulses.  Exam reveals no gallop and no friction rub.    No murmur heard.  Pulmonary/Chest: Effort normal. No stridor. No respiratory distress. He has no wheezes. He has no rales. He exhibits no tenderness.   Abdominal: Soft. Bowel sounds are normal. He exhibits no distension. There is no tenderness. There is no rebound and no guarding.   Musculoskeletal: Normal range of motion. He exhibits no edema, tenderness or deformity.   No spinal tenderness appreciated    Neurological: He is alert. He has normal reflexes. No cranial nerve deficit. Coordination normal.   Skin: Skin is warm and dry. No rash noted. No erythema. No pallor.   L UE AVF +bruit/thrill   Nursing note and vitals reviewed.      Significant Labs:   CBC:   Recent Labs  Lab 04/09/17  1132 04/10/17  0526   WBC 4.57 4.68   HGB 11.3* 12.2*   HCT 35.7* 38.6*    203     CMP:   Recent Labs  Lab 04/09/17  0700 04/10/17  0526   * 133*   K 5.3* 5.9*    100   CO2 23 24   GLU 69* 70   BUN 26* 37*   CREATININE 7.6* 8.8*   CALCIUM 8.4* 8.6*   ALBUMIN 2.5*  --    ANIONGAP 6* 9   EGFRNONAA 7* 6*       Significant  Imaging:   Imaging Results         NM Inflammitory Localization WB Indium (In process)         US Soft Tissue Misc (Final result) Result time:  04/05/17 13:55:51    Final result by Mar Yadav MD (04/05/17 13:55:51)    Impression:         No sonographic evidence of abscess.      Electronically signed by: MAR YADAV MD  Date:     04/05/17  Time:    13:55     Narrative:    Exam: US SOFT TISSUE MISC    Clinical History:    Left arm cellulitis near the AV fistula.    Findings:  No suspicious fluid collection identified adjacent to the AV fistula in the left upper arm.            X-Ray Chest PA And Lateral (Final result) Result time:  04/03/17 18:52:43    Final result by Cornelius Jackson MD (04/03/17 18:52:43)    Impression:           1.  Left right basilar atelectasis last consolidation, concerning for pneumonia.  Other considerations would include interstitial pulmonary edema.  Clinical correlation is advised.  2.  Stable findings as noted above.      Electronically signed by: CORNELIUS JACKSON MD  Date:     04/03/17  Time:    18:52     Narrative:    PA and Lateral Chest x-ray    Clinical Indication: bacteremia.  Fever    Findings:    Comparisons are made to 01/31/2017.  Left rib and right basilar atelectasis/consolidation and effusions.      The upper lungs are clear. The cardiac silhouette size is enlarged. The trachea is midline and the mediastinal width is normal. Negative for pneumothorax. Pulmonary vasculature is normal. Negative for osseous abnormalities. There are degenerative changes of spine and both shoulder girdles. There are calcifications of the aortic knob and tortuosity of the descending thoracic aorta. There are changes of a prior median sternotomy and CABG changes.                Assessment/Plan:      * MRSA bacteremia  -Persistent MRSA Bacteremia:Had MRSA in blood Cx in Jan/Feb 2017  -Source unknown  -No obvious wounds, no abscess, no foreign bodies or catheters, ear surgery done in 2015,   -Infectious  Disease following  -ECHO: SALAZAR 4/6/17 negative  -Vancomycin IV to keep 15-20 level  Tagged WBC scan today 10 April.    Patient denies back pain upon examination. Will consider MRI of the L and T spine.    ESRD (end stage renal disease) on dialysis   -ESRD pt, q TTS HD schedule in [place], [place].  -Nephrology following for renal replacement  -plans for HD today     Dementia without behavioral disturbance  -supportive care  -frequent nursing rounds  -Continue Exelon    Hyperkalemia  -will improve with HD      VTE Risk Mitigation         Ordered     heparin (porcine) injection 5,000 Units  Every 8 hours     Route:  Subcutaneous        04/03/17 2007     Medium Risk of VTE  Once      04/03/17 2007          Pk Louis NP  Department of Hospital Medicine   [facility]

## 2017-04-10 NOTE — ASSESSMENT & PLAN NOTE
-ESRD pt, q TTS HD schedule in [place], [place].  -Nephrology following for renal replacement  -plans for HD today

## 2017-04-10 NOTE — ASSESSMENT & PLAN NOTE
-Persistent MRSA Bacteremia:Had MRSA in blood Cx in Jan/Feb 2017  -Source unknown  -No obvious wounds, no abscess, no foreign bodies or catheters, ear surgery done in 2015,   -Infectious Disease following  -ECHO: SALAZAR 4/6/17 negative  -Vancomycin IV to keep 15-20 level  Tagged WBC scan today 10 April.    Patient denies back pain upon examination. Will consider MRI of the L and T spine.

## 2017-04-10 NOTE — NURSING
AM glucose check was 69. Gave 4 glucose tabs totaling 16 grams. Rechecked glucose about 45 minutes later; result was 74. Pt asymptomatic. MD notified. Will continue to monitor.

## 2017-04-10 NOTE — SUBJECTIVE & OBJECTIVE
Interval History:  1 of 2 bottle from 4/8/17 growing gram positive Cocci. Plans for Indium scan today. Patient is afebrile. Patient denies back pain or paraesthesias.     Review of Systems   Unable to perform ROS: Dementia      Objective:     Vital Signs (Most Recent):  Temp: 97.4 °F (36.3 °C) (04/10/17 1000)  Pulse: (!) 57 (04/10/17 1000)  Resp: 18 (04/10/17 1000)  BP: (!) 147/76 (04/10/17 1000)  SpO2: 97 % (04/10/17 0914) Vital Signs (24h Range):  Temp:  [97.4 °F (36.3 °C)-98.5 °F (36.9 °C)] 97.4 °F (36.3 °C)  Pulse:  [54-66] 57  Resp:  [18] 18  SpO2:  [97 %-100 %] 97 %  BP: (124-147)/(73-88) 147/76     Weight: 94.4 kg (208 lb 1.6 oz)  Body mass index is 26.72 kg/(m^2).    Intake/Output Summary (Last 24 hours) at 04/10/17 1058  Last data filed at 04/10/17 0833   Gross per 24 hour   Intake              400 ml   Output                0 ml   Net              400 ml      Physical Exam   Constitutional: He appears well-developed.   HENT:   Head: Normocephalic and atraumatic.   Nose: Nose normal.   Eyes: Conjunctivae and EOM are normal. Pupils are equal, round, and reactive to light.   Neck: Normal range of motion. Neck supple. No JVD present. No tracheal deviation present. No thyromegaly present.   Cardiovascular: Normal rate, regular rhythm, normal heart sounds and intact distal pulses.  Exam reveals no gallop and no friction rub.    No murmur heard.  Pulmonary/Chest: Effort normal. No stridor. No respiratory distress. He has no wheezes. He has no rales. He exhibits no tenderness.   Abdominal: Soft. Bowel sounds are normal. He exhibits no distension. There is no tenderness. There is no rebound and no guarding.   Musculoskeletal: Normal range of motion. He exhibits no edema, tenderness or deformity.   No spinal tenderness appreciated    Neurological: He is alert. He has normal reflexes. No cranial nerve deficit. Coordination normal.   Skin: Skin is warm and dry. No rash noted. No erythema. No pallor.   L UE AVF  +bruit/thrill   Nursing note and vitals reviewed.      Significant Labs:   CBC:   Recent Labs  Lab 04/09/17  1132 04/10/17  0526   WBC 4.57 4.68   HGB 11.3* 12.2*   HCT 35.7* 38.6*    203     CMP:   Recent Labs  Lab 04/09/17  0700 04/10/17  0526   * 133*   K 5.3* 5.9*    100   CO2 23 24   GLU 69* 70   BUN 26* 37*   CREATININE 7.6* 8.8*   CALCIUM 8.4* 8.6*   ALBUMIN 2.5*  --    ANIONGAP 6* 9   EGFRNONAA 7* 6*       Significant Imaging:   Imaging Results         NM Inflammitory Localization WB Indium (In process)         US Soft Tissue Misc (Final result) Result time:  04/05/17 13:55:51    Final result by Mar Yadav MD (04/05/17 13:55:51)    Impression:         No sonographic evidence of abscess.      Electronically signed by: MAR YADAV MD  Date:     04/05/17  Time:    13:55     Narrative:    Exam: US SOFT TISSUE MISC    Clinical History:    Left arm cellulitis near the AV fistula.    Findings:  No suspicious fluid collection identified adjacent to the AV fistula in the left upper arm.            X-Ray Chest PA And Lateral (Final result) Result time:  04/03/17 18:52:43    Final result by Cornelius Jackson MD (04/03/17 18:52:43)    Impression:           1.  Left right basilar atelectasis last consolidation, concerning for pneumonia.  Other considerations would include interstitial pulmonary edema.  Clinical correlation is advised.  2.  Stable findings as noted above.      Electronically signed by: CORNELIUS JACKSON MD  Date:     04/03/17  Time:    18:52     Narrative:    PA and Lateral Chest x-ray    Clinical Indication: bacteremia.  Fever    Findings:    Comparisons are made to 01/31/2017.  Left rib and right basilar atelectasis/consolidation and effusions.      The upper lungs are clear. The cardiac silhouette size is enlarged. The trachea is midline and the mediastinal width is normal. Negative for pneumothorax. Pulmonary vasculature is normal. Negative for osseous abnormalities. There are  degenerative changes of spine and both shoulder girdles. There are calcifications of the aortic knob and tortuosity of the descending thoracic aorta. There are changes of a prior median sternotomy and CABG changes.

## 2017-04-10 NOTE — PLAN OF CARE
04/10/17 1542   Discharge Reassessment   Assessment Type Discharge Planning Reassessment   Can the patient answer the patient profile reliably? No, cognitively impaired   How does the patient rate their overall health at the present time? Poor   Describe the patient's ability to walk at the present time. Minor restrictions or changes   How often would a person be available to care for the patient? Whenever needed   Number of comorbid conditions (as recorded on the chart) Three   During the past month, has the patient often been bothered by feeling down, depressed or hopeless? No   During the past month, has the patient often been bothered by little interest or pleasure in doing things? No   Discharge plan remains the same: Yes   Provided patient/caregiver education on the expected discharge date and the discharge plan Yes   Discharge Plan A Home;Home with family   Discharge Plan B Home;Home with family;Home Health   Change in patient condition or support system No   Patient choice form signed by patient/caregiver N/A   Explained to the the patient/caregiver why the discharge planned changed: Yes   Involved the patient/caregiver in establishing a new discharge plan: Yes

## 2017-04-10 NOTE — PROGRESS NOTES
Pt completed 3 hours of HD tx with 2.2 L net UF removal.  Tx terminated early due to machine clotting off.  Pt tolerated tx well.  Verbal and written report to nurse attending.

## 2017-04-10 NOTE — PLAN OF CARE
Problem: Patient Care Overview  Goal: Plan of Care Review  Outcome: Ongoing (interventions implemented as appropriate)  POC reviewed with family. Indications for medication and possible side effects explained; pt's family verbalized understanding. Contact precautions maintained. Pt oriented to person only. Pleasant and cooperative, follows commands, speech clear/fluent. Pt reported no pain during the shift. Glucose readings during the shift were WDL and required no supplemental insulin. VS stable. Respirations even and unlabored. Family at bedside; will continue to monitor.     24 hour chart check complete.

## 2017-04-11 LAB
ALBUMIN SERPL BCP-MCNC: 2.7 G/DL
ANION GAP SERPL CALC-SCNC: 8 MMOL/L
ANION GAP SERPL CALC-SCNC: 8 MMOL/L
BASOPHILS # BLD AUTO: 0.04 K/UL
BASOPHILS NFR BLD: 0.7 %
BUN SERPL-MCNC: 40 MG/DL
BUN SERPL-MCNC: 40 MG/DL
CALCIUM SERPL-MCNC: 8.6 MG/DL
CALCIUM SERPL-MCNC: 8.6 MG/DL
CHLORIDE SERPL-SCNC: 101 MMOL/L
CHLORIDE SERPL-SCNC: 101 MMOL/L
CO2 SERPL-SCNC: 23 MMOL/L
CO2 SERPL-SCNC: 23 MMOL/L
CREAT SERPL-MCNC: 8.4 MG/DL
CREAT SERPL-MCNC: 8.4 MG/DL
DIFFERENTIAL METHOD: ABNORMAL
EOSINOPHIL # BLD AUTO: 0.1 K/UL
EOSINOPHIL NFR BLD: 2.6 %
ERYTHROCYTE [DISTWIDTH] IN BLOOD BY AUTOMATED COUNT: 18.3 %
EST. GFR  (AFRICAN AMERICAN): 7 ML/MIN/1.73 M^2
EST. GFR  (AFRICAN AMERICAN): 7 ML/MIN/1.73 M^2
EST. GFR  (NON AFRICAN AMERICAN): 6 ML/MIN/1.73 M^2
EST. GFR  (NON AFRICAN AMERICAN): 6 ML/MIN/1.73 M^2
GLUCOSE SERPL-MCNC: 64 MG/DL
GLUCOSE SERPL-MCNC: 64 MG/DL
HBV SURFACE AG SERPL QL IA: NEGATIVE
HCT VFR BLD AUTO: 36.5 %
HGB BLD-MCNC: 11.8 G/DL
LYMPHOCYTES # BLD AUTO: 2 K/UL
LYMPHOCYTES NFR BLD: 37.5 %
MCH RBC QN AUTO: 28 PG
MCHC RBC AUTO-ENTMCNC: 32.3 %
MCV RBC AUTO: 87 FL
MONOCYTES # BLD AUTO: 0.6 K/UL
MONOCYTES NFR BLD: 11.2 %
NEUTROPHILS # BLD AUTO: 2.6 K/UL
NEUTROPHILS NFR BLD: 48 %
PHOSPHATE SERPL-MCNC: 4.9 MG/DL
PLATELET # BLD AUTO: 172 K/UL
PMV BLD AUTO: 9.1 FL
POTASSIUM SERPL-SCNC: 5.6 MMOL/L
POTASSIUM SERPL-SCNC: 5.6 MMOL/L
RBC # BLD AUTO: 4.21 M/UL
SODIUM SERPL-SCNC: 132 MMOL/L
SODIUM SERPL-SCNC: 132 MMOL/L
VANCOMYCIN SERPL-MCNC: 14.8 UG/ML
WBC # BLD AUTO: 5.44 K/UL

## 2017-04-11 PROCEDURE — 80100016 HC MAINTENANCE HEMODIALYSIS

## 2017-04-11 PROCEDURE — 25000003 PHARM REV CODE 250: Performed by: HOSPITALIST

## 2017-04-11 PROCEDURE — 25000003 PHARM REV CODE 250: Performed by: INTERNAL MEDICINE

## 2017-04-11 PROCEDURE — 25000003 PHARM REV CODE 250: Performed by: NURSE PRACTITIONER

## 2017-04-11 PROCEDURE — 99233 SBSQ HOSP IP/OBS HIGH 50: CPT | Mod: ,,, | Performed by: INTERNAL MEDICINE

## 2017-04-11 PROCEDURE — 80069 RENAL FUNCTION PANEL: CPT

## 2017-04-11 PROCEDURE — 21400001 HC TELEMETRY ROOM

## 2017-04-11 PROCEDURE — 36415 COLL VENOUS BLD VENIPUNCTURE: CPT

## 2017-04-11 PROCEDURE — 96372 THER/PROPH/DIAG INJ SC/IM: CPT

## 2017-04-11 PROCEDURE — 80202 ASSAY OF VANCOMYCIN: CPT

## 2017-04-11 PROCEDURE — 85025 COMPLETE CBC W/AUTO DIFF WBC: CPT

## 2017-04-11 PROCEDURE — 63600175 PHARM REV CODE 636 W HCPCS: Performed by: HOSPITALIST

## 2017-04-11 RX ORDER — HEPARIN SODIUM 1000 [USP'U]/ML
1000 INJECTION, SOLUTION INTRAVENOUS; SUBCUTANEOUS
Status: DISCONTINUED | OUTPATIENT
Start: 2017-04-11 | End: 2017-04-12 | Stop reason: SDUPTHER

## 2017-04-11 RX ORDER — ALBUMIN HUMAN 250 G/1000ML
12.5 SOLUTION INTRAVENOUS
Status: DISCONTINUED | OUTPATIENT
Start: 2017-04-11 | End: 2017-04-12 | Stop reason: SDUPTHER

## 2017-04-11 RX ADMIN — METOPROLOL TARTRATE 25 MG: 25 TABLET ORAL at 09:04

## 2017-04-11 RX ADMIN — ATORVASTATIN CALCIUM 20 MG: 10 TABLET, FILM COATED ORAL at 09:04

## 2017-04-11 RX ADMIN — STANDARDIZED SENNA CONCENTRATE AND DOCUSATE SODIUM 1 TABLET: 8.6; 5 TABLET, FILM COATED ORAL at 09:04

## 2017-04-11 RX ADMIN — HEPARIN SODIUM 1000 UNITS: 1000 INJECTION, SOLUTION INTRAVENOUS; SUBCUTANEOUS at 10:04

## 2017-04-11 RX ADMIN — POLYETHYLENE GLYCOL 3350 17 G: 17 POWDER, FOR SOLUTION ORAL at 09:04

## 2017-04-11 RX ADMIN — HEPARIN SODIUM 5000 UNITS: 5000 INJECTION, SOLUTION INTRAVENOUS; SUBCUTANEOUS at 06:04

## 2017-04-11 RX ADMIN — SERTRALINE HYDROCHLORIDE 50 MG: 50 TABLET ORAL at 09:04

## 2017-04-11 RX ADMIN — GABAPENTIN 100 MG: 100 CAPSULE ORAL at 09:04

## 2017-04-11 RX ADMIN — GABAPENTIN 100 MG: 100 CAPSULE ORAL at 06:04

## 2017-04-11 RX ADMIN — Medication 16 G: at 06:04

## 2017-04-11 RX ADMIN — GABAPENTIN 100 MG: 100 CAPSULE ORAL at 01:04

## 2017-04-11 RX ADMIN — HEPARIN SODIUM 5000 UNITS: 5000 INJECTION, SOLUTION INTRAVENOUS; SUBCUTANEOUS at 01:04

## 2017-04-11 RX ADMIN — VANCOMYCIN HYDROCHLORIDE 750 MG: 1 INJECTION, POWDER, LYOPHILIZED, FOR SOLUTION INTRAVENOUS at 01:04

## 2017-04-11 RX ADMIN — PANTOPRAZOLE SODIUM 40 MG: 40 TABLET, DELAYED RELEASE ORAL at 09:04

## 2017-04-11 RX ADMIN — HEPARIN SODIUM 5000 UNITS: 5000 INJECTION, SOLUTION INTRAVENOUS; SUBCUTANEOUS at 09:04

## 2017-04-11 RX ADMIN — LOSARTAN POTASSIUM 50 MG: 50 TABLET, FILM COATED ORAL at 09:04

## 2017-04-11 RX ADMIN — RIVASTIGMINE TRANSDERMAL SYSTEM 1 PATCH: 4.6 PATCH, EXTENDED RELEASE TRANSDERMAL at 09:04

## 2017-04-11 NOTE — PLAN OF CARE
Problem: Patient Care Overview  Goal: Plan of Care Review  Outcome: Ongoing (interventions implemented as appropriate)  Pt remains free of injuries. No c/o pain, n/v/d. Dialyzed today for hyperkalemia K 5.6. I L removed. Plan to dialyze tomorrow. Indium scan completed today. Blood glucose stable since AM. 0600 accucheck added. 12 hr chart check completed. Will continue to monitor.

## 2017-04-11 NOTE — PROGRESS NOTES
"Subjective    Patient denies complaints today.       Physical exam:  /82 (BP Location: Right arm, Patient Position: Lying, BP Method: Automatic)  Pulse (!) 52  Temp 97.8 °F (36.6 °C) (Oral)   Resp 18  Ht 6' 2" (1.88 m)  Wt 94.8 kg (209 lb)  SpO2 100%  BMI 26.83 kg/m2    HEENT: moist mucosal membranes  HEART: RRR, S1/S2, no rub  LUNGS: CTA, bilaterally, no wheezing  ABDOMEN: soft, nontender, not distended, bowel sounds present  EXTREMITIES: no LE edema  NEURO: A & O x 3    Labs:  Lab Results   Component Value Date    CREATININE 8.4 (H) 04/11/2017    CREATININE 8.4 (H) 04/11/2017    BUN 40 (H) 04/11/2017    BUN 40 (H) 04/11/2017     (L) 04/11/2017     (L) 04/11/2017    K 5.6 (H) 04/11/2017    K 5.6 (H) 04/11/2017     04/11/2017     04/11/2017    CO2 23 04/11/2017    CO2 23 04/11/2017     Lab Results   Component Value Date     (H) 11/06/2008    CALCIUM 8.6 (L) 04/11/2017    CALCIUM 8.6 (L) 04/11/2017    PHOS 4.9 (H) 04/11/2017     Lab Results   Component Value Date    ALBUMIN 2.7 (L) 04/11/2017     Lab Results   Component Value Date    WBC 5.44 04/11/2017    HGB 11.8 (L) 04/11/2017    HCT 36.5 (L) 04/11/2017    MCV 87 04/11/2017     04/11/2017       Assessment and Plan:  61 year old male with h/o ESRD, anemia, HTN, DM2, CAD, CHF presented with MRSA bacteremia.       1. ESRD: TTS, next HD today, Time: 2.5  Hrs    2. Access: Left arm AVF.     3. Electrolytes: Hyperkalemia. Will use 1K/2 K bath with HD. No hemolysis was noted. Will change patient to                               strict low K diet.                            Borderline hyponatremia: monitor.     4. Acid-base: no issues.     5. Volume: no gross overload.     6. Anemia: Hgb at goal for dialysis patient, no SUNIL needed at present.     7. HTN: good BP control.     8. Medications: reviewed.     9. MRSA bacteremia: Continue Vancomycin.         "

## 2017-04-11 NOTE — PLAN OF CARE
Problem: Patient Care Overview  Goal: Plan of Care Review  Outcome: Ongoing (interventions implemented as appropriate)  Family at bedside. Pt afebrile during shift. Sinus bernardino on cardiac monitor. Fall precautions in place. Call bell and personal items within reach. Will continue to monitor.

## 2017-04-11 NOTE — PROGRESS NOTES
NISREEN Louis NP stated rationale for holding chemo is due to active infection. Explained to pt sister Mrs. Cardenas at bedside. SHANDA

## 2017-04-11 NOTE — ASSESSMENT & PLAN NOTE
-ESRD pt, q TTS HD schedule in [place], [place].  -Nephrology following for renal replacement  -received HD yesterday. Will receive again today

## 2017-04-11 NOTE — SUBJECTIVE & OBJECTIVE
Interval History: Part 2 of Indium scan today. Potassium remains high. Will receive HD again today. Daughter at bedside. Nurses report episode of hypoglycemia overnight.     Review of Systems   Unable to perform ROS: Dementia     Objective:     Vital Signs (Most Recent):  Temp: 97.8 °F (36.6 °C) (04/11/17 0912)  Pulse: (!) 52 (04/11/17 0912)  Resp: 18 (04/11/17 0912)  BP: 127/82 (04/11/17 0912)  SpO2: 100 % (04/11/17 0912) Vital Signs (24h Range):  Temp:  [97.4 °F (36.3 °C)-98 °F (36.7 °C)] 97.8 °F (36.6 °C)  Pulse:  [52-77] 52  Resp:  [16-18] 18  SpO2:  [95 %-100 %] 100 %  BP: (113-135)/(61-82) 127/82     Weight: 94.8 kg (209 lb)  Body mass index is 26.83 kg/(m^2).    Intake/Output Summary (Last 24 hours) at 04/11/17 1052  Last data filed at 04/11/17 0912   Gross per 24 hour   Intake             1420 ml   Output             2714 ml   Net            -1294 ml      Physical Exam   Constitutional: He appears well-developed.   Flat affect     HENT:   Head: Normocephalic and atraumatic.   Nose: Nose normal.   Eyes: Conjunctivae and EOM are normal. Pupils are equal, round, and reactive to light.   Neck: Normal range of motion. Neck supple. No JVD present. No tracheal deviation present. No thyromegaly present.   Cardiovascular: Normal rate, regular rhythm, normal heart sounds and intact distal pulses.  Exam reveals no gallop and no friction rub.    No murmur heard.  Pulmonary/Chest: Effort normal. No stridor. No respiratory distress. He has no wheezes. He has no rales. He exhibits no tenderness.   Abdominal: Soft. Bowel sounds are normal. He exhibits no distension. There is no tenderness. There is no rebound and no guarding.   Musculoskeletal: Normal range of motion. He exhibits no edema, tenderness or deformity.   No spinal tenderness appreciated    Neurological: He is alert. He has normal reflexes. No cranial nerve deficit. Coordination normal.   Follows simple commands   Skin: Skin is warm and dry. No rash noted. No  erythema. No pallor.   L UE AVF +bruit/thrill   Nursing note and vitals reviewed.    Significant Labs:   BMP:   Recent Labs  Lab 04/11/17  0648   GLU 64*  64*   *  132*   K 5.6*  5.6*     101   CO2 23  23   BUN 40*  40*   CREATININE 8.4*  8.4*   CALCIUM 8.6*  8.6*     CBC:   Recent Labs  Lab 04/09/17  1132 04/10/17  0526 04/11/17  0648   WBC 4.57 4.68 5.44   HGB 11.3* 12.2* 11.8*   HCT 35.7* 38.6* 36.5*    203 172       Significant Imaging:  Imaging Results         NM Inflammitory Localization WB Indium (In process)         US Soft Tissue Misc (Final result) Result time:  04/05/17 13:55:51    Final result by Mar Yadav MD (04/05/17 13:55:51)    Impression:         No sonographic evidence of abscess.      Electronically signed by: MAR YADAV MD  Date:     04/05/17  Time:    13:55     Narrative:    Exam: US SOFT TISSUE MISC    Clinical History:    Left arm cellulitis near the AV fistula.    Findings:  No suspicious fluid collection identified adjacent to the AV fistula in the left upper arm.            X-Ray Chest PA And Lateral (Final result) Result time:  04/03/17 18:52:43    Final result by Cornelius Jackson MD (04/03/17 18:52:43)    Impression:           1.  Left right basilar atelectasis last consolidation, concerning for pneumonia.  Other considerations would include interstitial pulmonary edema.  Clinical correlation is advised.  2.  Stable findings as noted above.      Electronically signed by: CORNELIUS JACKSON MD  Date:     04/03/17  Time:    18:52     Narrative:    PA and Lateral Chest x-ray    Clinical Indication: bacteremia.  Fever    Findings:    Comparisons are made to 01/31/2017.  Left rib and right basilar atelectasis/consolidation and effusions.      The upper lungs are clear. The cardiac silhouette size is enlarged. The trachea is midline and the mediastinal width is normal. Negative for pneumothorax. Pulmonary vasculature is normal. Negative for osseous abnormalities. There  are degenerative changes of spine and both shoulder girdles. There are calcifications of the aortic knob and tortuosity of the descending thoracic aorta. There are changes of a prior median sternotomy and CABG changes.

## 2017-04-11 NOTE — ASSESSMENT & PLAN NOTE
-Persistent MRSA Bacteremia:Had MRSA in blood Cx in Jan/Feb 2017  -Source unknown  -No obvious wounds, no abscess, no foreign bodies or catheters, ear surgery done in 2015,   -Infectious Disease following  -ECHO: SALAZAR 4/6/17 negative  -Vancomycin IV to keep 15-20 level  Tagged WBC scan Part 2 today  Patient denies back pain upon examination. Will consider MRI of the L and T spine.

## 2017-04-11 NOTE — CONSULTS
Pharmacy Vancomycin Consult Note    WBC=5.44  Tmax=98  CrCl=10.5ml/min Scr=8.4  Dialysis patient: Tues,Thurs, Sat    Cultures: blood-MRSA-Vanc sensitive  Dx. MRSA bacteremia  Goal trough=15-20mcg/ml    Pre-dialysis random level=14.6mcg/ml  According to protocol, patient will receive a Vancomycin 750mg dose post-dialysis.  Vancomycin 1 gram Tues,Thurs, Sat is a placeholder dose only.  Next pre-dialysis random due Thurs, 4/13     Thank you for allowing us to participate in this patient's care.    Osiris Pereyra, Pharm D 4/11/2017 8:16 AM

## 2017-04-11 NOTE — PROGRESS NOTES
HD today for 2.5 hrs due to elevated K+. No complications. Fistula working fine. Net UF 1L    Post /71 HR 58

## 2017-04-12 LAB
ALBUMIN SERPL BCP-MCNC: 2.7 G/DL
ANION GAP SERPL CALC-SCNC: 9 MMOL/L
ANION GAP SERPL CALC-SCNC: 9 MMOL/L
BACTERIA BLD CULT: NORMAL
BASOPHILS # BLD AUTO: 0.03 K/UL
BASOPHILS NFR BLD: 0.7 %
BUN SERPL-MCNC: 34 MG/DL
BUN SERPL-MCNC: 34 MG/DL
CALCIUM SERPL-MCNC: 8.6 MG/DL
CALCIUM SERPL-MCNC: 8.6 MG/DL
CHLORIDE SERPL-SCNC: 102 MMOL/L
CHLORIDE SERPL-SCNC: 102 MMOL/L
CO2 SERPL-SCNC: 21 MMOL/L
CO2 SERPL-SCNC: 21 MMOL/L
CREAT SERPL-MCNC: 7.4 MG/DL
CREAT SERPL-MCNC: 7.4 MG/DL
DIFFERENTIAL METHOD: ABNORMAL
EOSINOPHIL # BLD AUTO: 0.1 K/UL
EOSINOPHIL NFR BLD: 2.2 %
ERYTHROCYTE [DISTWIDTH] IN BLOOD BY AUTOMATED COUNT: 18.1 %
ERYTHROCYTE [SEDIMENTATION RATE] IN BLOOD BY WESTERGREN METHOD: 12 MM/HR
EST. GFR  (AFRICAN AMERICAN): 8 ML/MIN/1.73 M^2
EST. GFR  (AFRICAN AMERICAN): 8 ML/MIN/1.73 M^2
EST. GFR  (NON AFRICAN AMERICAN): 7 ML/MIN/1.73 M^2
EST. GFR  (NON AFRICAN AMERICAN): 7 ML/MIN/1.73 M^2
GLUCOSE SERPL-MCNC: 88 MG/DL
GLUCOSE SERPL-MCNC: 88 MG/DL
HCT VFR BLD AUTO: 34.6 %
HGB BLD-MCNC: 11 G/DL
LYMPHOCYTES # BLD AUTO: 2 K/UL
LYMPHOCYTES NFR BLD: 44.2 %
MCH RBC QN AUTO: 27.4 PG
MCHC RBC AUTO-ENTMCNC: 31.8 %
MCV RBC AUTO: 86 FL
MONOCYTES # BLD AUTO: 0.3 K/UL
MONOCYTES NFR BLD: 7.4 %
NEUTROPHILS # BLD AUTO: 2.1 K/UL
NEUTROPHILS NFR BLD: 45.5 %
PHOSPHATE SERPL-MCNC: 4.8 MG/DL
PLATELET # BLD AUTO: 183 K/UL
PMV BLD AUTO: 10.1 FL
POCT GLUCOSE: 115 MG/DL (ref 70–110)
POCT GLUCOSE: 123 MG/DL (ref 70–110)
POCT GLUCOSE: 56 MG/DL (ref 70–110)
POCT GLUCOSE: 67 MG/DL (ref 70–110)
POCT GLUCOSE: 80 MG/DL (ref 70–110)
POCT GLUCOSE: 92 MG/DL (ref 70–110)
POCT GLUCOSE: 93 MG/DL (ref 70–110)
POCT GLUCOSE: 96 MG/DL (ref 70–110)
POCT GLUCOSE: 97 MG/DL (ref 70–110)
POCT GLUCOSE: 98 MG/DL (ref 70–110)
POTASSIUM SERPL-SCNC: 5.7 MMOL/L
POTASSIUM SERPL-SCNC: 5.7 MMOL/L
RBC # BLD AUTO: 4.02 M/UL
SODIUM SERPL-SCNC: 132 MMOL/L
SODIUM SERPL-SCNC: 132 MMOL/L
WBC # BLD AUTO: 4.59 K/UL

## 2017-04-12 PROCEDURE — 96372 THER/PROPH/DIAG INJ SC/IM: CPT

## 2017-04-12 PROCEDURE — 85651 RBC SED RATE NONAUTOMATED: CPT

## 2017-04-12 PROCEDURE — 25000003 PHARM REV CODE 250: Performed by: NURSE PRACTITIONER

## 2017-04-12 PROCEDURE — 99233 SBSQ HOSP IP/OBS HIGH 50: CPT | Mod: ,,, | Performed by: INTERNAL MEDICINE

## 2017-04-12 PROCEDURE — 21400001 HC TELEMETRY ROOM

## 2017-04-12 PROCEDURE — 25000003 PHARM REV CODE 250: Performed by: INTERNAL MEDICINE

## 2017-04-12 PROCEDURE — 80069 RENAL FUNCTION PANEL: CPT

## 2017-04-12 PROCEDURE — 36415 COLL VENOUS BLD VENIPUNCTURE: CPT

## 2017-04-12 PROCEDURE — 87040 BLOOD CULTURE FOR BACTERIA: CPT

## 2017-04-12 PROCEDURE — 85025 COMPLETE CBC W/AUTO DIFF WBC: CPT

## 2017-04-12 RX ORDER — AMLODIPINE BESYLATE 5 MG/1
5 TABLET ORAL DAILY
Status: DISCONTINUED | OUTPATIENT
Start: 2017-04-12 | End: 2017-04-14 | Stop reason: HOSPADM

## 2017-04-12 RX ORDER — HEPARIN SODIUM 1000 [USP'U]/ML
1000 INJECTION, SOLUTION INTRAVENOUS; SUBCUTANEOUS
Status: DISCONTINUED | OUTPATIENT
Start: 2017-04-13 | End: 2017-04-14 | Stop reason: HOSPADM

## 2017-04-12 RX ORDER — ALBUMIN HUMAN 250 G/1000ML
12.5 SOLUTION INTRAVENOUS
Status: DISCONTINUED | OUTPATIENT
Start: 2017-04-12 | End: 2017-04-14 | Stop reason: HOSPADM

## 2017-04-12 RX ORDER — SODIUM BICARBONATE 650 MG/1
1300 TABLET ORAL 3 TIMES DAILY
Status: DISCONTINUED | OUTPATIENT
Start: 2017-04-12 | End: 2017-04-14 | Stop reason: HOSPADM

## 2017-04-12 RX ADMIN — SODIUM BICARBONATE 650 MG TABLET 1300 MG: at 04:04

## 2017-04-12 RX ADMIN — STANDARDIZED SENNA CONCENTRATE AND DOCUSATE SODIUM 1 TABLET: 8.6; 5 TABLET, FILM COATED ORAL at 09:04

## 2017-04-12 RX ADMIN — TRAZODONE HYDROCHLORIDE 50 MG: 50 TABLET ORAL at 09:04

## 2017-04-12 RX ADMIN — SODIUM BICARBONATE 650 MG TABLET 1300 MG: at 09:04

## 2017-04-12 RX ADMIN — HEPARIN SODIUM 5000 UNITS: 5000 INJECTION, SOLUTION INTRAVENOUS; SUBCUTANEOUS at 09:04

## 2017-04-12 RX ADMIN — GABAPENTIN 100 MG: 100 CAPSULE ORAL at 09:04

## 2017-04-12 RX ADMIN — POLYETHYLENE GLYCOL 3350 17 G: 17 POWDER, FOR SOLUTION ORAL at 09:04

## 2017-04-12 RX ADMIN — SODIUM BICARBONATE 650 MG TABLET 1300 MG: at 10:04

## 2017-04-12 RX ADMIN — Medication 16 G: at 05:04

## 2017-04-12 RX ADMIN — GABAPENTIN 100 MG: 100 CAPSULE ORAL at 04:04

## 2017-04-12 RX ADMIN — HEPARIN SODIUM 5000 UNITS: 5000 INJECTION, SOLUTION INTRAVENOUS; SUBCUTANEOUS at 04:04

## 2017-04-12 RX ADMIN — SERTRALINE HYDROCHLORIDE 50 MG: 50 TABLET ORAL at 09:04

## 2017-04-12 RX ADMIN — RIVASTIGMINE TRANSDERMAL SYSTEM 1 PATCH: 4.6 PATCH, EXTENDED RELEASE TRANSDERMAL at 09:04

## 2017-04-12 RX ADMIN — METOPROLOL TARTRATE 25 MG: 25 TABLET ORAL at 09:04

## 2017-04-12 RX ADMIN — AMLODIPINE BESYLATE 5 MG: 5 TABLET ORAL at 09:04

## 2017-04-12 RX ADMIN — PANTOPRAZOLE SODIUM 40 MG: 40 TABLET, DELAYED RELEASE ORAL at 09:04

## 2017-04-12 RX ADMIN — GABAPENTIN 100 MG: 100 CAPSULE ORAL at 05:04

## 2017-04-12 RX ADMIN — ATORVASTATIN CALCIUM 20 MG: 10 TABLET, FILM COATED ORAL at 09:04

## 2017-04-12 RX ADMIN — HEPARIN SODIUM 5000 UNITS: 5000 INJECTION, SOLUTION INTRAVENOUS; SUBCUTANEOUS at 05:04

## 2017-04-12 NOTE — ASSESSMENT & PLAN NOTE
-Persistent MRSA Bacteremia:Had MRSA in blood Cx in Jan/Feb 2017  -Source unknown  -No obvious wounds, no abscess, no foreign bodies or catheters,  surgery done in 2015,   -Infectious Disease following  -ECHO: SALAZAR 4/6/17 negative  -Vancomycin IV to keep 15-20 level  -Indium  scan negative.   -If repeat cultures are negative will discuss with ID regarding antibiotics for discharge.

## 2017-04-12 NOTE — PLAN OF CARE
Problem: Patient Care Overview  Goal: Plan of Care Review  Outcome: Ongoing (interventions implemented as appropriate)  Room air, respirations even and unlabored, no distress noted on assessment, no pain nausea or shortness of breath, oriented to person, left upper arm fistula positive thrill and bruit, family refused bed alarm

## 2017-04-12 NOTE — NURSING
Dr. Sanchez expressed concerns that pt is continuing to receive foods on his tray that are high in Potassium, although he is on a renal diet. He requested that I check each tray that is delivered for high potassium foods and drinks. Called Jaun in [facility] and informed him of Dr. Sanchez's concerns about the trays. He stated that he would contact the dietician, Alex, and have her get involved in patient's dietary needs.

## 2017-04-12 NOTE — PROGRESS NOTES
"[facility] Hospital Medicine  Progress Note    Patient Name: David Hadley  MRN: [id number]  Patient Class: IP- Inpatient   Admission Date: 4/3/2017  Length of Stay: 9 days  Attending Physician: Jose Walker, *  Primary Care Provider: Isaac Fitch MD        Subjective:     Principal Problem:MRSA bacteremia    HPI:  David Hadley is a 61 y.o. male patient with PMHx of ESRD on HD q TTS, HTN, HLP, who presented to the ER for c/o "for further evaluation of infection that was noticed in his blood today via Dr. Choudhury." Dr. Choudhury reported positive blood cx's c/w GPC in clusters. Records were reviewed. Pt had MRSA on blood Cx in Jan/Feb 2017. Source is not known.Pt stated he was sent here for IV abx via Dr. Choudhury. Pt is denying any sxs at this time. Pt states he is due for dialysis tomorrow. Patient denies any fever, N/V/D, chills, abd pain, CP, SOB, weakness/numbness, dizziness, lightheadedness, HA  and all other sxs at this time. No tooth problems. Pt has been on HD about 10 years, and gets dialyzed regularly in [place], [place] (Dr. Rinku Guardado). His L UE AVF is not bothering him.    Hospital Course:  ID consulted. Blood cultures showed Staphylcoccus aureus. Cardiology consulted and SALAZAR scheduled 4/6/17 which did not show evidence of endocarditis.  Continued IV antibiotics and patient remained asymptomatic.  Planned for Indium tagged WBC scan Monday 10 April. Repeat blood cultures drawn 4/8/17 1 of 2 positive for MRSA. Indium scan 4/10/17 was negative. Blood cultures repeated 4/12/17.     Interval History: spouse at bedside. Ready to discharge. Plan of care discussed with wife. Blood cultures repeated today.     Review of Systems   Unable to perform ROS: Dementia        Objective:     Vital Signs (Most Recent):  Temp: 98.7 °F (37.1 °C) (04/12/17 1210)  Pulse: 75 (04/12/17 1210)  Resp: 18 (04/12/17 1210)  BP: (!) 135/47 (04/12/17 1210)  SpO2: 100 % (04/12/17 1210) Vital Signs (24h Range):  Temp:  [97.4 " °F (36.3 °C)-98.8 °F (37.1 °C)] 98.7 °F (37.1 °C)  Pulse:  [51-75] 75  Resp:  [16-19] 18  SpO2:  [96 %-100 %] 100 %  BP: (128-159)/(47-82) 135/47     Weight: 93.6 kg (206 lb 4.8 oz)  Body mass index is 26.49 kg/(m^2).    Intake/Output Summary (Last 24 hours) at 04/12/17 1311  Last data filed at 04/12/17 1200   Gross per 24 hour   Intake             1490 ml   Output             1500 ml   Net              -10 ml      Physical Exam   Constitutional: He appears well-developed.   HENT:   Head: Normocephalic and atraumatic.   Nose: Nose normal.   Eyes: Conjunctivae and EOM are normal. Pupils are equal, round, and reactive to light.   Neck: Normal range of motion. Neck supple. No JVD present. No tracheal deviation present. No thyromegaly present.   Cardiovascular: Normal rate, regular rhythm, normal heart sounds and intact distal pulses.  Exam reveals no gallop and no friction rub.    No murmur heard.  Pulmonary/Chest: Effort normal. No stridor. No respiratory distress. He has no wheezes. He has no rales. He exhibits no tenderness.   Abdominal: Soft. Bowel sounds are normal. He exhibits no distension. There is no tenderness. There is no rebound and no guarding.   Musculoskeletal: Normal range of motion. He exhibits no edema, tenderness or deformity.   No spinal tenderness appreciated    Neurological: He is alert. He has normal reflexes. No cranial nerve deficit. Coordination normal.   Skin: Skin is warm and dry. No rash noted. No erythema. No pallor.   L UE AVF +bruit/thrill   Nursing note and vitals reviewed.    Significant Labs:   CBC:   Recent Labs  Lab 04/11/17  0648 04/12/17  0618   WBC 5.44 4.59   HGB 11.8* 11.0*   HCT 36.5* 34.6*    183     CMP:   Recent Labs  Lab 04/11/17  0648 04/12/17  0618   *  132* 132*  132*   K 5.6*  5.6* 5.7*  5.7*     101 102  102   CO2 23  23 21*  21*   GLU 64*  64* 88  88   BUN 40*  40* 34*  34*   CREATININE 8.4*  8.4* 7.4*  7.4*   CALCIUM 8.6*  8.6* 8.6*   8.6*   ALBUMIN 2.7* 2.7*   ANIONGAP 8  8 9  9   EGFRNONAA 6*  6* 7*  7*       Significant Imaging:   Imaging Results         NM Inflammitory Localization WB Indium (Final result) Result time:  04/11/17 16:40:13    Final result by Ramírez Cline MD (04/11/17 16:40:13)    Impression:      Negative indium-111 labeled white blood cell scan.      Electronically signed by: RAMÍREZ CLINE MD  Date:     04/11/17  Time:    16:40     Narrative:    Exam: NM INFLAMMATORY WHOLE BODY INDIUM,    Date:  04/10/17 10:22:54    History: Fever of unknown origin.  MRSA infection.    Comparison:  No prior relevant studies available    Findings: Whole body indium-111 labeled white blood cell scan performed with 480 uCi.    Normal hepatosplenic uptake.  Normal marrow uptake.  No localized uptake is present.            US Soft Tissue Misc (Final result) Result time:  04/05/17 13:55:51    Final result by Mar Zapata MD (04/05/17 13:55:51)    Impression:         No sonographic evidence of abscess.      Electronically signed by: MAR ZAPATA MD  Date:     04/05/17  Time:    13:55     Narrative:    Exam: US SOFT TISSUE MISC    Clinical History:    Left arm cellulitis near the AV fistula.    Findings:  No suspicious fluid collection identified adjacent to the AV fistula in the left upper arm.            X-Ray Chest PA And Lateral (Final result) Result time:  04/03/17 18:52:43    Final result by Cornelius Jackson MD (04/03/17 18:52:43)    Impression:           1.  Left right basilar atelectasis last consolidation, concerning for pneumonia.  Other considerations would include interstitial pulmonary edema.  Clinical correlation is advised.  2.  Stable findings as noted above.      Electronically signed by: CORNELIUS JACKSON MD  Date:     04/03/17  Time:    18:52     Narrative:    PA and Lateral Chest x-ray    Clinical Indication: bacteremia.  Fever    Findings:    Comparisons are made to 01/31/2017.  Left rib and right basilar  atelectasis/consolidation and effusions.      The upper lungs are clear. The cardiac silhouette size is enlarged. The trachea is midline and the mediastinal width is normal. Negative for pneumothorax. Pulmonary vasculature is normal. Negative for osseous abnormalities. There are degenerative changes of spine and both shoulder girdles. There are calcifications of the aortic knob and tortuosity of the descending thoracic aorta. There are changes of a prior median sternotomy and CABG changes.                Assessment/Plan:      * MRSA bacteremia  -Persistent MRSA Bacteremia:Had MRSA in blood Cx in Jan/Feb 2017  -Source unknown  -No obvious wounds, no abscess, no foreign bodies or catheters,  surgery done in 2015,   -Infectious Disease following  -ECHO: SALAZAR 4/6/17 negative  -Vancomycin IV to keep 15-20 level  -Indium  scan negative.   -If repeat cultures are negative will discuss with ID regarding antibiotics for discharge.     ESRD (end stage renal disease) on dialysis   -ESRD pt, q TTS HD schedule in [place], [place].  -Nephrology following for renal replacement  -next HD session 4/13/17    CML in remission  -Hold Sprycel given acute infection  -will discuss with Oncology when to resume    Dementia without behavioral disturbance  -supportive care  -frequent nursing rounds  -Continue Exelon    Hyperkalemia  -will improve with HD        VTE Risk Mitigation         Ordered     heparin (porcine) injection 5,000 Units  Every 8 hours     Route:  Subcutaneous        04/03/17 2007     Medium Risk of VTE  Once      04/03/17 2007          Pk Louis NP  Department of Hospital Medicine   [facility]

## 2017-04-12 NOTE — CONSULTS
Received call regarding patient's current diet (Low Potassium) and his continually elevated Potassium levels. There was concern that patient may getting food high in potassium on his tray from the kitchen. Spoke with patient's wife this AM who reported that they were bringing the patient food from outside including cheesburgers (300-400mgK in a small) and french fries (470-900mgK in a small to large) among other fast foods. Reviewed list of high potassium foods that should be restricted and discussed with her importance of compliance with hospital Rx diet in order to treat patient. She ensures they will bring no more food from outside. Also reviewed Renal diet (specifically potassium containing foods) with nutrition assistant assigned to unit. Trays will be checked by RD prior to leaving kitchen and by RN prior to delivery to patient.

## 2017-04-12 NOTE — PROGRESS NOTES
"Subjective    Patient has no complaints/issues today.       Physical exam:  BP (!) 144/78 (BP Location: Right arm, Patient Position: Lying, BP Method: Automatic)  Pulse (!) 58  Temp 98.8 °F (37.1 °C) (Oral)   Resp 19  Ht 6' 2" (1.88 m)  Wt 93.6 kg (206 lb 4.8 oz)  SpO2 96%  BMI 26.49 kg/m2    HEENT: moist mucosal membranes  HEART: RRR, S1/S2, no rub  LUNGS: CTA, bilaterally, no wheezing  ABDOMEN: soft, nontender, not distended, bowel sounds present  EXTREMITIES: no LE edema  NEURO: A & O x 3    Labs:  Lab Results   Component Value Date    CREATININE 7.4 (H) 04/12/2017    CREATININE 7.4 (H) 04/12/2017    BUN 34 (H) 04/12/2017    BUN 34 (H) 04/12/2017     (L) 04/12/2017     (L) 04/12/2017    K 5.7 (H) 04/12/2017    K 5.7 (H) 04/12/2017     04/12/2017     04/12/2017    CO2 21 (L) 04/12/2017    CO2 21 (L) 04/12/2017     Lab Results   Component Value Date     (H) 11/06/2008    CALCIUM 8.6 (L) 04/12/2017    CALCIUM 8.6 (L) 04/12/2017    PHOS 4.8 (H) 04/12/2017     Lab Results   Component Value Date    ALBUMIN 2.7 (L) 04/12/2017     Lab Results   Component Value Date    WBC 4.59 04/12/2017    HGB 11.0 (L) 04/12/2017    HCT 34.6 (L) 04/12/2017    MCV 86 04/12/2017     04/12/2017       Assessment and Plan:  61 year old male with h/o ESRD, anemia, HTN, DM2, CAD, CHF presented with MRSA bacteremia.       1. ESRD: TTS, next HD tomorrow, Time: 3.5  Hrs    2. Access: Left arm AVF.     3. Electrolytes: Hyperkalemia. Patient now on strict low K diet. Will give sodium bicarbonate po and stop                               Losartan. Consider pseudohyperkalemia.                            Borderline hyponatremia: monitor.     4. Acid-base: mild acidosis. Will add sodium bicarbonate.     5. Volume: no gross overload.     6. Anemia: Hgb at goal for dialysis patient, no SUNIL needed at present.     7. HTN: acceptable BP control.     8. Medications: reviewed.     9. MRSA bacteremia: Continue " Vancomycin.

## 2017-04-12 NOTE — SUBJECTIVE & OBJECTIVE
Interval History: spouse at bedside. Ready to discharge. Plan of care discussed with wife. Blood cultures repeated today.     Review of Systems   Unable to perform ROS: Dementia        Objective:     Vital Signs (Most Recent):  Temp: 98.7 °F (37.1 °C) (04/12/17 1210)  Pulse: 75 (04/12/17 1210)  Resp: 18 (04/12/17 1210)  BP: (!) 135/47 (04/12/17 1210)  SpO2: 100 % (04/12/17 1210) Vital Signs (24h Range):  Temp:  [97.4 °F (36.3 °C)-98.8 °F (37.1 °C)] 98.7 °F (37.1 °C)  Pulse:  [51-75] 75  Resp:  [16-19] 18  SpO2:  [96 %-100 %] 100 %  BP: (128-159)/(47-82) 135/47     Weight: 93.6 kg (206 lb 4.8 oz)  Body mass index is 26.49 kg/(m^2).    Intake/Output Summary (Last 24 hours) at 04/12/17 1311  Last data filed at 04/12/17 1200   Gross per 24 hour   Intake             1490 ml   Output             1500 ml   Net              -10 ml      Physical Exam   Constitutional: He appears well-developed.   HENT:   Head: Normocephalic and atraumatic.   Nose: Nose normal.   Eyes: Conjunctivae and EOM are normal. Pupils are equal, round, and reactive to light.   Neck: Normal range of motion. Neck supple. No JVD present. No tracheal deviation present. No thyromegaly present.   Cardiovascular: Normal rate, regular rhythm, normal heart sounds and intact distal pulses.  Exam reveals no gallop and no friction rub.    No murmur heard.  Pulmonary/Chest: Effort normal. No stridor. No respiratory distress. He has no wheezes. He has no rales. He exhibits no tenderness.   Abdominal: Soft. Bowel sounds are normal. He exhibits no distension. There is no tenderness. There is no rebound and no guarding.   Musculoskeletal: Normal range of motion. He exhibits no edema, tenderness or deformity.   No spinal tenderness appreciated    Neurological: He is alert. He has normal reflexes. No cranial nerve deficit. Coordination normal.   Skin: Skin is warm and dry. No rash noted. No erythema. No pallor.   L UE AVF +bruit/thrill   Nursing note and vitals  reviewed.    Significant Labs:   CBC:   Recent Labs  Lab 04/11/17  0648 04/12/17  0618   WBC 5.44 4.59   HGB 11.8* 11.0*   HCT 36.5* 34.6*    183     CMP:   Recent Labs  Lab 04/11/17  0648 04/12/17  0618   *  132* 132*  132*   K 5.6*  5.6* 5.7*  5.7*     101 102  102   CO2 23  23 21*  21*   GLU 64*  64* 88  88   BUN 40*  40* 34*  34*   CREATININE 8.4*  8.4* 7.4*  7.4*   CALCIUM 8.6*  8.6* 8.6*  8.6*   ALBUMIN 2.7* 2.7*   ANIONGAP 8  8 9  9   EGFRNONAA 6*  6* 7*  7*       Significant Imaging:   Imaging Results         NM Inflammitory Localization WB Indium (Final result) Result time:  04/11/17 16:40:13    Final result by Ramírez Cline MD (04/11/17 16:40:13)    Impression:      Negative indium-111 labeled white blood cell scan.      Electronically signed by: RAMÍREZ CLINE MD  Date:     04/11/17  Time:    16:40     Narrative:    Exam: NM INFLAMMATORY WHOLE BODY INDIUM,    Date:  04/10/17 10:22:54    History: Fever of unknown origin.  MRSA infection.    Comparison:  No prior relevant studies available    Findings: Whole body indium-111 labeled white blood cell scan performed with 480 uCi.    Normal hepatosplenic uptake.  Normal marrow uptake.  No localized uptake is present.            US Soft Tissue Misc (Final result) Result time:  04/05/17 13:55:51    Final result by Mar Zapata MD (04/05/17 13:55:51)    Impression:         No sonographic evidence of abscess.      Electronically signed by: MAR ZAPATA MD  Date:     04/05/17  Time:    13:55     Narrative:    Exam: US SOFT TISSUE MISC    Clinical History:    Left arm cellulitis near the AV fistula.    Findings:  No suspicious fluid collection identified adjacent to the AV fistula in the left upper arm.            X-Ray Chest PA And Lateral (Final result) Result time:  04/03/17 18:52:43    Final result by Cornelius Jackson MD (04/03/17 18:52:43)    Impression:           1.  Left right basilar atelectasis last  consolidation, concerning for pneumonia.  Other considerations would include interstitial pulmonary edema.  Clinical correlation is advised.  2.  Stable findings as noted above.      Electronically signed by: DAVID GUTIERREZ MD  Date:     04/03/17  Time:    18:52     Narrative:    PA and Lateral Chest x-ray    Clinical Indication: bacteremia.  Fever    Findings:    Comparisons are made to 01/31/2017.  Left rib and right basilar atelectasis/consolidation and effusions.      The upper lungs are clear. The cardiac silhouette size is enlarged. The trachea is midline and the mediastinal width is normal. Negative for pneumothorax. Pulmonary vasculature is normal. Negative for osseous abnormalities. There are degenerative changes of spine and both shoulder girdles. There are calcifications of the aortic knob and tortuosity of the descending thoracic aorta. There are changes of a prior median sternotomy and CABG changes.

## 2017-04-12 NOTE — PROGRESS NOTES
"[facility] Hospital Medicine  Progress Note    Patient Name: David Hadley  MRN: [id number]  Patient Class: IP- Inpatient   Admission Date: 4/3/2017  Length of Stay: 9 days  Attending Physician: Jose Walker, *  Primary Care Provider: Isaac Fitch MD        Subjective:     Principal Problem:MRSA bacteremia    HPI:  David Hadley is a 61 y.o. male patient with PMHx of ESRD on HD q TTS, HTN, HLP, who presented to the ER for c/o "for further evaluation of infection that was noticed in his blood today via Dr. Choudhury." Dr. Choudhury reported positive blood cx's c/w GPC in clusters. Records were reviewed. Pt had MRSA on blood Cx in Jan/Feb 2017. Source is not known.Pt stated he was sent here for IV abx via Dr. Choudhury. Pt is denying any sxs at this time. Pt states he is due for dialysis tomorrow. Patient denies any fever, N/V/D, chills, abd pain, CP, SOB, weakness/numbness, dizziness, lightheadedness, HA  and all other sxs at this time. No tooth problems. Pt has been on HD about 10 years, and gets dialyzed regularly in [place], [place] (Dr. Rinku Guardado). His L UE AVF is not bothering him.    Hospital Course:  ID consulted. Blood cultures showed Staphylcoccus aureus. Cardiology consulted and SALAZAR scheduled 4/6/17 which did not show evidence of endocarditis.  Continued IV antibiotics and patient remained asymptomatic.  Planned for Indium tagged WBC scan Monday 10 April. Repeat blood cultures drawn 4/8/17 1 of 2 positive for MRSA. Indium scan 4/10/17 was negative. Blood cultures repeated 4/12/17.     Interval History: Part 2 of Indium scan today. Potassium remains high. Will receive HD again today. Daughter at bedside. Nurses report episode of hypoglycemia overnight.     Review of Systems   Unable to perform ROS: Dementia     Objective:     Vital Signs (Most Recent):  Temp: 97.8 °F (36.6 °C) (04/11/17 0912)  Pulse: (!) 52 (04/11/17 0912)  Resp: 18 (04/11/17 0912)  BP: 127/82 (04/11/17 0912)  SpO2: 100 % (04/11/17 " 0912) Vital Signs (24h Range):  Temp:  [97.4 °F (36.3 °C)-98 °F (36.7 °C)] 97.8 °F (36.6 °C)  Pulse:  [52-77] 52  Resp:  [16-18] 18  SpO2:  [95 %-100 %] 100 %  BP: (113-135)/(61-82) 127/82     Weight: 94.8 kg (209 lb)  Body mass index is 26.83 kg/(m^2).    Intake/Output Summary (Last 24 hours) at 04/11/17 1052  Last data filed at 04/11/17 0912   Gross per 24 hour   Intake             1420 ml   Output             2714 ml   Net            -1294 ml      Physical Exam   Constitutional: He appears well-developed.   Flat affect     HENT:   Head: Normocephalic and atraumatic.   Nose: Nose normal.   Eyes: Conjunctivae and EOM are normal. Pupils are equal, round, and reactive to light.   Neck: Normal range of motion. Neck supple. No JVD present. No tracheal deviation present. No thyromegaly present.   Cardiovascular: Normal rate, regular rhythm, normal heart sounds and intact distal pulses.  Exam reveals no gallop and no friction rub.    No murmur heard.  Pulmonary/Chest: Effort normal. No stridor. No respiratory distress. He has no wheezes. He has no rales. He exhibits no tenderness.   Abdominal: Soft. Bowel sounds are normal. He exhibits no distension. There is no tenderness. There is no rebound and no guarding.   Musculoskeletal: Normal range of motion. He exhibits no edema, tenderness or deformity.   No spinal tenderness appreciated    Neurological: He is alert. He has normal reflexes. No cranial nerve deficit. Coordination normal.   Follows simple commands   Skin: Skin is warm and dry. No rash noted. No erythema. No pallor.   L UE AVF +bruit/thrill   Nursing note and vitals reviewed.    Significant Labs:   BMP:   Recent Labs  Lab 04/11/17  0648   GLU 64*  64*   *  132*   K 5.6*  5.6*     101   CO2 23  23   BUN 40*  40*   CREATININE 8.4*  8.4*   CALCIUM 8.6*  8.6*     CBC:   Recent Labs  Lab 04/09/17  1132 04/10/17  0526 04/11/17  0648   WBC 4.57 4.68 5.44   HGB 11.3* 12.2* 11.8*   HCT 35.7* 38.6*  36.5*    203 172       Significant Imaging:  Imaging Results         NM Inflammitory Localization WB Indium (In process)         US Soft Tissue Misc (Final result) Result time:  04/05/17 13:55:51    Final result by Mar Yadav MD (04/05/17 13:55:51)    Impression:         No sonographic evidence of abscess.      Electronically signed by: MAR YADAV MD  Date:     04/05/17  Time:    13:55     Narrative:    Exam: US SOFT TISSUE MISC    Clinical History:    Left arm cellulitis near the AV fistula.    Findings:  No suspicious fluid collection identified adjacent to the AV fistula in the left upper arm.            X-Ray Chest PA And Lateral (Final result) Result time:  04/03/17 18:52:43    Final result by Cornelius Jackson MD (04/03/17 18:52:43)    Impression:           1.  Left right basilar atelectasis last consolidation, concerning for pneumonia.  Other considerations would include interstitial pulmonary edema.  Clinical correlation is advised.  2.  Stable findings as noted above.      Electronically signed by: CORNELIUS JACKSON MD  Date:     04/03/17  Time:    18:52     Narrative:    PA and Lateral Chest x-ray    Clinical Indication: bacteremia.  Fever    Findings:    Comparisons are made to 01/31/2017.  Left rib and right basilar atelectasis/consolidation and effusions.      The upper lungs are clear. The cardiac silhouette size is enlarged. The trachea is midline and the mediastinal width is normal. Negative for pneumothorax. Pulmonary vasculature is normal. Negative for osseous abnormalities. There are degenerative changes of spine and both shoulder girdles. There are calcifications of the aortic knob and tortuosity of the descending thoracic aorta. There are changes of a prior median sternotomy and CABG changes.                Assessment/Plan:      * MRSA bacteremia  -Persistent MRSA Bacteremia:Had MRSA in blood Cx in Jan/Feb 2017  -Source unknown  -No obvious wounds, no abscess, no foreign bodies or  catheters, ear surgery done in 2015,   -Infectious Disease following  -ECHO: SALAZAR 4/6/17 negative  -Vancomycin IV to keep 15-20 level  Tagged WBC scan Part 2 today  Patient denies back pain upon examination. Will consider MRI of the L and T spine.    ESRD (end stage renal disease) on dialysis   -ESRD pt, q TTS HD schedule in [place], [place].  -Nephrology following for renal replacement  -received HD yesterday. Will receive again today    CML in remission  -Hold Sprycel given acute infection      Dementia without behavioral disturbance  -supportive care  -frequent nursing rounds  -Continue Exelon    Hyperkalemia  -will improve with HD        VTE Risk Mitigation         Ordered     heparin (porcine) injection 5,000 Units  Every 8 hours     Route:  Subcutaneous        04/03/17 2007     Medium Risk of VTE  Once      04/03/17 2007          Pk Louis NP  Department of Hospital Medicine   [facility]

## 2017-04-12 NOTE — ASSESSMENT & PLAN NOTE
-ESRD pt, q TTS HD schedule in [place], [place].  -Nephrology following for renal replacement  -next HD session 4/13/17

## 2017-04-13 LAB
ALBUMIN SERPL BCP-MCNC: 2.8 G/DL
ANION GAP SERPL CALC-SCNC: 9 MMOL/L
ANION GAP SERPL CALC-SCNC: 9 MMOL/L
BACTERIA BLD CULT: NORMAL
BASOPHILS # BLD AUTO: 0.04 K/UL
BASOPHILS NFR BLD: 0.9 %
BUN SERPL-MCNC: 44 MG/DL
BUN SERPL-MCNC: 44 MG/DL
CALCIUM SERPL-MCNC: 8.8 MG/DL
CALCIUM SERPL-MCNC: 8.8 MG/DL
CHLORIDE SERPL-SCNC: 99 MMOL/L
CHLORIDE SERPL-SCNC: 99 MMOL/L
CO2 SERPL-SCNC: 22 MMOL/L
CO2 SERPL-SCNC: 22 MMOL/L
CREAT SERPL-MCNC: 8.7 MG/DL
CREAT SERPL-MCNC: 8.7 MG/DL
DIFFERENTIAL METHOD: ABNORMAL
EOSINOPHIL # BLD AUTO: 0.1 K/UL
EOSINOPHIL NFR BLD: 2.5 %
ERYTHROCYTE [DISTWIDTH] IN BLOOD BY AUTOMATED COUNT: 18.7 %
EST. GFR  (AFRICAN AMERICAN): 7 ML/MIN/1.73 M^2
EST. GFR  (AFRICAN AMERICAN): 7 ML/MIN/1.73 M^2
EST. GFR  (NON AFRICAN AMERICAN): 6 ML/MIN/1.73 M^2
EST. GFR  (NON AFRICAN AMERICAN): 6 ML/MIN/1.73 M^2
GLUCOSE SERPL-MCNC: 63 MG/DL
GLUCOSE SERPL-MCNC: 63 MG/DL
HCT VFR BLD AUTO: 36 %
HGB BLD-MCNC: 11.5 G/DL
LYMPHOCYTES # BLD AUTO: 1.8 K/UL
LYMPHOCYTES NFR BLD: 42.3 %
MCH RBC QN AUTO: 27.9 PG
MCHC RBC AUTO-ENTMCNC: 31.9 %
MCV RBC AUTO: 87 FL
MONOCYTES # BLD AUTO: 0.4 K/UL
MONOCYTES NFR BLD: 9.9 %
NEUTROPHILS # BLD AUTO: 1.9 K/UL
NEUTROPHILS NFR BLD: 44.4 %
PHOSPHATE SERPL-MCNC: 5.5 MG/DL
PLATELET # BLD AUTO: 186 K/UL
PMV BLD AUTO: 9.7 FL
POCT GLUCOSE: 106 MG/DL (ref 70–110)
POCT GLUCOSE: 67 MG/DL (ref 70–110)
POCT GLUCOSE: 80 MG/DL (ref 70–110)
POCT GLUCOSE: 84 MG/DL (ref 70–110)
POCT GLUCOSE: 90 MG/DL (ref 70–110)
POTASSIUM SERPL-SCNC: 5.6 MMOL/L
POTASSIUM SERPL-SCNC: 5.6 MMOL/L
RBC # BLD AUTO: 4.12 M/UL
SODIUM SERPL-SCNC: 130 MMOL/L
SODIUM SERPL-SCNC: 130 MMOL/L
VANCOMYCIN SERPL-MCNC: 17.9 UG/ML
WBC # BLD AUTO: 4.33 K/UL

## 2017-04-13 PROCEDURE — 36415 COLL VENOUS BLD VENIPUNCTURE: CPT

## 2017-04-13 PROCEDURE — 25000003 PHARM REV CODE 250: Performed by: NURSE PRACTITIONER

## 2017-04-13 PROCEDURE — 80100016 HC MAINTENANCE HEMODIALYSIS

## 2017-04-13 PROCEDURE — 96372 THER/PROPH/DIAG INJ SC/IM: CPT

## 2017-04-13 PROCEDURE — 80202 ASSAY OF VANCOMYCIN: CPT

## 2017-04-13 PROCEDURE — 80069 RENAL FUNCTION PANEL: CPT

## 2017-04-13 PROCEDURE — 25000003 PHARM REV CODE 250: Performed by: HOSPITALIST

## 2017-04-13 PROCEDURE — 21400001 HC TELEMETRY ROOM

## 2017-04-13 PROCEDURE — 99232 SBSQ HOSP IP/OBS MODERATE 35: CPT | Mod: ,,, | Performed by: INTERNAL MEDICINE

## 2017-04-13 PROCEDURE — 25000003 PHARM REV CODE 250: Performed by: INTERNAL MEDICINE

## 2017-04-13 PROCEDURE — 85025 COMPLETE CBC W/AUTO DIFF WBC: CPT

## 2017-04-13 PROCEDURE — 63600175 PHARM REV CODE 636 W HCPCS: Performed by: HOSPITALIST

## 2017-04-13 RX ORDER — SODIUM BICARBONATE 650 MG/1
1300 TABLET ORAL 3 TIMES DAILY
COMMUNITY
Start: 2017-04-13 | End: 2017-09-07

## 2017-04-13 RX ORDER — AMLODIPINE BESYLATE 5 MG/1
5 TABLET ORAL DAILY
Qty: 30 TABLET | Refills: 0 | Status: SHIPPED | OUTPATIENT
Start: 2017-04-13 | End: 2017-05-09 | Stop reason: SDUPTHER

## 2017-04-13 RX ADMIN — PANTOPRAZOLE SODIUM 40 MG: 40 TABLET, DELAYED RELEASE ORAL at 08:04

## 2017-04-13 RX ADMIN — Medication 16 G: at 05:04

## 2017-04-13 RX ADMIN — GABAPENTIN 100 MG: 100 CAPSULE ORAL at 02:04

## 2017-04-13 RX ADMIN — STANDARDIZED SENNA CONCENTRATE AND DOCUSATE SODIUM 1 TABLET: 8.6; 5 TABLET, FILM COATED ORAL at 09:04

## 2017-04-13 RX ADMIN — VANCOMYCIN HYDROCHLORIDE 750 MG: 1 INJECTION, POWDER, LYOPHILIZED, FOR SOLUTION INTRAVENOUS at 03:04

## 2017-04-13 RX ADMIN — AMLODIPINE BESYLATE 5 MG: 5 TABLET ORAL at 02:04

## 2017-04-13 RX ADMIN — SERTRALINE HYDROCHLORIDE 50 MG: 50 TABLET ORAL at 08:04

## 2017-04-13 RX ADMIN — SODIUM BICARBONATE 650 MG TABLET 1300 MG: at 03:04

## 2017-04-13 RX ADMIN — HEPARIN SODIUM 5000 UNITS: 5000 INJECTION, SOLUTION INTRAVENOUS; SUBCUTANEOUS at 02:04

## 2017-04-13 RX ADMIN — ATORVASTATIN CALCIUM 20 MG: 10 TABLET, FILM COATED ORAL at 08:04

## 2017-04-13 RX ADMIN — STANDARDIZED SENNA CONCENTRATE AND DOCUSATE SODIUM 1 TABLET: 8.6; 5 TABLET, FILM COATED ORAL at 08:04

## 2017-04-13 RX ADMIN — SODIUM BICARBONATE 650 MG TABLET 1300 MG: at 05:04

## 2017-04-13 RX ADMIN — HEPARIN SODIUM 5000 UNITS: 5000 INJECTION, SOLUTION INTRAVENOUS; SUBCUTANEOUS at 09:04

## 2017-04-13 RX ADMIN — RIVASTIGMINE TRANSDERMAL SYSTEM 1 PATCH: 4.6 PATCH, EXTENDED RELEASE TRANSDERMAL at 08:04

## 2017-04-13 RX ADMIN — HEPARIN SODIUM 5000 UNITS: 5000 INJECTION, SOLUTION INTRAVENOUS; SUBCUTANEOUS at 05:04

## 2017-04-13 RX ADMIN — SODIUM BICARBONATE 650 MG TABLET 1300 MG: at 09:04

## 2017-04-13 RX ADMIN — METOPROLOL TARTRATE 25 MG: 25 TABLET ORAL at 02:04

## 2017-04-13 RX ADMIN — POLYETHYLENE GLYCOL 3350 17 G: 17 POWDER, FOR SOLUTION ORAL at 08:04

## 2017-04-13 RX ADMIN — GABAPENTIN 100 MG: 100 CAPSULE ORAL at 05:04

## 2017-04-13 RX ADMIN — GABAPENTIN 100 MG: 100 CAPSULE ORAL at 09:04

## 2017-04-13 RX ADMIN — METOPROLOL TARTRATE 25 MG: 25 TABLET ORAL at 09:04

## 2017-04-13 NOTE — PLAN OF CARE
Patient received hd for 3.5 hours today. Net removal 2 liters. No access problems. Dr. Sanchez visited during hd.

## 2017-04-13 NOTE — PLAN OF CARE
Called [facility] ([phone]) to inform them of order being sent for pt to receive Vancomycin at HD. D/c summary and order faxed to [facility] ([phone]).     04/13/17 1529   Discharge Assessment   Assessment Type Final Discharge Note   On Dialysis? Yes   If yes, what is the name of the dialysis unit? [facility]   Does the patient receive outpatient dialysis? Yes   Discharge Plan A Home with family   Patient/Family In Agreement With Plan yes

## 2017-04-13 NOTE — ASSESSMENT & PLAN NOTE
Nutrition Problem:   Altered nutrition related laboratory values    Etiology/Related to:   ESRD    As evidenced by:  Elevated BUN Cr, K, and Phos with limited adherence to nutrition related diet recommendations    Treatment Strategy:   1. Renal diet  2. Education to caregiver (wife)    Nutrition Diagnosis Status:   New

## 2017-04-13 NOTE — PROGRESS NOTES
Pt receiving HD @ this time. Dialysis nurse, Moe, at BS. Administered morning medications, but held Amlodipine and Metoprolol until after dialysis. Will also hold Vancomycin until dialysis is complete. Will continue to monitor.

## 2017-04-13 NOTE — PROGRESS NOTES
"Subjective    Patient is currently on hemodialysis. He is tolerating the procedure well, vital signs are stable. Patient is resting comfortably, no complaints.       Physical exam:  /76 (BP Location: Right arm, Patient Position: Lying, BP Method: Automatic)  Pulse (!) 53  Temp 98 °F (36.7 °C) (Oral)   Resp 18  Ht 6' 2" (1.88 m)  Wt 92.7 kg (204 lb 5.9 oz)  SpO2 98%  BMI 26.24 kg/m2    HEENT: moist mucosal membranes  HEART: RRR, S1/S2, no rub  LUNGS: CTA, bilaterally, no wheezing  ABDOMEN: soft, nontender, not distended, bowel sounds present  EXTREMITIES: no LE edema  NEURO: A & O x 3    Labs:  Lab Results   Component Value Date    CREATININE 8.7 (H) 04/13/2017    CREATININE 8.7 (H) 04/13/2017    BUN 44 (H) 04/13/2017    BUN 44 (H) 04/13/2017     (L) 04/13/2017     (L) 04/13/2017    K 5.6 (H) 04/13/2017    K 5.6 (H) 04/13/2017    CL 99 04/13/2017    CL 99 04/13/2017    CO2 22 (L) 04/13/2017    CO2 22 (L) 04/13/2017     Lab Results   Component Value Date     (H) 11/06/2008    CALCIUM 8.8 04/13/2017    CALCIUM 8.8 04/13/2017    PHOS 5.5 (H) 04/13/2017     Lab Results   Component Value Date    ALBUMIN 2.8 (L) 04/13/2017     Lab Results   Component Value Date    WBC 4.33 04/13/2017    HGB 11.5 (L) 04/13/2017    HCT 36.0 (L) 04/13/2017    MCV 87 04/13/2017     04/13/2017       Assessment and Plan:  61 year old male with h/o ESRD, anemia, HTN, DM2, CAD, CHF presented with MRSA bacteremia.       1. ESRD: TTS, HD today, Time: 3.5  Hrs    2. Access: Left arm AVF.     3. Electrolytes: Hyperkalemia. Patient now on strict low K diet. Sodium bicarbonate was added and Losartan was stopped. Consider pseudohyperkalemia.   Borderline hyponatremia: monitor.     4. Acid-base: mild acidosis. Continue sodium bicarbonate.     5. Volume: no gross overload.     6. Anemia: Hgb at goal for dialysis patient, no SUNIL needed at present.     7. HTN: acceptable BP control.     8. Medications: reviewed.     9. " MRSA bacteremia: Continue Vancomycin.

## 2017-04-13 NOTE — PLAN OF CARE
Problem: Diabetes, Type 1 (Adult)  Intervention: Support/Optimize Psychosocial Response to Condition  Reviewed plan of care with patient.  Patient verbalized understanding and teachback.       12h chart check completed.

## 2017-04-13 NOTE — DISCHARGE INSTRUCTIONS
Vancomycin injection  What is this medicine?  VANCOMYCIN (clint MARTINEZ sin) is a glycopeptide antibiotic. It is used to treat certain kinds of bacterial infections. It will not work for colds, flu, or other viral infections.  How should I use this medicine?  This medicine is infused into a vein. It is usually given by a health care provider in a hospital or clinic.  If you receive this medicine at home, you will receive special instructions. Take your medicine at regular intervals. Do not take your medicine more often than directed. Take all of your medicine as directed even if you think you are better. Do not skip doses or stop your medicine early.  It is important that you put your used needles and syringes in a special sharps container. Do not put them in a trash can. If you do not have a sharps container, call your pharmacist or healthcare provider to get one.  Talk to your pediatrician regarding the use of this medicine in children. While this drug may be prescribed for even very young infants for selected conditions, precautions do apply.  What side effects may I notice from receiving this medicine?  Side effects that you should report to your doctor or health care professional as soon as possible:  · allergic reactions like skin rash, itching or hives, swelling of the face, lips, or tongue  · breathing difficulty, wheezing  · change in amount, color of urine  · change in hearing  · chest pain  · dizziness  · fever, chills  · flushing of the face and neck (reddening)  · low blood pressure  · redness, blistering, peeling or loosening of the skin, including inside the mouth  · unusual bleeding or bruising  · unusually weak or tired  Side effects that usually do not require medical attention (report to your doctor or health care professional if they continue or are bothersome):  · nausea, vomiting  · pain, swelling where injected  · stomach cramps  What may interact with this medicine?  · amphotericin  B  · anesthetics  · bacitracin  · birth control pills  · cisplatin  · colistin  · diuretics  · other aminoglycoside antibiotics  · polymyxin B  What if I miss a dose?  If you miss a dose, take it as soon as you can. If it is almost time for your next dose, take only that dose. Do not take double or extra doses.  Where should I keep my medicine?  Keep out of the reach of children.  You will be instructed on how to store this medicine, if needed. Throw away any unused medicine after the expiration date on the label.  What should I tell my health care provider before I take this medicine?  They need to know if you have any of these conditions:  · dehydration  · hearing loss  · kidney disease  · other chronic illness  · an unusual or allergic reaction to vancomycin, other medicines, foods, dyes, or preservatives  · pregnant or trying to get pregnant  · breast-feeding  What should I watch for while using this medicine?  Tell your doctor or health care professional if your symptoms do not improve or if you get new symptoms. Your condition and lab work will be monitored while you are taking this medicine.  Do not treat diarrhea with over the counter products. Contact your doctor if you have diarrhea that lasts more than 2 days or if it is severe and watery.  Date Last Reviewed:   NOTE:This sheet is a summary. It may not cover all possible information. If you have questions about this medicine, talk to your doctor, pharmacist, or health care provider. Copyright© 2016 [employer]        Sodium Bicarbonate tablets  What is this medicine?  SODIUM BICARBONATE (JOSELO wendy um; bye BENSON arreguin ate) is an antacid. It is used to treat acid indigestion and heartburn caused by too much acid in the stomach.  How should I use this medicine?  Take this medicine by mouth. Dissolve it in a glass of water before taking. Do not take tablets whole. Follow the directions on the package label. Take your medicine at regular intervals. Do not take it  more often than directed.  Talk to your pediatrician regarding the use of this medicine in children. Special care may be needed.  Patients over 60 years old may have a stronger reaction and need a smaller dose.  What side effects may I notice from receiving this medicine?  Side effects that you should report to your doctor or health care professional as soon as possible:  · allergic reactions like skin rash, itching or hives, swelling of the face, lips, or tongue  · confusion  · dizziness  · muscle pain  · nausea, vomiting  · seizures  · swelling of the feet and legs  · unusually weak or tired  Side effects that usually do not require medical attention (report to your doctor or health care professional if they continue or are bothersome):  · bloating and stomach gas  · increased thirst  · stomach cramps  What may interact with this medicine?  Do not take this medicine with any of the following medications:  · ammonium chloride  This medicine may also interact with the following medications:  · antibiotics like ciprofloxacin, doxycycline, tetracycline  · aspirin and aspirin-like medicines  · delavirdine  · ephedra, Ma Manning  · itraconazole, ketoconazole  · lithium  · methenamine  · norfloxacin  · quinidine  · quinine  · stimulants like amphetamine, dexmethylphenidate, and others  · tolmetin  What if I miss a dose?  If you miss a dose, take it as soon as you can. If it is almost time for your next dose, take only that dose. Do not take double or extra doses.  Where should I keep my medicine?  Keep out of the reach of children.  Store at room temperature between 15 and 30 degrees C (59 and 86 degrees F). Keep container tightly closed. Throw away any unused medicine after the expiration date.  What should I tell my health care provider before I take this medicine?  They need to know if you have any of these conditions:  · Cushing's syndrome  · heart problems  · kidney disease  · low levels of calcium or potassium in the  blood  · low salt or sodium diet  · stomach ulcer  · an unusual or allergic reaction to sodium bicarbonate, other medicines, foods, dyes, or preservatives  · pregnant or trying to get pregnant  · breast-feeding  What should I watch for while using this medicine?  Tell your doctor or healthcare professional if your symptoms do not start to get better or if they get worse. Do not take this medicine for more than 2 weeks unless your doctor directs you to. See your doctor if your symptoms return. You may have a serious medical condition.  Date Last Reviewed:   NOTE:This sheet is a summary. It may not cover all possible information. If you have questions about this medicine, talk to your doctor, pharmacist, or health care provider. Copyright© 2016 [employer]        Aliskiren; Amlodipine oral tablets  What is this medicine?  ALISKIREN; AMLODIPINE (a lis ADONAY kristie; am BERNADETTE di peen) is a combination of a renin inhibitor and a calcium channel blocker. This medicine is used to treat high blood pressure.  How should I use this medicine?  Take this medicine by mouth with a glass of water. Follow the directions on your prescription label. You may take this medicine with or without food, but try to take it the same way every time. Take your medicine at regular intervals. Do not take it more often than directed. Do not stop taking except on your doctor's advice.  Talk to your pediatrician regarding the use of this medicine in children. Special care may be needed.  What side effects may I notice from receiving this medicine?  Side effects that you should report to your doctor or health care professional as soon as possible:  · allergic reactions like skin rash or hives, swelling of the hands, feet, face, lips, throat, or tongue  · breathing problems  · chest pain  · fast, irregular heartbeat  · feeling faint or lightheaded, falls  · low blood pressure  · seizures  · swelling of ankles, feet, hands  Side effects that usually do not  require medical attention (Report these to your doctor or health care professional if they continue or are bothersome.):  · cough  · diarrhea  · dry mouth  · facial flushing  · nausea, vomiting  · upset stomach  · weak or tired  What may interact with this medicine?  · atorvastatin  · certain medicines for fungal infections like ketoconazole and itraconazole  · cyclosporine  · diuretics  · medicines for blood pressure  · potassium supplements  · salt substitutes with potassium  What if I miss a dose?  If you miss a dose, take it as soon as you can. If it is almost time for your next dose, take only that dose. Do not take double or extra doses.  Where should I keep my medicine?  Keep out of the reach of children.  Store at room temperature between 15 and 30 degrees C (59 and 86 degrees F). Protect from heat and moisture. Throw away any unused medicine after the expiration date.  What should I tell my health care provider before I take this medicine?  They need to know if you have any of these conditions:  · dehydration  · diabetes  · heart problems like heart failure or aortic stenosis  · kidney disease  · liver disease  · an unusual or allergic reaction to aliskiren, amlodipine, other medicines, foods, dyes, or preservatives  · pregnant or trying to get pregnant  · breast-feeding  What should I watch for while using this medicine?  Visit your doctor or health care professional for regular checks on your progress. Check your blood pressure as directed. Ask your doctor or health care professional what your blood pressure should be and when you should contact him or her.  If you have diabetes and are taking a medicine called an angiotensin-receptor-blocker (ARB) or angiotensin-converting-enzyme-inhibitor (ACE inhibitor), do not take this medicine. Talk to your doctor or health care professional for more information.  Women should inform their doctor if they wish to become pregnant or think they might be pregnant. There  is a potential for serious side effects to an unborn child. Talk to your health care professional or pharmacist for more information.  This medicine may make you feel confused, dizzy or lightheaded. Do not drive, use machinery, or do anything that needs mental alertness until you know how this medicine affects you. To reduce the risk of dizzy or fainting spells, do not sit or stand up quickly, especially if you are an older patient. Avoid alcoholic drinks; they can make you more dizzy.  Date Last Reviewed:   NOTE:This sheet is a summary. It may not cover all possible information. If you have questions about this medicine, talk to your doctor, pharmacist, or health care provider. Copyright© 2016 [employer]        Methicillin-Resistant Staphylococcus aureus (MRSA)  What is MRSA?  Staphylococcus aureus bacteria or staph are common germs. They are normally found on the skin or in the nose of many people. Usually, the bacteria cause no problem. Occasionally, they can cause mild skin infections. Or severe infections of the skin, lungs, blood, or other organs or tissues may develop. Some staph infections can be easily treated with antibiotics. But one type of staph infection, methicillin-resistant staphylococcus aureus (MRSA) cannot. It is called methicillin-resistant because the antibiotic, methicillin, which used to be effective treatment, no longer works. MRSA is common in hospitals and nursing homes or long-term care facilities. It is also spreading among healthy children and adults outside the healthcare system. A person may be a carrier. Or he or she may have the infection.  · Colonization. When a person carries the MRSA bacteria but is healthy, it's called being colonized. This person can spread MRSA to others but has no infection.  · Infection. When a person gets sick because of the bacteria, it's called being infected with MRSA. This person can also spread MRSA to others. If not treated properly, MRSA  infections can be very serious and even cause death.    What are the risk factors for MRSA?  Anyone can get MRSA, although there are factors that increase the risk. Some of these include:  · Recent or lengthy hospital stay  · Having a surgical wound or intravenous (IV) line  · Having a weakened immune system due to a medical condition or its treatment  · Living in a nursing home or long-term care facility  · Recent antibiotic therapy  · Diabetes  · Kidney dialysis  · HIV infection  · Injection drug use or sharing needles  · custodial or long-term time  · Living in any crowded facility, such as a dormitory  ·  service  · Sharing sports equipment, razors, or other sharp objects  How does MRSA spread?  · People who are colonized with MRSA have MRSA in their noses or on their skin. Though they may not be sick themselves, they can spread the germs to others.  · In hospitals and long-term care facilities, MRSA can spread from patient to patient on the hands of healthcare workers. It can also spread on objects, such as cart or door handles and bedrails.  · Outside healthcare settings, MRSA usually spreads through skin-to-skin contact, shared towels or athletic equipment, or through close contact with an infected person.  What are the symptoms of MRSA infection?  MRSA skin infections start as small red bumps on the skin that look like pimples or spider bites. The small bumps usually get larger and become swollen, painful, warm to the touch, and filled with pus. Fever may be present. MRSA can also start in other ways. And it can spread deeper into the body where it can cause one or more of the following:  · Infections in bones, muscles, and other tissues  · Pneumonia, an infection in one or both lungs  · Infection of a surgical wound  · Infection in the bloodstream (bacteremia)  · Infection of the lining of the heart (endocarditis)  · Infection of the urinary tract (bladder and kidneys)  How is MRSA diagnosed?  A sample of  blood, urine, or infected tissue may be taken to diagnose a MRSA infection. A swab of the inside of the nose is taken to diagnose colonization. The sample is then sent to a laboratory and tested for MRSA. if the infection involves bone, joint, or other organs, a blood test may be done. Imaging studies, such as an X-ray or CT scan, may also be needed.  How is MRSA treated?  MRSA infections are usually treated with antibiotics. It may be given by mouth in pill form or into a vein (intravenous or IV). If a skin abscess is present, it may be drained.   Patients who test positive for MRSA colonization may undergo a process called decolonization. A topical antibiotic is applied inside the nose or in the nostrils to kill the bacteria. A special soap may be used to cleanse the skin.  Can MRSA be prevented?  Hospitals and nursing homes help prevent MRSA by doing the following:  · Handwashing. This is the single most important way to prevent the spread of germs. Healthcare workers should wash their hands with soap and water or an alcohol-based hand  before and after treating each patient. They also should clean their hands after touching any surface that may be contaminated.  · Protective clothing. Healthcare workers and visitors may wear gloves and a gown when entering the room of a patient with MRSA. They remove these items before leaving.  · Private rooms. Patients with MRSA infections are placed in private rooms or in a room with others who have the same infection.  · Personal care items. Patients with MRSA may have their own patient care items, such as thermometers and stethoscopes.  · Monitoring. Hospitals monitor the spread of MRSA and educate all staff on the best ways to prevent it.  Patients can help prevent MRSA by doing the following:  · Ask all hospital staff to wash their hands before touching you. Dont be afraid to speak up!  · Wash your own hands frequently with soap and water. Or use an alcohol-based  hand gel.  · Ask that stethoscopes and other instruments be wiped with alcohol before they are used on you.  · Be sure youre tested for MRSA if you have a skin infection.  If you are taking care of someone with MRSA:  · Wash your hands well with soap and water before and after any contact with the person.  · Wear gloves when changing a bandage or touching an infected wound. Discard gloves after each use. Then wash your hands well.  · Wash the patient's bed linens, towels, and clothing in hot water with detergent or liquid bleach.  Everyone can help prevent MRSA by doing the following:  · Wash your hands often with warm water and soap.  ¨ Rub your hands together.  ¨ Clean the whole hand, under your nails, between your fingers, and up the wrists.  ¨ Wash for at least 15 to 20 seconds.  ¨ Rinse, letting the water run down your fingers, not up your wrists.  ¨ Dry your hands well. Use a paper towel or turn off the faucet and open the door.  · If soap and water aren't available, use an alcohol-based hand .  ¨ Squeeze about a tablespoon of  into the palm of one hand.  ¨ Rub your hands together briskly, cleaning the backs of your hands, the palms, between your fingers, and up the wrists.  ¨ Rub until the  is gone and your hands are completely dry.  · Keep cuts and scrapes clean and covered until they heal.  · Avoid contact with the wounds or bandages of others.  · Avoid sharing towels, razors, clothing, and athletic equipment.  Date Last Reviewed: 10/1/2016  © 9511-7754 [employer]. [address] [place] [id number]. All rights reserved. This information is not intended as a substitute for professional medical care. Always follow your healthcare professional's instructions.

## 2017-04-13 NOTE — PLAN OF CARE
Problem: Patient Care Overview  Goal: Plan of Care Review  Outcome: Ongoing (interventions implemented as appropriate)  Recommendation/Intervention: 1. Consider adding full Renal restriction 2. Continue to encourage diet adherence  Goals: Adherence to Rx diet  Nutrition Goal Status: new  Communication of RD Recs: reviewed with RN

## 2017-04-13 NOTE — CONSULTS
[facility] -   Adult Nutrition  Consult Note    SUMMARY     Recommendations  Recommendation/Intervention: 1. Consider adding full Renal restriction    2. Continue to encourage diet adherence  Goals: Adherence to Rx diet  Nutrition Goal Status: new  Communication of RD Recs: reviewed with RN    Nutrition Discharge Plan  Home on Renal diet    Reason for Assessment  Reason for Assessment: length of stay  Diagnosis:  (MRSA Bacteremia, ESRD)  Relevent Medical History: Dementia, DM, ESRD on HD, MI, HTN   General Information Comments: Patient has been non compliant with hospital Rx diet during this admission. Discussed diet with wife.    Nutrition Prescription Ordered  Current Diet Order: Low Potassium     Nutrition Risk Screen  Nutrition Risk Screen: no indicators present    Nutrition/Diet History  Typical Food/Fluid Intake: Good appetite and intake    Labs/Tests/Procedures/Meds  Pertinent Labs Reviewed: reviewed  Pertinent Labs Comments: Na 130, K 5.6, BUN 44, Cr 8.7, Gluc 63, Phos 5.5, Alb 2.8  Pertinent Medications Reviewed: reviewed    Physical Findings  Skin:  (Heriberto 21)    Anthropometrics  Height (inches): 74.02 in  Weight Method: Bed Scale  Weight (kg): 92.7 kg  Ideal Body Weight (IBW), Male: 190.12 lb  % Ideal Body Weight, Male (lb): 107.5 lb  BMI (kg/m2): 26.23  BMI Grade: 25 - 29.9 - overweight    Estimated/Assessed Needs  Weight Used For Calorie Calculations: 92.7 kg (204 lb 5.9 oz)   Height (cm): 188 cm  30 kcal/kg (kcal): 2781   Weight Used For Protein Calculations: 92.7 kg (204 lb 5.9 oz)  1.2 gm Protein (gm): 111.47 and 1.5 gm Protein (gm): 139.34  Fluid Need Method:  (1000ml +UOP)    Assessment and Plan  ESRD (end stage renal disease) on dialysis  Nutrition Problem:   Altered nutrition related laboratory values    Etiology/Related to:   ESRD    As evidenced by:  Elevated BUN Cr, K, and Phos with limited adherence to nutrition related diet recommendations    Treatment Strategy:   1. Renal  diet  2. Education to caregiver (wife)    Nutrition Diagnosis Status:   New      Monitor and Evaluation  Food and Nutrient Intake: food and beverage intake  Food and Nutrient Adminstration: diet order  Knowledge/Beliefs/Attitudes: beliefs and attitudes  Physical Activity and Function: nutrition-related ADLs and IADLs  Biochemical Data, Medical Tests and Procedures: electrolyte and renal panel, glucose/endocrine profile    Nutrition Follow-Up  RD Follow-up?: Yes (1-2x weekly)

## 2017-04-13 NOTE — PLAN OF CARE
Problem: Patient Care Overview  Goal: Plan of Care Review  Outcome: Ongoing (interventions implemented as appropriate)  Reviewed POC, including indications and possible side effects of administered medications. Sister in law verbalized understanding and teach back.     12 hour chart check complete.

## 2017-04-13 NOTE — DISCHARGE SUMMARY
"[facility] Hospital Medicine  Discharge Summary      Patient Name: David Hadley  MRN: [id number]  Admission Date: 4/3/2017  Hospital Length of Stay: 10 days  Discharge Date and Time:  04/13/2017 2:43 PM  Attending Physician: Jose Walker, *   Discharging Provider: Guilherme Solares NP  Primary Care Provider: Isaac Fitch MD      HPI:   David Hadley is a 61 y.o. male patient with PMHx of ESRD on HD q TTS, HTN, HLP, who presented to the ER for c/o "for further evaluation of infection that was noticed in his blood today via Dr. Choudhury." Dr. Choudhury reported positive blood cx's c/w GPC in clusters. Records were reviewed. Pt had MRSA on blood Cx in Jan/Feb 2017. Source is not known.Pt stated he was sent here for IV abx via Dr. Choudhury. Pt is denying any sxs at this time. Pt states he is due for dialysis tomorrow. Patient denies any fever, N/V/D, chills, abd pain, CP, SOB, weakness/numbness, dizziness, lightheadedness, HA  and all other sxs at this time. No tooth problems. Pt has been on HD about 10 years, and gets dialyzed regularly in [place], [place] (Dr. Rinku Guardado). His L UE AVF is not bothering him.    Procedure(s) (LRB):  TRANSESOPHAGEAL ECHOCARDIOGRAM (SALAZAR) (N/A)      Indwelling Lines/Drains at time of discharge:   Lines/Drains/Airways     Drain                 Hemodialysis AV Fistula Left upper arm -- days              Hospital Course:   ID consulted. Blood cultures showed Staphylcoccus aureus. Cardiology consulted and SALAZAR scheduled 4/6/17 which did not show evidence of endocarditis.  Continued IV antibiotics and patient remained asymptomatic.  Planned for Indium tagged WBC scan Monday 10 April. Repeat blood cultures drawn 4/8/17 1 of 2 positive for MRSA. Indium scan 4/10/17 was negative. Blood cultures repeated 4/12/17. Repeat blood cx were negative. The case was discussed with Dr. Choudhury (infectious disease) who recommended completing 6 weeks of IV Vanc. The patient was seen and examined today and " determined to be suitable for discharge. The patient will resume regularly schedulde dialysis and will receive IV vanc during his treatments. He will follow up with his PCP and with Dr. Choudhury as an outpatient.       Consults:   Consults         Status Ordering Provider     Inpatient consult to Cardiology  Once     Provider:  (Not yet assigned)    Completed KASSIE CHOUDHURY     Inpatient consult to Infectious Diseases  Once     Provider:  Kassie Choudhury MD    Acknowledged HIRA OLIVAREZ     Inpatient consult to Nephrology  Once     Provider:  Deshawn Rome MD    Completed MARII VALDOVINOS     Inpatient consult to Social Work  Once     Provider:  (Not yet assigned)    Completed MAICOL PATINO     Pharmacy to dose Vancomycin consult  Once     Provider:  (Not yet assigned)    Acknowledged DESHAWN ROME          Significant Diagnostic Studies:   Imaging Results         NM Inflammitory Localization WB Indium (Final result) Result time:  04/11/17 16:40:13    Final result by Ramírez Cline MD (04/11/17 16:40:13)    Impression:      Negative indium-111 labeled white blood cell scan.      Electronically signed by: RAMÍREZ CLINE MD  Date:     04/11/17  Time:    16:40     Narrative:    Exam: NM INFLAMMATORY WHOLE BODY INDIUM,    Date:  04/10/17 10:22:54    History: Fever of unknown origin.  MRSA infection.    Comparison:  No prior relevant studies available    Findings: Whole body indium-111 labeled white blood cell scan performed with 480 uCi.    Normal hepatosplenic uptake.  Normal marrow uptake.  No localized uptake is present.            US Soft Tissue Misc (Final result) Result time:  04/05/17 13:55:51    Final result by Mar Zapata MD (04/05/17 13:55:51)    Impression:         No sonographic evidence of abscess.      Electronically signed by: MAR ZAPATA MD  Date:     04/05/17  Time:    13:55     Narrative:    Exam: US SOFT TISSUE MISC    Clinical History:    Left arm cellulitis near the AV  fistula.    Findings:  No suspicious fluid collection identified adjacent to the AV fistula in the left upper arm.            X-Ray Chest PA And Lateral (Final result) Result time:  04/03/17 18:52:43    Final result by Cornelius Jackson MD (04/03/17 18:52:43)    Impression:           1.  Left right basilar atelectasis last consolidation, concerning for pneumonia.  Other considerations would include interstitial pulmonary edema.  Clinical correlation is advised.  2.  Stable findings as noted above.      Electronically signed by: CORNELIUS JACKSON MD  Date:     04/03/17  Time:    18:52     Narrative:    PA and Lateral Chest x-ray    Clinical Indication: bacteremia.  Fever    Findings:    Comparisons are made to 01/31/2017.  Left rib and right basilar atelectasis/consolidation and effusions.      The upper lungs are clear. The cardiac silhouette size is enlarged. The trachea is midline and the mediastinal width is normal. Negative for pneumothorax. Pulmonary vasculature is normal. Negative for osseous abnormalities. There are degenerative changes of spine and both shoulder girdles. There are calcifications of the aortic knob and tortuosity of the descending thoracic aorta. There are changes of a prior median sternotomy and CABG changes.                Pending Diagnostic Studies:     None        Final Active Diagnoses:    Diagnosis Date Noted POA    PRINCIPAL PROBLEM:  MRSA bacteremia [R78.81] 04/03/2017 Yes    Hyperkalemia [E87.5] 04/10/2017 Yes    Dementia without behavioral disturbance [F03.90] 07/29/2016 Yes    ESRD (end stage renal disease) on dialysis [N18.6, Z99.2] 06/26/2013 Not Applicable    CML in remission [C92.11] 06/26/2013 Yes      Problems Resolved During this Admission:    Diagnosis Date Noted Date Resolved POA      No new Assessment & Plan notes have been filed under this hospital service since the last note was generated.  Service: Hospital Medicine      Discharged Condition: stable    Disposition: Home  or Self Care    Follow Up:    Patient Instructions:     Diet general     Activity as tolerated       Medications:  Reconciled Home Medications:   Current Discharge Medication List      START taking these medications    Details   amlodipine (NORVASC) 5 MG tablet Take 1 tablet (5 mg total) by mouth once daily.  Qty: 30 tablet, Refills: 0      sodium bicarbonate 650 MG tablet Take 2 tablets (1,300 mg total) by mouth 3 (three) times daily.      VANCOMYCIN HCL (VANCOMYCIN 1 G/250 ML D5W, READY TO MIX SYSTEM,) Inject 250 mLs (1 g total) into the vein every Tues, Thurs, Sat.         CONTINUE these medications which have NOT CHANGED    Details   atorvastatin (LIPITOR) 20 MG tablet Take 1 tablet (20 mg total) by mouth once daily.  Qty: 90 tablet, Refills: 3      FOLIC ACID/VIT BCOMP,C (JOSAFAT-JANICE ORAL) Take by mouth.      gabapentin (NEURONTIN) 100 MG capsule Take 1 capsule (100 mg total) by mouth 3 (three) times daily.  Qty: 270 capsule, Refills: 3      hydrocodone-acetaminophen 5-325mg (NORCO) 5-325 mg per tablet Take 1 tablet by mouth every 6 to 8 hours as needed for Pain.  Qty: 60 tablet, Refills: 0      losartan (COZAAR) 50 MG tablet Take by mouth. 1 tablet Oral Every day      metoprolol tartrate (LOPRESSOR) 25 MG tablet Take 1 tablet (25 mg total) by mouth 2 (two) times daily.  Qty: 60 tablet, Refills: 0      rivastigmine (EXELON) 4.6 mg/24 hr PT24 Place 1 patch onto the skin once daily.  Qty: 30 patch, Refills: 11      SENSIPAR 30 mg Tab Take 1 tablet by mouth once daily.      sertraline (ZOLOFT) 50 MG tablet Take 1 tablet (50 mg total) by mouth once daily.  Qty: 30 tablet, Refills: 11      SPRYCEL 100 mg Tab Take 100 mg by mouth once daily.       trazodone (DESYREL) 50 MG tablet Take 1 tablet (50 mg total) by mouth every evening.  Qty: 26 tablet, Refills: 11           Time spent on the discharge of patient: 45 minutes    Guilherme Solares NP  Department of Hospital Medicine  [facility]     Patient is  seen and examined, discussed with the patient about the POD, patient is medically stable for discharge.

## 2017-04-13 NOTE — PLAN OF CARE
Problem: Patient Care Overview  Goal: Plan of Care Review  Outcome: Ongoing (interventions implemented as appropriate)  Fall prevention precautions maintained, pt remained free of falls throughout shift, call bell and personal items within reach. HD shunt positive for thrill and bruit, clean, dry, and intact. Isolations precautions maintained for MRSA. 24 hour chart check completed. Will continue to monitor

## 2017-04-13 NOTE — CONSULTS
Pharmacy Vancomycin Consult Note    WBC= 4.33                   Tmax=98.7 F  CrCl=10.1 ml/min          Scr=8.7  Dialysis patient: Tues, Thurs, Sat    Cultures: blood-MRSA-Vanc sensitive  Dx: MRSA bacteremia  Goal trough=15-20mcg/ml    Pre-dialysis random level=17.9mcg/ml  According to protocol, patient will receive a Vancomycin 750mg dose post dialysis today.  Vancomycin 1gm Tues, Thurs, Sat is a placeholder dose only.  Next pre-dialysis random level is due Saturday, 4/15/17.    Thank you for allowing us to participate in this patient's care.    Dickson Murillo [facility].4/13/2017 7:42 AM

## 2017-04-14 VITALS
OXYGEN SATURATION: 100 % | SYSTOLIC BLOOD PRESSURE: 112 MMHG | BODY MASS INDEX: 26.23 KG/M2 | RESPIRATION RATE: 16 BRPM | TEMPERATURE: 98 F | HEART RATE: 59 BPM | DIASTOLIC BLOOD PRESSURE: 61 MMHG | HEIGHT: 74 IN | WEIGHT: 204.38 LBS

## 2017-04-14 LAB
ANION GAP SERPL CALC-SCNC: 8 MMOL/L
BASOPHILS # BLD AUTO: 0.04 K/UL
BASOPHILS NFR BLD: 1.1 %
BUN SERPL-MCNC: 32 MG/DL
CALCIUM SERPL-MCNC: 8.8 MG/DL
CHLORIDE SERPL-SCNC: 99 MMOL/L
CO2 SERPL-SCNC: 25 MMOL/L
CREAT SERPL-MCNC: 6.8 MG/DL
DIFFERENTIAL METHOD: ABNORMAL
EOSINOPHIL # BLD AUTO: 0.1 K/UL
EOSINOPHIL NFR BLD: 3.2 %
ERYTHROCYTE [DISTWIDTH] IN BLOOD BY AUTOMATED COUNT: 18.8 %
EST. GFR  (AFRICAN AMERICAN): 9 ML/MIN/1.73 M^2
EST. GFR  (NON AFRICAN AMERICAN): 8 ML/MIN/1.73 M^2
GLUCOSE SERPL-MCNC: 67 MG/DL
HCT VFR BLD AUTO: 36.2 %
HGB BLD-MCNC: 11.5 G/DL
LYMPHOCYTES # BLD AUTO: 1.7 K/UL
LYMPHOCYTES NFR BLD: 43.8 %
MCH RBC QN AUTO: 27.8 PG
MCHC RBC AUTO-ENTMCNC: 31.8 %
MCV RBC AUTO: 87 FL
MONOCYTES # BLD AUTO: 0.4 K/UL
MONOCYTES NFR BLD: 10.3 %
NEUTROPHILS # BLD AUTO: 1.6 K/UL
NEUTROPHILS NFR BLD: 41.6 %
PLATELET # BLD AUTO: 174 K/UL
PMV BLD AUTO: 10 FL
POCT GLUCOSE: 103 MG/DL (ref 70–110)
POCT GLUCOSE: 64 MG/DL (ref 70–110)
POTASSIUM SERPL-SCNC: 4.9 MMOL/L
RBC # BLD AUTO: 4.14 M/UL
SODIUM SERPL-SCNC: 132 MMOL/L
WBC # BLD AUTO: 3.77 K/UL

## 2017-04-14 PROCEDURE — 99223 1ST HOSP IP/OBS HIGH 75: CPT | Mod: ,,, | Performed by: INTERNAL MEDICINE

## 2017-04-14 PROCEDURE — 25000003 PHARM REV CODE 250: Performed by: INTERNAL MEDICINE

## 2017-04-14 PROCEDURE — 80048 BASIC METABOLIC PNL TOTAL CA: CPT

## 2017-04-14 PROCEDURE — 25000003 PHARM REV CODE 250: Performed by: NURSE PRACTITIONER

## 2017-04-14 PROCEDURE — 85025 COMPLETE CBC W/AUTO DIFF WBC: CPT

## 2017-04-14 PROCEDURE — 80100016 HC MAINTENANCE HEMODIALYSIS

## 2017-04-14 PROCEDURE — 25000003 PHARM REV CODE 250: Performed by: HOSPITALIST

## 2017-04-14 PROCEDURE — 96372 THER/PROPH/DIAG INJ SC/IM: CPT

## 2017-04-14 PROCEDURE — 36415 COLL VENOUS BLD VENIPUNCTURE: CPT

## 2017-04-14 RX ADMIN — HEPARIN SODIUM 5000 UNITS: 5000 INJECTION, SOLUTION INTRAVENOUS; SUBCUTANEOUS at 06:04

## 2017-04-14 RX ADMIN — PANTOPRAZOLE SODIUM 40 MG: 40 TABLET, DELAYED RELEASE ORAL at 08:04

## 2017-04-14 RX ADMIN — GABAPENTIN 100 MG: 100 CAPSULE ORAL at 01:04

## 2017-04-14 RX ADMIN — HEPARIN SODIUM 5000 UNITS: 5000 INJECTION, SOLUTION INTRAVENOUS; SUBCUTANEOUS at 01:04

## 2017-04-14 RX ADMIN — POLYETHYLENE GLYCOL 3350 17 G: 17 POWDER, FOR SOLUTION ORAL at 08:04

## 2017-04-14 RX ADMIN — GABAPENTIN 100 MG: 100 CAPSULE ORAL at 06:04

## 2017-04-14 RX ADMIN — METOPROLOL TARTRATE 25 MG: 25 TABLET ORAL at 08:04

## 2017-04-14 RX ADMIN — AMLODIPINE BESYLATE 5 MG: 5 TABLET ORAL at 08:04

## 2017-04-14 RX ADMIN — STANDARDIZED SENNA CONCENTRATE AND DOCUSATE SODIUM 1 TABLET: 8.6; 5 TABLET, FILM COATED ORAL at 08:04

## 2017-04-14 RX ADMIN — RIVASTIGMINE TRANSDERMAL SYSTEM 1 PATCH: 4.6 PATCH, EXTENDED RELEASE TRANSDERMAL at 08:04

## 2017-04-14 RX ADMIN — SODIUM BICARBONATE 650 MG TABLET 1300 MG: at 06:04

## 2017-04-14 RX ADMIN — SERTRALINE HYDROCHLORIDE 50 MG: 50 TABLET ORAL at 08:04

## 2017-04-14 RX ADMIN — Medication 500 MG: at 02:04

## 2017-04-14 RX ADMIN — ATORVASTATIN CALCIUM 20 MG: 10 TABLET, FILM COATED ORAL at 08:04

## 2017-04-14 NOTE — ASSESSMENT & PLAN NOTE
-ESRD pt, q TTS HD schedule in [place], [place].  -Nephrology following for renal replacement  -next HD session 4/13/17

## 2017-04-14 NOTE — SUBJECTIVE & OBJECTIVE
Interval History: Patient reports improvement in symptoms. Will discharge once arrangements made for vancomycin.     Review of Systems   Unable to perform ROS: Dementia     Objective:     Vital Signs (Most Recent):  Temp: 97.6 °F (36.4 °C) (04/14/17 0509)  Pulse: (!) 58 (04/14/17 0509)  Resp: 18 (04/14/17 0509)  BP: (!) 143/81 (04/14/17 0509)  SpO2: 98 % (04/14/17 0509) Vital Signs (24h Range):  Temp:  [97.4 °F (36.3 °C)-98 °F (36.7 °C)] 97.6 °F (36.4 °C)  Pulse:  [47-60] 58  Resp:  [16-20] 18  SpO2:  [98 %-100 %] 98 %  BP: ()/(63-83) 143/81     Weight: 92.7 kg (204 lb 5.9 oz)  Body mass index is 26.24 kg/(m^2).    Intake/Output Summary (Last 24 hours) at 04/14/17 0830  Last data filed at 04/14/17 0700   Gross per 24 hour   Intake             1450 ml   Output             2500 ml   Net            -1050 ml      Physical Exam   Constitutional: He appears well-developed.   HENT:   Head: Normocephalic and atraumatic.   Nose: Nose normal.   Eyes: Conjunctivae and EOM are normal. Pupils are equal, round, and reactive to light.   Neck: Normal range of motion. Neck supple. No JVD present. No tracheal deviation present. No thyromegaly present.   Cardiovascular: Normal rate, regular rhythm, normal heart sounds and intact distal pulses.  Exam reveals no gallop and no friction rub.    No murmur heard.  Pulmonary/Chest: Effort normal. No stridor. No respiratory distress. He has no wheezes. He has no rales. He exhibits no tenderness.   Abdominal: Soft. Bowel sounds are normal. He exhibits no distension. There is no tenderness. There is no rebound and no guarding.   Musculoskeletal: Normal range of motion. He exhibits no edema, tenderness or deformity.   Neurological: He is alert. He has normal reflexes. No cranial nerve deficit. Coordination normal.   Skin: Skin is warm and dry. No rash noted. No erythema. No pallor.   L UE AVF +bruit/thrill   Nursing note and vitals reviewed.      Significant Labs: All pertinent labs within  the past 24 hours have been reviewed.    Significant Imaging:   Imaging Results         NM Inflammitory Localization WB Indium (Final result) Result time:  04/11/17 16:40:13    Final result by Ramírez Cline MD (04/11/17 16:40:13)    Impression:      Negative indium-111 labeled white blood cell scan.      Electronically signed by: RAMÍREZ CLINE MD  Date:     04/11/17  Time:    16:40     Narrative:    Exam: NM INFLAMMATORY WHOLE BODY INDIUM,    Date:  04/10/17 10:22:54    History: Fever of unknown origin.  MRSA infection.    Comparison:  No prior relevant studies available    Findings: Whole body indium-111 labeled white blood cell scan performed with 480 uCi.    Normal hepatosplenic uptake.  Normal marrow uptake.  No localized uptake is present.            US Soft Tissue Misc (Final result) Result time:  04/05/17 13:55:51    Final result by Mar Zapata MD (04/05/17 13:55:51)    Impression:         No sonographic evidence of abscess.      Electronically signed by: MAR ZAPATA MD  Date:     04/05/17  Time:    13:55     Narrative:    Exam: US SOFT TISSUE MISC    Clinical History:    Left arm cellulitis near the AV fistula.    Findings:  No suspicious fluid collection identified adjacent to the AV fistula in the left upper arm.            X-Ray Chest PA And Lateral (Final result) Result time:  04/03/17 18:52:43    Final result by Cornelius Jackson MD (04/03/17 18:52:43)    Impression:           1.  Left right basilar atelectasis last consolidation, concerning for pneumonia.  Other considerations would include interstitial pulmonary edema.  Clinical correlation is advised.  2.  Stable findings as noted above.      Electronically signed by: CORNELIUS JACKSON MD  Date:     04/03/17  Time:    18:52     Narrative:    PA and Lateral Chest x-ray    Clinical Indication: bacteremia.  Fever    Findings:    Comparisons are made to 01/31/2017.  Left rib and right basilar atelectasis/consolidation and effusions.      The upper  lungs are clear. The cardiac silhouette size is enlarged. The trachea is midline and the mediastinal width is normal. Negative for pneumothorax. Pulmonary vasculature is normal. Negative for osseous abnormalities. There are degenerative changes of spine and both shoulder girdles. There are calcifications of the aortic knob and tortuosity of the descending thoracic aorta. There are changes of a prior median sternotomy and CABG changes.

## 2017-04-14 NOTE — PROGRESS NOTES
"Subjective: Patient seen for ESRD and MRSA on vancomycin    no chest pain or shortness of breath ; no nausea or vomiting ; no hematemesis no melena no bleeding from anywhere;  no fevers no chills;  The rest of the review of system involving all 10 systems of the body is negative     vital signs are stable. Patient is resting comfortably, no complaints.       Physical exam:  /71 (BP Location: Right arm, Patient Position: Lying, BP Method: Automatic)  Pulse (!) 50  Temp 97.6 °F (36.4 °C) (Oral)   Resp 18  Ht 6' 2" (1.88 m)  Wt 92.7 kg (204 lb 5.9 oz)  SpO2 100%  BMI 26.24 kg/m2    HEENT: moist mucosal membranes  HEART: RRR, S1/S2, no rub  LUNGS: CTA, bilaterally, no wheezing  ABDOMEN: soft, nontender, not distended, bowel sounds present  EXTREMITIES: no LE edema  NEURO: A & O x 3    Labs:  Lab Results   Component Value Date    CREATININE 6.8 (H) 04/14/2017    BUN 32 (H) 04/14/2017     (L) 04/14/2017    K 4.9 04/14/2017    CL 99 04/14/2017    CO2 25 04/14/2017     Lab Results   Component Value Date     (H) 11/06/2008    CALCIUM 8.8 04/14/2017    PHOS 5.5 (H) 04/13/2017     Lab Results   Component Value Date    ALBUMIN 2.8 (L) 04/13/2017     Lab Results   Component Value Date    WBC 3.77 (L) 04/14/2017    HGB 11.5 (L) 04/14/2017    HCT 36.2 (L) 04/14/2017    MCV 87 04/14/2017     04/14/2017       Assessment and Plan:  61 year old male with h/o ESRD, anemia, HTN, DM2, CAD, CHF presented with MRSA bacteremia.       1. ESRD: TTS, HD today, Time: 3.0  Hrs. I have called the patient's dialysis unit.  He has now been switched to Monday Wednesday Friday schedule.  His next dialysis outpatient will be Monday.  We'll do dialysis today before discharge as discussed with hospital medicine.  We will give his vancomycin.    2. Access: Left arm AVF.     3. Electrolytes: Hyperkalemia. Patient now on strict low K diet. Sodium bicarbonate was added and Losartan was stopped. Consider pseudohyperkalemia. " Borderline hyponatremia: monitor.     4. Acid-base: mild acidosis. Continue sodium bicarbonate.     5. Volume: no gross overload.     6. Anemia: Hgb at goal for dialysis patient, no SUNIL needed at present.     7. HTN: acceptable BP control.     8. Medications: reviewed.     9. MRSA bacteremia: Continue Vancomycin.  I have given orders for vancomycin to be given 2 more weeks after discharge and then follow cultures per protocol.  All these plans discussed with the charge nurse in the [facility] dialysis unit    Bernard Marks MD

## 2017-04-14 NOTE — PLAN OF CARE
Problem: Patient Care Overview (Adult)  Goal: Plan of Care Review  Outcome: Ongoing (interventions implemented as appropriate)  Patient disoriented to place, time and situation. Bradycardic on monitor.  Remains free of injury. Fall precautions maintained with bed low and wheels locked.  Denies pain.  before bedtime.  [place] provided for snack. Dialysis on Mon., Wed., and Fri.  Patient will remain in hospital until Vancomycin is approved to be given through [facility] while patient is being hemodialyzed. Chart review for 24 hours completed. Will continue to monitor till discharge.

## 2017-04-14 NOTE — NURSING
Patient discharged home with family. Patient alert to self and confused to person, place and situation. Patient denies pain and discomfort. Discharge instructions given to patient and family. Family and patient both verbalized understanding.

## 2017-04-14 NOTE — PROGRESS NOTES
"[facility] Hospital Medicine  Progress Note    Patient Name: David Hadley  MRN: [id number]  Patient Class: IP- Inpatient   Admission Date: 4/3/2017  Length of Stay: 11 days  Attending Physician: Jose Walker, *  Primary Care Provider: Isaac Fitch MD        Subjective:     Principal Problem:MRSA bacteremia    HPI:  David Hadley is a 61 y.o. male patient with PMHx of ESRD on HD q TTS, HTN, HLP, who presented to the ER for c/o "for further evaluation of infection that was noticed in his blood today via Dr. Choudhury." Dr. Choudhury reported positive blood cx's c/w GPC in clusters. Records were reviewed. Pt had MRSA on blood Cx in Jan/Feb 2017. Source is not known.Pt stated he was sent here for IV abx via Dr. Choudhury. Pt is denying any sxs at this time. Pt states he is due for dialysis tomorrow. Patient denies any fever, N/V/D, chills, abd pain, CP, SOB, weakness/numbness, dizziness, lightheadedness, HA  and all other sxs at this time. No tooth problems. Pt has been on HD about 10 years, and gets dialyzed regularly in [place], [place] (Dr. Rinku Guardado). His L UE AVF is not bothering him.    Hospital Course:  ID consulted. Blood cultures showed Staphylcoccus aureus. Cardiology consulted and SALAZAR scheduled 4/6/17 which did not show evidence of endocarditis.  Continued IV antibiotics and patient remained asymptomatic.  Planned for Indium tagged WBC scan Monday 10 April. Repeat blood cultures drawn 4/8/17 1 of 2 positive for MRSA. Indium scan 4/10/17 was negative. Blood cultures repeated 4/12/17. Repeat blood cx were negative. The case was discussed with Dr. Choudhury (infectious disease) who recommended completing 6 weeks of IV Vanc. The patient was seen and examined today and determined to be suitable for discharge. Discharge delayed due to dialysis center not being able to supply Vanc. [facility] contacted to supply vancomycin to dialysis center. Will discharge once arrangements are made.      Interval History: " Patient reports improvement in symptoms. Will discharge once arrangements made for vancomycin.     Review of Systems   Unable to perform ROS: Dementia     Objective:     Vital Signs (Most Recent):  Temp: 97.6 °F (36.4 °C) (04/14/17 0509)  Pulse: (!) 58 (04/14/17 0509)  Resp: 18 (04/14/17 0509)  BP: (!) 143/81 (04/14/17 0509)  SpO2: 98 % (04/14/17 0509) Vital Signs (24h Range):  Temp:  [97.4 °F (36.3 °C)-98 °F (36.7 °C)] 97.6 °F (36.4 °C)  Pulse:  [47-60] 58  Resp:  [16-20] 18  SpO2:  [98 %-100 %] 98 %  BP: ()/(63-83) 143/81     Weight: 92.7 kg (204 lb 5.9 oz)  Body mass index is 26.24 kg/(m^2).    Intake/Output Summary (Last 24 hours) at 04/14/17 0830  Last data filed at 04/14/17 0700   Gross per 24 hour   Intake             1450 ml   Output             2500 ml   Net            -1050 ml      Physical Exam   Constitutional: He appears well-developed.   HENT:   Head: Normocephalic and atraumatic.   Nose: Nose normal.   Eyes: Conjunctivae and EOM are normal. Pupils are equal, round, and reactive to light.   Neck: Normal range of motion. Neck supple. No JVD present. No tracheal deviation present. No thyromegaly present.   Cardiovascular: Normal rate, regular rhythm, normal heart sounds and intact distal pulses.  Exam reveals no gallop and no friction rub.    No murmur heard.  Pulmonary/Chest: Effort normal. No stridor. No respiratory distress. He has no wheezes. He has no rales. He exhibits no tenderness.   Abdominal: Soft. Bowel sounds are normal. He exhibits no distension. There is no tenderness. There is no rebound and no guarding.   Musculoskeletal: Normal range of motion. He exhibits no edema, tenderness or deformity.   Neurological: He is alert. He has normal reflexes. No cranial nerve deficit. Coordination normal.   Skin: Skin is warm and dry. No rash noted. No erythema. No pallor.   L UE AVF +bruit/thrill   Nursing note and vitals reviewed.      Significant Labs: All pertinent labs within the past 24 hours  have been reviewed.    Significant Imaging:   Imaging Results         NM Inflammitory Localization WB Indium (Final result) Result time:  04/11/17 16:40:13    Final result by Ramírez Cline MD (04/11/17 16:40:13)    Impression:      Negative indium-111 labeled white blood cell scan.      Electronically signed by: RAMÍREZ CLINE MD  Date:     04/11/17  Time:    16:40     Narrative:    Exam: NM INFLAMMATORY WHOLE BODY INDIUM,    Date:  04/10/17 10:22:54    History: Fever of unknown origin.  MRSA infection.    Comparison:  No prior relevant studies available    Findings: Whole body indium-111 labeled white blood cell scan performed with 480 uCi.    Normal hepatosplenic uptake.  Normal marrow uptake.  No localized uptake is present.            US Soft Tissue Misc (Final result) Result time:  04/05/17 13:55:51    Final result by Mar Zapata MD (04/05/17 13:55:51)    Impression:         No sonographic evidence of abscess.      Electronically signed by: MAR ZAPATA MD  Date:     04/05/17  Time:    13:55     Narrative:    Exam: US SOFT TISSUE MISC    Clinical History:    Left arm cellulitis near the AV fistula.    Findings:  No suspicious fluid collection identified adjacent to the AV fistula in the left upper arm.            X-Ray Chest PA And Lateral (Final result) Result time:  04/03/17 18:52:43    Final result by Cornelius Jackson MD (04/03/17 18:52:43)    Impression:           1.  Left right basilar atelectasis last consolidation, concerning for pneumonia.  Other considerations would include interstitial pulmonary edema.  Clinical correlation is advised.  2.  Stable findings as noted above.      Electronically signed by: CORNELIUS JACKSON MD  Date:     04/03/17  Time:    18:52     Narrative:    PA and Lateral Chest x-ray    Clinical Indication: bacteremia.  Fever    Findings:    Comparisons are made to 01/31/2017.  Left rib and right basilar atelectasis/consolidation and effusions.      The upper lungs are clear.  The cardiac silhouette size is enlarged. The trachea is midline and the mediastinal width is normal. Negative for pneumothorax. Pulmonary vasculature is normal. Negative for osseous abnormalities. There are degenerative changes of spine and both shoulder girdles. There are calcifications of the aortic knob and tortuosity of the descending thoracic aorta. There are changes of a prior median sternotomy and CABG changes.                 Assessment/Plan:      * MRSA bacteremia  -Persistent MRSA Bacteremia:Had MRSA in blood Cx in Jan/Feb 2017  -Source unknown  -No obvious wounds, no abscess, no foreign bodies or catheters,  surgery done in 2015,   -Infectious Disease following  -ECHO: SALAZAR 4/6/17 negative  -Vancomycin IV to keep 15-20 level  -Indium  scan negative.   -If repeat cultures are negative will discuss with ID regarding antibiotics for discharge.     ESRD (end stage renal disease) on dialysis   -ESRD pt, q TTS HD schedule in [place], [place].  -Nephrology following for renal replacement  -next HD session 4/13/17    CML in remission  -Hold Sprycel given acute infection  -will discuss with Oncology when to resume    Dementia without behavioral disturbance  -supportive care  -frequent nursing rounds  -Continue Exelon    Hyperkalemia  -will improve with HD        VTE Risk Mitigation         Ordered     heparin (porcine) injection 5,000 Units  Every 8 hours     Route:  Subcutaneous        04/03/17 2007     Medium Risk of VTE  Once      04/03/17 2007          Guilherme Solares NP  Department of Hospital Medicine   [facility]    I have reviewed the notes, assessments, and/or procedures performed by NP, I concur with her/his documentation of David Hadley.

## 2017-04-14 NOTE — PROGRESS NOTES
HD today for 3.5 hrs. Pt tolerated well LAVF working fine. Vancomycin 500 mg given during last hour of tx. Net UF 2.5L    Post /61 HR 59

## 2017-04-14 NOTE — DISCHARGE SUMMARY
"[facility] Hospital Medicine  Discharge Summary      Patient Name: David Hadley  MRN: [id number]  Admission Date: 4/3/2017  Hospital Length of Stay: 11 days  Discharge Date and Time: No discharge date for patient encounter.  Attending Physician: Jose Walker, *   Discharging Provider: Guilherme Solares NP  Primary Care Provider: Isaac Fitch MD      HPI:   David Hadley is a 61 y.o. male patient with PMHx of ESRD on HD q TTS, HTN, HLP, who presented to the ER for c/o "for further evaluation of infection that was noticed in his blood today via Dr. Choudhury." Dr. Choudhury reported positive blood cx's c/w GPC in clusters. Records were reviewed. Pt had MRSA on blood Cx in Jan/Feb 2017. Source is not known.Pt stated he was sent here for IV abx via Dr. Choudhury. Pt is denying any sxs at this time. Pt states he is due for dialysis tomorrow. Patient denies any fever, N/V/D, chills, abd pain, CP, SOB, weakness/numbness, dizziness, lightheadedness, HA  and all other sxs at this time. No tooth problems. Pt has been on HD about 10 years, and gets dialyzed regularly in [place], [place] (Dr. Rinku Guardado). His L UE AVF is not bothering him.    Procedure(s) (LRB):  TRANSESOPHAGEAL ECHOCARDIOGRAM (SALAZAR) (N/A)      Indwelling Lines/Drains at time of discharge:   Lines/Drains/Airways     Drain                 Hemodialysis AV Fistula Left upper arm -- days              Hospital Course:   ID consulted. Blood cultures showed Staphylcoccus aureus. Cardiology consulted and SALAZAR scheduled 4/6/17 which did not show evidence of endocarditis.  Continued IV antibiotics and patient remained asymptomatic.  Planned for Indium tagged WBC scan Monday 10 April. Repeat blood cultures drawn 4/8/17 1 of 2 positive for MRSA. Indium scan 4/10/17 was negative. Blood cultures repeated 4/12/17. Repeat blood cx were negative. The case was discussed with Dr. Choudhury (infectious disease) who recommended completing 6 weeks of IV Vanc. Discharge delayed due " to dialysis center not being able to supply Vanc. [facility] contacted to supply vancomycin to dialysis center. Will discharge once arrangements are made. 4/14/17 The case was discussed with Dr. Marks [facility] [place] [facility] the [place]comycin with HD treatments. The patient was seen and examined today and determined to be suitable for discharge. The patient will continue HD with [facility] [address], and will follow up with Dr. Choudhury and his PCP.      Consults:   Consults         Status Ordering Provider     Inpatient consult to Cardiology  Once     Provider:  (Not yet assigned)    Completed KASSIE CHOUDHURY     Inpatient consult to Infectious Diseases  Once     Provider:  Kassie Choudhury MD    Acknowledged HIRA OLIVAREZ     Inpatient consult to Nephrology  Once     Provider:  Deshawn Rome MD    Completed MARII VALDOVINOS     Inpatient consult to Social Work  Once     Provider:  (Not yet assigned)    Completed MAICOL PATINO     Pharmacy to dose Vancomycin consult  Once     Provider:  (Not yet assigned)    Acknowledged DESHAWN ROME          Significant Diagnostic Studies:   .[email or web address]       Pending Diagnostic Studies:     None        Final Active Diagnoses:    Diagnosis Date Noted POA    PRINCIPAL PROBLEM:  MRSA bacteremia [R78.81] 04/03/2017 Yes    Hyperkalemia [E87.5] 04/10/2017 Yes    Dementia without behavioral disturbance [F03.90] 07/29/2016 Yes    ESRD (end stage renal disease) on dialysis [N18.6, Z99.2] 06/26/2013 Not Applicable    CML in remission [C92.11] 06/26/2013 Yes      Problems Resolved During this Admission:    Diagnosis Date Noted Date Resolved POA      No new Assessment & Plan notes have been filed under this hospital service since the last note was generated.  Service: Hospital Medicine      Discharged Condition: stable    Disposition: Home or Self Care    Follow Up:  Follow-up Information     Follow up with [facility].    Contact  information:    [address] [place], [place] [address]        Follow up with Isaac Fitch MD. Schedule an appointment as soon as possible for a visit in 3 days.    Specialty:  Family Medicine    Contact information:    [address] [address] [address] [phone]          Follow up with Ganga Choudhury MD. Schedule an appointment as soon as possible for a visit in 6 weeks.    Specialty:  Infectious Diseases    Contact information:    [address] [facility] [address] [phone]          Patient Instructions:     Diet general     Diet general     Activity as tolerated     Activity as tolerated       Medications:  Reconciled Home Medications:   Current Discharge Medication List      START taking these medications    Details   amlodipine (NORVASC) 5 MG tablet Take 1 tablet (5 mg total) by mouth once daily.  Qty: 30 tablet, Refills: 0      sodium bicarbonate 650 MG tablet Take 2 tablets (1,300 mg total) by mouth 3 (three) times daily.      VANCOMYCIN HCL (VANCOMYCIN 1 G/250 ML D5W, READY TO MIX SYSTEM,) Inject 250 mLs (1 g total) into the vein every Mon, Wed, Fri.  Refills: 0         CONTINUE these medications which have NOT CHANGED    Details   atorvastatin (LIPITOR) 20 MG tablet Take 1 tablet (20 mg total) by mouth once daily.  Qty: 90 tablet, Refills: 3      FOLIC ACID/VIT BCOMP,C (JOSAFAT-JANICE ORAL) Take by mouth.      gabapentin (NEURONTIN) 100 MG capsule Take 1 capsule (100 mg total) by mouth 3 (three) times daily.  Qty: 270 capsule, Refills: 3      hydrocodone-acetaminophen 5-325mg (NORCO) 5-325 mg per tablet Take 1 tablet by mouth every 6 to 8 hours as needed for Pain.  Qty: 60 tablet, Refills: 0      losartan (COZAAR) 50 MG tablet Take by mouth. 1 tablet Oral Every day      metoprolol tartrate (LOPRESSOR) 25 MG tablet Take 1 tablet (25 mg total) by mouth 2 (two) times daily.  Qty: 60 tablet, Refills: 0      rivastigmine (EXELON) 4.6 mg/24 hr PT24 Place 1 patch onto the skin once  daily.  Qty: 30 patch, Refills: 11      SENSIPAR 30 mg Tab Take 1 tablet by mouth once daily.      sertraline (ZOLOFT) 50 MG tablet Take 1 tablet (50 mg total) by mouth once daily.  Qty: 30 tablet, Refills: 11      SPRYCEL 100 mg Tab Take 100 mg by mouth once daily.       trazodone (DESYREL) 50 MG tablet Take 1 tablet (50 mg total) by mouth every evening.  Qty: 26 tablet, Refills: 11           Time spent on the discharge of patient: > 30 minutes    Guilherme Solares NP  Department of Hospital Medicine  [facility]

## 2017-04-15 LAB
BACTERIA BLD CULT: NORMAL

## 2017-04-16 LAB
POCT GLUCOSE: 121 MG/DL (ref 70–110)
POCT GLUCOSE: 96 MG/DL (ref 70–110)

## 2017-04-17 LAB — BACTERIA BLD CULT: NORMAL

## 2017-05-03 ENCOUNTER — HOSPITAL ENCOUNTER (OUTPATIENT)
Facility: HOSPITAL | Age: 62
Discharge: HOME | End: 2017-05-03
Attending: EMERGENCY MEDICINE
Payer: MEDICARE

## 2017-05-03 VITALS
BODY MASS INDEX: 26.79 KG/M2 | RESPIRATION RATE: 20 BRPM | DIASTOLIC BLOOD PRESSURE: 79 MMHG | HEART RATE: 70 BPM | WEIGHT: 220 LBS | HEIGHT: 76 IN | TEMPERATURE: 98 F | OXYGEN SATURATION: 99 % | SYSTOLIC BLOOD PRESSURE: 159 MMHG

## 2017-05-03 DIAGNOSIS — Z99.2 ESRD ON DIALYSIS: ICD-10-CM

## 2017-05-03 DIAGNOSIS — N18.6 ESRD ON DIALYSIS: ICD-10-CM

## 2017-05-03 DIAGNOSIS — R55 SYNCOPE, UNSPECIFIED SYNCOPE TYPE: Primary | ICD-10-CM

## 2017-05-03 DIAGNOSIS — N18.6 ESRD (END STAGE RENAL DISEASE) ON DIALYSIS: ICD-10-CM

## 2017-05-03 DIAGNOSIS — I10 ESSENTIAL HYPERTENSION: ICD-10-CM

## 2017-05-03 DIAGNOSIS — R55 SYNCOPE: ICD-10-CM

## 2017-05-03 DIAGNOSIS — Z99.2 ESRD (END STAGE RENAL DISEASE) ON DIALYSIS: ICD-10-CM

## 2017-05-03 LAB
ALBUMIN SERPL BCP-MCNC: 3.3 G/DL
ALP SERPL-CCNC: 82 U/L
ALT SERPL W/O P-5'-P-CCNC: 10 U/L
ANION GAP SERPL CALC-SCNC: 13 MMOL/L
APTT BLDCRRT: 29.4 SEC
AST SERPL-CCNC: 23 U/L
BASOPHILS # BLD AUTO: 0.03 K/UL
BASOPHILS NFR BLD: 0.7 %
BILIRUB SERPL-MCNC: 0.6 MG/DL
BUN SERPL-MCNC: 30 MG/DL
CALCIUM SERPL-MCNC: 8.1 MG/DL
CHLORIDE SERPL-SCNC: 92 MMOL/L
CK MB SERPL-MCNC: 1.2 NG/ML
CK MB SERPL-RTO: 0.8 %
CK SERPL-CCNC: 144 U/L
CK SERPL-CCNC: 144 U/L
CO2 SERPL-SCNC: 31 MMOL/L
CREAT SERPL-MCNC: 8.6 MG/DL
DIFFERENTIAL METHOD: ABNORMAL
EOSINOPHIL # BLD AUTO: 0.1 K/UL
EOSINOPHIL NFR BLD: 1.9 %
ERYTHROCYTE [DISTWIDTH] IN BLOOD BY AUTOMATED COUNT: 20 %
EST. GFR  (AFRICAN AMERICAN): 7 ML/MIN/1.73 M^2
EST. GFR  (NON AFRICAN AMERICAN): 6 ML/MIN/1.73 M^2
GLUCOSE SERPL-MCNC: 75 MG/DL
HCT VFR BLD AUTO: 38.3 %
HGB BLD-MCNC: 11.8 G/DL
INR PPP: 1.1
LYMPHOCYTES # BLD AUTO: 1.2 K/UL
LYMPHOCYTES NFR BLD: 28.8 %
MAGNESIUM SERPL-MCNC: 2.5 MG/DL
MCH RBC QN AUTO: 27.5 PG
MCHC RBC AUTO-ENTMCNC: 30.8 %
MCV RBC AUTO: 89 FL
MONOCYTES # BLD AUTO: 0.4 K/UL
MONOCYTES NFR BLD: 10.2 %
NEUTROPHILS # BLD AUTO: 2.4 K/UL
NEUTROPHILS NFR BLD: 58.4 %
PHOSPHATE SERPL-MCNC: 4.5 MG/DL
PLATELET # BLD AUTO: 158 K/UL
PMV BLD AUTO: 10.4 FL
POIKILOCYTOSIS BLD QL SMEAR: SLIGHT
POTASSIUM SERPL-SCNC: 4.9 MMOL/L
PROT SERPL-MCNC: 8.3 G/DL
PROTHROMBIN TIME: 11.8 SEC
RBC # BLD AUTO: 4.29 M/UL
SODIUM SERPL-SCNC: 136 MMOL/L
TARGETS BLD QL SMEAR: ABNORMAL
TROPONIN I SERPL DL<=0.01 NG/ML-MCNC: 0.07 NG/ML
TROPONIN I SERPL DL<=0.01 NG/ML-MCNC: 0.07 NG/ML
WBC # BLD AUTO: 4.13 K/UL

## 2017-05-03 PROCEDURE — 83735 ASSAY OF MAGNESIUM: CPT

## 2017-05-03 PROCEDURE — 85025 COMPLETE CBC W/AUTO DIFF WBC: CPT

## 2017-05-03 PROCEDURE — 93005 ELECTROCARDIOGRAM TRACING: CPT

## 2017-05-03 PROCEDURE — 96372 THER/PROPH/DIAG INJ SC/IM: CPT

## 2017-05-03 PROCEDURE — 99285 EMERGENCY DEPT VISIT HI MDM: CPT | Mod: 25

## 2017-05-03 PROCEDURE — 80053 COMPREHEN METABOLIC PANEL: CPT

## 2017-05-03 PROCEDURE — 99215 OFFICE O/P EST HI 40 MIN: CPT | Mod: 25,,, | Performed by: INTERNAL MEDICINE

## 2017-05-03 PROCEDURE — G0257 UNSCHED DIALYSIS ESRD PT HOS: HCPCS

## 2017-05-03 PROCEDURE — 25000003 PHARM REV CODE 250: Performed by: NURSE PRACTITIONER

## 2017-05-03 PROCEDURE — 85730 THROMBOPLASTIN TIME PARTIAL: CPT

## 2017-05-03 PROCEDURE — 84484 ASSAY OF TROPONIN QUANT: CPT | Mod: 91

## 2017-05-03 PROCEDURE — 85610 PROTHROMBIN TIME: CPT

## 2017-05-03 PROCEDURE — 90937 HEMODIALYSIS REPEATED EVAL: CPT | Mod: ,,, | Performed by: INTERNAL MEDICINE

## 2017-05-03 PROCEDURE — 84484 ASSAY OF TROPONIN QUANT: CPT

## 2017-05-03 PROCEDURE — 82553 CREATINE MB FRACTION: CPT

## 2017-05-03 PROCEDURE — 84100 ASSAY OF PHOSPHORUS: CPT

## 2017-05-03 PROCEDURE — G0378 HOSPITAL OBSERVATION PER HR: HCPCS

## 2017-05-03 PROCEDURE — 93010 ELECTROCARDIOGRAM REPORT: CPT | Mod: ,,, | Performed by: INTERNAL MEDICINE

## 2017-05-03 RX ORDER — SERTRALINE HYDROCHLORIDE 50 MG/1
50 TABLET, FILM COATED ORAL DAILY
Status: DISCONTINUED | OUTPATIENT
Start: 2017-05-03 | End: 2017-05-03 | Stop reason: HOSPADM

## 2017-05-03 RX ORDER — HEPARIN SODIUM 5000 [USP'U]/ML
5000 INJECTION, SOLUTION INTRAVENOUS; SUBCUTANEOUS EVERY 8 HOURS
Status: DISCONTINUED | OUTPATIENT
Start: 2017-05-03 | End: 2017-05-03 | Stop reason: HOSPADM

## 2017-05-03 RX ORDER — TRAZODONE HYDROCHLORIDE 50 MG/1
50 TABLET ORAL NIGHTLY
Status: DISCONTINUED | OUTPATIENT
Start: 2017-05-03 | End: 2017-05-03 | Stop reason: HOSPADM

## 2017-05-03 RX ORDER — AMLODIPINE BESYLATE 5 MG/1
5 TABLET ORAL DAILY
Status: DISCONTINUED | OUTPATIENT
Start: 2017-05-03 | End: 2017-05-03 | Stop reason: HOSPADM

## 2017-05-03 RX ORDER — SODIUM BICARBONATE 650 MG/1
1300 TABLET ORAL 3 TIMES DAILY
Status: DISCONTINUED | OUTPATIENT
Start: 2017-05-03 | End: 2017-05-03 | Stop reason: HOSPADM

## 2017-05-03 RX ORDER — CINACALCET 30 MG/1
30 TABLET, FILM COATED ORAL DAILY
Status: DISCONTINUED | OUTPATIENT
Start: 2017-05-03 | End: 2017-05-03

## 2017-05-03 RX ORDER — LOSARTAN POTASSIUM 50 MG/1
50 TABLET ORAL DAILY
Status: DISCONTINUED | OUTPATIENT
Start: 2017-05-03 | End: 2017-05-03 | Stop reason: HOSPADM

## 2017-05-03 RX ORDER — CINACALCET 30 MG/1
30 TABLET, FILM COATED ORAL NIGHTLY
Status: DISCONTINUED | OUTPATIENT
Start: 2017-05-03 | End: 2017-05-03 | Stop reason: HOSPADM

## 2017-05-03 RX ORDER — ATORVASTATIN CALCIUM 10 MG/1
20 TABLET, FILM COATED ORAL DAILY
Status: DISCONTINUED | OUTPATIENT
Start: 2017-05-03 | End: 2017-05-03

## 2017-05-03 RX ORDER — RIVASTIGMINE 4.6 MG/24H
1 PATCH, EXTENDED RELEASE TRANSDERMAL DAILY
Status: DISCONTINUED | OUTPATIENT
Start: 2017-05-03 | End: 2017-05-03 | Stop reason: HOSPADM

## 2017-05-03 RX ORDER — ACETAMINOPHEN 325 MG/1
650 TABLET ORAL EVERY 6 HOURS PRN
Status: DISCONTINUED | OUTPATIENT
Start: 2017-05-03 | End: 2017-05-03 | Stop reason: HOSPADM

## 2017-05-03 RX ORDER — ATORVASTATIN CALCIUM 10 MG/1
20 TABLET, FILM COATED ORAL NIGHTLY
Status: DISCONTINUED | OUTPATIENT
Start: 2017-05-03 | End: 2017-05-03 | Stop reason: HOSPADM

## 2017-05-03 RX ORDER — METOPROLOL TARTRATE 25 MG/1
25 TABLET, FILM COATED ORAL 2 TIMES DAILY
Status: DISCONTINUED | OUTPATIENT
Start: 2017-05-03 | End: 2017-05-03 | Stop reason: HOSPADM

## 2017-05-03 RX ADMIN — Medication 1 CAPSULE: at 01:05

## 2017-05-03 RX ADMIN — SODIUM BICARBONATE 650 MG TABLET 1300 MG: at 01:05

## 2017-05-03 RX ADMIN — SERTRALINE HYDROCHLORIDE 50 MG: 50 TABLET, FILM COATED ORAL at 12:05

## 2017-05-03 RX ADMIN — RIVASTIGMINE TRANSDERMAL SYSTEM 1 PATCH: 4.6 PATCH, EXTENDED RELEASE TRANSDERMAL at 01:05

## 2017-05-03 RX ADMIN — HEPARIN SODIUM 5000 UNITS: 5000 INJECTION, SOLUTION INTRAVENOUS; SUBCUTANEOUS at 01:05

## 2017-05-03 NOTE — ED NOTES
Wife of patient will bring medication Sprycel from home.  with pharmacy states they will not be able to provide this medication.

## 2017-05-03 NOTE — ED PROVIDER NOTES
"SCRIBE #1 NOTE: I, Cecelia Harding/Gloria Peña, am scribing for, and in the presence of, Juan Joiner MD. I have scribed the entire note.      History      Chief Complaint   Patient presents with    Dizziness       Review of patient's allergies indicates:   Allergen Reactions    Benadryl decongestant Itching     Nothing with benadryl         HPI   HPI    5/3/2017, 9:35 AM   History obtained from the [facility], patient, and family       History of Present Illness: David Hadley is a 61 y.o. male patient who presents to the Emergency Department for synocpe which onset suddenly this morning. Per [facility], pt was brushing his teeth this morning when he became dizzy and "passed out." Symptoms are constant and moderate in severity. No mitigating or exacerbating factors reported. Associated sx include dizziness. Patient denies any fever, chills, CP, SOB, abdominal pain, n/v, HA, visual disturbance, focal weakness/numbness, back pain, or neck pain. Family notes that pt is confused at baseline. Pt missed dialysis this morning due to episode. No further complaints or concerns at this time.       Arrival mode: \[facility] [place] [facility]\""    PCP: Isaac Fitch MD       Past Medical History:  Past Medical History:   Diagnosis Date    After-cataract, unspecified - Left Eye 11/27/2013    Allergy     Arthritis     Cancer     CHF (congestive heart failure)     Chronic kidney disease     chemo/ dialias    Dementia     Diabetes mellitus     Hypertension     Myocardial infarction        Past Surgical History:  Past Surgical History:   Procedure Laterality Date    CATARACT EXTRACTION      CORONARY ARTERY BYPASS GRAFT  3-2014         Family History:  Family History   Problem Relation Age of Onset    Colon cancer Sister     Cancer Brother      colon    Pancreatic cancer Brother     Prostate cancer Father     Glaucoma Mother        Social History:  Social History     Social History Main Topics    Smoking status: Former Smoker     Types: Cigarettes     " Quit date: 1/28/2000    Smokeless tobacco: Never Used    Alcohol use No    Drug use: No    Sexual activity: Not Currently     Birth control/ protection: None       ROS   Review of Systems   Constitutional: Negative for chills, diaphoresis and fever.   HENT: Negative for sore throat.    Eyes: Negative for visual disturbance.   Respiratory: Negative for shortness of breath.    Cardiovascular: Negative for chest pain.   Gastrointestinal: Negative for abdominal pain, blood in stool, constipation, diarrhea, nausea and vomiting.   Genitourinary: Negative for dysuria.   Musculoskeletal: Negative for back pain, neck pain and neck stiffness.   Skin: Negative for rash.   Neurological: Positive for dizziness and syncope. Negative for facial asymmetry, speech difficulty, weakness, light-headedness, numbness and headaches.   Hematological: Does not bruise/bleed easily.     Physical Exam    Initial Vitals   BP Pulse Resp Temp SpO2   05/03/17 0928 05/03/17 0928 05/03/17 0928 05/03/17 0928 05/03/17 0928   143/72 60 18 98 °F (36.7 °C) 99 %      Physical Exam  Nursing Notes and Vital Signs Reviewed.  Constitutional: Patient is in no acute distress. Awake and alert. Well-developed and well-nourished.  Head: Atraumatic. Normocephalic.  Eyes: PERRL. EOM intact. Conjunctivae are not pale. No scleral icterus.  ENT: Mucous membranes are moist. Oropharynx is clear and symmetric.    Neck: Supple. Full ROM. No lymphadenopathy.  Cardiovascular: Regular rate. Regular rhythm. No murmurs, rubs, or gallops. Distal pulses are 2+ and symmetric.  Pulmonary/Chest: No respiratory distress. Clear to auscultation bilaterally. No wheezing, rales, or rhonchi.  Abdominal: Soft and non-distended.  There is no tenderness.  No rebound, guarding, or rigidity. Good bowel sounds.  Musculoskeletal: Moves all extremities. No obvious deformities. No edema. No calf tenderness. Fistula to left upper arm with positive thrill.   Skin: Warm. Dry. Abrasions noted to L  wrist, R forehead, and R nare. Healed sternotomy scar.   Neurological:  Alert and awake. Confused at mental baseline.  Normal speech.  No acute focal neurological deficits are appreciated.  Psychiatric: Normal affect. Good eye contact. Appropriate in content.    ED Course    Procedures  ED Vital Signs:  Vitals:    05/03/17 1233 05/03/17 1403 05/03/17 1525 05/03/17 1533   BP: (!) 150/72 (!) 143/64 (!) 141/72 (!) 148/71   Pulse: (!) 58 (!) 58 (!) 58 (!) 58   Resp: 12 11 11 10   Temp:   97.6 °F (36.4 °C)    TempSrc:   Oral    SpO2: 100% 100% 100% 100%   Weight:       Height:        05/03/17 1550 05/03/17 1600 05/03/17 1630 05/03/17 1700   BP: (!) 157/78 (!) 160/79 (!) 141/63 (!) 143/71   Pulse: 60 60 62 63   Resp: 11 10 11 10   Temp:       TempSrc:       SpO2: 100% 100% 100% 100%   Weight:       Height:        05/03/17 1730 05/03/17 1800 05/03/17 1830 05/03/17 1850   BP: (!) 167/91 (!) 152/98 (!) 164/85 (!) 152/81   Pulse: 69 64 70 66   Resp: 13 11 19 11   Temp:       TempSrc:       SpO2: 98% 99% 99% 99%   Weight:       Height:        05/03/17 1900 05/03/17 1905 05/03/17 1929   BP: (!) 158/88 (!) 158/73 (!) 159/79   Pulse: 66 67 70   Resp: 16 12 20   Temp:      TempSrc:      SpO2: 100% 100% 99%   Weight:      Height:          Abnormal Lab Results:  Labs Reviewed   CBC W/ AUTO DIFFERENTIAL - Abnormal; Notable for the following:        Result Value    RBC 4.29 (*)     Hemoglobin 11.8 (*)     Hematocrit 38.3 (*)     MCHC 30.8 (*)     RDW 20.0 (*)     All other components within normal limits   COMPREHENSIVE METABOLIC PANEL - Abnormal; Notable for the following:     Chloride 92 (*)     CO2 31 (*)     BUN, Bld 30 (*)     Creatinine 8.6 (*)     Calcium 8.1 (*)     Albumin 3.3 (*)     eGFR if  7 (*)     eGFR if non  6 (*)     All other components within normal limits   TROPONIN I - Abnormal; Notable for the following:     Troponin I 0.066 (*)     All other components within normal limits    TROPONIN I - Abnormal; Notable for the following:     Troponin I 0.071 (*)     All other components within normal limits   PROTIME-INR   APTT   MAGNESIUM   PHOSPHORUS   CK-MB   CK        All Lab Results:  Results for orders placed or performed during the hospital encounter of 05/03/17   CBC auto differential   Result Value Ref Range    WBC 4.13 3.90 - 12.70 K/uL    RBC 4.29 (L) 4.60 - 6.20 M/uL    Hemoglobin 11.8 (L) 14.0 - 18.0 g/dL    Hematocrit 38.3 (L) 40.0 - 54.0 %    MCV 89 82 - 98 fL    MCH 27.5 27.0 - 31.0 pg    MCHC 30.8 (L) 32.0 - 36.0 %    RDW 20.0 (H) 11.5 - 14.5 %    Platelets 158 150 - 350 K/uL    MPV 10.4 9.2 - 12.9 fL    Gran # 2.4 1.8 - 7.7 K/uL    Lymph # 1.2 1.0 - 4.8 K/uL    Mono # 0.4 0.3 - 1.0 K/uL    Eos # 0.1 0.0 - 0.5 K/uL    Baso # 0.03 0.00 - 0.20 K/uL    Gran% 58.4 38.0 - 73.0 %    Lymph% 28.8 18.0 - 48.0 %    Mono% 10.2 4.0 - 15.0 %    Eosinophil% 1.9 0.0 - 8.0 %    Basophil% 0.7 0.0 - 1.9 %    Poik Slight     Target Cells Occasional     Differential Method Automated    Comprehensive metabolic panel   Result Value Ref Range    Sodium 136 136 - 145 mmol/L    Potassium 4.9 3.5 - 5.1 mmol/L    Chloride 92 (L) 95 - 110 mmol/L    CO2 31 (H) 23 - 29 mmol/L    Glucose 75 70 - 110 mg/dL    BUN, Bld 30 (H) 8 - 23 mg/dL    Creatinine 8.6 (H) 0.5 - 1.4 mg/dL    Calcium 8.1 (L) 8.7 - 10.5 mg/dL    Total Protein 8.3 6.0 - 8.4 g/dL    Albumin 3.3 (L) 3.5 - 5.2 g/dL    Total Bilirubin 0.6 0.1 - 1.0 mg/dL    Alkaline Phosphatase 82 55 - 135 U/L    AST 23 10 - 40 U/L    ALT 10 10 - 44 U/L    Anion Gap 13 8 - 16 mmol/L    eGFR if African American 7 (A) >60 mL/min/1.73 m^2    eGFR if non African American 6 (A) >60 mL/min/1.73 m^2   Protime-INR   Result Value Ref Range    Prothrombin Time 11.8 9.0 - 12.5 sec    INR 1.1 0.8 - 1.2   APTT   Result Value Ref Range    aPTT 29.4 21.0 - 32.0 sec   Magnesium   Result Value Ref Range    Magnesium 2.5 1.6 - 2.6 mg/dL   Phosphorus   Result Value Ref Range     Phosphorus 4.5 2.7 - 4.5 mg/dL   Troponin I   Result Value Ref Range    Troponin I 0.066 (H) 0.000 - 0.026 ng/mL   CK-MB   Result Value Ref Range     20 - 200 U/L    CPK MB 1.2 0.1 - 6.5 ng/mL    MB% 0.8 0.0 - 5.0 %   CK   Result Value Ref Range     20 - 200 U/L   Troponin I   Result Value Ref Range    Troponin I 0.071 (H) 0.000 - 0.026 ng/mL         Imaging Results:  Imaging Results         US Carotid Bilateral (Final result) Result time:  05/03/17 14:44:48    Final result by IRMA Jorge Sr., MD (05/03/17 14:44:48)    Impression:           1. No clinically significant area of stenosis.  2. There is a mild amount of atherosclerosis bilaterally.    Validated velocity measurements with angiographic measurements, velocity criteria are extrapolated from diameter data as defined by the Society of Radiologists in Ultrasound Consensus Conference Radiology 2003; 229; 340-346      Electronically signed by: IRMA JORGE MD  Date:     05/03/17  Time:    14:44     Narrative:    Ultrasound examination of the carotid arteries with color flow and spectral Doppler imaging    History:     Syncope    Technique: Multiple static ultrasound images are submitted for interpretation with color flow and spectral Doppler imaging.    Finding: There is a mild amount of atherosclerosis bilaterally.    The following pertains to the right side of the neck. The common carotid artery has a normal arterial waveform with peak systolic velocity of 75 cm/sec and a diastolic velocity of 5 cm/sec. The internal carotid artery has a normal arterial waveform with a peak systolic velocity of 57 cm/sec and a diastolic velocity of 9 cm/sec. The external carotid artery has a peak systolic velocity of 76 cm/sec. The vertebral artery has normal antegrade flow.    The following pertains to the left side of the neck. The common carotid artery has a normal arterial waveform with peak systolic velocity of 59 cm/sec and a diastolic velocity of 5  cm/sec. The internal carotid artery has a normal arterial waveform with a peak systolic velocity of 41 cm/sec and a diastolic velocity of 3 cm/sec. The external carotid artery has a peak systolic velocity of 48 cm/sec. The vertebral artery has normal antegrade flow.            X-Ray Chest 1 View (Final result) Result time:  05/03/17 10:20:46    Final result by IRMA Jorge Sr., MD (05/03/17 10:20:46)    Impression:        1. There is opacification of the base of the left hemithorax. There is a mild amount of haziness in the inferior half of the left hemithorax. This is characteristic of a small layering left pleural effusion with associated atelectasis.  2. There is mild cardiomegaly.  3. There are surgical changes associated with a prior sternotomy.      Electronically signed by: IRMA JORGE MD  Date:     05/03/17  Time:    10:20     Narrative:    One-view chest x-ray    Clinical History: Syncope    Finding: Comparison was made to a prior examination performed on 4/3/2017. There are multiple sternotomy wires in place. There are multiple surgical clips projected over the mediastinum. There is mild cardiomegaly. There is opacification of the base of the left hemithorax. There is a mild amount of haziness in the inferior half of the left hemithorax. The right lung is clear. There is no pneumothorax. The right costophrenic angle is sharp.            CT Head Without Contrast (Final result) Result time:  05/03/17 10:09:09    Final result by Mar Zapata MD (05/03/17 10:09:09)    Impression:           No CT evidence of acute intracranial abnormality.  Age advanced atrophy and remote right occipital lobe infarct.    All CT scans at this facility use dose modulation, iterative reconstruction and/or weight based dosing when appropriate to reduce radiation dose to as low as reasonably achievable.       Electronically signed by: MAR ZAPATA MD  Date:     05/03/17  Time:    10:09     Narrative:    Exam:  CT HEAD WITHOUT  CONTRAST    Clinical History:  Altered mental status. syncope    Technique: Axial CT imaging was performed through the head without intravenous contrast.     Comparison: MRI brain performed on October 29, 2013    Findings:     Age advanced generalized cerebral and cerebellar atrophy. Moderate, symmetric, low density changes in the periventricular white matter consistent with chronic small vessel ischemic change.  Wedge-shaped encephalomalacia in the right occipital lobe consistent with a remote cortical infarct.    No intracranial hemorrhage is identified.   No hydrocephalus, midline shift or mass effect.  The calvarium is intact.   Visualized paranasal sinuses and mastoid air cells are clear.   Atheromatous calcifications involve the bilateral cavernous carotid arteries.             The EKG was ordered, reviewed, and independently interpreted by the ED provider.  Interpretation time: 0945  Rate:58 BPM  Rhythm: sinus bradycardia with first deg AV block   Interpretation: Incomplete RBBB, L anterior fascicular block, . No STEMI.             The Emergency Provider reviewed the vital signs and test results, which are outlined above.    ED Discussion     11:05 AM: Re-evaluated pt. I have discussed test results, shared treatment plan, and the need for admission with patient and family at bedside. Pt and family express understanding at this time and agree with all information. All questions answered. Pt and family have no further questions or concerns at this time. Pt is ready for admit.    11:11 AM: Discussed case with Janay Bear NP ([facility]). Janay agrees with current care and management of pt and accepts admission.   Admitting Service: Hospital medicine   Admitting Physician: Dr. Griffith  Admit to: Observation/Telemetry       ED Medication(s):  Medications - No data to display      Medical Decision Making    Medical Decision Making:   Clinical Tests:   Lab Tests: Ordered and Reviewed  Radiological Study:  Ordered and Reviewed  Medical Tests: Reviewed and Ordered           Scribe Attestation:   Scribe #1: I performed the above scribed service and the documentation accurately describes the services I performed. I attest to the accuracy of the note.    Attending:   Physician Attestation Statement for Scribe #1: I, Juan Joiner MD, personally performed the services described in this documentation, as scribed by Cecelia Harding/Gloria Peña, in my presence, and it is both accurate and complete.          Clinical Impression       ICD-10-CM ICD-9-CM   1. Syncope, unspecified syncope type R55 780.2   2. ESRD on dialysis N18.6 585.6    Z99.2 V45.11   3. Syncope R55 780.2   4. ESRD (end stage renal disease) on dialysis N18.6 585.6    Z99.2 V45.11   5. Essential hypertension I10 401.9       Disposition:   Disposition: Placed in Observation  Condition: Fair         Juan Jioner MD  05/08/17 [id number]

## 2017-05-03 NOTE — SUBJECTIVE & OBJECTIVE
Past Medical History:   Diagnosis Date    After-cataract, unspecified - Left Eye 11/27/2013    Allergy     Arthritis     Cancer     CHF (congestive heart failure)     Chronic kidney disease     chemo/ dialias    Dementia     Diabetes mellitus     Hypertension     Myocardial infarction        Past Surgical History:   Procedure Laterality Date    CATARACT EXTRACTION      CORONARY ARTERY BYPASS GRAFT  3-2014       Review of patient's allergies indicates:   Allergen Reactions    Benadryl decongestant Itching     Nothing with benadryl        No current facility-administered medications on file prior to encounter.      Current Outpatient Prescriptions on File Prior to Encounter   Medication Sig    amlodipine (NORVASC) 5 MG tablet Take 1 tablet (5 mg total) by mouth once daily.    atorvastatin (LIPITOR) 20 MG tablet Take 1 tablet (20 mg total) by mouth once daily.    FOLIC ACID/VIT BCOMP,C (JOSAFAT-JANICE ORAL) Take by mouth.    gabapentin (NEURONTIN) 100 MG capsule Take 1 capsule (100 mg total) by mouth 3 (three) times daily.    hydrocodone-acetaminophen 5-325mg (NORCO) 5-325 mg per tablet Take 1 tablet by mouth every 6 to 8 hours as needed for Pain.    losartan (COZAAR) 50 MG tablet Take by mouth. 1 tablet Oral Every day    metoprolol tartrate (LOPRESSOR) 25 MG tablet Take 1 tablet (25 mg total) by mouth 2 (two) times daily.    rivastigmine (EXELON) 4.6 mg/24 hr PT24 Place 1 patch onto the skin once daily.    SENSIPAR 30 mg Tab Take 1 tablet by mouth once daily.    sertraline (ZOLOFT) 50 MG tablet Take 1 tablet (50 mg total) by mouth once daily.    sodium bicarbonate 650 MG tablet Take 2 tablets (1,300 mg total) by mouth 3 (three) times daily.    SPRYCEL 100 mg Tab Take 100 mg by mouth once daily.     trazodone (DESYREL) 50 MG tablet Take 1 tablet (50 mg total) by mouth every evening.    VANCOMYCIN HCL (VANCOMYCIN 1 G/250 ML D5W, READY TO MIX SYSTEM,) Inject 250 mLs (1 g total) into the vein every  Mon, Wed, Fri.     Family History     Problem Relation (Age of Onset)    Cancer Brother    Colon cancer Sister    Glaucoma Mother    Pancreatic cancer Brother    Prostate cancer Father        Social History Main Topics    Smoking status: Former Smoker     Types: Cigarettes     Quit date: 1/28/2000    Smokeless tobacco: Never Used    Alcohol use No    Drug use: No    Sexual activity: Not Currently     Birth control/ protection: None     Review of Systems   Constitutional: Negative.  Negative for appetite change, chills, fatigue, fever and unexpected weight change.   HENT: Negative.  Negative for congestion and sore throat.    Eyes: Negative.    Respiratory: Negative.  Negative for cough, chest tightness and shortness of breath.    Cardiovascular: Negative.  Negative for chest pain, palpitations and leg swelling.   Gastrointestinal: Negative.  Negative for abdominal pain.   Endocrine: Negative.    Genitourinary: Negative.    Musculoskeletal: Negative.    Skin: Negative.    Allergic/Immunologic: Negative.    Neurological: Positive for dizziness and syncope.   Hematological: Negative.    Psychiatric/Behavioral: Positive for confusion.     Objective:     Vital Signs (Most Recent):  Temp: 98 °F (36.7 °C) (05/03/17 0928)  Pulse: (!) 57 (05/03/17 1203)  Resp: 11 (05/03/17 1203)  BP: (!) 155/77 (05/03/17 1203)  SpO2: 100 % (05/03/17 1203) Vital Signs (24h Range):  Temp:  [98 °F (36.7 °C)] 98 °F (36.7 °C)  Pulse:  [56-63] 57  Resp:  [11-18] 11  SpO2:  [99 %-100 %] 100 %  BP: (143-155)/(70-79) 155/77     Weight: 99.8 kg (220 lb)  Body mass index is 26.78 kg/(m^2).    Physical Exam   Constitutional: He appears well-developed and well-nourished.   HENT:   Head: Normocephalic and atraumatic.   Eyes: EOM are normal. Pupils are equal, round, and reactive to light.   Neck: Normal range of motion. Neck supple.   Cardiovascular: Normal rate, regular rhythm, normal heart sounds and intact distal pulses.    Pulmonary/Chest: Effort  normal and breath sounds normal.   Abdominal: Soft. Bowel sounds are normal.   Musculoskeletal: Normal range of motion.   Fistula to left upper arm with positive thrill and bruit    Neurological: He is alert. He has normal reflexes. He is disoriented.   Patient confused at baseline   Skin: Skin is warm and dry. Abrasion (Abrasions noted to L wrist, R forehead, and R nare. Healed sternotomy scar. ) noted.   Psychiatric: He has a normal mood and affect. His behavior is normal. Judgment and thought content normal.   Nursing note and vitals reviewed.       Significant Labs:   CBC:   Recent Labs  Lab 05/03/17  0945   WBC 4.13   HGB 11.8*   HCT 38.3*        CMP:   Recent Labs  Lab 05/03/17  0945      K 4.9   CL 92*   CO2 31*   GLU 75   BUN 30*   CREATININE 8.6*   CALCIUM 8.1*   PROT 8.3   ALBUMIN 3.3*   BILITOT 0.6   ALKPHOS 82   AST 23   ALT 10   ANIONGAP 13   EGFRNONAA 6*     Troponin:   Recent Labs  Lab 05/03/17  0945   TROPONINI 0.066*     All pertinent labs within the past 24 hours have been reviewed.    Significant Imaging:   Imaging Results         X-Ray Chest 1 View (Final result) Result time:  05/03/17 10:20:46    Final result by IRMA Jorge Sr., MD (05/03/17 10:20:46)    Impression:        1. There is opacification of the base of the left hemithorax. There is a mild amount of haziness in the inferior half of the left hemithorax. This is characteristic of a small layering left pleural effusion with associated atelectasis.  2. There is mild cardiomegaly.  3. There are surgical changes associated with a prior sternotomy.      Electronically signed by: IRMA JORGE MD  Date:     05/03/17  Time:    10:20     Narrative:    One-view chest x-ray    Clinical History: Syncope    Finding: Comparison was made to a prior examination performed on 4/3/2017. There are multiple sternotomy wires in place. There are multiple surgical clips projected over the mediastinum. There is mild cardiomegaly. There is  opacification of the base of the left hemithorax. There is a mild amount of haziness in the inferior half of the left hemithorax. The right lung is clear. There is no pneumothorax. The right costophrenic angle is sharp.            CT Head Without Contrast (Final result) Result time:  05/03/17 10:09:09    Final result by Mar Yadav MD (05/03/17 10:09:09)    Impression:           No CT evidence of acute intracranial abnormality.  Age advanced atrophy and remote right occipital lobe infarct.    All CT scans at this facility use dose modulation, iterative reconstruction and/or weight based dosing when appropriate to reduce radiation dose to as low as reasonably achievable.       Electronically signed by: MAR YADAV MD  Date:     05/03/17  Time:    10:09     Narrative:    Exam:  CT HEAD WITHOUT CONTRAST    Clinical History:  Altered mental status. syncope    Technique: Axial CT imaging was performed through the head without intravenous contrast.     Comparison: MRI brain performed on October 29, 2013    Findings:     Age advanced generalized cerebral and cerebellar atrophy. Moderate, symmetric, low density changes in the periventricular white matter consistent with chronic small vessel ischemic change.  Wedge-shaped encephalomalacia in the right occipital lobe consistent with a remote cortical infarct.    No intracranial hemorrhage is identified.   No hydrocephalus, midline shift or mass effect.  The calvarium is intact.   Visualized paranasal sinuses and mastoid air cells are clear.   Atheromatous calcifications involve the bilateral cavernous carotid arteries.

## 2017-05-03 NOTE — ED NOTES
Called BENJAMIN Bear regarding blood pressure meds and current pressure readings, order to hold BP was given at this time.

## 2017-05-03 NOTE — ED NOTES
Per hosp medicine after dialysis patient to walk and if he is able to ambulate, let them know for possible discharge. Will continue to monitor patient.

## 2017-05-03 NOTE — ED AVS SNAPSHOT
[facility]  [address] [facility] [address]               David Hadley   5/3/2017  9:27 AM   ED    Description:  Male : 1955   Department:  [facility]           Your Care was Coordinated By:     Provider Role From To    Juan Joiner MD Attending Provider 17 0926 17 1449      Reason for Visit     Dizziness           Diagnoses this Visit        Comments    Syncope, unspecified syncope type    -  Primary     ESRD on dialysis         Syncope         ESRD (end stage renal disease) on dialysis         Essential hypertension           ED Disposition     ED Disposition Condition Comment    Observation             To Do List           Follow-up Information     Follow up with Isaac Fitch MD In 3 days.    Specialty:  Family Medicine    Why:  hospital follow up     Contact information:    [address] [address] [address] [phone]          Follow up with Cr Lazo MD In 1 week.    Specialties:  Cardiology, Cardiovascular Disease    Why:  hospital follow up     Contact information:    [address] [facility] [address] [phone]          Follow up with Rafa Latham MD In 1 week.    Specialty:  Nephrology    Why:  hospital follow up     Contact information:    [address] [facility] [address] [phone]        [facility]     [facility] Care [facility] - 24 Assistance  Unless otherwise directed by your provider, please contact [facility], our nurse care line that is available for / assistance.     Registered nurses in the [facility] provide: appointment scheduling, clinical advisement, health education, and other advisory services.  Call: [phone] (toll free)               Medications           Message regarding Medications     Verify the changes and/or additions to your medication regime listed below are the same as discussed with your clinician today.  If any  of these changes or additions are incorrect, please notify your healthcare provider.        These medications were administered today        Dose Freq    vitamin renal formula (B-complex-vitamin c-folic acid) 1 mg per capsule 1 capsule 1 capsule Daily    Sig: Take 1 capsule by mouth once daily.    Class: Normal    Route: Oral    acetaminophen tablet 650 mg 650 mg Every 6 hours PRN    Sig: Take 2 tablets (650 mg total) by mouth every 6 (six) hours as needed for mild pain 1-3/10 pain scale.    Class: Normal    Route: Oral    heparin (porcine) injection 5,000 Units 5,000 Units Every 8 hours    Sig: Inject 1 mL (5,000 Units total) into the skin every 8 (eight) hours.    Class: Normal    Route: Subcutaneous           Verify that the below list of medications is an accurate representation of the medications you are currently taking.  If none reported, the list may be blank. If incorrect, please contact your healthcare provider. Carry this list with you in case of emergency.           Current Medications     acetaminophen tablet 650 mg Take 2 tablets (650 mg total) by mouth every 6 (six) hours as needed for mild pain 1-3/10 pain scale.    amlodipine (NORVASC) 5 MG tablet Take 1 tablet (5 mg total) by mouth once daily.    amlodipine tablet 5 mg Take 1 tablet (5 mg total) by mouth once daily.    atorvastatin (LIPITOR) 20 MG tablet Take 1 tablet (20 mg total) by mouth once daily.    atorvastatin tablet 20 mg Take 2 tablets (20 mg total) by mouth every evening.    cinacalcet tablet 30 mg Take 1 tablet (30 mg total) by mouth every evening.    dasatinib (SPRYCEL) tablet 100 mg Take 1 tablet (100 mg total) by mouth once daily.    FOLIC ACID/VIT BCOMP,C (JOSAFAT-JANICE ORAL) Take by mouth.    gabapentin (NEURONTIN) 100 MG capsule Take 1 capsule (100 mg total) by mouth 3 (three) times daily.    heparin (porcine) injection 5,000 Units Inject 1 mL (5,000 Units total) into the skin every 8 (eight) hours.    hydrocodone-acetaminophen  "5-325mg (NORCO) 5-325 mg per tablet Take 1 tablet by mouth every 6 to 8 hours as needed for Pain.    losartan (COZAAR) 50 MG tablet Take by mouth. 1 tablet Oral Every day    losartan tablet 50 mg Take 1 tablet (50 mg total) by mouth once daily.    metoprolol tartrate (LOPRESSOR) 25 MG tablet Take 1 tablet (25 mg total) by mouth 2 (two) times daily.    metoprolol tartrate (LOPRESSOR) tablet 25 mg Take 1 tablet (25 mg total) by mouth 2 (two) times daily.    rivastigmine (EXELON) 4.6 mg/24 hr PT24 Place 1 patch onto the skin once daily.    rivastigmine 4.6 mg/24 hr 1 patch Place 1 patch onto the skin once daily.    SENSIPAR 30 mg Tab Take 1 tablet by mouth once daily.    sertraline (ZOLOFT) 50 MG tablet Take 1 tablet (50 mg total) by mouth once daily.    sertraline tablet 50 mg Take 1 tablet (50 mg total) by mouth once daily.    sodium bicarbonate 650 MG tablet Take 2 tablets (1,300 mg total) by mouth 3 (three) times daily.    sodium bicarbonate tablet 1,300 mg Take 2 tablets (1,300 mg total) by mouth 3 (three) times daily.    SPRYCEL 100 mg Tab Take 100 mg by mouth once daily.     trazodone (DESYREL) 50 MG tablet Take 1 tablet (50 mg total) by mouth every evening.    trazodone tablet 50 mg Take 1 tablet (50 mg total) by mouth every evening.    vancomycin 1 g in dextrose 5 % 250 mL IVPB (ready to mix system) Starting on May 05, 2017. Inject 250 mLs (1 g total) into the vein every 48 hours.    VANCOMYCIN HCL (VANCOMYCIN 1 G/250 ML D5W, READY TO MIX SYSTEM,) Inject 250 mLs (1 g total) into the vein every Mon, Wed, Fri.    vitamin renal formula (B-complex-vitamin c-folic acid) 1 mg per capsule 1 capsule Take 1 capsule by mouth once daily.           Clinical Reference Information           Your Vitals Were     BP Pulse Temp Resp Height Weight    159/79 (BP Location: Right arm, Patient Position: Sitting, BP Method: Automatic) 70 97.6 °F (36.4 °C) (Oral) 20 6' 4" (1.93 m) 99.8 kg (220 lb)    SpO2 BMI             99% 26.78 " kg/m2         Allergies as of 5/3/2017        Reactions    Benadryl Decongestant Itching    Nothing with benadryl       Immunizations Administered on Date of Encounter - 5/3/2017     None      ED Micro, Lab, POCT     Start Ordered       Status Ordering Provider    05/04/17 0600 05/03/17 1140  CBC auto differential  Daily     Start Status   05/04/17 0600 Scheduled   05/05/17 0600 Scheduled   05/06/17 0600 Scheduled   05/07/17 0600 Scheduled       Acknowledged     05/04/17 0600 05/03/17 1140  Comprehensive metabolic panel  Daily     Start Status   05/04/17 0600 Scheduled   05/05/17 0600 Scheduled   05/06/17 0600 Scheduled   05/07/17 0600 Scheduled       Acknowledged     05/04/17 0430 05/03/17 1256  Vancomycin, random  Early Morning Draw      Acknowledged     05/03/17 1800 05/03/17 1211  Troponin I  Every 6 hours     Start Status   05/03/17 1800 Final result   05/04/17 0000 Scheduled       Acknowledged     05/03/17 0935 05/03/17 0934  CBC auto differential  STAT      Final result     05/03/17 0935 05/03/17 0934  Comprehensive metabolic panel  STAT      Final result     05/03/17 0935 05/03/17 0934  Protime-INR  STAT      Final result     05/03/17 0935 05/03/17 0934  APTT  STAT      Final result     05/03/17 0935 05/03/17 0934  Magnesium  STAT      Final result     05/03/17 0935 05/03/17 0934  Phosphorus  STAT      Final result     05/03/17 0935 05/03/17 0934  Troponin I  STAT      Final result     05/03/17 0935 05/03/17 0934  CK-MB  STAT      Final result     05/03/17 0935 05/03/17 0934  CK  STAT      Final result       ED Imaging Orders     Start Ordered       Status Ordering Provider    05/03/17 1206 05/03/17 1211  US Carotid Bilateral  1 time imaging      Final result     05/03/17 0935 05/03/17 0934  X-Ray Chest 1 View  1 time imaging      Final result     05/03/17 0935 05/03/17 0934  CT Head Without Contrast  1 time imaging      Final result       [facility] Sign-Up     Activating your [facility] account is as easy as  1-2-3!     1) Visit [email or web address], select Sign Up Now, enter this activation code and your date of birth, then select Next.  [email or web address]  Expires: 5/22/2017  3:03 PM      2) Create a username and password to use when you visit [facility] in the future and select a security question in case you lose your password and select Next.    3) Enter your e-mail address and click Sign Up!    Additional Information  If you have questions, please e-mail [email or web address] or call [phone] to talk to our [facility] staff. Remember, [facility] is NOT to be used for urgent needs. For medical emergencies, dial 911.         Smoking Cessation     If you would like to quit smoking:   You may be eligible for free services if you are a [place] resident and started smoking cigarettes before September 1, 1988.  Call the Smoking Cessation Trust ([facility]) toll free at ([phone] or [phone]) [phone].   Call 5-228-QUIT-NOW if you do not meet the above criteria.   Contact us via email: [email or web address]   View our website for more information: [email or web address]         [facility]  complies with applicable Federal civil rights laws and does not discriminate on the basis of race, color, national origin, age, disability, or sex.        Language Assistance Services     ATTENTION: Language assistance services are available, free of charge. Please call [phone].      ATENCIÓN: Si habla español, tiene a das disposición servicios gratuitos de asistencia lingüística. Llame al [phone].     CHÚ Ý: N?u b?n nói Ti?ng Vi?t, có các d?ch v? h? tr? ngôn ng? mi?n phí dành cho b?n. G?i s? [phone].

## 2017-05-03 NOTE — H&P
"[facility] Hospital Medicine  History & Physical    Patient Name: David Hadley  MRN: [id number]  Admission Date: 5/3/2017  Attending Physician: Dena Griffith MD    Primary Care Provider: Isaac Fitch MD         Patient information was obtained from patient, spouse/SO and ER records.     Subjective:     Principal Problem:Syncope    Chief Complaint:   Chief Complaint   Patient presents with    Dizziness        HPI: David Hadley is a 61 y.o. male patient with PMH of ESRD, HTN, Cancer, DM, CHF, and MI  who presents to the Emergency Department for synocpe which onset suddenly this morning. Spouse reports pt was brushing his teeth this morning when he became dizzy and when walking back to living room he "passed out." Patient has dementia which is progressively getting worse per spouse.  Patient confused which is baseline. Patient denies any fever, chills, CP, SOB, abdominal pain, n/v, HA, visual disturbance, focal weakness/numbness, back pain, or neck pain. Patient missed dialysis this morning due to syncopcal episode.     Past Medical History:   Diagnosis Date    After-cataract, unspecified - Left Eye 11/27/2013    Allergy     Arthritis     Cancer     CHF (congestive heart failure)     Chronic kidney disease     chemo/ dialias    Dementia     Diabetes mellitus     Hypertension     Myocardial infarction        Past Surgical History:   Procedure Laterality Date    CATARACT EXTRACTION      CORONARY ARTERY BYPASS GRAFT  3-2014       Review of patient's allergies indicates:   Allergen Reactions    Benadryl decongestant Itching     Nothing with benadryl        No current facility-administered medications on file prior to encounter.      Current Outpatient Prescriptions on File Prior to Encounter   Medication Sig    amlodipine (NORVASC) 5 MG tablet Take 1 tablet (5 mg total) by mouth once daily.    atorvastatin (LIPITOR) 20 MG tablet Take 1 tablet (20 mg total) by mouth once daily.    FOLIC " ACID/VIT BCOMP,C (JOSAFAT-JANICE ORAL) Take by mouth.    gabapentin (NEURONTIN) 100 MG capsule Take 1 capsule (100 mg total) by mouth 3 (three) times daily.    hydrocodone-acetaminophen 5-325mg (NORCO) 5-325 mg per tablet Take 1 tablet by mouth every 6 to 8 hours as needed for Pain.    losartan (COZAAR) 50 MG tablet Take by mouth. 1 tablet Oral Every day    metoprolol tartrate (LOPRESSOR) 25 MG tablet Take 1 tablet (25 mg total) by mouth 2 (two) times daily.    rivastigmine (EXELON) 4.6 mg/24 hr PT24 Place 1 patch onto the skin once daily.    SENSIPAR 30 mg Tab Take 1 tablet by mouth once daily.    sertraline (ZOLOFT) 50 MG tablet Take 1 tablet (50 mg total) by mouth once daily.    sodium bicarbonate 650 MG tablet Take 2 tablets (1,300 mg total) by mouth 3 (three) times daily.    SPRYCEL 100 mg Tab Take 100 mg by mouth once daily.     trazodone (DESYREL) 50 MG tablet Take 1 tablet (50 mg total) by mouth every evening.    VANCOMYCIN HCL (VANCOMYCIN 1 G/250 ML D5W, READY TO MIX SYSTEM,) Inject 250 mLs (1 g total) into the vein every Mon, Wed, Fri.     Family History     Problem Relation (Age of Onset)    Cancer Brother    Colon cancer Sister    Glaucoma Mother    Pancreatic cancer Brother    Prostate cancer Father        Social History Main Topics    Smoking status: Former Smoker     Types: Cigarettes     Quit date: 1/28/2000    Smokeless tobacco: Never Used    Alcohol use No    Drug use: No    Sexual activity: Not Currently     Birth control/ protection: None     Review of Systems   Constitutional: Negative.  Negative for appetite change, chills, fatigue, fever and unexpected weight change.   HENT: Negative.  Negative for congestion and sore throat.    Eyes: Negative.    Respiratory: Negative.  Negative for cough, chest tightness and shortness of breath.    Cardiovascular: Negative.  Negative for chest pain, palpitations and leg swelling.   Gastrointestinal: Negative.  Negative for abdominal pain.    Endocrine: Negative.    Genitourinary: Negative.    Musculoskeletal: Negative.    Skin: Negative.    Allergic/Immunologic: Negative.    Neurological: Positive for dizziness and syncope.   Hematological: Negative.    Psychiatric/Behavioral: Positive for confusion.     Objective:     Vital Signs (Most Recent):  Temp: 98 °F (36.7 °C) (05/03/17 0928)  Pulse: (!) 57 (05/03/17 1203)  Resp: 11 (05/03/17 1203)  BP: (!) 155/77 (05/03/17 1203)  SpO2: 100 % (05/03/17 1203) Vital Signs (24h Range):  Temp:  [98 °F (36.7 °C)] 98 °F (36.7 °C)  Pulse:  [56-63] 57  Resp:  [11-18] 11  SpO2:  [99 %-100 %] 100 %  BP: (143-155)/(70-79) 155/77     Weight: 99.8 kg (220 lb)  Body mass index is 26.78 kg/(m^2).    Physical Exam   Constitutional: He appears well-developed and well-nourished.   HENT:   Head: Normocephalic and atraumatic.   Eyes: EOM are normal. Pupils are equal, round, and reactive to light.   Neck: Normal range of motion. Neck supple.   Cardiovascular: Normal rate, regular rhythm, normal heart sounds and intact distal pulses.    Pulmonary/Chest: Effort normal and breath sounds normal.   Abdominal: Soft. Bowel sounds are normal.   Musculoskeletal: Normal range of motion.   Fistula to left upper arm with positive thrill and bruit    Neurological: He is alert. He has normal reflexes. He is disoriented.   Patient confused at baseline   Skin: Skin is warm and dry. Abrasion (Abrasions noted to L wrist, R forehead, and R nare. Healed sternotomy scar. ) noted.   Psychiatric: He has a normal mood and affect. His behavior is normal. Judgment and thought content normal.   Nursing note and vitals reviewed.       Significant Labs:   CBC:   Recent Labs  Lab 05/03/17  0945   WBC 4.13   HGB 11.8*   HCT 38.3*        CMP:   Recent Labs  Lab 05/03/17  0945      K 4.9   CL 92*   CO2 31*   GLU 75   BUN 30*   CREATININE 8.6*   CALCIUM 8.1*   PROT 8.3   ALBUMIN 3.3*   BILITOT 0.6   ALKPHOS 82   AST 23   ALT 10   ANIONGAP 13    EGFRNONAA 6*     Troponin:   Recent Labs  Lab 05/03/17  0945   TROPONINI 0.066*     All pertinent labs within the past 24 hours have been reviewed.    Significant Imaging:   Imaging Results         X-Ray Chest 1 View (Final result) Result time:  05/03/17 10:20:46    Final result by IRMA Jorge Sr., MD (05/03/17 10:20:46)    Impression:        1. There is opacification of the base of the left hemithorax. There is a mild amount of haziness in the inferior half of the left hemithorax. This is characteristic of a small layering left pleural effusion with associated atelectasis.  2. There is mild cardiomegaly.  3. There are surgical changes associated with a prior sternotomy.      Electronically signed by: IRMA JORGE MD  Date:     05/03/17  Time:    10:20     Narrative:    One-view chest x-ray    Clinical History: Syncope    Finding: Comparison was made to a prior examination performed on 4/3/2017. There are multiple sternotomy wires in place. There are multiple surgical clips projected over the mediastinum. There is mild cardiomegaly. There is opacification of the base of the left hemithorax. There is a mild amount of haziness in the inferior half of the left hemithorax. The right lung is clear. There is no pneumothorax. The right costophrenic angle is sharp.            CT Head Without Contrast (Final result) Result time:  05/03/17 10:09:09    Final result by Mar Zapata MD (05/03/17 10:09:09)    Impression:           No CT evidence of acute intracranial abnormality.  Age advanced atrophy and remote right occipital lobe infarct.    All CT scans at this facility use dose modulation, iterative reconstruction and/or weight based dosing when appropriate to reduce radiation dose to as low as reasonably achievable.       Electronically signed by: MAR ZAPATA MD  Date:     05/03/17  Time:    10:09     Narrative:    Exam:  CT HEAD WITHOUT CONTRAST    Clinical History:  Altered mental status. syncope    Technique:  Axial CT imaging was performed through the head without intravenous contrast.     Comparison: MRI brain performed on October 29, 2013    Findings:     Age advanced generalized cerebral and cerebellar atrophy. Moderate, symmetric, low density changes in the periventricular white matter consistent with chronic small vessel ischemic change.  Wedge-shaped encephalomalacia in the right occipital lobe consistent with a remote cortical infarct.    No intracranial hemorrhage is identified.   No hydrocephalus, midline shift or mass effect.  The calvarium is intact.   Visualized paranasal sinuses and mastoid air cells are clear.   Atheromatous calcifications involve the bilateral cavernous carotid arteries.            Assessment/Plan:     * Syncope  Recent echo reveal EF 45-50%  Carotid ultrasound pending   Orthostatics every shift   Serial CE's   CT head showed no acute findings   Will consider Cardiology consult   Tele monitoring   Fall precautions       ESRD (end stage renal disease) on dialysis  Consulted Nephrology  Will provide dialysis today  Renal: ESRD pt  K normal        Hypertension  Resume home meds   Will continue to monitor.       Hyperlipidemia  Resume Statin       Dementia without behavioral disturbance  Patient followed by Dr. Pineda  Continue home meds.       VTE Risk Mitigation         Ordered     heparin (porcine) injection 5,000 Units  Every 8 hours     Route:  Subcutaneous        05/03/17 1248     Medium Risk of VTE  Once      05/03/17 1140        Janay Bear NP  Department of Hospital Medicine   [facility]

## 2017-05-03 NOTE — CONSULTS
"Nephrology consult note:  Date of consult: 5/3/17  Reason for consult: ESRD on HD, being admitted after a syncopal episode  Referring physician: Dr. Juan Chapalindsay Hadley is a 61 y.o. male for whom nephrology consult has been requested to evaluate and give opinion.      HPI: Pt was seen and examined. H/o reviewed. Discussed with ER. Pt is a 62 y/o male with ESRD on chronic HD q TTS in [place], [place], who presented after a syncopal episode this am on his way to the HD unit. Records were reviewed. Pt currently feels well in ER. No h/a, no n/v, has mild confusion per his wife, but according to her "that may not be new." No fever. Pt has been on HD about 10 years, and gets dialyzed regularly in [place], [place] (Dr. Rinku Guardado). His L UE AVF is not bothering him.     PAST MEDICAL HISTORY: He  has a past medical history of After-cataract, unspecified - Left Eye (11/27/2013); Allergy; Arthritis; Cancer; CHF (congestive heart failure); ESRD on TTS HD, Dementia; Diabetes mellitus; Hypertension; and Myocardial infarction.     PAST SURGICAL HISTORY: He  has a past surgical history that includes Cataract extraction and Coronary artery bypass graft (3-2014).     SOCIAL HISTORY: He  reports that he quit smoking about 17 years ago. His smoking use included Cigarettes. He has never used smokeless tobacco. He reports that he does not drink alcohol or use illicit drugs.     FAMILY MEDICAL HISTORY: His family history includes Cancer in his brother; Colon cancer in his sister; Glaucoma in his mother; Pancreatic cancer in his brother; Prostate cancer in his father.           Review of patient's allergies indicates:   Allergen Reactions    Benadryl decongestant Itching       Nothing with benadryl           ceftAZIDime (FORTAZ) IVPB 500 mg Intravenous Q24H    [START ON 4/5/2017] vancomycin 1 g in dextrose 5 % 250 mL IVPB (ready to mix system) 1 g Intravenous Q48H                 Prior to Admission medications    Medication Sig Start Date " "End Date Taking? Authorizing Provider   atorvastatin (LIPITOR) 20 MG tablet Take 1 tablet (20 mg total) by mouth once daily. 10/28/16   Yes Isaac Fitch MD   FOLIC ACID/VIT BCOMP,C (JOSAFAT-JANICE ORAL) Take by mouth.     Yes Historical Provider, MD   gabapentin (NEURONTIN) 100 MG capsule Take 1 capsule (100 mg total) by mouth 3 (three) times daily. 10/28/16 4/3/17 Yes Isaac Fitch MD   hydrocodone-acetaminophen 5-325mg (NORCO) 5-325 mg per tablet Take 1 tablet by mouth every 6 to 8 hours as needed for Pain. 10/28/16   Yes Isaac Fitch MD   losartan (COZAAR) 50 MG tablet Take by mouth. 1 tablet Oral Every day     Yes Historical Provider, MD   metoprolol tartrate (LOPRESSOR) 25 MG tablet Take 1 tablet (25 mg total) by mouth 2 (two) times daily. 6/29/16 6/29/17 Yes Isaac Fitch MD   rivastigmine (EXELON) 4.6 mg/24 hr PT24 Place 1 patch onto the skin once daily. 12/21/16 12/21/17 Yes Isaac Fitch MD   SENSIPAR 30 mg Tab Take 1 tablet by mouth once daily. 6/29/16   Yes Historical Provider, MD   sertraline (ZOLOFT) 50 MG tablet Take 1 tablet (50 mg total) by mouth once daily. 12/21/16 12/21/17 Yes Isaac Fitch MD   SPRYCEL 100 mg Tab Take 100 mg by mouth once daily.  4/20/15   Yes Historical Provider, MD   trazodone (DESYREL) 50 MG tablet Take 1 tablet (50 mg total) by mouth every evening. 10/28/16 10/28/17 Yes Isaac Fitch MD          REVIEW OF SYSTEMS:  Patient has no fever, fatigue, visual changes, chest pain, edema, cough, dyspnea, nausea, vomiting, constipation, diarrhea, arthralgias, pruritis, dizziness, weakness, depression, confusion.     No intake or output data in the 24 hours ending 04/03/17 1634     PHYSICAL EXAM:  Blood pressure (!) 143/71, pulse 63, temperature 97.6 °F (36.4 °C), temperature source Oral, resp. rate 10, height 6' 4" (1.93 m), weight 99.8 kg (220 lb), SpO2 100 %.  Gen: WDWN male in no apparent distress  Psych: Normal mood and affect  Skin: No rashes or ulcers  Eyes: Normal conjunctiva and " lids, PERRLA  ENT: Normal hearing   Neck: No JVD  Chest: Clear with no rales, rhonchi, wheezing with normal effort  CV: Regular with no murmurs, gallops or rubs  Abd: Soft, nontender, no distension, positive bowel sounds  Ext: No cyanosis, clubbing or edema  L UE AVF: + aneurysm, no erythema, + thrill, +bruits     Labs: reviewed  BMP  Lab Results   Component Value Date     05/03/2017    K 4.9 05/03/2017    CL 92 (L) 05/03/2017    CO2 31 (H) 05/03/2017    BUN 30 (H) 05/03/2017    CREATININE 8.6 (H) 05/03/2017    CALCIUM 8.1 (L) 05/03/2017    ANIONGAP 13 05/03/2017    ESTGFRAFRICA 7 (A) 05/03/2017    EGFRNONAA 6 (A) 05/03/2017     Lab Results   Component Value Date    WBC 4.13 05/03/2017    HGB 11.8 (L) 05/03/2017    HCT 38.3 (L) 05/03/2017    MCV 89 05/03/2017     05/03/2017        IMPRESSION AND RECOMMENDATIONS: 60 y/o male with a h/o of ESRD, on chronic HD, with recurrent positive blood cx's and no apparent, obvious source for the bacteremia:     1. Renal: ESRD pt, q TTS HD schedule in [place], [place].  Due for HD today  K normal  Good O2 sat  Orders for HD written and discussed with HD nurse.     2. Neuro: presented with syncope.  No current sx's  CT head shows no acute processes.     3. comorbidities noted: DM, HTN.  BP slightly elevated.  Meds reviewed  Will monitor closely after starting outpt meds.     Plans and recommendations:  As discussed above   Opportunity for questions and discussion provided.  Total time spent 50 minutes including time needed to review the records, the   patient evaluation, documentation, face-to-face discussion with the patient,   more than 50% of the time was spent on coordination of care and counseling.   Pt will receive multiple visits and reevaluations during receiving HD.  Level V visit.     Leisa Coleman MD

## 2017-05-03 NOTE — ED NOTES
Received report from ROSENDO Adams Pt. In NAD, VSS, RR equal and unlabored. Pt awaiting dialysis completion and disposition. Pt's bed is low, locked, and call light in reach. SR up X2. Will continue to monitor pt.

## 2017-05-03 NOTE — ASSESSMENT & PLAN NOTE
Recent echo reveal EF 45-50%  Carotid ultrasound pending   Orthostatics every shift   Serial CE's   CT head showed no acute findings   Will consider Cardiology consult   Tele monitoring   Fall precautions

## 2017-05-03 NOTE — PROGRESS NOTES
Renal addendum note:  Pt was revisited and reevaluated during HD treatment. No c/o's with HD.  No new c/o's, wants to go home.  Tolerating HD well, continue HD  Will defer decision to d/c home to MARIO Coleman MD

## 2017-05-04 NOTE — ED NOTES
Patient walked down hallway for an approximated 50 feet. Patient denies any dizziness or weakness. tolerated well. VSS,

## 2017-05-04 NOTE — PROGRESS NOTES
Pt tolerated HD well with no complications. LAVF working fine. Dr. Coleman saw pt during tx. Net UF 2L

## 2017-05-04 NOTE — DISCHARGE SUMMARY
"[facility] Hospital Medicine  Discharge Summary      Patient Name: David Hadley  MRN: [id number]  Admission Date: 5/3/2017  Hospital Length of Stay: 0 days  Discharge Date and Time: 5/3/2017  7:54 PM  Attending Physician: Dr. Dena Griffith   Discharging Provider: Celina Larson NP  Primary Care Provider: Isaac Fitch MD      HPI:   David Hadley is a 61 y.o. male patient with PMH of ESRD, HTN, Cancer, DM, CHF, and MI  who presents to the Emergency Department for synocpe which onset suddenly this morning. Spouse reports pt was brushing his teeth this morning when he became dizzy and when walking back to living room he "passed out." Patient has dementia which is progressively getting worse per spouse.  Patient confused which is baseline. Patient denies any fever, chills, CP, SOB, abdominal pain, n/v, HA, visual disturbance, focal weakness/numbness, back pain, or neck pain. Patient missed dialysis this morning due to syncopcal episode.     * No surgery found *      Indwelling Lines/Drains at time of discharge:   Lines/Drains/Airways     Drain                 Hemodialysis AV Fistula Left upper arm -- days              Hospital Course:   Pt admitted to Observation Unit for Syncope.  CT of head showed no acute process.  Chest xray showed a small left pleural effusion.  EKG showed sinus bradycardia with 1st degree AV block and HR 58.  Carotid US showed no clinically significant area of stenosis with mild amount of atherosclerosis bilaterally.  Nephrology consulted due to history of ESRD. HD completed per schedule with 2 L removed.  Pt returned to neuro baseline per wife at bedside with no additional symptoms reported. Pt able to ambulate without difficulty with dizziness, nausea, vision changes, and gait instability denied.  Pt seen and examined prior to discharge by MD and deemed suitable for discharge to home accompanied by wife. Current medications resumed.  Pt instructed to follow up with PCP within 3 days, " Dr. Lazo (Cardiology), and Dr. Latham (Nephrology) within 1 week.      Consults:   Consults         Status Ordering Provider     Inpatient consult to Nephrology  Once     Provider:  (Not yet assigned)    Completed KAREN MCCALL     Inpatient consult to Nephrology  Once     Provider:  (Not yet assigned)    Completed TIGRE VALIENTE     Pharmacy to dose Vancomycin consult  Once     Provider:  (Not yet assigned)    Acknowledged TIGRE VALIENTE          Significant Diagnostic Studies:   Imaging Results         US Carotid Bilateral (Final result) Result time:  05/03/17 14:44:48    Final result by IRMA Jorge Sr., MD (05/03/17 14:44:48)    Impression:           1. No clinically significant area of stenosis.  2. There is a mild amount of atherosclerosis bilaterally.    Validated velocity measurements with angiographic measurements, velocity criteria are extrapolated from diameter data as defined by the Society of Radiologists in Ultrasound Consensus Conference Radiology 2003; 229; 340-346      Electronically signed by: IRMA JORGE MD  Date:     05/03/17  Time:    14:44     Narrative:    Ultrasound examination of the carotid arteries with color flow and spectral Doppler imaging    History:     Syncope    Technique: Multiple static ultrasound images are submitted for interpretation with color flow and spectral Doppler imaging.    Finding: There is a mild amount of atherosclerosis bilaterally.    The following pertains to the right side of the neck. The common carotid artery has a normal arterial waveform with peak systolic velocity of 75 cm/sec and a diastolic velocity of 5 cm/sec. The internal carotid artery has a normal arterial waveform with a peak systolic velocity of 57 cm/sec and a diastolic velocity of 9 cm/sec. The external carotid artery has a peak systolic velocity of 76 cm/sec. The vertebral artery has normal antegrade flow.    The following pertains to the left side of the neck. The common  carotid artery has a normal arterial waveform with peak systolic velocity of 59 cm/sec and a diastolic velocity of 5 cm/sec. The internal carotid artery has a normal arterial waveform with a peak systolic velocity of 41 cm/sec and a diastolic velocity of 3 cm/sec. The external carotid artery has a peak systolic velocity of 48 cm/sec. The vertebral artery has normal antegrade flow.            X-Ray Chest 1 View (Final result) Result time:  05/03/17 10:20:46    Final result by IRMA Jorge Sr., MD (05/03/17 10:20:46)    Impression:        1. There is opacification of the base of the left hemithorax. There is a mild amount of haziness in the inferior half of the left hemithorax. This is characteristic of a small layering left pleural effusion with associated atelectasis.  2. There is mild cardiomegaly.  3. There are surgical changes associated with a prior sternotomy.      Electronically signed by: IRMA JORGE MD  Date:     05/03/17  Time:    10:20     Narrative:    One-view chest x-ray    Clinical History: Syncope    Finding: Comparison was made to a prior examination performed on 4/3/2017. There are multiple sternotomy wires in place. There are multiple surgical clips projected over the mediastinum. There is mild cardiomegaly. There is opacification of the base of the left hemithorax. There is a mild amount of haziness in the inferior half of the left hemithorax. The right lung is clear. There is no pneumothorax. The right costophrenic angle is sharp.            CT Head Without Contrast (Final result) Result time:  05/03/17 10:09:09    Final result by Jay Yadav MD (05/03/17 10:09:09)    Impression:           No CT evidence of acute intracranial abnormality.  Age advanced atrophy and remote right occipital lobe infarct.    All CT scans at this facility use dose modulation, iterative reconstruction and/or weight based dosing when appropriate to reduce radiation dose to as low as reasonably achievable.        Electronically signed by: MAR ZAPATA MD  Date:     05/03/17  Time:    10:09     Narrative:    Exam:  CT HEAD WITHOUT CONTRAST    Clinical History:  Altered mental status. syncope    Technique: Axial CT imaging was performed through the head without intravenous contrast.     Comparison: MRI brain performed on October 29, 2013    Findings:     Age advanced generalized cerebral and cerebellar atrophy. Moderate, symmetric, low density changes in the periventricular white matter consistent with chronic small vessel ischemic change.  Wedge-shaped encephalomalacia in the right occipital lobe consistent with a remote cortical infarct.    No intracranial hemorrhage is identified.   No hydrocephalus, midline shift or mass effect.  The calvarium is intact.   Visualized paranasal sinuses and mastoid air cells are clear.   Atheromatous calcifications involve the bilateral cavernous carotid arteries.              Pending Diagnostic Studies:     None        Final Active Diagnoses:    Diagnosis Date Noted POA    PRINCIPAL PROBLEM:  Syncope [R55] 05/03/2017 Yes    Hyperlipidemia [E78.5] 07/29/2016 Yes    Dementia without behavioral disturbance [F03.90] 07/29/2016 Yes    ESRD (end stage renal disease) on dialysis [N18.6, Z99.2] 06/26/2013 Not Applicable    Hypertension [I10] 06/26/2013 Yes      Problems Resolved During this Admission:    Diagnosis Date Noted Date Resolved POA        Discharged Condition: stable    Disposition: Home or Self Care    Follow Up:  Follow-up Information     Follow up with Isaac Fitch MD In 3 days.    Specialty:  Family Medicine    Why:  hospital follow up     Contact information:    [address] [address] [address] [phone]          Follow up with Cr Lazo MD In 1 week.    Specialties:  Cardiology, Cardiovascular Disease    Why:  hospital follow up     Contact information:    [address] [place] [address] [phone]          Follow up with Rafa Latham MD  In 1 week.    Specialty:  Nephrology    Why:  hospital follow up     Contact information:    [address]  [place] [address] [phone]          Patient Instructions:     Diet general   Order Specific Question Answer Comments   Na restriction, if any: 2gNa    Additional restrictions: Diabetic 1800      Activity as tolerated     Call MD for:  temperature >100.4     Call MD for:  persistent nausea and vomiting or diarrhea     Call MD for:  severe uncontrolled pain     Call MD for:  increased confusion or weakness     Call MD for:  persistent dizziness, light-headedness, or visual disturbances     Call MD for:  severe persistent headache     Call MD for:  difficulty breathing or increased cough       Medications:  Reconciled Home Medications:   Discharge Medication List as of 5/3/2017  7:53 PM      CONTINUE these medications which have NOT CHANGED    Details   amlodipine (NORVASC) 5 MG tablet Take 1 tablet (5 mg total) by mouth once daily., Starting 4/13/2017, Until Fri 4/13/18, Normal      atorvastatin (LIPITOR) 20 MG tablet Take 1 tablet (20 mg total) by mouth once daily., Starting 10/28/2016, Until Discontinued, Normal      FOLIC ACID/VIT BCOMP,C (JOSAFAT-JANICE ORAL) Take by mouth., Until Discontinued, Historical Med      gabapentin (NEURONTIN) 100 MG capsule Take 1 capsule (100 mg total) by mouth 3 (three) times daily., Starting 10/28/2016, Until Mon 4/3/17, Normal      hydrocodone-acetaminophen 5-325mg (NORCO) 5-325 mg per tablet Take 1 tablet by mouth every 6 to 8 hours as needed for Pain., Starting 10/28/2016, Until Discontinued, Normal      losartan (COZAAR) 50 MG tablet Take by mouth. 1 tablet Oral Every day, Until Discontinued, Historical Med      metoprolol tartrate (LOPRESSOR) 25 MG tablet Take 1 tablet (25 mg total) by mouth 2 (two) times daily., Starting 6/29/2016, Until Thu 6/29/17, Normal      rivastigmine (EXELON) 4.6 mg/24 hr PT24 Place 1 patch onto the skin once daily., Starting 12/21/2016,  Until Thu 12/21/17, Normal      SENSIPAR 30 mg Tab Take 1 tablet by mouth once daily., Starting 6/29/2016, Until Discontinued, Historical Med      sertraline (ZOLOFT) 50 MG tablet Take 1 tablet (50 mg total) by mouth once daily., Starting 12/21/2016, Until Thu 12/21/17, Normal      sodium bicarbonate 650 MG tablet Take 2 tablets (1,300 mg total) by mouth 3 (three) times daily., Starting 4/13/2017, Until Fri 4/13/18, OTC      SPRYCEL 100 mg Tab Take 100 mg by mouth once daily. , Starting 4/20/2015, Until Discontinued, Historical Med      trazodone (DESYREL) 50 MG tablet Take 1 tablet (50 mg total) by mouth every evening., Starting 10/28/2016, Until Sat 10/28/17, Normal      VANCOMYCIN HCL (VANCOMYCIN 1 G/250 ML D5W, READY TO MIX SYSTEM,) Inject 250 mLs (1 g total) into the vein every Mon, Wed, Fri., Starting 4/14/2017, Until Fri 5/26/17, No Print           Time spent on the discharge of patient: 40 minutes    Celina Larson NP  Department of Hospital Medicine  [facility]

## 2017-05-04 NOTE — ED NOTES
The patient is awake, alert and cooperative with a calm affect, patient is aware of environment. Airway is open and patent, respirations are spontaneous, normal respiratory effort and rate noted, skin warm and dry, moves all extremities well, appearance: no apparent distress noted.Pt. Resting in bed. VSS. Bed in low, locked, and call light in reach. Side rails up X 2. Will continue to monitor.

## 2017-05-04 NOTE — ED NOTES
Patient stated his PCP is Dr. Fitch, Nephrologist is Dr. Craig and Dr. Rinku Guardado, Cardiologist is Dr. Degroot.

## 2017-05-09 ENCOUNTER — OFFICE VISIT (OUTPATIENT)
Dept: INTERNAL MEDICINE | Facility: CLINIC | Age: 62
End: 2017-05-09
Payer: MEDICARE

## 2017-05-09 ENCOUNTER — HOSPITAL ENCOUNTER (OUTPATIENT)
Dept: RADIOLOGY | Facility: HOSPITAL | Age: 62
Discharge: HOME | End: 2017-05-09
Attending: FAMILY MEDICINE
Payer: MEDICARE

## 2017-05-09 VITALS
OXYGEN SATURATION: 96 % | HEART RATE: 64 BPM | TEMPERATURE: 97 F | SYSTOLIC BLOOD PRESSURE: 128 MMHG | DIASTOLIC BLOOD PRESSURE: 70 MMHG

## 2017-05-09 DIAGNOSIS — M25.462 SWELLING OF LEFT KNEE JOINT: ICD-10-CM

## 2017-05-09 DIAGNOSIS — R56.9 SEIZURES: Primary | ICD-10-CM

## 2017-05-09 DIAGNOSIS — M17.12 OSTEOARTHROSIS, LOCALIZED, PRIMARY, KNEE, LEFT: ICD-10-CM

## 2017-05-09 DIAGNOSIS — R29.6 RECURRENT FALLS WHILE WALKING: ICD-10-CM

## 2017-05-09 PROCEDURE — 73560 X-RAY EXAM OF KNEE 1 OR 2: CPT | Mod: 26,59,RT, | Performed by: RADIOLOGY

## 2017-05-09 PROCEDURE — 73562 X-RAY EXAM OF KNEE 3: CPT | Mod: 26,LT,, | Performed by: RADIOLOGY

## 2017-05-09 PROCEDURE — 99214 OFFICE O/P EST MOD 30 MIN: CPT | Mod: S$PBB,,, | Performed by: FAMILY MEDICINE

## 2017-05-09 PROCEDURE — 73560 X-RAY EXAM OF KNEE 1 OR 2: CPT | Mod: TC,PO,RT

## 2017-05-09 PROCEDURE — 99999 PR PBB SHADOW E&M-EST. PATIENT-LVL III: CPT | Mod: PBBFAC,,, | Performed by: FAMILY MEDICINE

## 2017-05-09 RX ORDER — AMLODIPINE BESYLATE 5 MG/1
5 TABLET ORAL DAILY
Qty: 30 TABLET | Refills: 6 | Status: SHIPPED | OUTPATIENT
Start: 2017-05-09 | End: 2018-05-09

## 2017-05-09 RX ORDER — LEVETIRACETAM 500 MG/1
500 TABLET ORAL 2 TIMES DAILY
Qty: 60 TABLET | Refills: 11 | Status: SHIPPED | OUTPATIENT
Start: 2017-05-09 | End: 2017-12-14 | Stop reason: ALTCHOICE

## 2017-05-09 RX ORDER — LORAZEPAM 0.5 MG/1
0.5 TABLET ORAL EVERY 6 HOURS PRN
Qty: 30 TABLET | Refills: 1 | Status: SHIPPED | OUTPATIENT
Start: 2017-05-09 | End: 2017-12-02 | Stop reason: SDUPTHER

## 2017-05-09 NOTE — MR AVS SNAPSHOT
[facility]  [address] [place] [address]  Phone: [phone]                  David Hadley   2017 2:00 PM   Office Visit    Description:  Male : 1955   Provider:  Isaac Fitch MD   Department:  [facility] - [facility]           Reason for Visit     Hospital Follow Up     Fall           Diagnoses this Visit        Comments    Seizures    -  Primary     Recurrent falls while walking         Swelling of left knee joint                To Do List           Future Appointments        Provider Department Dept Phone    2017 3:00 PM [facility] XR1 [facility] - X-Ray [phone]    2017 2:00 PM [facility] US1 [facility] - [phone]    2017 11:00 AM EEG, [facility] -  [phone]    6/15/2017 11:00 AM Abdirashid Kowalski MD [facility] [phone]      Goals (5 Years of Data)     None       These Medications        Disp Refills Start End    amlodipine (NORVASC) 5 MG tablet 30 tablet 6 2017    Take 1 tablet (5 mg total) by mouth once daily. - Oral    Pharmacy: [facility] [address] [place] [address] Ph #: [phone]       levetiracetam (KEPPRA) 500 MG Tab 60 tablet 11 2017    Take 1 tablet (500 mg total) by mouth 2 (two) times daily. - Oral    Pharmacy: [facility] [address] [place] [address] Ph #: [phone]       lorazepam (ATIVAN) 0.5 MG tablet 30 tablet 1 2017    Take 1 tablet (0.5 mg total) by mouth every 6 (six) hours as needed for Anxiety. - Oral    Pharmacy: [facility] [address] [place] [address] Ph #: [phone]         [facility]     [facility] - / Assistance  Unless otherwise directed by your provider, please contact [facility], our nurse care line that is available for  assistance.     Registered nurses in the [facility] [place]  provide: appointment scheduling, clinical advisement, health education, and other advisory services.  Call: [phone] (toll free)               Medications           Message regarding Medications     Verify the changes and/or additions to your medication regime listed below are the same as discussed with your clinician today.  If any of these changes or additions are incorrect, please notify your healthcare provider.        START taking these NEW medications        Refills    levetiracetam (KEPPRA) 500 MG Tab 11    Sig: Take 1 tablet (500 mg total) by mouth 2 (two) times daily.    Class: Normal    Route: Oral    lorazepam (ATIVAN) 0.5 MG tablet 1    Sig: Take 1 tablet (0.5 mg total) by mouth every 6 (six) hours as needed for Anxiety.    Class: Normal    Route: Oral           Verify that the below list of medications is an accurate representation of the medications you are currently taking.  If none reported, the list may be blank. If incorrect, please contact your healthcare provider. Carry this list with you in case of emergency.           Current Medications     amlodipine (NORVASC) 5 MG tablet Take 1 tablet (5 mg total) by mouth once daily.    atorvastatin (LIPITOR) 20 MG tablet Take 1 tablet (20 mg total) by mouth once daily.    FOLIC ACID/VIT BCOMP,C (JOSAFAT-JANICE ORAL) Take by mouth.    hydrocodone-acetaminophen 5-325mg (NORCO) 5-325 mg per tablet Take 1 tablet by mouth every 6 to 8 hours as needed for Pain.    losartan (COZAAR) 50 MG tablet Take by mouth. 1 tablet Oral Every day    metoprolol tartrate (LOPRESSOR) 25 MG tablet Take 1 tablet (25 mg total) by mouth 2 (two) times daily.    rivastigmine (EXELON) 4.6 mg/24 hr PT24 Place 1 patch onto the skin once daily.    SENSIPAR 30 mg Tab Take 1 tablet by mouth once daily.    sertraline (ZOLOFT) 50 MG tablet Take 1 tablet (50 mg total) by mouth once daily.    sodium bicarbonate 650 MG tablet Take 2 tablets (1,300 mg total) by mouth 3 (three) times daily.     SPRYCEL 100 mg Tab Take 100 mg by mouth once daily.     trazodone (DESYREL) 50 MG tablet Take 1 tablet (50 mg total) by mouth every evening.    VANCOMYCIN HCL (VANCOMYCIN 1 G/250 ML D5W, READY TO MIX SYSTEM,) Inject 250 mLs (1 g total) into the vein every Mon, Wed, Fri.    gabapentin (NEURONTIN) 100 MG capsule Take 1 capsule (100 mg total) by mouth 3 (three) times daily.    levetiracetam (KEPPRA) 500 MG Tab Take 1 tablet (500 mg total) by mouth 2 (two) times daily.    lorazepam (ATIVAN) 0.5 MG tablet Take 1 tablet (0.5 mg total) by mouth every 6 (six) hours as needed for Anxiety.           Clinical Reference Information           Your Vitals Were     BP Pulse Temp SpO2          128/70 (BP Location: Right arm, Patient Position: Sitting, BP Method: Manual) 64 97 °F (36.1 °C) (Tympanic) 96%        Blood Pressure          Most Recent Value    BP  128/70      Allergies as of 5/9/2017     Benadryl Decongestant      Immunizations Administered on Date of Encounter - 5/9/2017     None      Orders Placed During Today's Visit      Normal Orders This Visit    Ambulatory referral to Neurology     Future Labs/Procedures Expected by Expires    US Extremity Non Vascular Limited Left  5/9/2017 5/9/2018    X-Ray Knee 3 View Left  5/9/2017 5/9/2018    Ambulatory EEG  As directed 5/9/2018      Maintenance Dialysis History     Start End Type Comments Center    11/26/2008    [facility] Selene [facility]            Current Dialysis Center Information     [facility] Harjinder Bradley [address] [facility] Phone #:  [phone]    Contact:  N/A THIAGO NUNEZ  [address] Fax #:  [phone]            [facility] Sign-Up     Activating your [facility] account is as easy as 1-2-3!     1) Visit [email or web address], select Sign Up Now, enter this activation code and your date of birth, then select Next.  [email or web address]  Expires: 5/22/2017  3:03 PM      2) Create a username and password to use when you visit [facility] in the future and select a security question  in case you lose your password and select Next.    3) Enter your e-mail address and click Sign Up!    Additional Information  If you have questions, please e-mail [email or web address] or call [phone] to talk to our [facility] staff. Remember, [facility] is NOT to be used for urgent needs. For medical emergencies, dial 911.         Language Assistance Services     ATTENTION: Language assistance services are available, free of charge. Please call [phone].      ATENCIÓN: Si habla español, tiene a das disposición servicios gratuitos de asistencia lingüística. Llame al [phone].     SACHIN Ý: N?u b?n nói Ti?ng Vi?t, có các d?ch v? h? tr? ngôn ng? mi?n phí dành cho b?n. G?i s? [phone].         [facility] complies with applicable Federal civil rights laws and does not discriminate on the basis of race, color, national origin, age, disability, or sex.

## 2017-05-09 NOTE — PROGRESS NOTES
Chief Complaint:    Chief Complaint   Patient presents with    Hospital Follow Up    Fall       History of Present Illness:    Patient with end-stage renal disease status post EGD discharge for falling.  He has had sustained several falls according to his wife.  This time and he felt he was seizures shaking and had froth coming out from his mouth after the episode he was unresponsive for several minutes.  This is the second episode that he is at.  He is a status scalp injury CAT scan of the brain did not show any acute bleed.  Also been having some chronic long-standing left knee pain.  He has dementia and he does get agitated wife is requesting some medication.    ROS:  Review of Systems   Unable to perform ROS: Dementia   Constitutional: Negative for activity change, chills, fatigue, fever and unexpected weight change.   HENT: Negative for congestion, ear discharge, ear pain, hearing loss, postnasal drip and rhinorrhea.    Eyes: Negative for pain and visual disturbance.   Respiratory: Negative for cough, chest tightness and shortness of breath.    Cardiovascular: Negative for chest pain and palpitations.   Gastrointestinal: Negative for abdominal pain, diarrhea and vomiting.   Endocrine: Negative for heat intolerance.   Genitourinary: Negative for dysuria, flank pain, frequency and hematuria.   Musculoskeletal: Negative for back pain, gait problem and neck pain.   Skin: Negative for color change and rash.   Neurological: Negative for dizziness, tremors, seizures, numbness and headaches.   Psychiatric/Behavioral: Positive for behavioral problems. Negative for agitation, hallucinations, self-injury, sleep disturbance and suicidal ideas. The patient is not nervous/anxious.        Past Medical History:   Diagnosis Date    After-cataract, unspecified - Left Eye 11/27/2013    Allergy     Arthritis     Cancer     CHF (congestive heart failure)     Chronic kidney disease     chemo/ dialias    Dementia      Diabetes mellitus     Hypertension     Myocardial infarction        Social History:  Social History     Social History    Marital status:      Spouse name: N/A    Number of children: 1    Years of education: N/A     Social History Main Topics    Smoking status: Former Smoker     Types: Cigarettes     Quit date: 1/28/2000    Smokeless tobacco: Never Used    Alcohol use No    Drug use: No    Sexual activity: Not Currently     Birth control/ protection: None     Other Topics Concern    None     Social History Narrative    None       Family History:   family history includes Cancer in his brother; Colon cancer in his sister; Glaucoma in his mother; Pancreatic cancer in his brother; Prostate cancer in his father.    Health Maintenance   Topic Date Due    Pneumococcal PPSV23 (High Risk) (2) 04/14/2016    Hemoglobin A1c  02/05/2017    Foot Exam  02/18/2017    Eye Exam  06/21/2017    Influenza Vaccine  08/01/2017    Lipid Panel  08/05/2017    TETANUS VACCINE  09/15/2019    Colonoscopy  01/28/2020    Hepatitis C Screening  Completed    Zoster Vaccine  Completed    Pneumococcal PCV13 (High Risk)  Completed       Physical Exam:    Vital Signs  Temp: 97 °F (36.1 °C)  Temp src: Tympanic  Pulse: 64  SpO2: 96 %  BP: 128/70  BP Location: Right arm  BP Method: Manual  Patient Position: Sitting  Pain Score: 10-Worst pain ever  Pain Loc: Knee]    There is no height or weight on file to calculate BMI.    Physical Exam   Constitutional: He appears well-developed.   HENT:   Mouth/Throat: Oropharynx is clear and moist.   Eyes: Conjunctivae are normal. Pupils are equal, round, and reactive to light.   Neck: Normal range of motion. Neck supple.   Cardiovascular: Normal rate, regular rhythm and normal heart sounds.    No murmur heard.  Pulmonary/Chest: Effort normal and breath sounds normal. No respiratory distress. He has no wheezes. He has no rales. He exhibits no tenderness.   Abdominal: Soft. He exhibits no  distension and no mass. There is no tenderness. There is no guarding.   Musculoskeletal: He exhibits no edema.        Left knee: He exhibits effusion. Tenderness found. Medial joint line tenderness noted.   Lymphadenopathy:     He has no cervical adenopathy.   Neurological: He is alert. Coordination and gait abnormal.   Patient walks slowly appears that he could fall..   Skin: Skin is warm and dry.       Lab Results   Component Value Date    CHOL 116 (L) 08/05/2016    CHOL 135 03/06/2014    CHOL 122 05/10/2011    TRIG 41 08/05/2016    TRIG 64 03/06/2014    TRIG 41 05/10/2011    HDL 46 08/05/2016    HDL 56 03/06/2014    HDL 56 05/10/2011    TOTALCHOLEST 2.5 08/05/2016    TOTALCHOLEST 2.4 03/06/2014    TOTALCHOLEST 2.2 05/10/2011    NONHDLCHOL 70 08/05/2016    NONHDLCHOL 79 03/06/2014       Lab Results   Component Value Date    HGBA1C 4.4 (L) 08/05/2016       Assessment:      ICD-10-CM ICD-9-CM   1. Seizures R56.9 780.39   2. Recurrent falls while walking R29.6 781.99   3. Swelling of left knee joint M25.462 719.06         Plan:  1.  Seizure disorder new onset started on Keppra twice a day.  We'll get EEG and a neurology consult.  2.  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  He should be in a wheelchair and will need assistance when he needs to use the bathroom.  3.  Left knee effusion, we will get ultrasound and x-ray of the effusion significantly enlarged consider tapping it for fluid analysis.  4.  Dementia with some agitation that Ativan for when necessary use only.  Follow-up after the ultrasound.  Orders Placed This Encounter   Procedures    US Extremity Non Vascular Limited Left    Ambulatory referral to Neurology    Ambulatory EEG       Current Outpatient Prescriptions   Medication Sig Dispense Refill    amlodipine (NORVASC) 5 MG tablet Take 1 tablet (5 mg total) by mouth once daily. 30 tablet 6     atorvastatin (LIPITOR) 20 MG tablet Take 1 tablet (20 mg total) by mouth once daily. 90 tablet 3    FOLIC ACID/VIT BCOMP,C (JOSAFAT-JANICE ORAL) Take by mouth.      hydrocodone-acetaminophen 5-325mg (NORCO) 5-325 mg per tablet Take 1 tablet by mouth every 6 to 8 hours as needed for Pain. 60 tablet 0    losartan (COZAAR) 50 MG tablet Take by mouth. 1 tablet Oral Every day      metoprolol tartrate (LOPRESSOR) 25 MG tablet Take 1 tablet (25 mg total) by mouth 2 (two) times daily. 60 tablet 0    rivastigmine (EXELON) 4.6 mg/24 hr PT24 Place 1 patch onto the skin once daily. 30 patch 11    SENSIPAR 30 mg Tab Take 1 tablet by mouth once daily.      sertraline (ZOLOFT) 50 MG tablet Take 1 tablet (50 mg total) by mouth once daily. 30 tablet 11    sodium bicarbonate 650 MG tablet Take 2 tablets (1,300 mg total) by mouth 3 (three) times daily.      SPRYCEL 100 mg Tab Take 100 mg by mouth once daily.       trazodone (DESYREL) 50 MG tablet Take 1 tablet (50 mg total) by mouth every evening. 26 tablet 11    VANCOMYCIN HCL (VANCOMYCIN 1 G/250 ML D5W, READY TO MIX SYSTEM,) Inject 250 mLs (1 g total) into the vein every Mon, Wed, Fri.  0    gabapentin (NEURONTIN) 100 MG capsule Take 1 capsule (100 mg total) by mouth 3 (three) times daily. 270 capsule 3    levetiracetam (KEPPRA) 500 MG Tab Take 1 tablet (500 mg total) by mouth 2 (two) times daily. 60 tablet 11    lorazepam (ATIVAN) 0.5 MG tablet Take 1 tablet (0.5 mg total) by mouth every 6 (six) hours as needed for Anxiety. 30 tablet 1     No current facility-administered medications for this visit.        Medications Discontinued During This Encounter   Medication Reason    amlodipine (NORVASC) 5 MG tablet Reorder       No Follow-up on file.      Dr Isaac Fitch MD    Disclaimer: This note is prepared using voice recognition system and as such is likely to have errors and is not proof read.

## 2017-05-10 ENCOUNTER — TELEPHONE (OUTPATIENT)
Dept: INTERNAL MEDICINE | Facility: CLINIC | Age: 62
End: 2017-05-10

## 2017-05-10 NOTE — TELEPHONE ENCOUNTER
----- Message from Isaac Fitch MD sent at 5/9/2017 10:58 PM CDT -----  Arthritis present in both knee along with some fluid.

## 2017-05-10 NOTE — TELEPHONE ENCOUNTER
Yes, but the ultrasound needs to be done first and once the ultrasound results are available we can drain the fluid and send it for analysis.

## 2017-05-10 NOTE — TELEPHONE ENCOUNTER
Called and informed pt's sister of results.  She voiced understanding.  Would like to know if the fluid needs removing.  Please advise.

## 2017-05-10 NOTE — TELEPHONE ENCOUNTER
----- Message from Sapphire Corbett sent at 5/10/2017  4:49 PM CDT -----  Contact: wife-ms [phone]  Patient would like to consult with nurse regarding appointment scheduled on 5/11/17 for an ultrasound. Please call back at [phone].     Thanks Sapphire Corbett

## 2017-05-11 ENCOUNTER — HOSPITAL ENCOUNTER (OUTPATIENT)
Dept: RADIOLOGY | Facility: HOSPITAL | Age: 62
Discharge: HOME | End: 2017-05-11
Attending: FAMILY MEDICINE
Payer: MEDICARE

## 2017-05-11 DIAGNOSIS — M25.462 SWELLING OF LEFT KNEE JOINT: ICD-10-CM

## 2017-05-11 PROCEDURE — 76882 US LMTD JT/FCL EVL NVASC XTR: CPT | Mod: TC

## 2017-05-18 ENCOUNTER — PROCEDURE VISIT (OUTPATIENT)
Dept: INTERNAL MEDICINE | Facility: CLINIC | Age: 62
End: 2017-05-18
Payer: MEDICARE

## 2017-05-18 VITALS
WEIGHT: 212.06 LBS | HEIGHT: 76 IN | BODY MASS INDEX: 25.82 KG/M2 | SYSTOLIC BLOOD PRESSURE: 124 MMHG | TEMPERATURE: 97 F | DIASTOLIC BLOOD PRESSURE: 66 MMHG | OXYGEN SATURATION: 94 % | HEART RATE: 58 BPM

## 2017-05-18 DIAGNOSIS — M25.462 KNEE EFFUSION, LEFT: Primary | ICD-10-CM

## 2017-05-18 PROCEDURE — 20610 DRAIN/INJ JOINT/BURSA W/O US: CPT | Mod: PBBFAC,PO | Performed by: FAMILY MEDICINE

## 2017-05-18 PROCEDURE — 20610 DRAIN/INJ JOINT/BURSA W/O US: CPT | Mod: S$PBB,LT,, | Performed by: FAMILY MEDICINE

## 2017-05-18 NOTE — PROCEDURES
Large Joint Aspiration/Injection  Date/Time: 5/18/2017 11:14 AM  Performed by: TRISH CASTILLO  Authorized by: TRISH CASTILLO     Consent Done?:  Yes (Written)  Indications:  Joint swelling    Location:  Knee  Site:  L knee  Ultrasonic Guidance for needle placement: No  Needle size:  18 G  Approach:  Anteromedial  Aspirate:  Bloody    Additional Comments: Unable to obtain a significant fluid only about a cc of bloody fluid obtained appear to be traumatic not be sent for analysis.

## 2017-05-23 ENCOUNTER — TELEPHONE (OUTPATIENT)
Dept: INTERNAL MEDICINE | Facility: CLINIC | Age: 62
End: 2017-05-23

## 2017-05-23 NOTE — TELEPHONE ENCOUNTER
----- Message from Heidy Feliz sent at 5/23/2017 12:47 PM CDT -----  Pt needs to reschedule his appointment and would like it earlier because he can not wait that late to eat/ please call [phone]/ma

## 2017-06-15 ENCOUNTER — OFFICE VISIT (OUTPATIENT)
Dept: NEUROLOGY | Facility: CLINIC | Age: 62
End: 2017-06-15
Payer: MEDICARE

## 2017-06-15 VITALS
HEIGHT: 75 IN | BODY MASS INDEX: 25.93 KG/M2 | HEART RATE: 60 BPM | WEIGHT: 208.56 LBS | SYSTOLIC BLOOD PRESSURE: 140 MMHG | DIASTOLIC BLOOD PRESSURE: 80 MMHG

## 2017-06-15 DIAGNOSIS — G30.1 LATE ONSET ALZHEIMER'S DISEASE WITHOUT BEHAVIORAL DISTURBANCE: Primary | ICD-10-CM

## 2017-06-15 DIAGNOSIS — F02.80 LATE ONSET ALZHEIMER'S DISEASE WITHOUT BEHAVIORAL DISTURBANCE: Primary | ICD-10-CM

## 2017-06-15 PROCEDURE — 99213 OFFICE O/P EST LOW 20 MIN: CPT | Mod: PBBFAC,PO | Performed by: PSYCHIATRY & NEUROLOGY

## 2017-06-15 PROCEDURE — 99999 PR PBB SHADOW E&M-EST. PATIENT-LVL III: CPT | Mod: PBBFAC,,, | Performed by: PSYCHIATRY & NEUROLOGY

## 2017-06-15 PROCEDURE — 99204 OFFICE O/P NEW MOD 45 MIN: CPT | Mod: S$PBB,,, | Performed by: PSYCHIATRY & NEUROLOGY

## 2017-06-15 NOTE — LETTER
Chari 15, 2017      Isaac Fitch MD  [address] [address] [address]           [facility]  [address] [facility] [address]  Phone: [phone]          Patient: David Hadley   MR Number: [id number]   YOB: 1955   Date of Visit: 6/15/2017       Dear Dr. Isaac Fitch:    Thank you for referring David Hadley to me for evaluation. Attached you will find relevant portions of my assessment and plan of care.    If you have questions, please do not hesitate to call me. I look forward to following David Hadley along with you.    Sincerely,    Abdirashid Kowalski MD    Enclosure  CC:  No Recipients    If you would like to receive this communication electronically, please contact [email or web address] or [phone] to request more information on [employer] access.    For providers and/or their staff who would like to refer a patient to [facility], please contact us through our one-stop-shop provider referral line, Tony Schwab, at [phone].    If you feel you have received this communication in error or would no longer like to receive these types of communications, please e-mail [email or web address]

## 2017-06-15 NOTE — PROGRESS NOTES
This is a 61-year-old right-handed patient who is accompanied to the office today by his niece.  The patient's niece states that she has not seen him for several weeks and is only infrequently around him and cannot provide much history.  The patient himself is not able to provide much history either.  Review of medical records indicate that the patient has had several episodes of either syncope, near-syncope or perhaps even seizure-like activity associated with a loss of consciousness.  The patient is unable to give me any additional history and neither is the family.  However, the patient and the family indicates that since being placed on Keppra, that he has not had any further episodes of fall or syncope.    The patient has chronic renal failure and is on hemodialysis 3 days a week.  His current Keppra dosing is 1000 mg daily.    The family indicates that the patient does have some form of dementia.  By their definition, the patient has significant difficulties with memory.  As an example, he does not seem to recall names of various family members.  The niece indicates that he at times is apparently very confused and is unable to recall events that occur from 1 day to the next.  He is unable to take his medications without supervision.  At times, apparently he becomes slightly agitated.  He is independent for ambulation but has a tendency to be slightly unsteady.  He does require supervision for other activities of daily living.    An attempt was made to obtain review of systems, but the patient is unable to answer questions accurately and the niece is not with him frequently enough to answer questions.    PAST HISTORY:  Surgery: Coronary bypass graft, cataract extraction with lens implant  Medical: Chronic renal failure with dialysis, diabetes mellitus, hypertension, coronary artery disease, and history of congestive heart failure  ALLERGIES: Benadryl    FAMILY HISTORY:  The patient's mother is  his  father is apparently still living although his health status is not known.  He had a brother who  with colon cancer and a sister who also  with colon cancer.    SOCIAL HISTORY:  The patient is not working.  He lives with family members.  He quit smoking in .  He denies alcohol use.    PE:   VITAL SIGNS: Blood pressure 140/80, pulse 72, weight 94.6 kg, height 6 foot 3 inches, BMI 26.07  APPEARANCE: Well nourished, well developed, in no acute distress.  The patient is cooperative for the examination.    HEAD: Normocephalic, atraumatic.  EYES: PERRL. EOMI.  Non-icteric sclerae.    EARS: TM's intact. Light reflex normal. No retraction or perforation.    NOSE: Mucosa pink. Airway clear.  MOUTH & THROAT: No tonsillar enlargement. No pharyngeal erythema or exudate. No stridor.  NECK: Supple. No bruits.  CHEST: Lungs clear to auscultation.  CARDIOVASCULAR: Regular rhythm without significant murmurs.  ABDOMEN: Bowel sounds normal. Not distended.   MUSCULOSKELETAL:  No bony deformity seen.  Muscle tone and muscle mass are normal both upper and both lower extremities.  NEUROLOGIC:   Mental Status: The patient was unable to name the day, date, month, or year.  He knew that he was in a doctor's office but could not name the city or state.  He was able to follow one-step commands but could not follow two-step commands.  He was unable to recall 3 unrelated words.  He was unable to repeat sentences.  He was unable to perform abstractions.  The patient is attentive to the environment and cooperative for the exam.  Cranial Nerves: II-XII grossly intact. Fundoscopic exam is normal.  No hemorrhage, exudate or papilledema is present. The extraocular muscles are intact in the cardinal directions of gaze.  No ptosis is present. Facial features are symmetrical.  Speech is normal in fluency, diction, and phrasing.  Tongue protrudes in the midline.    Gait and Station:  Romberg is negative.  The patient is slightly unsteady with  wide-based gait.  The niece provides hand held assist for ambulation.  Motor:  No downdrift of either arm when held at shoulder level.  Manual muscle testing of proximal and distal muscles of both upper and lower extremities is normal.   Sensory: The patient reports perception of vibratory sensation in both hands and in both lower extremities.  Cerebellar:  Finger to nose done well.  Alternating movements intact.  No involuntary movements or tremor seen.  Reflexes:  Stretch reflexes are 2+ both upper and lower extremities.  Plantar stimulation is flexor bilaterally and no pathological reflexes are seen       ASSESSMENT:  1.  History of probable seizure, now well controlled with levetiracetam 500 mg twice a day  2.  Dementia of the Alzheimer type  3.  History of chronic renal failure on hemodialysis  4.  History of coronary artery disease, hypertension, and diabetes mellitus    RECOMMENDATIONS:  1.  From the history given it appears that the addition of levetiracetam has been beneficial for the patient as he has not apparently had any further episodes of fall or loss of consciousness with seizure activity.  2.  Continue medication as previously prescribed  3.  Continue follow-up with primary care and nephrology with return to neurology as needed.     APPEARANCE: Well nourished, well developed, in no acute distress.    HEAD: Normocephalic, atraumatic.  EYES: PERRL. EOMI.  Non-icteric sclerae.    EARS: TM's intact. Light reflex normal. No retraction or perforation.    NOSE: Mucosa pink. Airway clear.  MOUTH & THROAT: No tonsillar enlargeh  NECK: Supple. No bruits.  CHEST: Lungs clear to auscultation.  CARDIOVASCULAR: Regular rhythm without significant murmurs.  MUSCULOSKELETAL:  No bony deformity seen.   NEUROLOGIC:   Mental Status:  The patient is well oriented to person, time, place, and situation.  The patient is attentive to the environment and cooperative for the exam.  Cranial Nerves: II-XII grossly intact.  Fundoscopic exam is normal.  No hemorrhage, exudate or papilledema is present. The extraocular muscles are intact in the cardinal directions of gaze.  No ptosis is present. Facial features are symmetrical.  Speech is normal in fluency, diction, and phrasing.  Tongue protrudes in the midline.    Gait and Station:  Romberg is negative.  Good alternate armswing with normal gait.  Motor:  No downdrift of either arm when held at shoulder level.  Manual muscle testing of proximal and distal muscles of both upper and lower extremities is normal. Muscle mass and muscle tone are normal both upper and both lower extremities.  Sensory:  Intact both upper and lower extremities to pin prick, touch, and vibration.  Cerebellar:  Finger to nose done well.  Alternating movements intact.  No involuntary movements or tremor seen.  Reflexes:  Stretch reflexes are 2+ both upper and lower extremities.  Plantar stimulation is flexor bilaterally and no pathological reflexes are seen       This note is generated with speech recognition software and is subject to transcription error and sound alike phrases that may be missed by proofreading.

## 2017-08-10 ENCOUNTER — OFFICE VISIT (OUTPATIENT)
Dept: FAMILY MEDICINE | Facility: CLINIC | Age: 62
DRG: 871 | End: 2017-08-10
Payer: MEDICARE

## 2017-08-10 ENCOUNTER — HOSPITAL ENCOUNTER (INPATIENT)
Facility: HOSPITAL | Age: 62
LOS: 7 days | Discharge: HOME | DRG: 871 | End: 2017-08-17
Attending: EMERGENCY MEDICINE | Admitting: INTERNAL MEDICINE
Payer: MEDICARE

## 2017-08-10 VITALS
OXYGEN SATURATION: 91 % | HEART RATE: 88 BPM | WEIGHT: 206.56 LBS | BODY MASS INDEX: 25.82 KG/M2 | SYSTOLIC BLOOD PRESSURE: 80 MMHG | TEMPERATURE: 97 F | DIASTOLIC BLOOD PRESSURE: 40 MMHG

## 2017-08-10 DIAGNOSIS — R78.81 BACTEREMIA: Primary | ICD-10-CM

## 2017-08-10 DIAGNOSIS — R78.81 STAPHYLOCOCCUS AUREUS BACTEREMIA: ICD-10-CM

## 2017-08-10 DIAGNOSIS — I95.9 HYPOTENSION, UNSPECIFIED HYPOTENSION TYPE: ICD-10-CM

## 2017-08-10 DIAGNOSIS — N18.6 ESRD (END STAGE RENAL DISEASE) ON DIALYSIS: ICD-10-CM

## 2017-08-10 DIAGNOSIS — J90 BILATERAL PLEURAL EFFUSION: ICD-10-CM

## 2017-08-10 DIAGNOSIS — Z99.2 ESRD (END STAGE RENAL DISEASE) ON DIALYSIS: ICD-10-CM

## 2017-08-10 DIAGNOSIS — B95.61 STAPHYLOCOCCUS AUREUS BACTEREMIA: ICD-10-CM

## 2017-08-10 DIAGNOSIS — R63.0 LOSS OF APPETITE: ICD-10-CM

## 2017-08-10 DIAGNOSIS — R53.1 WEAK: ICD-10-CM

## 2017-08-10 DIAGNOSIS — Z99.2 ESRD (END STAGE RENAL DISEASE) ON DIALYSIS: Chronic | ICD-10-CM

## 2017-08-10 DIAGNOSIS — N18.9 CHRONIC RENAL FAILURE, UNSPECIFIED STAGE: ICD-10-CM

## 2017-08-10 DIAGNOSIS — N18.6 ESRD (END STAGE RENAL DISEASE) ON DIALYSIS: Chronic | ICD-10-CM

## 2017-08-10 DIAGNOSIS — R41.0 CONFUSED: Primary | ICD-10-CM

## 2017-08-10 DIAGNOSIS — R78.81 GRAM-POSITIVE COCCI BACTEREMIA: ICD-10-CM

## 2017-08-10 DIAGNOSIS — F03.90 DEMENTIA WITHOUT BEHAVIORAL DISTURBANCE, UNSPECIFIED DEMENTIA TYPE: ICD-10-CM

## 2017-08-10 LAB
ALBUMIN SERPL BCP-MCNC: 2.9 G/DL
ALP SERPL-CCNC: 107 U/L
ALT SERPL W/O P-5'-P-CCNC: 22 U/L
ANION GAP SERPL CALC-SCNC: 13 MMOL/L
AST SERPL-CCNC: 58 U/L
BASOPHILS # BLD AUTO: 0.02 K/UL
BASOPHILS NFR BLD: 0.1 %
BILIRUB SERPL-MCNC: 0.7 MG/DL
BUN SERPL-MCNC: 25 MG/DL
CALCIUM SERPL-MCNC: 9.3 MG/DL
CHLORIDE SERPL-SCNC: 95 MMOL/L
CO2 SERPL-SCNC: 28 MMOL/L
CREAT SERPL-MCNC: 6.1 MG/DL
DIFFERENTIAL METHOD: ABNORMAL
EOSINOPHIL # BLD AUTO: 0 K/UL
EOSINOPHIL NFR BLD: 0.1 %
ERYTHROCYTE [DISTWIDTH] IN BLOOD BY AUTOMATED COUNT: 16.7 %
EST. GFR  (AFRICAN AMERICAN): 10 ML/MIN/1.73 M^2
EST. GFR  (NON AFRICAN AMERICAN): 9 ML/MIN/1.73 M^2
GLUCOSE SERPL-MCNC: 132 MG/DL
HCT VFR BLD AUTO: 37.2 %
HGB BLD-MCNC: 12.1 G/DL
LACTATE SERPL-SCNC: 2.2 MMOL/L
LACTATE SERPL-SCNC: 2.2 MMOL/L
LYMPHOCYTES # BLD AUTO: 1.3 K/UL
LYMPHOCYTES NFR BLD: 9.6 %
MCH RBC QN AUTO: 27.8 PG
MCHC RBC AUTO-ENTMCNC: 32.5 G/DL
MCV RBC AUTO: 85 FL
MONOCYTES # BLD AUTO: 1.6 K/UL
MONOCYTES NFR BLD: 11.4 %
NEUTROPHILS # BLD AUTO: 11 K/UL
NEUTROPHILS NFR BLD: 78.8 %
PLATELET # BLD AUTO: 186 K/UL
PMV BLD AUTO: 10.7 FL
POCT GLUCOSE: 139 MG/DL (ref 70–110)
POCT GLUCOSE: 284 MG/DL (ref 70–110)
POTASSIUM SERPL-SCNC: 4.7 MMOL/L
PROT SERPL-MCNC: 8.1 G/DL
RBC # BLD AUTO: 4.36 M/UL
SODIUM SERPL-SCNC: 136 MMOL/L
WBC # BLD AUTO: 13.9 K/UL

## 2017-08-10 PROCEDURE — 11000001 HC ACUTE MED/SURG PRIVATE ROOM

## 2017-08-10 PROCEDURE — 96366 THER/PROPH/DIAG IV INF ADDON: CPT

## 2017-08-10 PROCEDURE — 99222 1ST HOSP IP/OBS MODERATE 55: CPT | Mod: ,,, | Performed by: INTERNAL MEDICINE

## 2017-08-10 PROCEDURE — 96365 THER/PROPH/DIAG IV INF INIT: CPT

## 2017-08-10 PROCEDURE — 36415 COLL VENOUS BLD VENIPUNCTURE: CPT

## 2017-08-10 PROCEDURE — 99284 EMERGENCY DEPT VISIT MOD MDM: CPT | Mod: 25,27

## 2017-08-10 PROCEDURE — 63600175 PHARM REV CODE 636 W HCPCS: Performed by: EMERGENCY MEDICINE

## 2017-08-10 PROCEDURE — 83605 ASSAY OF LACTIC ACID: CPT

## 2017-08-10 PROCEDURE — 85025 COMPLETE CBC W/AUTO DIFF WBC: CPT

## 2017-08-10 PROCEDURE — 25000003 PHARM REV CODE 250: Performed by: EMERGENCY MEDICINE

## 2017-08-10 PROCEDURE — 99213 OFFICE O/P EST LOW 20 MIN: CPT | Mod: PBBFAC,PO,25 | Performed by: FAMILY MEDICINE

## 2017-08-10 PROCEDURE — 99213 OFFICE O/P EST LOW 20 MIN: CPT | Mod: S$PBB,,, | Performed by: FAMILY MEDICINE

## 2017-08-10 PROCEDURE — 99999 PR PBB SHADOW E&M-EST. PATIENT-LVL III: CPT | Mod: PBBFAC,,, | Performed by: FAMILY MEDICINE

## 2017-08-10 PROCEDURE — 87186 SC STD MICRODIL/AGAR DIL: CPT

## 2017-08-10 PROCEDURE — 21400001 HC TELEMETRY ROOM

## 2017-08-10 PROCEDURE — 80053 COMPREHEN METABOLIC PANEL: CPT

## 2017-08-10 PROCEDURE — 87040 BLOOD CULTURE FOR BACTERIA: CPT | Mod: 59

## 2017-08-10 PROCEDURE — 87077 CULTURE AEROBIC IDENTIFY: CPT

## 2017-08-10 PROCEDURE — 82962 GLUCOSE BLOOD TEST: CPT

## 2017-08-10 PROCEDURE — 99356 PR PROLONGED SERV,INPATIENT,1ST HR: CPT | Mod: ,,, | Performed by: INTERNAL MEDICINE

## 2017-08-10 PROCEDURE — 3008F BODY MASS INDEX DOCD: CPT | Mod: ,,, | Performed by: FAMILY MEDICINE

## 2017-08-10 PROCEDURE — 96361 HYDRATE IV INFUSION ADD-ON: CPT

## 2017-08-10 RX ORDER — RIVASTIGMINE 4.6 MG/24H
1 PATCH, EXTENDED RELEASE TRANSDERMAL DAILY
Status: DISCONTINUED | OUTPATIENT
Start: 2017-08-11 | End: 2017-08-17 | Stop reason: HOSPADM

## 2017-08-10 RX ORDER — METOPROLOL TARTRATE 25 MG/1
25 TABLET, FILM COATED ORAL 2 TIMES DAILY
COMMUNITY
Start: 2017-07-28 | End: 2017-09-07

## 2017-08-10 RX ORDER — ATORVASTATIN CALCIUM 10 MG/1
20 TABLET, FILM COATED ORAL DAILY
Status: DISCONTINUED | OUTPATIENT
Start: 2017-08-11 | End: 2017-08-17 | Stop reason: HOSPADM

## 2017-08-10 RX ORDER — LORAZEPAM 0.5 MG/1
0.5 TABLET ORAL EVERY 6 HOURS PRN
Status: DISCONTINUED | OUTPATIENT
Start: 2017-08-10 | End: 2017-08-17 | Stop reason: HOSPADM

## 2017-08-10 RX ORDER — SERTRALINE HYDROCHLORIDE 50 MG/1
50 TABLET, FILM COATED ORAL DAILY
Status: DISCONTINUED | OUTPATIENT
Start: 2017-08-11 | End: 2017-08-17 | Stop reason: HOSPADM

## 2017-08-10 RX ORDER — INSULIN ASPART 100 [IU]/ML
0-5 INJECTION, SOLUTION INTRAVENOUS; SUBCUTANEOUS
Status: DISCONTINUED | OUTPATIENT
Start: 2017-08-10 | End: 2017-08-17 | Stop reason: HOSPADM

## 2017-08-10 RX ORDER — LEVETIRACETAM 500 MG/1
500 TABLET ORAL 2 TIMES DAILY
Status: DISCONTINUED | OUTPATIENT
Start: 2017-08-10 | End: 2017-08-17 | Stop reason: HOSPADM

## 2017-08-10 RX ORDER — SODIUM BICARBONATE 650 MG/1
1300 TABLET ORAL 3 TIMES DAILY
Status: DISCONTINUED | OUTPATIENT
Start: 2017-08-10 | End: 2017-08-17 | Stop reason: HOSPADM

## 2017-08-10 RX ORDER — ASPIRIN 81 MG/1
81 TABLET ORAL DAILY
Status: DISCONTINUED | OUTPATIENT
Start: 2017-08-11 | End: 2017-08-12

## 2017-08-10 RX ORDER — IBUPROFEN 200 MG
24 TABLET ORAL
Status: DISCONTINUED | OUTPATIENT
Start: 2017-08-10 | End: 2017-08-17 | Stop reason: HOSPADM

## 2017-08-10 RX ORDER — IBUPROFEN 200 MG
16 TABLET ORAL
Status: DISCONTINUED | OUTPATIENT
Start: 2017-08-10 | End: 2017-08-17 | Stop reason: HOSPADM

## 2017-08-10 RX ORDER — GLUCAGON 1 MG
1 KIT INJECTION
Status: DISCONTINUED | OUTPATIENT
Start: 2017-08-10 | End: 2017-08-17 | Stop reason: HOSPADM

## 2017-08-10 RX ADMIN — SODIUM CHLORIDE 250 ML: 0.9 INJECTION, SOLUTION INTRAVENOUS at 07:08

## 2017-08-10 RX ADMIN — LEVETIRACETAM 500 MG: 500 TABLET, FILM COATED ORAL at 09:08

## 2017-08-10 RX ADMIN — SODIUM BICARBONATE 650 MG TABLET 1300 MG: at 11:08

## 2017-08-10 RX ADMIN — VANCOMYCIN HYDROCHLORIDE 1250 MG: 100 INJECTION, POWDER, LYOPHILIZED, FOR SOLUTION INTRAVENOUS at 07:08

## 2017-08-10 NOTE — ED PROVIDER NOTES
"SCRIBE #1 NOTE: I, Corinne Mack, am scribing for, and in the presence of, Hans Hadley Jr., MD. I have scribed the entire note.      History      Chief Complaint   Patient presents with    Hypotension     sent from clinic. Received dialysis yesterday and was told he had bactremia. Weakness       Review of patient's allergies indicates:   Allergen Reactions    Benadryl decongestant Itching     Nothing with benadryl         HPI   HPI    8/10/2017, 5:53 PM   History obtained from the patient's family      History of Present Illness: David Hadley is a 62 y.o. male patient who presents to the Emergency Department for hypotension which onset gradually today. Symptoms are constant and moderate in severity. Pt was sent from the clinic for further evaluation of hypotension and positive blood cultures that were drawn on Monday. Pt received dialysis yesterday and is scheduled to have dialysis tomorrow. Pt's family states that pt has been "out of it" and eating less. No mitigating or exacerbating factors reported. Associated sxs include loss of appetite and confusion. No prior Tx reported. Pt has Hx of dementia, CHF, DM, HTN, and CKD. No further complaints or concerns at this time. HPI limited due to pt dementia.      Arrival mode:  [facility]    PCP: Isaac Fitch MD       Past Medical History:  Past Medical History:   Diagnosis Date    After-cataract, unspecified - Left Eye 11/27/2013    Allergy     Arthritis     Cancer     CHF (congestive heart failure)     Chronic kidney disease     chemo/ dialias    Dementia     Diabetes mellitus     Hypertension     Myocardial infarction        Past Surgical History:  Past Surgical History:   Procedure Laterality Date    CATARACT EXTRACTION      CORONARY ARTERY BYPASS GRAFT  3-2014         Family History:  Family History   Problem Relation Age of Onset    Colon cancer Sister     Cancer Brother      colon    Pancreatic cancer Brother     Prostate cancer Father     Glaucoma " "Mother        Social History:  Social History     Social History Main Topics    Smoking status: Former Smoker     Types: Cigarettes     Quit date: 1/28/2000    Smokeless tobacco: Never Used    Alcohol use No    Drug use: No    Sexual activity: Not Currently     Birth control/ protection: None       ROS   Review of Systems   Unable to perform ROS: Dementia   Constitutional: Positive for appetite change (decreased).   Cardiovascular:        (+) hypotension   Psychiatric/Behavioral: Positive for confusion.      Physical Exam      Initial Vitals [08/10/17 1751]   BP Pulse Resp Temp SpO2   97/67 73 18 97.7 °F (36.5 °C) (!) 93 %      MAP       77          Physical Exam  Nursing Notes and Vital Signs Reviewed.  Constitutional: Patient is in no apparent distress. Well-developed and well-nourished.  Head: Atraumatic. Normocephalic.  Eyes: PERRL. EOM intact. Conjunctivae are not pale. No scleral icterus.  ENT: Mucous membranes are moist. Oropharynx is clear and symmetric.    Neck: Supple. Full ROM. No lymphadenopathy.  Cardiovascular: Regular rate. Regular rhythm. No murmurs, rubs, or gallops. Distal pulses are 2+ and symmetric.  Pulmonary/Chest: No respiratory distress. Clear to auscultation bilaterally. No wheezing, rales, or rhonchi.  Abdominal: Soft and non-distended.  There is no tenderness.    Musculoskeletal: Moves all extremities. No obvious deformities. No edema. No calf tenderness.  Skin: Warm and dry.  Neurological:  Alert, awake, and appropriate.  Normal speech.  No acute focal neurological deficits are appreciated.  Psychiatric: Normal affect. Good eye contact. Appropriate in content. Demented. Confused.    ED Course    Procedures  ED Vital Signs:  Vitals:    08/10/17 1751 08/10/17 1758 08/10/17 1800 08/10/17 1809   BP: 97/67  97/67 106/66   Pulse: 73 74 73 74   Resp: 18 18 16 15   Temp: 97.7 °F (36.5 °C)      TempSrc: Oral      SpO2: (!) 93% 99%  (!) 94%   Weight: 90.7 kg (200 lb)      Height: 6' 2" (1.88 " m)       08/10/17 1924 08/10/17 1939   BP: 104/68 105/73   Pulse: 74 73   Resp: 14 14   Temp:     TempSrc:     SpO2: 100% 96%   Weight:     Height:         Abnormal Lab Results:  Labs Reviewed   CBC W/ AUTO DIFFERENTIAL - Abnormal; Notable for the following:        Result Value    WBC 13.90 (*)     RBC 4.36 (*)     Hemoglobin 12.1 (*)     Hematocrit 37.2 (*)     RDW 16.7 (*)     Gran # 11.0 (*)     Mono # 1.6 (*)     Gran% 78.8 (*)     Lymph% 9.6 (*)     All other components within normal limits   COMPREHENSIVE METABOLIC PANEL - Abnormal; Notable for the following:     Glucose 132 (*)     BUN, Bld 25 (*)     Creatinine 6.1 (*)     Albumin 2.9 (*)     AST 58 (*)     eGFR if  10 (*)     eGFR if non  9 (*)     All other components within normal limits   CULTURE, BLOOD   CULTURE, BLOOD   LACTIC ACID, PLASMA        All Lab Results:  Results for orders placed or performed during the hospital encounter of 08/10/17   CBC auto differential   Result Value Ref Range    WBC 13.90 (H) 3.90 - 12.70 K/uL    RBC 4.36 (L) 4.60 - 6.20 M/uL    Hemoglobin 12.1 (L) 14.0 - 18.0 g/dL    Hematocrit 37.2 (L) 40.0 - 54.0 %    MCV 85 82 - 98 fL    MCH 27.8 27.0 - 31.0 pg    MCHC 32.5 32.0 - 36.0 g/dL    RDW 16.7 (H) 11.5 - 14.5 %    Platelets 186 150 - 350 K/uL    MPV 10.7 9.2 - 12.9 fL    Gran # 11.0 (H) 1.8 - 7.7 K/uL    Lymph # 1.3 1.0 - 4.8 K/uL    Mono # 1.6 (H) 0.3 - 1.0 K/uL    Eos # 0.0 0.0 - 0.5 K/uL    Baso # 0.02 0.00 - 0.20 K/uL    Gran% 78.8 (H) 38.0 - 73.0 %    Lymph% 9.6 (L) 18.0 - 48.0 %    Mono% 11.4 4.0 - 15.0 %    Eosinophil% 0.1 0.0 - 8.0 %    Basophil% 0.1 0.0 - 1.9 %    Differential Method Automated    Comprehensive metabolic panel   Result Value Ref Range    Sodium 136 136 - 145 mmol/L    Potassium 4.7 3.5 - 5.1 mmol/L    Chloride 95 95 - 110 mmol/L    CO2 28 23 - 29 mmol/L    Glucose 132 (H) 70 - 110 mg/dL    BUN, Bld 25 (H) 8 - 23 mg/dL    Creatinine 6.1 (H) 0.5 - 1.4 mg/dL    Calcium  9.3 8.7 - 10.5 mg/dL    Total Protein 8.1 6.0 - 8.4 g/dL    Albumin 2.9 (L) 3.5 - 5.2 g/dL    Total Bilirubin 0.7 0.1 - 1.0 mg/dL    Alkaline Phosphatase 107 55 - 135 U/L    AST 58 (H) 10 - 40 U/L    ALT 22 10 - 44 U/L    Anion Gap 13 8 - 16 mmol/L    eGFR if African American 10 (A) >60 mL/min/1.73 m^2    eGFR if non African American 9 (A) >60 mL/min/1.73 m^2   Lactic acid, plasma   Result Value Ref Range    Lactate (Lactic Acid) 2.2 0.5 - 2.2 mmol/L       Imaging Results:  Imaging Results    None                 The Emergency Provider reviewed the vital signs and test results, which are outlined above.    ED Discussion     7:30 PM: Discussed case with Nandini Murphy NP ([facility]). Nandini Murphy NP agrees with current care and management of pt and accepts admission.   Admitting Service: Hospital medicine   Admitting Physician: Dr. Escamilla  Admit to: Tele-Obs    7:47 PM: Re-evaluated pt. I have discussed test results, shared treatment plan, and the need for admission with patient and family at bedside. Pt and family express understanding at this time and agree with all information. All questions answered. Pt and family have no further questions or concerns at this time. Pt is ready for admit.      ED Medication(s):  Medications   vancomycin (VANCOCIN) 1,250 mg in dextrose 5 % 250 mL IVPB (1,250 mg Intravenous New Bag 8/10/17 1951)   sodium chloride 0.9% bolus 250 mL (0 mLs Intravenous Stopped 8/10/17 1951)       New Prescriptions    No medications on file             Medical Decision Making    Medical Decision Making:   Clinical Tests:   Lab Tests: Ordered and Reviewed           Scribe Attestation:   Scribe #1: I performed the above scribed service and the documentation accurately describes the services I performed. I attest to the accuracy of the note.    Attending:   Physician Attestation Statement for Scribe #1: I, Hans Hadley Jr., MD, personally performed the services described in this documentation,  as scribed by Corinne Mack, in my presence, and it is both accurate and complete.          Clinical Impression       ICD-10-CM ICD-9-CM   1. Bacteremia R78.81 790.7   2. Chronic renal failure, unspecified stage N18.9 585.9   3. Dementia without behavioral disturbance, unspecified dementia type F03.90 294.20       Disposition:   Disposition: Placed in Observation  Condition: Raiza Hadley Jr., MD  08/11/17 0012

## 2017-08-10 NOTE — PROGRESS NOTES
Chief Complaint:    Chief Complaint   Patient presents with    Extremity Weakness       History of Present Illness:    Patient brought in by wife and sister and Cynthia and patient being more confused lately for the last few days had some congestion possibly cough he was taken to the hospital in [place] and chest x-ray was done.  He was seen in the dialysis for dialysis and was told that he's got some bacteremia in the blood but no further instructions were given.    According to the wife patient's oral intake has gone down and he is extremely weak finds hard to stand up.  He's had bacteria in the past and had to be hospitalized at least 2 occasions.    He does have underlying dementia and goes through episodes of confusion.      ROS:  Review of Systems   Constitutional: Negative for activity change, chills, fatigue, fever and unexpected weight change.   HENT: Negative for congestion, ear discharge, ear pain, hearing loss, postnasal drip and rhinorrhea.    Eyes: Negative for pain and visual disturbance.   Respiratory: Negative for cough, chest tightness and shortness of breath.    Cardiovascular: Negative for chest pain and palpitations.   Gastrointestinal: Negative for abdominal pain, diarrhea and vomiting.   Endocrine: Negative for heat intolerance.   Genitourinary: Negative for dysuria, flank pain, frequency and hematuria.   Musculoskeletal: Negative for back pain, gait problem and neck pain.   Skin: Negative for color change and rash.   Neurological: Positive for weakness. Negative for dizziness, tremors, seizures, numbness and headaches.   Psychiatric/Behavioral: Positive for confusion. Negative for agitation, hallucinations, self-injury, sleep disturbance and suicidal ideas. The patient is not nervous/anxious.        Past Medical History:   Diagnosis Date    After-cataract, unspecified - Left Eye 11/27/2013    Allergy     Arthritis     Cancer     CHF (congestive heart failure)     Chronic kidney  disease     chemo/ dialias    Dementia     Diabetes mellitus     Hypertension     Myocardial infarction        Social History:  Social History     Social History    Marital status:      Spouse name: N/A    Number of children: 1    Years of education: N/A     Social History Main Topics    Smoking status: Former Smoker     Types: Cigarettes     Quit date: 1/28/2000    Smokeless tobacco: Never Used    Alcohol use No    Drug use: No    Sexual activity: Not Currently     Birth control/ protection: None     Other Topics Concern    None     Social History Narrative    None       Family History:   family history includes Cancer in his brother; Colon cancer in his sister; Glaucoma in his mother; Pancreatic cancer in his brother; Prostate cancer in his father.    Health Maintenance   Topic Date Due    Pneumococcal PPSV23 (High Risk) (2) 04/14/2016    Foot Exam  02/18/2017    Hemoglobin A1c  06/13/2017    Eye Exam  06/21/2017    Influenza Vaccine  08/01/2017    Lipid Panel  08/05/2017    TETANUS VACCINE  09/15/2019    Colonoscopy  01/28/2020    Hepatitis C Screening  Completed    Zoster Vaccine  Completed    Pneumococcal PCV13 (High Risk)  Completed       Physical Exam:    Vital Signs  Temp: 96.8 °F (36 °C)  Temp src: Tympanic  Pulse: 88  SpO2: (!) 91 %  BP: (!) 80/40  Pain Score: 10-Worst pain ever  Pain Loc: Generalized  Height and Weight  Weight: 93.7 kg (206 lb 9.1 oz)]    Body mass index is 25.82 kg/m².    Physical Exam   Constitutional: He appears well-developed.   HENT:   Mouth/Throat: Oropharynx is clear and moist.   Eyes: Conjunctivae are normal. Pupils are equal, round, and reactive to light.   Neck: Neck supple.   Cardiovascular: Normal rate, regular rhythm and normal heart sounds.    No murmur heard.  Pulmonary/Chest: Effort normal and breath sounds normal. No respiratory distress. He has no wheezes. He has no rales. He exhibits no tenderness.   Abdominal: Soft. He exhibits no  distension and no mass. There is no tenderness. There is no guarding.   Musculoskeletal: He exhibits no edema or tenderness.   Lymphadenopathy:     He has no cervical adenopathy.   Neurological: He is alert.   Skin: Skin is warm and dry.   Psychiatric: He has a normal mood and affect.       Lab Results   Component Value Date    CHOL 116 (L) 08/05/2016    CHOL 135 03/06/2014    CHOL 122 05/10/2011    TRIG 41 08/05/2016    TRIG 64 03/06/2014    TRIG 41 05/10/2011    HDL 46 08/05/2016    HDL 56 03/06/2014    HDL 56 05/10/2011    TOTALCHOLEST 2.5 08/05/2016    TOTALCHOLEST 2.4 03/06/2014    TOTALCHOLEST 2.2 05/10/2011    NONHDLCHOL 70 08/05/2016    NONHDLCHOL 79 03/06/2014       Lab Results   Component Value Date    HGBA1C 4.4 (L) 08/05/2016       Assessment:      ICD-10-CM ICD-9-CM   1. Confused R41.0 298.9   2. Weak R53.1 780.79   3. Loss of appetite R63.0 783.0   4. Gram-positive cocci bacteremia R78.81 790.7     041.89   5. ESRD (end stage renal disease) on dialysis N18.6 585.6    Z99.2 V45.11   6. Hypotension, unspecified hypotension type I95.9 458.9         Plan:  Patient with underlying end-stage renal disease on dialysis and dementia currently he is hypertensive and there is some suggestion that he has some bacteremia although I do not have access to that report.  If that is true and given patient's hypertensive state he really needs to be in the hospital.  He will be taken to the ED via ambulance since he finds hard to stand up on his own.    Addendum: Blood culture reports revealed that he has gram positive cocci on the second culture source was blood FROM HIS FISTULA      No orders of the defined types were placed in this encounter.      Current Outpatient Prescriptions   Medication Sig Dispense Refill    amlodipine (NORVASC) 5 MG tablet Take 1 tablet (5 mg total) by mouth once daily. 30 tablet 6    atorvastatin (LIPITOR) 20 MG tablet Take 1 tablet (20 mg total) by mouth once daily. 90 tablet 3    FOLIC  ACID/VIT BCOMP,C (JOSAFAT-JANICE ORAL) Take by mouth.      gabapentin (NEURONTIN) 100 MG capsule Take 1 capsule (100 mg total) by mouth 3 (three) times daily. 270 capsule 3    hydrocodone-acetaminophen 5-325mg (NORCO) 5-325 mg per tablet Take 1 tablet by mouth every 6 to 8 hours as needed for Pain. 60 tablet 0    levetiracetam (KEPPRA) 500 MG Tab Take 1 tablet (500 mg total) by mouth 2 (two) times daily. 60 tablet 11    lorazepam (ATIVAN) 0.5 MG tablet Take 1 tablet (0.5 mg total) by mouth every 6 (six) hours as needed for Anxiety. 30 tablet 1    losartan (COZAAR) 50 MG tablet Take by mouth. 1 tablet Oral Every day      metoprolol tartrate (LOPRESSOR) 25 MG tablet Take 25 mg by mouth 2 (two) times daily.      rivastigmine (EXELON) 4.6 mg/24 hr PT24 Place 1 patch onto the skin once daily. 30 patch 11    SENSIPAR 30 mg Tab Take 1 tablet by mouth once daily.      sertraline (ZOLOFT) 50 MG tablet Take 1 tablet (50 mg total) by mouth once daily. 30 tablet 11    sodium bicarbonate 650 MG tablet Take 2 tablets (1,300 mg total) by mouth 3 (three) times daily.      SPRYCEL 100 mg Tab Take 100 mg by mouth once daily.       trazodone (DESYREL) 50 MG tablet Take 1 tablet (50 mg total) by mouth every evening. 26 tablet 11     No current facility-administered medications for this visit.        There are no discontinued medications.    No Follow-up on file.      Dr Isaac Fitch MD    Disclaimer: This note is prepared using voice recognition system and as such is likely to have errors and is not proof read.

## 2017-08-11 PROBLEM — E83.39 HYPOPHOSPHATEMIA: Status: ACTIVE | Noted: 2017-08-11

## 2017-08-11 LAB
ANION GAP SERPL CALC-SCNC: 13 MMOL/L
BASOPHILS # BLD AUTO: 0.01 K/UL
BASOPHILS NFR BLD: 0.1 %
BUN SERPL-MCNC: 29 MG/DL
CALCIUM SERPL-MCNC: 9 MG/DL
CHLORIDE SERPL-SCNC: 95 MMOL/L
CO2 SERPL-SCNC: 27 MMOL/L
CREAT SERPL-MCNC: 6.5 MG/DL
DIFFERENTIAL METHOD: ABNORMAL
EOSINOPHIL # BLD AUTO: 0 K/UL
EOSINOPHIL NFR BLD: 0.2 %
ERYTHROCYTE [DISTWIDTH] IN BLOOD BY AUTOMATED COUNT: 16.9 %
EST. GFR  (AFRICAN AMERICAN): 10 ML/MIN/1.73 M^2
EST. GFR  (NON AFRICAN AMERICAN): 8 ML/MIN/1.73 M^2
ESTIMATED AVG GLUCOSE: 85 MG/DL
GLUCOSE SERPL-MCNC: 117 MG/DL
HBA1C MFR BLD HPLC: 4.6 %
HCT VFR BLD AUTO: 37.2 %
HGB BLD-MCNC: 12.1 G/DL
LYMPHOCYTES # BLD AUTO: 0.9 K/UL
LYMPHOCYTES NFR BLD: 6.6 %
MAGNESIUM SERPL-MCNC: 2 MG/DL
MCH RBC QN AUTO: 27.8 PG
MCHC RBC AUTO-ENTMCNC: 32.5 G/DL
MCV RBC AUTO: 86 FL
MONOCYTES # BLD AUTO: 1.3 K/UL
MONOCYTES NFR BLD: 9.9 %
NEUTROPHILS # BLD AUTO: 10.9 K/UL
NEUTROPHILS NFR BLD: 83.2 %
PHOSPHATE SERPL-MCNC: 2 MG/DL
PLATELET # BLD AUTO: 201 K/UL
PMV BLD AUTO: 10.7 FL
POCT GLUCOSE: 103 MG/DL (ref 70–110)
POCT GLUCOSE: 109 MG/DL (ref 70–110)
POCT GLUCOSE: 118 MG/DL (ref 70–110)
POCT GLUCOSE: 126 MG/DL (ref 70–110)
POTASSIUM SERPL-SCNC: 4.7 MMOL/L
RBC # BLD AUTO: 4.35 M/UL
SODIUM SERPL-SCNC: 135 MMOL/L
VANCOMYCIN SERPL-MCNC: 13.3 UG/ML
WBC # BLD AUTO: 13.1 K/UL

## 2017-08-11 PROCEDURE — 87340 HEPATITIS B SURFACE AG IA: CPT

## 2017-08-11 PROCEDURE — 80100016 HC MAINTENANCE HEMODIALYSIS

## 2017-08-11 PROCEDURE — 36415 COLL VENOUS BLD VENIPUNCTURE: CPT

## 2017-08-11 PROCEDURE — 99233 SBSQ HOSP IP/OBS HIGH 50: CPT | Mod: ,,, | Performed by: INTERNAL MEDICINE

## 2017-08-11 PROCEDURE — 85025 COMPLETE CBC W/AUTO DIFF WBC: CPT

## 2017-08-11 PROCEDURE — 80048 BASIC METABOLIC PNL TOTAL CA: CPT

## 2017-08-11 PROCEDURE — 80202 ASSAY OF VANCOMYCIN: CPT

## 2017-08-11 PROCEDURE — 27000221 HC OXYGEN, UP TO 24 HOURS

## 2017-08-11 PROCEDURE — 63600175 PHARM REV CODE 636 W HCPCS: Performed by: INTERNAL MEDICINE

## 2017-08-11 PROCEDURE — 97802 MEDICAL NUTRITION INDIV IN: CPT

## 2017-08-11 PROCEDURE — 25500020 PHARM REV CODE 255: Performed by: INTERNAL MEDICINE

## 2017-08-11 PROCEDURE — 25000003 PHARM REV CODE 250: Performed by: INTERNAL MEDICINE

## 2017-08-11 PROCEDURE — 83036 HEMOGLOBIN GLYCOSYLATED A1C: CPT

## 2017-08-11 PROCEDURE — 84100 ASSAY OF PHOSPHORUS: CPT

## 2017-08-11 PROCEDURE — 86706 HEP B SURFACE ANTIBODY: CPT

## 2017-08-11 PROCEDURE — 25000003 PHARM REV CODE 250: Performed by: NURSE PRACTITIONER

## 2017-08-11 PROCEDURE — 94761 N-INVAS EAR/PLS OXIMETRY MLT: CPT

## 2017-08-11 PROCEDURE — 83735 ASSAY OF MAGNESIUM: CPT

## 2017-08-11 PROCEDURE — 21400001 HC TELEMETRY ROOM

## 2017-08-11 PROCEDURE — 25000003 PHARM REV CODE 250: Performed by: EMERGENCY MEDICINE

## 2017-08-11 RX ORDER — HEPARIN SODIUM 1000 [USP'U]/ML
1000 INJECTION, SOLUTION INTRAVENOUS; SUBCUTANEOUS
Status: DISCONTINUED | OUTPATIENT
Start: 2017-08-11 | End: 2017-08-13 | Stop reason: SDUPTHER

## 2017-08-11 RX ORDER — SODIUM CHLORIDE 9 MG/ML
INJECTION, SOLUTION INTRAVENOUS ONCE
Status: DISCONTINUED | OUTPATIENT
Start: 2017-08-11 | End: 2017-08-13 | Stop reason: SDUPTHER

## 2017-08-11 RX ORDER — MOXIFLOXACIN HYDROCHLORIDE 400 MG/250ML
400 INJECTION, SOLUTION INTRAVENOUS
Status: DISCONTINUED | OUTPATIENT
Start: 2017-08-11 | End: 2017-08-15

## 2017-08-11 RX ORDER — SODIUM CHLORIDE 9 MG/ML
INJECTION, SOLUTION INTRAVENOUS
Status: DISCONTINUED | OUTPATIENT
Start: 2017-08-11 | End: 2017-08-13 | Stop reason: SDUPTHER

## 2017-08-11 RX ADMIN — ASPIRIN 81 MG: 81 TABLET, COATED ORAL at 08:08

## 2017-08-11 RX ADMIN — ATORVASTATIN CALCIUM 20 MG: 10 TABLET, FILM COATED ORAL at 08:08

## 2017-08-11 RX ADMIN — RIVASTIGMINE TRANSDERMAL SYSTEM 1 PATCH: 4.6 PATCH, EXTENDED RELEASE TRANSDERMAL at 08:08

## 2017-08-11 RX ADMIN — Medication 500 MG: at 11:08

## 2017-08-11 RX ADMIN — SODIUM BICARBONATE 650 MG TABLET 1300 MG: at 10:08

## 2017-08-11 RX ADMIN — LEVETIRACETAM 500 MG: 500 TABLET, FILM COATED ORAL at 08:08

## 2017-08-11 RX ADMIN — SODIUM BICARBONATE 650 MG TABLET 1300 MG: at 05:08

## 2017-08-11 RX ADMIN — IOHEXOL 75 ML: 350 INJECTION, SOLUTION INTRAVENOUS at 07:08

## 2017-08-11 RX ADMIN — MOXIFLOXACIN HYDROCHLORIDE 400 MG: 400 INJECTION, SOLUTION INTRAVENOUS at 01:08

## 2017-08-11 RX ADMIN — IOHEXOL 30 ML: 350 INJECTION, SOLUTION INTRAVENOUS at 02:08

## 2017-08-11 RX ADMIN — SERTRALINE HYDROCHLORIDE 50 MG: 50 TABLET ORAL at 08:08

## 2017-08-11 RX ADMIN — LEVETIRACETAM 500 MG: 500 TABLET, FILM COATED ORAL at 09:08

## 2017-08-11 RX ADMIN — SODIUM BICARBONATE 650 MG TABLET 1300 MG: at 01:08

## 2017-08-11 NOTE — PLAN OF CARE
Met with pt and sister Sherine for d/c planning.  Pt sister says she is involved in care and answered most assessment questions.  Per sister, the pt does live at home with his wife. She anticipates that he will need caregiver services at home after discharge, and said pt did apply for medicaid and was approved for it then had it taken away because pt already had two insurances which should cover total cost of medical expenses.  CM informed pt that medicaid offers long term care services but medicare does not offer similar program.  Pt may have to consider out of pocket pay for caregiver or consider placement in nursing home.  Pt sister not interested in placement because pt's check would go to nursing home and she believes he needs to use money from check to help support his wife. CM to contact financial department and have them review pt medicaid status.         08/11/17 1534   Discharge Assessment   Assessment Type Discharge Planning Assessment   Confirmed/corrected address and phone number on facesheet? Yes   Assessment information obtained from? Caregiver;Patient;Medical Record   Expected Length of Stay (days) (TBD)   Prior to hospitilization cognitive status: Alert/Oriented   Prior to hospitalization functional status: Needs Assistance   Current cognitive status: Alert/Oriented   Current Functional Status: Needs Assistance   Arrived From home or self-care   Lives With spouse   Is patient able to care for self after discharge? No   How many people do you have in your home that can help with your care after discharge? 1   Who are your caregiver(s) and their phone number(s)? Nakia Hadley, spouse: [phone]; Theresa Block, sister: [phone]   Patient's perception of discharge disposition home or selfcare   Readmission Within The Last 30 Days no previous admission in last 30 days   Patient currently being followed by outpatient case management? No   Patient currently receives home health services? No   Does  the patient currently use HME? No   Patient currently receives private duty nursing? No   Patient currently receives any other outside agency services? No   Equipment Currently Used at Home none   Do you have any problems affording any of your prescribed medications? No   Is the patient taking medications as prescribed? yes   Do you have any financial concerns preventing you from receiving the healthcare you need? Yes   Does the patient have transportation to healthcare appointments? Yes   Transportation Available public transportation   On Dialysis? Yes   If yes, what is the name of the dialysis unit? [facility]    Does the patient receive outpatient dialysis? Yes   Does the patient receive services at the [facility]? No   Are there any open cases? No   Discharge Plan A Home   Discharge Plan B Home   Patient/Family In Agreement With Plan yes

## 2017-08-11 NOTE — SUBJECTIVE & OBJECTIVE
Interval History: Pt was seen and examined  At bedside .  He Is aao x 3 in nad and hemodynamically stable .  No acute event overnight . Will order a ct chest and abdomen .     Review of Systems   Unable to perform ROS: Dementia   Constitutional: Positive for appetite change. Negative for chills, fever and unexpected weight change.   HENT: Negative for trouble swallowing.    Respiratory: Negative for cough, shortness of breath and wheezing.    Cardiovascular: Negative for chest pain.   Gastrointestinal: Negative for abdominal distention, constipation, diarrhea and vomiting.   Neurological: Negative for seizures, syncope, facial asymmetry and speech difficulty.   Psychiatric/Behavioral: Positive for confusion.     Objective:     Vital Signs (Most Recent):  Temp: 97.4 °F (36.3 °C) (08/11/17 0854)  Pulse: 82 (08/11/17 1117)  Resp: 18 (08/11/17 1117)  BP: 106/66 (08/11/17 0854)  SpO2: 100 % (08/11/17 1117) Vital Signs (24h Range):  Temp:  [96.8 °F (36 °C)-97.7 °F (36.5 °C)] 97.4 °F (36.3 °C)  Pulse:  [73-88] 82  Resp:  [14-18] 18  SpO2:  [90 %-100 %] 100 %  BP: ()/(40-74) 106/66     Weight: 92 kg (202 lb 12.8 oz)  Body mass index is 26.04 kg/m².    Intake/Output Summary (Last 24 hours) at 08/11/17 1222  Last data filed at 08/11/17 0400   Gross per 24 hour   Intake               20 ml   Output                0 ml   Net               20 ml      Physical Exam   Constitutional: He appears well-developed and well-nourished. No distress.   HENT:   Head: Normocephalic and atraumatic.   Eyes: Conjunctivae and EOM are normal. Pupils are equal, round, and reactive to light.   Neck: Normal range of motion. Neck supple. No thyromegaly present.   Cardiovascular: Normal rate, regular rhythm and normal heart sounds.    Pulmonary/Chest: Effort normal and breath sounds normal. No respiratory distress. He has no wheezes. He has no rales.   Abdominal: Soft. Bowel sounds are normal. He exhibits no distension. There is no tenderness.    Musculoskeletal: Normal range of motion. He exhibits no edema, tenderness or deformity.   Neurological: He is alert.   Disoriented.  No focal deficits.   Skin: Skin is warm and dry. No rash noted. No erythema.   Psychiatric: He has a normal mood and affect.   Nursing note and vitals reviewed.      Significant Labs:   ABGs: No results for input(s): PH, PCO2, HCO3, POCSATURATED, BE, TOTALHB, COHB, METHB, O2HB, POCFIO2 in the last 48 hours.  Blood Culture: No results for input(s): LABBLOO in the last 48 hours.  BMP:   Recent Labs  Lab 08/11/17 0527   *   *   K 4.7   CL 95   CO2 27   BUN 29*   CREATININE 6.5*   CALCIUM 9.0   MG 2.0     CBC:   Recent Labs  Lab 08/10/17  1851 08/11/17  0527   WBC 13.90* 13.10*   HGB 12.1* 12.1*   HCT 37.2* 37.2*    201     Lactic Acid:   Recent Labs  Lab 08/10/17  1851 08/10/17  2133   LACTATE 2.2 2.2     Lipid Panel: No results for input(s): CHOL, HDL, LDLCALC, TRIG, CHOLHDL in the last 48 hours.  Respiratory Culture: No results for input(s): GSRESP, RESPIRATORYC in the last 48 hours.  TSH: No results for input(s): TSH in the last 4320 hours.    Significant Imaging: I have reviewed all pertinent imaging results/findings within the past 24 hours.

## 2017-08-11 NOTE — SUBJECTIVE & OBJECTIVE
Interval History:     8/11/17: Patient today denies any fevers, pain, SOB, nausea.         Review of patient's allergies indicates:   Allergen Reactions    Benadryl decongestant Itching     Nothing with benadryl; wife states  No allergy to Benadryl     Current Facility-Administered Medications   Medication Frequency    aspirin EC tablet 81 mg Daily    atorvastatin tablet 20 mg Daily    dextrose 50% injection 12.5 g PRN    dextrose 50% injection 25 g PRN    glucagon (human recombinant) injection 1 mg PRN    glucose chewable tablet 16 g PRN    glucose chewable tablet 24 g PRN    insulin aspart pen 0-5 Units QID (AC + HS) PRN    levetiracetam tablet 500 mg BID    lorazepam tablet 0.5 mg Q6H PRN    rivastigmine 4.6 mg/24 hr 1 patch Daily    sertraline tablet 50 mg Daily    sodium bicarbonate tablet 1,300 mg TID    [START ON 8/12/2017] vancomycin 1 g in dextrose 5 % 250 mL IVPB (ready to mix system) Q48H       Objective:     Vital Signs (Most Recent):  Temp: 97.4 °F (36.3 °C) (08/11/17 0854)  Pulse: 80 (08/11/17 0854)  Resp: 18 (08/11/17 0854)  BP: 106/66 (08/11/17 0854)  SpO2: 98 % (08/11/17 0854)  O2 Device (Oxygen Therapy): nasal cannula (08/11/17 0515) Vital Signs (24h Range):  Temp:  [96.8 °F (36 °C)-97.7 °F (36.5 °C)] 97.4 °F (36.3 °C)  Pulse:  [73-88] 80  Resp:  [14-18] 18  SpO2:  [90 %-100 %] 98 %  BP: ()/(40-74) 106/66     Weight: 92 kg (202 lb 12.8 oz) (08/10/17 2159)  Body mass index is 26.04 kg/m².  Body surface area is 2.19 meters squared.    I/O last 3 completed shifts:  In: 20 [P.O.:20]  Out: 0     Physical Exam   Constitutional: He appears well-developed. He is cooperative.   Alert and responsive.   HENT:   Head: Normocephalic.   Nose: No rhinorrhea.   Mouth/Throat: Mucous membranes are normal. No oropharyngeal exudate.   Neck: No thyroid mass present.   Cardiovascular: Normal rate, regular rhythm, S1 normal, S2 normal and intact distal pulses.    Pulmonary/Chest: Effort normal. No  respiratory distress. He has no wheezes.   Abdominal: Soft. Bowel sounds are normal. He exhibits no distension. There is no tenderness. No hernia.   Musculoskeletal:   Mild bilateral LE edema.   Lymphadenopathy:     He has no cervical adenopathy.   Neurological: He is alert.   Skin: Skin is warm and dry.   Psychiatric: He has a normal mood and affect.       Significant Labs:  Lab Results   Component Value Date    CREATININE 6.5 (H) 08/11/2017    BUN 29 (H) 08/11/2017     (L) 08/11/2017    K 4.7 08/11/2017    CL 95 08/11/2017    CO2 27 08/11/2017     Lab Results   Component Value Date     (H) 11/06/2008    CALCIUM 9.0 08/11/2017    PHOS 2.0 (L) 08/11/2017     Lab Results   Component Value Date    ALBUMIN 2.9 (L) 08/10/2017     Lab Results   Component Value Date    WBC 13.10 (H) 08/11/2017    HGB 12.1 (L) 08/11/2017    HCT 37.2 (L) 08/11/2017    MCV 86 08/11/2017     08/11/2017       Recent Labs  Lab 08/11/17  0527   MG 2.0         Significant Imaging:  Imaging Results          X-Ray Chest 1 View (Final result)  Result time 08/10/17 21:10:07    Final result by Shivam Ambrose MD (08/10/17 21:10:07)                 Impression:     Right basilar consolidation. See above. Otherwise no significant change from prior exam.      Electronically signed by: SHIVAM AMBROSE MD  Date:     08/10/17  Time:    21:10              Narrative:    Exam: Chest X-ray, one view.    History: Shortness of breath    Findings: Comparison is made to prior exam dated 05/03/2017. Mild cardiomegaly again noted. Right basilar consolidation is no apparent, likely related to combination of pleural fluid and airspace disease. Sternal wires again noted.

## 2017-08-11 NOTE — HOSPITAL COURSE
David Hadley is a 62 year old male who was admitted to [facility] with sepsis due to recurrent MRSA Bacteremia. Patient was admitted 4/2017 for the same, but source remained unclear. Repeat cultures on 4/12/17 were negative for MRSA (1/2 bottle grew coag-negative), and patient was discharged with 6 week therapy with Vancomycin. During this admission, medical management, included Vancomycin. Infectious Disease was consulted who recommended repeating TTE which proved negative for vegetation. A soft tissue US of Left Extremity at dialysis access was negative for abscess.  CXR revealed RLL consolidation and Avelox was started. Subsequent CT Chest/Abdomen revealed large right pleural effusion with compressive atelectasis. Pulmonology was consulted for thoracentesis. Pleural cytology pending. He received enema for fecal impaction. Left upper extremity ultrasound did not show evidence of abscess and Vascular physician indicated there were no clinical signs of abscess. MRI of Lumbar/Thoracic Spine were negative for abscess. Elevations were noted in the ESR and CRP. Again, blood cultures collected on 8/14/17 isolated gram + cocci resembling STAPH. An Indium scan was ordered by Infectious Ds. Which was negative. Blood cultures drawn 8/16/17 with no growth to date. ID agreed for pt to be discharged and f/u with HD for ABX X 6-8 weeks. Pt was seen and examined today and deemed stable for discharge home.

## 2017-08-11 NOTE — HPI
[id number] with multiple histories of MRSA infections with blood cultures being positive.  Has been admitted with positive blood cultures in April 2017 status post  Prolonged antibiotic course of vancomycin.  May 2017 admitted for syncope.  Has ESRD on hemodialysis.  Schedule for dialysis Tuesday Thursday and Saturday.  Last dialysis was today.     Gets his dialysis in [place] ---[facility]

## 2017-08-11 NOTE — H&P
[facility] Hospital Medicine  History & Physical    Patient Name: David Hadley  MRN: [id number]  Admission Date: 8/10/2017  Attending Physician: Dr. Escamilla  Primary Care Provider: Isaac Fitch MD         Patient information was obtained from relative(s), past medical records and ER records.     Subjective:     Principal Problem:Gram-positive cocci bacteremia    Chief Complaint:   Chief Complaint   Patient presents with    Hypotension     sent from clinic. Received dialysis yesterday and was told he had bactremia. Weakness        HPI: Mr. Hadley is a 61yo male with a PMHx of dementia, ESRD on HD, HTN, CAD with remote CABG, HLD, DM II, CML, and h/o recurrent gram-positive cocci bacteremia, who presented to the ED with c/o increased confusion and decreased appetite over the past 1-2 weeks.  Patient's niece denies patient c/o fever/chills, cough, SOB, chest pain, palpitations, ABD pain, dysuria, N/V/D, lightheadedness/dizziness, focal deficits, seizure-like activity, or syncope.Patient was sent to ED by PCP for positive blood cultures with gram-positive cocci drawn from AV fistula on Monday.  Patient's niece reports patient received IV Vanc during dialysis yesterday.  Upon arrival to ED, patient noted to be hypotensive with BP 97/67.  WBC 14.  Patient last episode of bacteremia was 4/2017.  SALAZAR at that time was negative for vegetation.  Hospital Medicine was consulted for admission.     Past Medical History:   Diagnosis Date    After-cataract, unspecified - Left Eye 11/27/2013    Allergy     Arthritis     Cancer     CHF (congestive heart failure)     Chronic kidney disease     chemo/ dialias    Dementia     Diabetes mellitus     Hypertension     Myocardial infarction        Past Surgical History:   Procedure Laterality Date    AV FISTULA PLACEMENT      CATARACT EXTRACTION      CORONARY ARTERY BYPASS GRAFT  3-2014       Review of patient's allergies indicates:   Allergen Reactions     Benadryl decongestant Itching     Nothing with benadryl        No current facility-administered medications on file prior to encounter.      Current Outpatient Prescriptions on File Prior to Encounter   Medication Sig    amlodipine (NORVASC) 5 MG tablet Take 1 tablet (5 mg total) by mouth once daily.    atorvastatin (LIPITOR) 20 MG tablet Take 1 tablet (20 mg total) by mouth once daily.    FOLIC ACID/VIT BCOMP,C (JOSAFAT-JANICE ORAL) Take by mouth.    gabapentin (NEURONTIN) 100 MG capsule Take 1 capsule (100 mg total) by mouth 3 (three) times daily.    hydrocodone-acetaminophen 5-325mg (NORCO) 5-325 mg per tablet Take 1 tablet by mouth every 6 to 8 hours as needed for Pain.    levetiracetam (KEPPRA) 500 MG Tab Take 1 tablet (500 mg total) by mouth 2 (two) times daily.    lorazepam (ATIVAN) 0.5 MG tablet Take 1 tablet (0.5 mg total) by mouth every 6 (six) hours as needed for Anxiety.    losartan (COZAAR) 50 MG tablet Take by mouth. 1 tablet Oral Every day    metoprolol tartrate (LOPRESSOR) 25 MG tablet Take 25 mg by mouth 2 (two) times daily.    rivastigmine (EXELON) 4.6 mg/24 hr PT24 Place 1 patch onto the skin once daily.    SENSIPAR 30 mg Tab Take 1 tablet by mouth once daily.    sertraline (ZOLOFT) 50 MG tablet Take 1 tablet (50 mg total) by mouth once daily.    sodium bicarbonate 650 MG tablet Take 2 tablets (1,300 mg total) by mouth 3 (three) times daily.    SPRYCEL 100 mg Tab Take 100 mg by mouth once daily.     trazodone (DESYREL) 50 MG tablet Take 1 tablet (50 mg total) by mouth every evening.     Family History     Problem Relation (Age of Onset)    Cancer Brother    Colon cancer Sister    Glaucoma Mother    Pancreatic cancer Brother    Prostate cancer Father        Social History Main Topics    Smoking status: Former Smoker     Types: Cigarettes     Quit date: 1/28/2000    Smokeless tobacco: Never Used    Alcohol use No    Drug use: No    Sexual activity: Not Currently     Birth control/  protection: None     Review of Systems   Unable to perform ROS: Dementia   Constitutional: Positive for appetite change. Negative for chills, fever and unexpected weight change.   HENT: Negative for trouble swallowing.    Respiratory: Negative for cough, shortness of breath and wheezing.    Cardiovascular: Negative for chest pain.   Gastrointestinal: Negative for abdominal distention, constipation, diarrhea and vomiting.   Neurological: Negative for seizures, syncope, facial asymmetry and speech difficulty.   Psychiatric/Behavioral: Positive for confusion.     Objective:     Vital Signs (Most Recent):  Temp: 97.7 °F (36.5 °C) (08/10/17 1751)  Pulse: 73 (08/10/17 1939)  Resp: 14 (08/10/17 1939)  BP: 105/73 (08/10/17 1939)  SpO2: 96 % (08/10/17 1939) Vital Signs (24h Range):  Temp:  [96.8 °F (36 °C)-97.7 °F (36.5 °C)] 97.7 °F (36.5 °C)  Pulse:  [73-88] 73  Resp:  [14-18] 14  SpO2:  [91 %-100 %] 96 %  BP: ()/(40-73) 105/73     Weight: 90.7 kg (200 lb)  Body mass index is 25.68 kg/m².    Physical Exam   Constitutional: He appears well-developed and well-nourished. No distress.   HENT:   Head: Normocephalic and atraumatic.   Eyes: Conjunctivae and EOM are normal. Pupils are equal, round, and reactive to light.   Neck: Normal range of motion. Neck supple. No thyromegaly present.   Cardiovascular: Normal rate, regular rhythm and normal heart sounds.    Pulmonary/Chest: Effort normal and breath sounds normal. No respiratory distress. He has no wheezes. He has no rales.   Abdominal: Soft. Bowel sounds are normal. He exhibits no distension. There is no tenderness.   Musculoskeletal: Normal range of motion. He exhibits no edema, tenderness or deformity.   Neurological: He is alert.   Disoriented.  No focal deficits.   Skin: Skin is warm and dry. No rash noted. No erythema.   Psychiatric: He has a normal mood and affect.   Nursing note and vitals reviewed.       Significant Labs:   Results for orders placed or performed  during the hospital encounter of 08/10/17   CBC auto differential   Result Value Ref Range    WBC 13.90 (H) 3.90 - 12.70 K/uL    RBC 4.36 (L) 4.60 - 6.20 M/uL    Hemoglobin 12.1 (L) 14.0 - 18.0 g/dL    Hematocrit 37.2 (L) 40.0 - 54.0 %    MCV 85 82 - 98 fL    MCH 27.8 27.0 - 31.0 pg    MCHC 32.5 32.0 - 36.0 g/dL    RDW 16.7 (H) 11.5 - 14.5 %    Platelets 186 150 - 350 K/uL    MPV 10.7 9.2 - 12.9 fL    Gran # 11.0 (H) 1.8 - 7.7 K/uL    Lymph # 1.3 1.0 - 4.8 K/uL    Mono # 1.6 (H) 0.3 - 1.0 K/uL    Eos # 0.0 0.0 - 0.5 K/uL    Baso # 0.02 0.00 - 0.20 K/uL    Gran% 78.8 (H) 38.0 - 73.0 %    Lymph% 9.6 (L) 18.0 - 48.0 %    Mono% 11.4 4.0 - 15.0 %    Eosinophil% 0.1 0.0 - 8.0 %    Basophil% 0.1 0.0 - 1.9 %    Differential Method Automated    Comprehensive metabolic panel   Result Value Ref Range    Sodium 136 136 - 145 mmol/L    Potassium 4.7 3.5 - 5.1 mmol/L    Chloride 95 95 - 110 mmol/L    CO2 28 23 - 29 mmol/L    Glucose 132 (H) 70 - 110 mg/dL    BUN, Bld 25 (H) 8 - 23 mg/dL    Creatinine 6.1 (H) 0.5 - 1.4 mg/dL    Calcium 9.3 8.7 - 10.5 mg/dL    Total Protein 8.1 6.0 - 8.4 g/dL    Albumin 2.9 (L) 3.5 - 5.2 g/dL    Total Bilirubin 0.7 0.1 - 1.0 mg/dL    Alkaline Phosphatase 107 55 - 135 U/L    AST 58 (H) 10 - 40 U/L    ALT 22 10 - 44 U/L    Anion Gap 13 8 - 16 mmol/L    eGFR if African American 10 (A) >60 mL/min/1.73 m^2    eGFR if non African American 9 (A) >60 mL/min/1.73 m^2   Lactic acid, plasma   Result Value Ref Range    Lactate (Lactic Acid) 2.2 0.5 - 2.2 mmol/L       Significant Imaging:   Imaging Results          X-Ray Chest 1 View (Final result)  Result time 08/10/17 21:10:07    Final result by Shivam Ambrose MD (08/10/17 21:10:07)                 Impression:     Right basilar consolidation. See above. Otherwise no significant change from prior exam.      Electronically signed by: SHIVAM AMBROSE MD  Date:     08/10/17  Time:    21:10              Narrative:    Exam: Chest X-ray, one view.    History:  Shortness of breath    Findings: Comparison is made to prior exam dated 05/03/2017. Mild cardiomegaly again noted. Right basilar consolidation is no apparent, likely related to combination of pleural fluid and airspace disease. Sternal wires again noted.                            Assessment/Plan:     * Gram-positive cocci bacteremia    - Recurrent Staph bacteremia.  Last episode 4/2017; SALAZAR negative for vegetation.  - Blood culture drawn from AV fistula on 8/7 positive for gram positive cocci. Patient received IV Vanc during dialysis 8/9.  - Will admit to Inpatient.  - Blood cultures.  - Random Vanc level.  - IV Vanc; Pharmacy to dose.  - Daily labs.        Hypotension with h/o hypertension    - Hold home antihypertensives for now.  Monitor BP trends.        ESRD (end stage renal disease) on dialysis    - Dialysis M,W,F.  - Nephrology consult.        Dementia without behavioral disturbance    - Continue home psych meds.        Type 2 diabetes mellitus with renal complication    - Accuchecks; SSI  - Diabetic, Renal diet.        Hyperlipidemia    - Continue home statin therapy.        CAD with remote CABG    - No angina.  - Continue home medical management with ASA and statin.  - Beta blocker on hold due to hypotension.  Will resume once BP allows.          VTE Risk Mitigation         Ordered     Medium Risk of VTE  Once      08/10/17 2007     Place PABLITO hose  Until discontinued      08/10/17 2007     Place sequential compression device  Until discontinued      08/10/17 2007     Reason for No Pharmacological VTE Prophylaxis  Once      08/10/17 2007             HAMZAH Guzman  Department of Hospital Medicine   [facility]

## 2017-08-11 NOTE — PLAN OF CARE
Problem: Patient Care Overview  Goal: Plan of Care Review  Outcome: Ongoing (interventions implemented as appropriate)  Recommendations  Recommendation/Intervention: 1. Add Novasource Renal 1 can BID to current diet    2. Encourage adequate oral intake and honor preferences within estimated needs  Goals: Intake of % of meals and supplements  Nutrition Goal Status: new  Communication of RD Recs:  (care plan, sticky note, problem list)

## 2017-08-11 NOTE — CONSULTS
[facility] -   Nephrology  Consult Note    Patient Name: David Hadley  MRN: [id number]  Admission Date: 8/10/2017  Hospital Length of Stay: 0 days  Attending Provider: Will Escamilla MD   Primary Care Physician: Isaac Fitch MD  Principal Problem:Gram-positive cocci bacteremia    Consults  Subjective:     HPI: [id number] with multiple histories of MRSA infections with blood cultures being positive.  Has been admitted with positive blood cultures in April 2017 status post  Prolonged antibiotic course of vancomycin.  May 2017 admitted for syncope.  Has ESRD on hemodialysis.  Schedule for dialysis Tuesday Thursday and Saturday.  Last dialysis was today.     Gets his dialysis in [place]     Past Medical History:   Diagnosis Date    After-cataract, unspecified - Left Eye 11/27/2013    Allergy     Arthritis     Cancer     CHF (congestive heart failure)     Chronic kidney disease     chemo/ dialias    Dementia     Diabetes mellitus     Hypertension     Myocardial infarction        Past Surgical History:   Procedure Laterality Date    AV FISTULA PLACEMENT      CATARACT EXTRACTION      CORONARY ARTERY BYPASS GRAFT  3-2014       Review of patient's allergies indicates:   Allergen Reactions    Benadryl decongestant Itching     Nothing with benadryl        No current facility-administered medications on file prior to encounter.      Current Outpatient Prescriptions on File Prior to Encounter   Medication Sig    amlodipine (NORVASC) 5 MG tablet Take 1 tablet (5 mg total) by mouth once daily.    atorvastatin (LIPITOR) 20 MG tablet Take 1 tablet (20 mg total) by mouth once daily.    FOLIC ACID/VIT BCOMP,C (JOSAFAT-JANICE ORAL) Take by mouth.    gabapentin (NEURONTIN) 100 MG capsule Take 1 capsule (100 mg total) by mouth 3 (three) times daily.    hydrocodone-acetaminophen 5-325mg (NORCO) 5-325 mg per tablet Take 1 tablet by mouth every 6 to 8 hours as needed for Pain.    levetiracetam (KEPPRA)  500 MG Tab Take 1 tablet (500 mg total) by mouth 2 (two) times daily.    lorazepam (ATIVAN) 0.5 MG tablet Take 1 tablet (0.5 mg total) by mouth every 6 (six) hours as needed for Anxiety.    losartan (COZAAR) 50 MG tablet Take by mouth. 1 tablet Oral Every day    metoprolol tartrate (LOPRESSOR) 25 MG tablet Take 25 mg by mouth 2 (two) times daily.    rivastigmine (EXELON) 4.6 mg/24 hr PT24 Place 1 patch onto the skin once daily.    sertraline (ZOLOFT) 50 MG tablet Take 1 tablet (50 mg total) by mouth once daily.    sodium bicarbonate 650 MG tablet Take 2 tablets (1,300 mg total) by mouth 3 (three) times daily.    trazodone (DESYREL) 50 MG tablet Take 1 tablet (50 mg total) by mouth every evening.    SENSIPAR 30 mg Tab Take 1 tablet by mouth once daily.    SPRYCEL 100 mg Tab Take 100 mg by mouth once daily.      Family History     Problem Relation (Age of Onset)    Cancer Brother    Colon cancer Sister    Glaucoma Mother    Pancreatic cancer Brother    Prostate cancer Father        Social History Main Topics    Smoking status: Former Smoker     Types: Cigarettes     Quit date: 1/28/2000    Smokeless tobacco: Never Used    Alcohol use No    Drug use: No    Sexual activity: Not Currently     Birth control/ protection: None     Review of Systems   Unable to perform ROS: Dementia   Constitutional: Positive for appetite change. Negative for chills, fever and unexpected weight change.   HENT: Negative for trouble swallowing.    Respiratory: Negative for cough, shortness of breath and wheezing.    Cardiovascular: Negative for chest pain.   Gastrointestinal: Negative for abdominal distention, constipation, diarrhea and vomiting.   Neurological: Negative for seizures, syncope, facial asymmetry and speech difficulty.        Confusion and dementia    Psychiatric/Behavioral: Positive for confusion.     Objective:     Vital Signs (Most Recent):  Temp: 97.5 °F (36.4 °C) (08/10/17 2159)  Pulse: 76 (08/10/17  2159)  Resp: 17 (08/10/17 2159)  BP: 105/73 (08/10/17 1939)  SpO2: 96 % (08/10/17 1939) Vital Signs (24h Range):  Temp:  [96.8 °F (36 °C)-97.7 °F (36.5 °C)] 97.5 °F (36.4 °C)  Pulse:  [73-88] 76  Resp:  [14-18] 17  SpO2:  [91 %-100 %] 96 %  BP: ()/(40-73) 105/73     Weight: 92 kg (202 lb 12.8 oz)  Body mass index is 26.04 kg/m².    Physical Exam   Constitutional: He appears well-developed and well-nourished. No distress.   HENT:   Head: Normocephalic and atraumatic.   Eyes: Conjunctivae and EOM are normal. Pupils are equal, round, and reactive to light.   Neck: Normal range of motion. Neck supple. No thyromegaly present.   Cardiovascular: Normal rate, regular rhythm and normal heart sounds.    JVD 7 cm of water    Pulmonary/Chest: Effort normal and breath sounds normal. No respiratory distress. He has no wheezes. He has no rales.   Abdominal: Soft. Bowel sounds are normal. He exhibits no distension. There is no tenderness.   Musculoskeletal: Normal range of motion. He exhibits no edema, tenderness or deformity.   LUAF +B/T   Neurological: He is alert.   Disoriented.  No focal deficits.   Skin: Skin is warm and dry. No rash noted. No erythema.   Psychiatric: He has a normal mood and affect.   Nursing note and vitals reviewed.      Bed Side Echo: normal LV and RV function no pericardial effusion; calcification of mitral valve      Significant Labs:   Results for orders placed or performed during the hospital encounter of 08/10/17   CBC auto differential   Result Value Ref Range    WBC 13.90 (H) 3.90 - 12.70 K/uL    RBC 4.36 (L) 4.60 - 6.20 M/uL    Hemoglobin 12.1 (L) 14.0 - 18.0 g/dL    Hematocrit 37.2 (L) 40.0 - 54.0 %    MCV 85 82 - 98 fL    MCH 27.8 27.0 - 31.0 pg    MCHC 32.5 32.0 - 36.0 g/dL    RDW 16.7 (H) 11.5 - 14.5 %    Platelets 186 150 - 350 K/uL    MPV 10.7 9.2 - 12.9 fL    Gran # 11.0 (H) 1.8 - 7.7 K/uL    Lymph # 1.3 1.0 - 4.8 K/uL    Mono # 1.6 (H) 0.3 - 1.0 K/uL    Eos # 0.0 0.0 - 0.5 K/uL     Baso # 0.02 0.00 - 0.20 K/uL    Gran% 78.8 (H) 38.0 - 73.0 %    Lymph% 9.6 (L) 18.0 - 48.0 %    Mono% 11.4 4.0 - 15.0 %    Eosinophil% 0.1 0.0 - 8.0 %    Basophil% 0.1 0.0 - 1.9 %    Differential Method Automated    Comprehensive metabolic panel   Result Value Ref Range    Sodium 136 136 - 145 mmol/L    Potassium 4.7 3.5 - 5.1 mmol/L    Chloride 95 95 - 110 mmol/L    CO2 28 23 - 29 mmol/L    Glucose 132 (H) 70 - 110 mg/dL    BUN, Bld 25 (H) 8 - 23 mg/dL    Creatinine 6.1 (H) 0.5 - 1.4 mg/dL    Calcium 9.3 8.7 - 10.5 mg/dL    Total Protein 8.1 6.0 - 8.4 g/dL    Albumin 2.9 (L) 3.5 - 5.2 g/dL    Total Bilirubin 0.7 0.1 - 1.0 mg/dL    Alkaline Phosphatase 107 55 - 135 U/L    AST 58 (H) 10 - 40 U/L    ALT 22 10 - 44 U/L    Anion Gap 13 8 - 16 mmol/L    eGFR if African American 10 (A) >60 mL/min/1.73 m^2    eGFR if non African American 9 (A) >60 mL/min/1.73 m^2   Lactic acid, plasma   Result Value Ref Range    Lactate (Lactic Acid) 2.2 0.5 - 2.2 mmol/L   Lactic acid, plasma   Result Value Ref Range    Lactate (Lactic Acid) 2.2 0.5 - 2.2 mmol/L   POCT glucose   Result Value Ref Range    POCT Glucose 284 (H) 70 - 110 mg/dL       Significant Imaging:   Imaging Results          X-Ray Chest 1 View (Final result)  Result time 08/10/17 21:10:07    Final result by Shivam Ambrose MD (08/10/17 21:10:07)                 Impression:     Right basilar consolidation. See above. Otherwise no significant change from prior exam.      Electronically signed by: SHIVAM AMBROSE MD  Date:     08/10/17  Time:    21:10              Narrative:    Exam: Chest X-ray, one view.    History: Shortness of breath    Findings: Comparison is made to prior exam dated 05/03/2017. Mild cardiomegaly again noted. Right basilar consolidation is no apparent, likely related to combination of pleural fluid and airspace disease. Sternal wires again noted.                            Assessment/Plan:     ESRD (end stage renal disease) on dialysis    No acute  indication for dialysis.  Bedside echo performed.  No fluid overload.  Case discussed in detail with the family.    Patient has multiple medical problems with multiorgan system involvement.  Extended time spent today is about 45 minutes in addition to the regular time required for follow-up visit.  The extended time was used face-to-face with the patient along with discussion with family and greater than 50% of the extended time face-to-face was used to discuss the therapeutic options involving all the comorbid conditions as outlined above.              Thank you for your consult.     Bernard Marks MD  Nephrology  [facility]

## 2017-08-11 NOTE — HPI
Mr. Hadley is a 61yo male with a PMHx of dementia, ESRD on HD, HTN, CAD with remote CABG, HLD, DM II, CML, and h/o recurrent gram-positive cocci bacteremia, who presented to the ED with c/o increased confusion and decreased appetite over the past 1-2 weeks.  Patient's niece denies patient c/o fever/chills, cough, SOB, chest pain, palpitations, ABD pain, dysuria, N/V/D, lightheadedness/dizziness, focal deficits, seizure-like activity, or syncope.Patient was sent to ED by PCP for positive blood cultures with gram-positive cocci drawn from AV fistula on Monday.  Patient's niece reports patient received IV Vanc during dialysis yesterday.  Upon arrival to ED, patient noted to be hypotensive with BP 97/67.  WBC 14.  Patient last episode of bacteremia was 4/2017.  SALAZAR at that time was negative for vegetation.  Hospital Medicine was consulted for admission.

## 2017-08-11 NOTE — ASSESSMENT & PLAN NOTE
Nutrition Diagnosis:  Suboptimal oral food and beverage intake    Related to (etiology):   Dementia and decreased appetite    Signs and Symptoms (as evidenced by):   PO intake of 70 % and decreased appetite     Interventions/Recommendations (treatment strategy):  1.Add Diabetic/ Renal restriction to current diet. 2. Will continue to encourage PO intake     Nutrition Diagnosis Status:   Improving 8/16/17 ~ PO intake of 70 %

## 2017-08-11 NOTE — PLAN OF CARE
Problem: Patient Care Overview  Goal: Plan of Care Review  Outcome: Ongoing (interventions implemented as appropriate)  Patient disoriented to place, time, and situation.   SPO2 difficult to read on pulse oximeter.   The highest reading on room air achieved was 90%; O2 applied at 2 L/min. Pt in not distress, breathing normally. Denies pain. ALBERTINA fistula thrill and bruit present.  Remains free of injury. Fall precautions maintained with bed low and wheels locked. Chart review for 24 hours completed. Will continue to monitor till discharge.

## 2017-08-11 NOTE — CONSULTS
Pharmacy Vancomycin Consult Note    WBC=13.90  Tmax=97.7  CrCl=14.2ml/min Scr=6.1  ABW=90.7kg IBW=82.2kg    Cultures:pending  Dx. Bacteremia  Patient will be pulse dosed    Patient received Vancomycin 1250mg one time dose in ED (15mg/kg)  Vancomycin 1 gram Q48 is a placeholder dose only and should not be given.  Patient may receive another dose of vancomycin once Vancomycin random level <18mcg/ml  Vancomycin 24 hour random level due 8/11 @2000    Thank you for allowing us to participate in this patient's care.  Osiris Pereyra, Pharm D 8/10/2017 8:19 PM

## 2017-08-11 NOTE — ASSESSMENT & PLAN NOTE
No acute indication for dialysis.  Bedside echo performed.  No fluid overload.  Case discussed in detail with the family.    Patient has multiple medical problems with multiorgan system involvement.  Extended time spent today is about 45 minutes in addition to the regular time required for follow-up visit.  The extended time was used face-to-face with the patient along with discussion with family and greater than 50% of the extended time face-to-face was used to discuss the therapeutic options involving all the comorbid conditions as outlined above.

## 2017-08-11 NOTE — ASSESSMENT & PLAN NOTE
- Recurrent Staph bacteremia.  Last episode 4/2017; SALAZAR negative for vegetation.  - Blood culture drawn from AV fistula on 8/7 positive for gram positive cocci. Patient received IV Vanc during dialysis 8/9.  - Blood cultures.  - Random Vanc level.  - IV Vanc; Pharmacy to dose.  -CXR RLL consolidation- will order a ct chest and abdomen

## 2017-08-11 NOTE — ASSESSMENT & PLAN NOTE
- No angina.  - Continue home medical management with ASA and statin.  - Beta blocker on hold due to hypotension.  Will resume once BP allows.

## 2017-08-11 NOTE — CONSULTS
"  [facility] -   Adult Nutrition  Consult Note    SUMMARY     Recommendations  Recommendation/Intervention: 1. Add Novasource Renal 1 can BID to current diet    2. Encourage adequate oral intake and honor preferences within estimated needs  Goals: Intake of % of meals and supplements  Nutrition Goal Status: new  Communication of RD Recs:  (care plan, sticky note, problem list)    Reason for Assessment  Reason for Assessment: identified at risk by screening criteria (decreased appetite)  Diagnosis:  (Bacteremia)  Relevent Medical History: see H&P   Arthritis    Cancer    CHF (congestive heart failure)    Chronic kidney disease    Dementia    Diabetes mellitus    Hypertension    Myocardial infarction     General Information Comments: Patient with hx of dementia, ill feeling with decreased appetite, consumed 25% of lunch today  Nutrition Discharge Planning: Home on Renal Diabetic Diet    Nutrition Prescription Ordered  Current Diet Order: 1800 calorie Diabetic Renal  % Intake of Estimated Energy Needs: 0 - 25 %  % Meal Intake: 25%     Nutrition Risk Screen  Nutrition Risk Screen: other (see comments) (poor appetite)    Nutrition/Diet History  Food Preferences: none reported inculding Roman [employer] or cultural    Labs/Tests/Procedures/Meds  Pertinent Labs Reviewed: reviewed  Pertinent Labs Comments: Na 135, BUN 29, Cr 6.5, Phos 2, Alb 2.9  Pertinent Medications Reviewed: reviewed    Physical Findings  Oral/Mouth Cavity: tooth/teeth missing  Skin:  (Heriberto 19)    Anthropometrics  Height: 6' 2" (188 cm)  Weight Method: Bed Scale  Weight: 92 kg (202 lb 13.2 oz)  Ideal Body Weight (IBW), Male: 190 lb  % Ideal Body Weight, Male (lb): 106.75 lb  BMI (Calculated): 26.1  BMI Grade: 25 - 29.9 - overweight    Estimated/Assessed Needs  Weight Used For Calorie Calculations: 92 kg (202 lb 13.2 oz)   25 kcal/kg (kcal): 2300 and 30 kcal/kg (kcal): 2760   Weight Used For Protein Calculations: 92 kg (202 lb 13.2 " oz)  1.2 gm Protein (gm): 110.63 and 1.5 gm Protein (gm): 138.29  Fluid Need Method: RDA Method (1ml/krunal or as needed)    Assessment and Plan  Dementia without behavioral disturbance    Nutrition Diagnosis:  Suboptimal oral food and beverage intake    Related to (etiology):   Dementia and decreased appetite    Signs and Symptoms (as evidenced by):   Decreased intake     Interventions/Recommendations (treatment strategy):  1. Add Renal appropriate oral supplement to current diet  2. Encourage good intake at meals  3. Assistance with meals as needed from CNAs    Nutrition Diagnosis Status:   New          Monitor and Evaluation  Food and Nutrient Intake: food and beverage intake  Food and Nutrient Adminstration: diet order  Knowledge/Beliefs/Attitudes: beliefs and attitudes  Physical Activity and Function: nutrition-related ADLs and IADLs  Anthropometric Measurements: weight  Biochemical Data, Medical Tests and Procedures: electrolyte and renal panel, glucose/endocrine profile    Nutrition Follow-Up  RD Follow-up?: Yes

## 2017-08-11 NOTE — SUBJECTIVE & OBJECTIVE
Past Medical History:   Diagnosis Date    After-cataract, unspecified - Left Eye 11/27/2013    Allergy     Arthritis     Cancer     CHF (congestive heart failure)     Chronic kidney disease     chemo/ dialias    Dementia     Diabetes mellitus     Hypertension     Myocardial infarction        Past Surgical History:   Procedure Laterality Date    AV FISTULA PLACEMENT      CATARACT EXTRACTION      CORONARY ARTERY BYPASS GRAFT  3-2014       Review of patient's allergies indicates:   Allergen Reactions    Benadryl decongestant Itching     Nothing with benadryl        No current facility-administered medications on file prior to encounter.      Current Outpatient Prescriptions on File Prior to Encounter   Medication Sig    amlodipine (NORVASC) 5 MG tablet Take 1 tablet (5 mg total) by mouth once daily.    atorvastatin (LIPITOR) 20 MG tablet Take 1 tablet (20 mg total) by mouth once daily.    FOLIC ACID/VIT BCOMP,C (JOSAFAT-JANICE ORAL) Take by mouth.    gabapentin (NEURONTIN) 100 MG capsule Take 1 capsule (100 mg total) by mouth 3 (three) times daily.    hydrocodone-acetaminophen 5-325mg (NORCO) 5-325 mg per tablet Take 1 tablet by mouth every 6 to 8 hours as needed for Pain.    levetiracetam (KEPPRA) 500 MG Tab Take 1 tablet (500 mg total) by mouth 2 (two) times daily.    lorazepam (ATIVAN) 0.5 MG tablet Take 1 tablet (0.5 mg total) by mouth every 6 (six) hours as needed for Anxiety.    losartan (COZAAR) 50 MG tablet Take by mouth. 1 tablet Oral Every day    metoprolol tartrate (LOPRESSOR) 25 MG tablet Take 25 mg by mouth 2 (two) times daily.    rivastigmine (EXELON) 4.6 mg/24 hr PT24 Place 1 patch onto the skin once daily.    SENSIPAR 30 mg Tab Take 1 tablet by mouth once daily.    sertraline (ZOLOFT) 50 MG tablet Take 1 tablet (50 mg total) by mouth once daily.    sodium bicarbonate 650 MG tablet Take 2 tablets (1,300 mg total) by mouth 3 (three) times daily.    SPRYCEL 100 mg Tab Take 100 mg by  mouth once daily.     trazodone (DESYREL) 50 MG tablet Take 1 tablet (50 mg total) by mouth every evening.     Family History     Problem Relation (Age of Onset)    Cancer Brother    Colon cancer Sister    Glaucoma Mother    Pancreatic cancer Brother    Prostate cancer Father        Social History Main Topics    Smoking status: Former Smoker     Types: Cigarettes     Quit date: 1/28/2000    Smokeless tobacco: Never Used    Alcohol use No    Drug use: No    Sexual activity: Not Currently     Birth control/ protection: None     Review of Systems   Unable to perform ROS: Dementia   Constitutional: Positive for appetite change. Negative for chills, fever and unexpected weight change.   HENT: Negative for trouble swallowing.    Respiratory: Negative for cough, shortness of breath and wheezing.    Cardiovascular: Negative for chest pain.   Gastrointestinal: Negative for abdominal distention, constipation, diarrhea and vomiting.   Neurological: Negative for seizures, syncope, facial asymmetry and speech difficulty.   Psychiatric/Behavioral: Positive for confusion.     Objective:     Vital Signs (Most Recent):  Temp: 97.7 °F (36.5 °C) (08/10/17 1751)  Pulse: 73 (08/10/17 1939)  Resp: 14 (08/10/17 1939)  BP: 105/73 (08/10/17 1939)  SpO2: 96 % (08/10/17 1939) Vital Signs (24h Range):  Temp:  [96.8 °F (36 °C)-97.7 °F (36.5 °C)] 97.7 °F (36.5 °C)  Pulse:  [73-88] 73  Resp:  [14-18] 14  SpO2:  [91 %-100 %] 96 %  BP: ()/(40-73) 105/73     Weight: 90.7 kg (200 lb)  Body mass index is 25.68 kg/m².    Physical Exam   Constitutional: He appears well-developed and well-nourished. No distress.   HENT:   Head: Normocephalic and atraumatic.   Eyes: Conjunctivae and EOM are normal. Pupils are equal, round, and reactive to light.   Neck: Normal range of motion. Neck supple. No thyromegaly present.   Cardiovascular: Normal rate, regular rhythm and normal heart sounds.    Pulmonary/Chest: Effort normal and breath sounds normal.  No respiratory distress. He has no wheezes. He has no rales.   Abdominal: Soft. Bowel sounds are normal. He exhibits no distension. There is no tenderness.   Musculoskeletal: Normal range of motion. He exhibits no edema, tenderness or deformity.   Neurological: He is alert.   Disoriented.  No focal deficits.   Skin: Skin is warm and dry. No rash noted. No erythema.   Psychiatric: He has a normal mood and affect.   Nursing note and vitals reviewed.       Significant Labs:   Results for orders placed or performed during the hospital encounter of 08/10/17   CBC auto differential   Result Value Ref Range    WBC 13.90 (H) 3.90 - 12.70 K/uL    RBC 4.36 (L) 4.60 - 6.20 M/uL    Hemoglobin 12.1 (L) 14.0 - 18.0 g/dL    Hematocrit 37.2 (L) 40.0 - 54.0 %    MCV 85 82 - 98 fL    MCH 27.8 27.0 - 31.0 pg    MCHC 32.5 32.0 - 36.0 g/dL    RDW 16.7 (H) 11.5 - 14.5 %    Platelets 186 150 - 350 K/uL    MPV 10.7 9.2 - 12.9 fL    Gran # 11.0 (H) 1.8 - 7.7 K/uL    Lymph # 1.3 1.0 - 4.8 K/uL    Mono # 1.6 (H) 0.3 - 1.0 K/uL    Eos # 0.0 0.0 - 0.5 K/uL    Baso # 0.02 0.00 - 0.20 K/uL    Gran% 78.8 (H) 38.0 - 73.0 %    Lymph% 9.6 (L) 18.0 - 48.0 %    Mono% 11.4 4.0 - 15.0 %    Eosinophil% 0.1 0.0 - 8.0 %    Basophil% 0.1 0.0 - 1.9 %    Differential Method Automated    Comprehensive metabolic panel   Result Value Ref Range    Sodium 136 136 - 145 mmol/L    Potassium 4.7 3.5 - 5.1 mmol/L    Chloride 95 95 - 110 mmol/L    CO2 28 23 - 29 mmol/L    Glucose 132 (H) 70 - 110 mg/dL    BUN, Bld 25 (H) 8 - 23 mg/dL    Creatinine 6.1 (H) 0.5 - 1.4 mg/dL    Calcium 9.3 8.7 - 10.5 mg/dL    Total Protein 8.1 6.0 - 8.4 g/dL    Albumin 2.9 (L) 3.5 - 5.2 g/dL    Total Bilirubin 0.7 0.1 - 1.0 mg/dL    Alkaline Phosphatase 107 55 - 135 U/L    AST 58 (H) 10 - 40 U/L    ALT 22 10 - 44 U/L    Anion Gap 13 8 - 16 mmol/L    eGFR if African American 10 (A) >60 mL/min/1.73 m^2    eGFR if non African American 9 (A) >60 mL/min/1.73 m^2   Lactic acid, plasma   Result  Value Ref Range    Lactate (Lactic Acid) 2.2 0.5 - 2.2 mmol/L       Significant Imaging:   Imaging Results          X-Ray Chest 1 View (Final result)  Result time 08/10/17 21:10:07    Final result by Shivam Ambrose MD (08/10/17 21:10:07)                 Impression:     Right basilar consolidation. See above. Otherwise no significant change from prior exam.      Electronically signed by: SHIVAM AMBROSE MD  Date:     08/10/17  Time:    21:10              Narrative:    Exam: Chest X-ray, one view.    History: Shortness of breath    Findings: Comparison is made to prior exam dated 05/03/2017. Mild cardiomegaly again noted. Right basilar consolidation is no apparent, likely related to combination of pleural fluid and airspace disease. Sternal wires again noted.

## 2017-08-11 NOTE — SUBJECTIVE & OBJECTIVE
Past Medical History:   Diagnosis Date    After-cataract, unspecified - Left Eye 11/27/2013    Allergy     Arthritis     Cancer     CHF (congestive heart failure)     Chronic kidney disease     chemo/ dialias    Dementia     Diabetes mellitus     Hypertension     Myocardial infarction        Past Surgical History:   Procedure Laterality Date    AV FISTULA PLACEMENT      CATARACT EXTRACTION      CORONARY ARTERY BYPASS GRAFT  3-2014       Review of patient's allergies indicates:   Allergen Reactions    Benadryl decongestant Itching     Nothing with benadryl        No current facility-administered medications on file prior to encounter.      Current Outpatient Prescriptions on File Prior to Encounter   Medication Sig    amlodipine (NORVASC) 5 MG tablet Take 1 tablet (5 mg total) by mouth once daily.    atorvastatin (LIPITOR) 20 MG tablet Take 1 tablet (20 mg total) by mouth once daily.    FOLIC ACID/VIT BCOMP,C (JOSAFAT-JANICE ORAL) Take by mouth.    gabapentin (NEURONTIN) 100 MG capsule Take 1 capsule (100 mg total) by mouth 3 (three) times daily.    hydrocodone-acetaminophen 5-325mg (NORCO) 5-325 mg per tablet Take 1 tablet by mouth every 6 to 8 hours as needed for Pain.    levetiracetam (KEPPRA) 500 MG Tab Take 1 tablet (500 mg total) by mouth 2 (two) times daily.    lorazepam (ATIVAN) 0.5 MG tablet Take 1 tablet (0.5 mg total) by mouth every 6 (six) hours as needed for Anxiety.    losartan (COZAAR) 50 MG tablet Take by mouth. 1 tablet Oral Every day    metoprolol tartrate (LOPRESSOR) 25 MG tablet Take 25 mg by mouth 2 (two) times daily.    rivastigmine (EXELON) 4.6 mg/24 hr PT24 Place 1 patch onto the skin once daily.    sertraline (ZOLOFT) 50 MG tablet Take 1 tablet (50 mg total) by mouth once daily.    sodium bicarbonate 650 MG tablet Take 2 tablets (1,300 mg total) by mouth 3 (three) times daily.    trazodone (DESYREL) 50 MG tablet Take 1 tablet (50 mg total) by mouth every evening.     SENSIPAR 30 mg Tab Take 1 tablet by mouth once daily.    SPRYCEL 100 mg Tab Take 100 mg by mouth once daily.      Family History     Problem Relation (Age of Onset)    Cancer Brother    Colon cancer Sister    Glaucoma Mother    Pancreatic cancer Brother    Prostate cancer Father        Social History Main Topics    Smoking status: Former Smoker     Types: Cigarettes     Quit date: 1/28/2000    Smokeless tobacco: Never Used    Alcohol use No    Drug use: No    Sexual activity: Not Currently     Birth control/ protection: None     Review of Systems   Unable to perform ROS: Dementia   Constitutional: Positive for appetite change. Negative for chills, fever and unexpected weight change.   HENT: Negative for trouble swallowing.    Respiratory: Negative for cough, shortness of breath and wheezing.    Cardiovascular: Negative for chest pain.   Gastrointestinal: Negative for abdominal distention, constipation, diarrhea and vomiting.   Neurological: Negative for seizures, syncope, facial asymmetry and speech difficulty.        Confusion and dementia    Psychiatric/Behavioral: Positive for confusion.     Objective:     Vital Signs (Most Recent):  Temp: 97.5 °F (36.4 °C) (08/10/17 2159)  Pulse: 76 (08/10/17 2159)  Resp: 17 (08/10/17 2159)  BP: 105/73 (08/10/17 1939)  SpO2: 96 % (08/10/17 1939) Vital Signs (24h Range):  Temp:  [96.8 °F (36 °C)-97.7 °F (36.5 °C)] 97.5 °F (36.4 °C)  Pulse:  [73-88] 76  Resp:  [14-18] 17  SpO2:  [91 %-100 %] 96 %  BP: ()/(40-73) 105/73     Weight: 92 kg (202 lb 12.8 oz)  Body mass index is 26.04 kg/m².    Physical Exam   Constitutional: He appears well-developed and well-nourished. No distress.   HENT:   Head: Normocephalic and atraumatic.   Eyes: Conjunctivae and EOM are normal. Pupils are equal, round, and reactive to light.   Neck: Normal range of motion. Neck supple. No thyromegaly present.   Cardiovascular: Normal rate, regular rhythm and normal heart sounds.    JVD 7 cm of water     Pulmonary/Chest: Effort normal and breath sounds normal. No respiratory distress. He has no wheezes. He has no rales.   Abdominal: Soft. Bowel sounds are normal. He exhibits no distension. There is no tenderness.   Musculoskeletal: Normal range of motion. He exhibits no edema, tenderness or deformity.   LUAF +B/T   Neurological: He is alert.   Disoriented.  No focal deficits.   Skin: Skin is warm and dry. No rash noted. No erythema.   Psychiatric: He has a normal mood and affect.   Nursing note and vitals reviewed.      Bed Side Echo: normal LV and RV function no pericardial effusion; calcification of mitral valve      Significant Labs:   Results for orders placed or performed during the hospital encounter of 08/10/17   CBC auto differential   Result Value Ref Range    WBC 13.90 (H) 3.90 - 12.70 K/uL    RBC 4.36 (L) 4.60 - 6.20 M/uL    Hemoglobin 12.1 (L) 14.0 - 18.0 g/dL    Hematocrit 37.2 (L) 40.0 - 54.0 %    MCV 85 82 - 98 fL    MCH 27.8 27.0 - 31.0 pg    MCHC 32.5 32.0 - 36.0 g/dL    RDW 16.7 (H) 11.5 - 14.5 %    Platelets 186 150 - 350 K/uL    MPV 10.7 9.2 - 12.9 fL    Gran # 11.0 (H) 1.8 - 7.7 K/uL    Lymph # 1.3 1.0 - 4.8 K/uL    Mono # 1.6 (H) 0.3 - 1.0 K/uL    Eos # 0.0 0.0 - 0.5 K/uL    Baso # 0.02 0.00 - 0.20 K/uL    Gran% 78.8 (H) 38.0 - 73.0 %    Lymph% 9.6 (L) 18.0 - 48.0 %    Mono% 11.4 4.0 - 15.0 %    Eosinophil% 0.1 0.0 - 8.0 %    Basophil% 0.1 0.0 - 1.9 %    Differential Method Automated    Comprehensive metabolic panel   Result Value Ref Range    Sodium 136 136 - 145 mmol/L    Potassium 4.7 3.5 - 5.1 mmol/L    Chloride 95 95 - 110 mmol/L    CO2 28 23 - 29 mmol/L    Glucose 132 (H) 70 - 110 mg/dL    BUN, Bld 25 (H) 8 - 23 mg/dL    Creatinine 6.1 (H) 0.5 - 1.4 mg/dL    Calcium 9.3 8.7 - 10.5 mg/dL    Total Protein 8.1 6.0 - 8.4 g/dL    Albumin 2.9 (L) 3.5 - 5.2 g/dL    Total Bilirubin 0.7 0.1 - 1.0 mg/dL    Alkaline Phosphatase 107 55 - 135 U/L    AST 58 (H) 10 - 40 U/L    ALT 22 10 - 44 U/L     Anion Gap 13 8 - 16 mmol/L    eGFR if African American 10 (A) >60 mL/min/1.73 m^2    eGFR if non African American 9 (A) >60 mL/min/1.73 m^2   Lactic acid, plasma   Result Value Ref Range    Lactate (Lactic Acid) 2.2 0.5 - 2.2 mmol/L   Lactic acid, plasma   Result Value Ref Range    Lactate (Lactic Acid) 2.2 0.5 - 2.2 mmol/L   POCT glucose   Result Value Ref Range    POCT Glucose 284 (H) 70 - 110 mg/dL       Significant Imaging:   Imaging Results          X-Ray Chest 1 View (Final result)  Result time 08/10/17 21:10:07    Final result by Shivam Ambrose MD (08/10/17 21:10:07)                 Impression:     Right basilar consolidation. See above. Otherwise no significant change from prior exam.      Electronically signed by: SHIVAM AMBROSE MD  Date:     08/10/17  Time:    21:10              Narrative:    Exam: Chest X-ray, one view.    History: Shortness of breath    Findings: Comparison is made to prior exam dated 05/03/2017. Mild cardiomegaly again noted. Right basilar consolidation is no apparent, likely related to combination of pleural fluid and airspace disease. Sternal wires again noted.

## 2017-08-11 NOTE — PLAN OF CARE
Problem: Patient Care Overview  Goal: Plan of Care Review  Outcome: Ongoing (interventions implemented as appropriate)  Pt remains free of injury, pt denies pain and there are no nonverbal indicators of pain. Pt is oriented to person only.dialysis to done today. No s/s of acute distress, will monitor.

## 2017-08-11 NOTE — NURSING
Chart assessed for possible diabetes educational needs   He was seen by this nurse on previous admit  See note from 4-4-17  No further action unless new diabetes educational needs are identified

## 2017-08-11 NOTE — ASSESSMENT & PLAN NOTE
Patient is on MWF dialysis schedule at [facility] (he does not know name of his outpatient nephrologist). Access is left AVF (no obvious signs of infection).  Will resume dialysis today.

## 2017-08-11 NOTE — PROGRESS NOTES
[facility] -   Nephrology  Progress Note    Patient Name: David Hadley  MRN: [id number]  Admission Date: 8/10/2017  Hospital Length of Stay: 1 days  Attending Provider: Chapincito Dixon, *   Primary Care Physician: Isaac Fitch MD  Principal Problem:Gram-positive cocci bacteremia    Subjective:     HPI: [id number] with multiple histories of MRSA infections with blood cultures being positive.  Has been admitted with positive blood cultures in April 2017 status post  Prolonged antibiotic course of vancomycin.  May 2017 admitted for syncope.  Has ESRD on hemodialysis.  Schedule for dialysis Tuesday Thursday and Saturday.  Last dialysis was today.     [place] [place] History:     8/11/17: Patient today denies any fevers, pain, SOB, nausea.         Review of patient's allergies indicates:   Allergen Reactions    Benadryl decongestant Itching     Nothing with benadryl; wife states  No allergy to Benadryl     Current Facility-Administered Medications   Medication Frequency    aspirin EC tablet 81 mg Daily    atorvastatin tablet 20 mg Daily    dextrose 50% injection 12.5 g PRN    dextrose 50% injection 25 g PRN    glucagon (human recombinant) injection 1 mg PRN    glucose chewable tablet 16 g PRN    glucose chewable tablet 24 g PRN    insulin aspart pen 0-5 Units QID (AC + HS) PRN    levetiracetam tablet 500 mg BID    lorazepam tablet 0.5 mg Q6H PRN    rivastigmine 4.6 mg/24 hr 1 patch Daily    sertraline tablet 50 mg Daily    sodium bicarbonate tablet 1,300 mg TID    [START ON 8/12/2017] vancomycin 1 g in dextrose 5 % 250 mL IVPB (ready to mix system) Q48H       Objective:     Vital Signs (Most Recent):  Temp: 97.4 °F (36.3 °C) (08/11/17 0854)  Pulse: 80 (08/11/17 0854)  Resp: 18 (08/11/17 0854)  BP: 106/66 (08/11/17 0854)  SpO2: 98 % (08/11/17 0854)  O2 Device (Oxygen Therapy): nasal cannula (08/11/17 0515) Vital Signs (24h Range):  Temp:  [96.8 °F (36  °C)-97.7 °F (36.5 °C)] 97.4 °F (36.3 °C)  Pulse:  [73-88] 80  Resp:  [14-18] 18  SpO2:  [90 %-100 %] 98 %  BP: ()/(40-74) 106/66     Weight: 92 kg (202 lb 12.8 oz) (08/10/17 2159)  Body mass index is 26.04 kg/m².  Body surface area is 2.19 meters squared.    I/O last 3 completed shifts:  In: 20 [P.O.:20]  Out: 0     Physical Exam   Constitutional: He appears well-developed. He is cooperative.   Alert and responsive.   HENT:   Head: Normocephalic.   Nose: No rhinorrhea.   Mouth/Throat: Mucous membranes are normal. No oropharyngeal exudate.   Neck: No thyroid mass present.   Cardiovascular: Normal rate, regular rhythm, S1 normal, S2 normal and intact distal pulses.    Pulmonary/Chest: Effort normal. No respiratory distress. He has no wheezes.   Abdominal: Soft. Bowel sounds are normal. He exhibits no distension. There is no tenderness. No hernia.   Musculoskeletal:   Mild bilateral LE edema.   Lymphadenopathy:     He has no cervical adenopathy.   Neurological: He is alert.   Skin: Skin is warm and dry.   Psychiatric: He has a normal mood and affect.       Significant Labs:  Lab Results   Component Value Date    CREATININE 6.5 (H) 08/11/2017    BUN 29 (H) 08/11/2017     (L) 08/11/2017    K 4.7 08/11/2017    CL 95 08/11/2017    CO2 27 08/11/2017     Lab Results   Component Value Date     (H) 11/06/2008    CALCIUM 9.0 08/11/2017    PHOS 2.0 (L) 08/11/2017     Lab Results   Component Value Date    ALBUMIN 2.9 (L) 08/10/2017     Lab Results   Component Value Date    WBC 13.10 (H) 08/11/2017    HGB 12.1 (L) 08/11/2017    HCT 37.2 (L) 08/11/2017    MCV 86 08/11/2017     08/11/2017       Recent Labs  Lab 08/11/17  0527   MG 2.0         Significant Imaging:  Imaging Results          X-Ray Chest 1 View (Final result)  Result time 08/10/17 21:10:07    Final result by Shivam Ambrose MD (08/10/17 21:10:07)                 Impression:     Right basilar consolidation. See above. Otherwise no significant  change from prior exam.      Electronically signed by: MICAH HALE MD  Date:     08/10/17  Time:    21:10              Narrative:    Exam: Chest X-ray, one view.    History: Shortness of breath    Findings: Comparison is made to prior exam dated 05/03/2017. Mild cardiomegaly again noted. Right basilar consolidation is no apparent, likely related to combination of pleural fluid and airspace disease. Sternal wires again noted.                                Assessment/Plan:     Hypophosphatemia    Mild hypophosphatemia. Monitor for now. Avoid calcium products or phosphate binders.         Hypotension with h/o hypertension    Good BP control at present. No hypotension.         ESRD (end stage renal disease) on dialysis    Patient is on MWF dialysis schedule at [facility] (he does not know name of his outpatient nephrologist). Access is left AVF (no obvious signs of infection).  Will resume dialysis today.           * Gram-positive cocci bacteremia    Continue Vancomycin. Follow up on cultures.             Thank you for your consult. I will follow-up with patient. Please contact us if you have any additional questions.    Alfonso Sanchez MD  Nephrology  [facility] -

## 2017-08-11 NOTE — PLAN OF CARE
IMM signed.        08/11/17 1541   Medicare Message   Important Message from Medicare regarding Discharge Appeal Rights Given to patient/caregiver;Explained to patient/caregiver;Signed/date by patient/caregiver   Date IMM was signed 08/11/17   Time IMM was signed [id number]

## 2017-08-11 NOTE — PROGRESS NOTES
[facility] Hospital Medicine  Progress Note    Patient Name: David Hadley  MRN: [id number]  Patient Class: IP- Inpatient   Admission Date: 8/10/2017  Length of Stay: 1 days  Attending Physician: Chapincito Dixon, *  Primary Care Provider: Isaac Fitch MD        Subjective:     Principal Problem:Gram-positive cocci bacteremia    HPI:  Mr. Hadley is a 61yo male with a PMHx of dementia, ESRD on HD, HTN, CAD with remote CABG, HLD, DM II, CML, and h/o recurrent gram-positive cocci bacteremia, who presented to the ED with c/o increased confusion and decreased appetite over the past 1-2 weeks.  Patient's niece denies patient c/o fever/chills, cough, SOB, chest pain, palpitations, ABD pain, dysuria, N/V/D, lightheadedness/dizziness, focal deficits, seizure-like activity, or syncope.Patient was sent to ED by PCP for positive blood cultures with gram-positive cocci drawn from AV fistula on Monday.  Patient's niece reports patient received IV Vanc during dialysis yesterday.  Upon arrival to ED, patient noted to be hypotensive with BP 97/67.  WBC 14.  Patient last episode of bacteremia was 4/2017.  SALAZAR at that time was negative for vegetation.  Hospital Medicine was consulted for admission.     Hospital Course:  8/11/17 Pt was seen and examined  At bedside .  He Is aao x 3 in nad and hemodynamically stable .  No acute event overnight . Will order a ct chest and abdomen .     Interval History: Pt was seen and examined  At bedside .  He Is aao x 3 in nad and hemodynamically stable .  No acute event overnight . Will order a ct chest and abdomen .     Review of Systems   Unable to perform ROS: Dementia   Constitutional: Positive for appetite change. Negative for chills, fever and unexpected weight change.   HENT: Negative for trouble swallowing.    Respiratory: Negative for cough, shortness of breath and wheezing.    Cardiovascular: Negative for chest pain.   Gastrointestinal: Negative for abdominal  distention, constipation, diarrhea and vomiting.   Neurological: Negative for seizures, syncope, facial asymmetry and speech difficulty.   Psychiatric/Behavioral: Positive for confusion.     Objective:     Vital Signs (Most Recent):  Temp: 97.4 °F (36.3 °C) (08/11/17 0854)  Pulse: 82 (08/11/17 1117)  Resp: 18 (08/11/17 1117)  BP: 106/66 (08/11/17 0854)  SpO2: 100 % (08/11/17 1117) Vital Signs (24h Range):  Temp:  [96.8 °F (36 °C)-97.7 °F (36.5 °C)] 97.4 °F (36.3 °C)  Pulse:  [73-88] 82  Resp:  [14-18] 18  SpO2:  [90 %-100 %] 100 %  BP: ()/(40-74) 106/66     Weight: 92 kg (202 lb 12.8 oz)  Body mass index is 26.04 kg/m².    Intake/Output Summary (Last 24 hours) at 08/11/17 1222  Last data filed at 08/11/17 0400   Gross per 24 hour   Intake               20 ml   Output                0 ml   Net               20 ml      Physical Exam   Constitutional: He appears well-developed and well-nourished. No distress.   HENT:   Head: Normocephalic and atraumatic.   Eyes: Conjunctivae and EOM are normal. Pupils are equal, round, and reactive to light.   Neck: Normal range of motion. Neck supple. No thyromegaly present.   Cardiovascular: Normal rate, regular rhythm and normal heart sounds.    Pulmonary/Chest: Effort normal and breath sounds normal. No respiratory distress. He has no wheezes. He has no rales.   Abdominal: Soft. Bowel sounds are normal. He exhibits no distension. There is no tenderness.   Musculoskeletal: Normal range of motion. He exhibits no edema, tenderness or deformity.   Neurological: He is alert.   Disoriented.  No focal deficits.   Skin: Skin is warm and dry. No rash noted. No erythema.   Psychiatric: He has a normal mood and affect.   Nursing note and vitals reviewed.      Significant Labs:   ABGs: No results for input(s): PH, PCO2, HCO3, POCSATURATED, BE, TOTALHB, COHB, METHB, O2HB, POCFIO2 in the last 48 hours.  Blood Culture: No results for input(s): LABBLOO in the last 48 hours.  BMP:   Recent  Labs  Lab 08/11/17  0527   *   *   K 4.7   CL 95   CO2 27   BUN 29*   CREATININE 6.5*   CALCIUM 9.0   MG 2.0     CBC:   Recent Labs  Lab 08/10/17  1851 08/11/17  0527   WBC 13.90* 13.10*   HGB 12.1* 12.1*   HCT 37.2* 37.2*    201     Lactic Acid:   Recent Labs  Lab 08/10/17  1851 08/10/17  2133   LACTATE 2.2 2.2     Lipid Panel: No results for input(s): CHOL, HDL, LDLCALC, TRIG, CHOLHDL in the last 48 hours.  Respiratory Culture: No results for input(s): GSRESP, RESPIRATORYC in the last 48 hours.  TSH: No results for input(s): TSH in the last 4320 hours.    Significant Imaging: I have reviewed all pertinent imaging results/findings within the past 24 hours.    Assessment/Plan:      Hypophosphatemia              Hypotension with h/o hypertension    - Hold home antihypertensives for now.  Monitor BP trends.        Dementia without behavioral disturbance    - Continue home psych meds.        Type 2 diabetes mellitus with renal complication    - Accuchecks; SSI  - Diabetic, Renal diet.        Hyperlipidemia    - Continue home statin therapy.        CAD with remote CABG    - No angina.  - Continue home medical management with ASA and statin.  - Beta blocker on hold due to hypotension.  Will resume once BP allows.        ESRD (end stage renal disease) on dialysis    - Dialysis M,W,F.  - Nephrology consult.        * Gram-positive cocci bacteremia    - Recurrent Staph bacteremia.  Last episode 4/2017; SALAZAR negative for vegetation.  - Blood culture drawn from AV fistula on 8/7 positive for gram positive cocci. Patient received IV Vanc during dialysis 8/9.  - Blood cultures.  - Random Vanc level.  - IV Vanc; Pharmacy to dose.  -CXR RLL consolidation- will order a ct chest and abdomen           VTE Risk Mitigation         Ordered     Medium Risk of VTE  Once      08/10/17 2007     Place PABLITO hose  Until discontinued      08/10/17 2007     Place sequential compression device  Until discontinued      08/10/17  2007     Reason for No Pharmacological VTE Prophylaxis  Once      08/10/17 2007          Plan:  F/U CT chest and abdomen   Add avelox  For possible RLL PNA  HD per nephrology   F/U blood cx     Chapincito Dixon MD  Department of Hospital Medicine   [facility] -

## 2017-08-12 PROBLEM — J90 PLEURAL EFFUSION: Status: ACTIVE | Noted: 2017-08-12

## 2017-08-12 LAB
ALBUMIN SERPL BCP-MCNC: 2.4 G/DL
ANION GAP SERPL CALC-SCNC: 12 MMOL/L
ANION GAP SERPL CALC-SCNC: 12 MMOL/L
APPEARANCE FLD: NORMAL
BASOPHILS # BLD AUTO: 0.01 K/UL
BASOPHILS NFR BLD: 0.1 %
BASOPHILS NFR FLD MANUAL: 1 %
BODY FLD TYPE: NORMAL
BUN SERPL-MCNC: 25 MG/DL
BUN SERPL-MCNC: 26 MG/DL
CALCIUM SERPL-MCNC: 8.9 MG/DL
CALCIUM SERPL-MCNC: 9 MG/DL
CHLORIDE SERPL-SCNC: 98 MMOL/L
CHLORIDE SERPL-SCNC: 98 MMOL/L
CO2 SERPL-SCNC: 26 MMOL/L
CO2 SERPL-SCNC: 26 MMOL/L
COLOR FLD: YELLOW
CREAT SERPL-MCNC: 5.6 MG/DL
CREAT SERPL-MCNC: 5.6 MG/DL
DIFFERENTIAL METHOD: ABNORMAL
EOSINOPHIL # BLD AUTO: 0 K/UL
EOSINOPHIL NFR BLD: 0.2 %
ERYTHROCYTE [DISTWIDTH] IN BLOOD BY AUTOMATED COUNT: 16.9 %
EST. GFR  (AFRICAN AMERICAN): 11 ML/MIN/1.73 M^2
EST. GFR  (AFRICAN AMERICAN): 11 ML/MIN/1.73 M^2
EST. GFR  (NON AFRICAN AMERICAN): 10 ML/MIN/1.73 M^2
EST. GFR  (NON AFRICAN AMERICAN): 10 ML/MIN/1.73 M^2
GLUCOSE SERPL-MCNC: 111 MG/DL
GLUCOSE SERPL-MCNC: 88 MG/DL
GLUCOSE SERPL-MCNC: 89 MG/DL
HCT VFR BLD AUTO: 34.7 %
HGB BLD-MCNC: 11.2 G/DL
LDH SERPL L TO P-CCNC: 433 U/L
LYMPHOCYTES # BLD AUTO: 1.1 K/UL
LYMPHOCYTES NFR BLD: 6.9 %
LYMPHOCYTES NFR FLD MANUAL: 41 %
MCH RBC QN AUTO: 27.5 PG
MCHC RBC AUTO-ENTMCNC: 32.3 G/DL
MCV RBC AUTO: 85 FL
MESOTHL CELL NFR FLD MANUAL: 1 %
MONOCYTES # BLD AUTO: 1.6 K/UL
MONOCYTES NFR BLD: 10.2 %
MONOS+MACROS NFR FLD MANUAL: 31 %
NEUTROPHILS # BLD AUTO: 13.2 K/UL
NEUTROPHILS NFR BLD: 82.6 %
NEUTROPHILS NFR FLD MANUAL: 26 %
PHOSPHATE SERPL-MCNC: 2 MG/DL
PLATELET # BLD AUTO: 212 K/UL
PMV BLD AUTO: 10.7 FL
POCT GLUCOSE: 100 MG/DL (ref 70–110)
POCT GLUCOSE: 105 MG/DL (ref 70–110)
POCT GLUCOSE: 124 MG/DL (ref 70–110)
POCT GLUCOSE: 142 MG/DL (ref 70–110)
POTASSIUM SERPL-SCNC: 4.7 MMOL/L
POTASSIUM SERPL-SCNC: 4.7 MMOL/L
PROT SERPL-MCNC: 8 G/DL
RBC # BLD AUTO: 4.07 M/UL
SODIUM SERPL-SCNC: 136 MMOL/L
SODIUM SERPL-SCNC: 136 MMOL/L
VANCOMYCIN TROUGH SERPL-MCNC: 13.1 UG/ML
WBC # BLD AUTO: 15.99 K/UL
WBC # FLD: 934 /CU MM

## 2017-08-12 PROCEDURE — 87116 MYCOBACTERIA CULTURE: CPT

## 2017-08-12 PROCEDURE — 84155 ASSAY OF PROTEIN SERUM: CPT

## 2017-08-12 PROCEDURE — 87070 CULTURE OTHR SPECIMN AEROBIC: CPT

## 2017-08-12 PROCEDURE — 80048 BASIC METABOLIC PNL TOTAL CA: CPT

## 2017-08-12 PROCEDURE — 0W993ZZ DRAINAGE OF RIGHT PLEURAL CAVITY, PERCUTANEOUS APPROACH: ICD-10-PCS | Performed by: INTERNAL MEDICINE

## 2017-08-12 PROCEDURE — 25000003 PHARM REV CODE 250: Performed by: INTERNAL MEDICINE

## 2017-08-12 PROCEDURE — 32555 ASPIRATE PLEURA W/ IMAGING: CPT | Mod: RT,,, | Performed by: INTERNAL MEDICINE

## 2017-08-12 PROCEDURE — 87102 FUNGUS ISOLATION CULTURE: CPT

## 2017-08-12 PROCEDURE — 36415 COLL VENOUS BLD VENIPUNCTURE: CPT

## 2017-08-12 PROCEDURE — 88305 TISSUE EXAM BY PATHOLOGIST: CPT | Mod: 26,,, | Performed by: PATHOLOGY

## 2017-08-12 PROCEDURE — 25000003 PHARM REV CODE 250: Performed by: NURSE PRACTITIONER

## 2017-08-12 PROCEDURE — 88112 CYTOPATH CELL ENHANCE TECH: CPT | Mod: 26,,, | Performed by: PATHOLOGY

## 2017-08-12 PROCEDURE — 25000003 PHARM REV CODE 250: Performed by: EMERGENCY MEDICINE

## 2017-08-12 PROCEDURE — 82945 GLUCOSE OTHER FLUID: CPT

## 2017-08-12 PROCEDURE — 80069 RENAL FUNCTION PANEL: CPT

## 2017-08-12 PROCEDURE — 82947 ASSAY GLUCOSE BLOOD QUANT: CPT

## 2017-08-12 PROCEDURE — 99233 SBSQ HOSP IP/OBS HIGH 50: CPT | Mod: ,,, | Performed by: INTERNAL MEDICINE

## 2017-08-12 PROCEDURE — 83615 LACTATE (LD) (LDH) ENZYME: CPT

## 2017-08-12 PROCEDURE — 88305 TISSUE EXAM BY PATHOLOGIST: CPT | Performed by: PATHOLOGY

## 2017-08-12 PROCEDURE — 99223 1ST HOSP IP/OBS HIGH 75: CPT | Mod: 25,,, | Performed by: INTERNAL MEDICINE

## 2017-08-12 PROCEDURE — 84157 ASSAY OF PROTEIN OTHER: CPT

## 2017-08-12 PROCEDURE — 85025 COMPLETE CBC W/AUTO DIFF WBC: CPT

## 2017-08-12 PROCEDURE — 87205 SMEAR GRAM STAIN: CPT

## 2017-08-12 PROCEDURE — 63600175 PHARM REV CODE 636 W HCPCS: Performed by: INTERNAL MEDICINE

## 2017-08-12 PROCEDURE — 21400001 HC TELEMETRY ROOM

## 2017-08-12 PROCEDURE — 80202 ASSAY OF VANCOMYCIN: CPT

## 2017-08-12 PROCEDURE — 89051 BODY FLUID CELL COUNT: CPT

## 2017-08-12 RX ORDER — LIDOCAINE HYDROCHLORIDE 10 MG/ML
1 INJECTION, SOLUTION EPIDURAL; INFILTRATION; INTRACAUDAL; PERINEURAL ONCE
Status: DISCONTINUED | OUTPATIENT
Start: 2017-08-12 | End: 2017-08-17 | Stop reason: HOSPADM

## 2017-08-12 RX ADMIN — SODIUM BICARBONATE 650 MG TABLET 1300 MG: at 09:08

## 2017-08-12 RX ADMIN — SERTRALINE HYDROCHLORIDE 50 MG: 50 TABLET ORAL at 08:08

## 2017-08-12 RX ADMIN — LEVETIRACETAM 500 MG: 500 TABLET, FILM COATED ORAL at 09:08

## 2017-08-12 RX ADMIN — SODIUM BICARBONATE 650 MG TABLET 1300 MG: at 06:08

## 2017-08-12 RX ADMIN — ASPIRIN 81 MG: 81 TABLET, COATED ORAL at 08:08

## 2017-08-12 RX ADMIN — SODIUM BICARBONATE 650 MG TABLET 1300 MG: at 01:08

## 2017-08-12 RX ADMIN — RIVASTIGMINE TRANSDERMAL SYSTEM 1 PATCH: 4.6 PATCH, EXTENDED RELEASE TRANSDERMAL at 08:08

## 2017-08-12 RX ADMIN — SODIUM PHOSPHATE, MONOBASIC, MONOHYDRATE 30 MMOL: 276; 142 INJECTION, SOLUTION INTRAVENOUS at 11:08

## 2017-08-12 RX ADMIN — MOXIFLOXACIN HYDROCHLORIDE 400 MG: 400 INJECTION, SOLUTION INTRAVENOUS at 01:08

## 2017-08-12 RX ADMIN — LEVETIRACETAM 500 MG: 500 TABLET, FILM COATED ORAL at 08:08

## 2017-08-12 RX ADMIN — ATORVASTATIN CALCIUM 20 MG: 10 TABLET, FILM COATED ORAL at 08:08

## 2017-08-12 NOTE — SUBJECTIVE & OBJECTIVE
Interval History:     8/11/17: Patient today denies any fevers, pain, SOB, nausea.     8/12/17: Patient is resting in chair, no complaints at present.         Review of patient's allergies indicates:   Allergen Reactions    Benadryl decongestant Itching     Nothing with benadryl; wife states  No allergy to Benadryl     Current Facility-Administered Medications   Medication Frequency    0.9%  NaCl infusion PRN    0.9%  NaCl infusion Once    aspirin EC tablet 81 mg Daily    atorvastatin tablet 20 mg Daily    dextrose 50% injection 12.5 g PRN    dextrose 50% injection 25 g PRN    glucagon (human recombinant) injection 1 mg PRN    glucose chewable tablet 16 g PRN    glucose chewable tablet 24 g PRN    heparin (porcine) injection 1,000 Units PRN    insulin aspart pen 0-5 Units QID (AC + HS) PRN    levetiracetam tablet 500 mg BID    lorazepam tablet 0.5 mg Q6H PRN    moxifloxacin 400 mg/250 mL IVPB 400 mg Q24H    rivastigmine 4.6 mg/24 hr 1 patch Daily    sertraline tablet 50 mg Daily    sodium bicarbonate tablet 1,300 mg TID    [START ON 8/13/2017] vancomycin 1 g in dextrose 5 % 250 mL IVPB (ready to mix system) Q48H       Objective:     Vital Signs (Most Recent):  Temp: 98 °F (36.7 °C) (08/12/17 0833)  Pulse: 87 (08/12/17 0833)  Resp: 16 (08/12/17 0833)  BP: 120/66 (08/12/17 0833)  SpO2: 96 % (08/12/17 0833)  O2 Device (Oxygen Therapy): room air (08/12/17 0833) Vital Signs (24h Range):  Temp:  [97.1 °F (36.2 °C)-98.5 °F (36.9 °C)] 98 °F (36.7 °C)  Pulse:  [] 87  Resp:  [16-18] 16  SpO2:  [95 %-100 %] 96 %  BP: (104-120)/(52-83) 120/66     Weight: 92 kg (202 lb 13.2 oz) (08/11/17 1400)  Body mass index is 26.04 kg/m².  Body surface area is 2.19 meters squared.    I/O last 3 completed shifts:  In: 1650 [P.O.:900; Other:500; IV Piggyback:250]  Out: 2500 [Other:2500]    Physical Exam   Constitutional: He appears well-developed. He is cooperative.   Alert and responsive.   HENT:   Head:  Normocephalic.   Nose: No rhinorrhea.   Mouth/Throat: Mucous membranes are normal. No oropharyngeal exudate.   Neck: No thyroid mass present.   Cardiovascular: Normal rate, regular rhythm, S1 normal, S2 normal and intact distal pulses.    Pulmonary/Chest: Effort normal. No respiratory distress. He has no wheezes.   Abdominal: Soft. Bowel sounds are normal. He exhibits no distension. There is no tenderness. No hernia.   Musculoskeletal:   Mild bilateral LE edema.   Lymphadenopathy:     He has no cervical adenopathy.   Neurological: He is alert.   Skin: Skin is warm and dry.   Psychiatric: He has a normal mood and affect.       Significant Labs:  Lab Results   Component Value Date    CREATININE 5.6 (H) 08/12/2017    CREATININE 5.6 (H) 08/12/2017    BUN 26 (H) 08/12/2017    BUN 25 (H) 08/12/2017     08/12/2017     08/12/2017    K 4.7 08/12/2017    K 4.7 08/12/2017    CL 98 08/12/2017    CL 98 08/12/2017    CO2 26 08/12/2017    CO2 26 08/12/2017     Lab Results   Component Value Date     (H) 11/06/2008    CALCIUM 9.0 08/12/2017    CALCIUM 8.9 08/12/2017    PHOS 2.0 (L) 08/12/2017     Lab Results   Component Value Date    ALBUMIN 2.4 (L) 08/12/2017     Lab Results   Component Value Date    WBC 15.99 (H) 08/12/2017    HGB 11.2 (L) 08/12/2017    HCT 34.7 (L) 08/12/2017    MCV 85 08/12/2017     08/12/2017       Recent Labs  Lab 08/11/17  0527   MG 2.0         Significant Imaging:  Imaging Results          CT Abdomen Pelvis With Contrast (Final result)  Result time 08/11/17 19:22:53    Final result by Shivam Ambrose MD (08/11/17 19:22:53)                 Impression:             Right greater than left pleural effusions, associated with compressive atelectasis/consolidation.    Heavily calcified coronary arteries.    Rectal fecal impaction noted.      Electronically signed by: SHIVAM AMBROSE MD  Date:     08/11/17  Time:    19:22              Narrative:    Exam: CT chest with contrast.    History:  Gram-positive cocci bacteremia    Findings: Large right and smaller pleural effusions are present. There is extensive compressive atelectasis/consolidation in the right base. Less pronounced findings in the left base. No mediastinal or hilar adenopathy.    Heavily calcified coronary arteries noted.    No osteolytic or osteoblastic lesions are identified. The    The liver, spleen, pancreas, kidneys and adrenal glands are unremarkable.    No free fluid, adenopathy, mesenteric inflammatory change is apparent.    Rectal fecal impaction noted.                             CT Chest With Contrast (Final result)  Result time 08/11/17 19:22:54    Final result by Shivam Ambrose MD (08/11/17 19:22:54)                 Impression:             Right greater than left pleural effusions, associated with compressive atelectasis/consolidation.    Heavily calcified coronary arteries.    Rectal fecal impaction noted.      Electronically signed by: SHIVAM AMBROSE MD  Date:     08/11/17  Time:    19:22              Narrative:    Exam: CT chest with contrast.    History: Gram-positive cocci bacteremia    Findings: Large right and smaller pleural effusions are present. There is extensive compressive atelectasis/consolidation in the right base. Less pronounced findings in the left base. No mediastinal or hilar adenopathy.    Heavily calcified coronary arteries noted.    No osteolytic or osteoblastic lesions are identified. The    The liver, spleen, pancreas, kidneys and adrenal glands are unremarkable.    No free fluid, adenopathy, mesenteric inflammatory change is apparent.    Rectal fecal impaction noted.                             X-Ray Chest 1 View (Final result)  Result time 08/10/17 21:10:07    Final result by Shivam Ambrose MD (08/10/17 21:10:07)                 Impression:     Right basilar consolidation. See above. Otherwise no significant change from prior exam.      Electronically signed by: SHIVAM AMBROSE MD  Date:      08/10/17  Time:    21:10              Narrative:    Exam: Chest X-ray, one view.    History: Shortness of breath    Findings: Comparison is made to prior exam dated 05/03/2017. Mild cardiomegaly again noted. Right basilar consolidation is no apparent, likely related to combination of pleural fluid and airspace disease. Sternal wires again noted.

## 2017-08-12 NOTE — PROGRESS NOTES
[facility] -   Nephrology  Progress Note    Patient Name: David Hadley  MRN: [id number]  Admission Date: 8/10/2017  Hospital Length of Stay: 2 days  Attending Provider: Conner Duarte MD   Primary Care Physician: Isaac Fitch MD  Principal Problem:Gram-positive cocci bacteremia    Subjective:     HPI: [id number] with multiple histories of MRSA infections with blood cultures being positive.  Has been admitted with positive blood cultures in April 2017 status post  Prolonged antibiotic course of vancomycin.  May 2017 admitted for syncope.  Has ESRD on hemodialysis.  Schedule for dialysis Tuesday Thursday and Saturday.  Last dialysis was today.     [place] [place]     Interval History:     8/11/17: Patient today denies any fevers, pain, SOB, nausea.     8/12/17: Patient is resting in chair, no complaints at present.         Review of patient's allergies indicates:   Allergen Reactions    Benadryl decongestant Itching     Nothing with benadryl; wife states  No allergy to Benadryl     Current Facility-Administered Medications   Medication Frequency    0.9%  NaCl infusion PRN    0.9%  NaCl infusion Once    aspirin EC tablet 81 mg Daily    atorvastatin tablet 20 mg Daily    dextrose 50% injection 12.5 g PRN    dextrose 50% injection 25 g PRN    glucagon (human recombinant) injection 1 mg PRN    glucose chewable tablet 16 g PRN    glucose chewable tablet 24 g PRN    heparin (porcine) injection 1,000 Units PRN    insulin aspart pen 0-5 Units QID (AC + HS) PRN    levetiracetam tablet 500 mg BID    lorazepam tablet 0.5 mg Q6H PRN    moxifloxacin 400 mg/250 mL IVPB 400 mg Q24H    rivastigmine 4.6 mg/24 hr 1 patch Daily    sertraline tablet 50 mg Daily    sodium bicarbonate tablet 1,300 mg TID    [START ON 8/13/2017] vancomycin 1 g in dextrose 5 % 250 mL IVPB (ready to mix system) Q48H       Objective:     Vital Signs (Most Recent):  Temp: 98 °F (36.7 °C) (08/12/17  0833)  Pulse: 87 (08/12/17 0833)  Resp: 16 (08/12/17 0833)  BP: 120/66 (08/12/17 0833)  SpO2: 96 % (08/12/17 0833)  O2 Device (Oxygen Therapy): room air (08/12/17 0833) Vital Signs (24h Range):  Temp:  [97.1 °F (36.2 °C)-98.5 °F (36.9 °C)] 98 °F (36.7 °C)  Pulse:  [] 87  Resp:  [16-18] 16  SpO2:  [95 %-100 %] 96 %  BP: (104-120)/(52-83) 120/66     Weight: 92 kg (202 lb 13.2 oz) (08/11/17 1400)  Body mass index is 26.04 kg/m².  Body surface area is 2.19 meters squared.    I/O last 3 completed shifts:  In: 1650 [P.O.:900; Other:500; IV Piggyback:250]  Out: 2500 [Other:2500]    Physical Exam   Constitutional: He appears well-developed. He is cooperative.   Alert and responsive.   HENT:   Head: Normocephalic.   Nose: No rhinorrhea.   Mouth/Throat: Mucous membranes are normal. No oropharyngeal exudate.   Neck: No thyroid mass present.   Cardiovascular: Normal rate, regular rhythm, S1 normal, S2 normal and intact distal pulses.    Pulmonary/Chest: Effort normal. No respiratory distress. He has no wheezes.   Abdominal: Soft. Bowel sounds are normal. He exhibits no distension. There is no tenderness. No hernia.   Musculoskeletal:   Mild bilateral LE edema.   Lymphadenopathy:     He has no cervical adenopathy.   Neurological: He is alert.   Skin: Skin is warm and dry.   Psychiatric: He has a normal mood and affect.       Significant Labs:  Lab Results   Component Value Date    CREATININE 5.6 (H) 08/12/2017    CREATININE 5.6 (H) 08/12/2017    BUN 26 (H) 08/12/2017    BUN 25 (H) 08/12/2017     08/12/2017     08/12/2017    K 4.7 08/12/2017    K 4.7 08/12/2017    CL 98 08/12/2017    CL 98 08/12/2017    CO2 26 08/12/2017    CO2 26 08/12/2017     Lab Results   Component Value Date     (H) 11/06/2008    CALCIUM 9.0 08/12/2017    CALCIUM 8.9 08/12/2017    PHOS 2.0 (L) 08/12/2017     Lab Results   Component Value Date    ALBUMIN 2.4 (L) 08/12/2017     Lab Results   Component Value Date    WBC 15.99 (H)  08/12/2017    HGB 11.2 (L) 08/12/2017    HCT 34.7 (L) 08/12/2017    MCV 85 08/12/2017     08/12/2017       Recent Labs  Lab 08/11/17  0527   MG 2.0         Significant Imaging:  Imaging Results          CT Abdomen Pelvis With Contrast (Final result)  Result time 08/11/17 19:22:53    Final result by Shivam Ambrose MD (08/11/17 19:22:53)                 Impression:             Right greater than left pleural effusions, associated with compressive atelectasis/consolidation.    Heavily calcified coronary arteries.    Rectal fecal impaction noted.      Electronically signed by: SHIVAM AMBROSE MD  Date:     08/11/17  Time:    19:22              Narrative:    Exam: CT chest with contrast.    History: Gram-positive cocci bacteremia    Findings: Large right and smaller pleural effusions are present. There is extensive compressive atelectasis/consolidation in the right base. Less pronounced findings in the left base. No mediastinal or hilar adenopathy.    Heavily calcified coronary arteries noted.    No osteolytic or osteoblastic lesions are identified. The    The liver, spleen, pancreas, kidneys and adrenal glands are unremarkable.    No free fluid, adenopathy, mesenteric inflammatory change is apparent.    Rectal fecal impaction noted.                             CT Chest With Contrast (Final result)  Result time 08/11/17 19:22:54    Final result by Shivam Ambrose MD (08/11/17 19:22:54)                 Impression:             Right greater than left pleural effusions, associated with compressive atelectasis/consolidation.    Heavily calcified coronary arteries.    Rectal fecal impaction noted.      Electronically signed by: SHIVAM AMBROSE MD  Date:     08/11/17  Time:    19:22              Narrative:    Exam: CT chest with contrast.    History: Gram-positive cocci bacteremia    Findings: Large right and smaller pleural effusions are present. There is extensive compressive atelectasis/consolidation in the right  base. Less pronounced findings in the left base. No mediastinal or hilar adenopathy.    Heavily calcified coronary arteries noted.    No osteolytic or osteoblastic lesions are identified. The    The liver, spleen, pancreas, kidneys and adrenal glands are unremarkable.    No free fluid, adenopathy, mesenteric inflammatory change is apparent.    Rectal fecal impaction noted.                             X-Ray Chest 1 View (Final result)  Result time 08/10/17 21:10:07    Final result by Shivam Ambrose MD (08/10/17 21:10:07)                 Impression:     Right basilar consolidation. See above. Otherwise no significant change from prior exam.      Electronically signed by: SHIVAM AMBROSE MD  Date:     08/10/17  Time:    21:10              Narrative:    Exam: Chest X-ray, one view.    History: Shortness of breath    Findings: Comparison is made to prior exam dated 05/03/2017. Mild cardiomegaly again noted. Right basilar consolidation is no apparent, likely related to combination of pleural fluid and airspace disease. Sternal wires again noted.                                Assessment/Plan:     Hypophosphatemia    Mild hypophosphatemia. Will replete phosphorus with IV sodium phosphate.         ESRD (end stage renal disease) on dialysis    Patient is on MWF dialysis schedule at [facility] (he does not know name of his outpatient nephrologist). Access is left AVF (no obvious signs of infection).  Next scheduled HD will be Mon. 8/14/17.           * Gram-positive cocci bacteremia    Continue Vancomycin. Follow up on cultures.             Thank you for your consult. I will follow-up with patient. Please contact us if you have any additional questions.    Alfonso Sanchez MD  Nephrology  [facility] -

## 2017-08-12 NOTE — CONSULTS
[facility] -   Pulmonology  Consult Note    Patient Name: David Hadley  MRN: [id number]  Admission Date: 8/10/2017  Hospital Length of Stay: 2 days  Code Status: Full Code  Attending Physician: Conner Duarte MD  Primary Care Provider: Isaac Fitch MD   Principal Problem: Gram-positive cocci bacteremia    Inpatient consult to Pulmonology  Consult performed by: ROBBY RODRIGUEZ  Consult ordered by: YOKO STRONG        Subjective: SOB     HPI: 61yo male with a PMHx of dementia, ESRD on HD, HTN, CAD with remote CABG, HLD, DM II, CML, and h/o recurrent gram-positive cocci bacteremia, who presented to the ED with c/o increased confusion and decreased appetite over the past 1-2 weeks. C/o shortness of breath. Blood cultures positive for gram positive cocci. Patient presented hypotensive to Emergency Room.    Noted to have pleural effusion. Asked to evaluate. Hx obtained from sister who is in room Patient is demented.    Hx of recurrent pleural effusion in the past ( left sided after heart surgery )    Past Medical History:   Diagnosis Date    After-cataract, unspecified - Left Eye 11/27/2013    Allergy     Arthritis     Cancer     CHF (congestive heart failure)     Chronic kidney disease     chemo/ dialias    Dementia     Diabetes mellitus     Hypertension     Myocardial infarction        Past Surgical History:   Procedure Laterality Date    AV FISTULA PLACEMENT      CATARACT EXTRACTION      CORONARY ARTERY BYPASS GRAFT  3-2014       Review of patient's allergies indicates:   Allergen Reactions    Benadryl decongestant Itching     Nothing with benadryl; wife states  No allergy to Benadryl       Family History     Problem Relation (Age of Onset)    Cancer Brother    Colon cancer Sister    Glaucoma Mother    Pancreatic cancer Brother    Prostate cancer Father        Social History Main Topics    Smoking status: Former Smoker     Types: Cigarettes     Quit date: 1/28/2000    Smokeless  tobacco: Never Used    Alcohol use No    Drug use: No    Sexual activity: Not Currently     Birth control/ protection: None        Review of Systems   Constitutional: Positive for fatigue and fever. Negative for unexpected weight change.   HENT: Negative for congestion, postnasal drip, rhinorrhea, sinus pressure and sneezing.    Respiratory: Positive for cough and shortness of breath.    Cardiovascular: Negative for chest pain, palpitations and leg swelling.   Gastrointestinal: Negative for abdominal pain and nausea.   Musculoskeletal: Negative for arthralgias and back pain.   Skin: Negative for rash.   Neurological: Positive for syncope, weakness and light-headedness. Negative for dizziness.   Hematological: Negative for adenopathy. Does not bruise/bleed easily.   Psychiatric/Behavioral: Positive for sleep disturbance. Negative for dysphoric mood. The patient is not nervous/anxious.      Objective:     Vital Signs (Most Recent):  Temp: 98.6 °F (37 °C) (08/12/17 1641)  Pulse: 99 (08/12/17 1712)  Resp: 18 (08/12/17 1712)  BP: 104/64 (08/12/17 1712)  SpO2: 99 % (08/12/17 1712) Vital Signs (24h Range):  Temp:  [98 °F (36.7 °C)-98.6 °F (37 °C)] 98.6 °F (37 °C)  Pulse:  [] 99  Resp:  [16-18] 18  SpO2:  [95 %-100 %] 99 %  BP: (103-120)/(52-83) 104/64     Weight: 92 kg (202 lb 13.2 oz)  Body mass index is 26.04 kg/m².      Intake/Output Summary (Last 24 hours) at 08/12/17 1718  Last data filed at 08/12/17 0400   Gross per 24 hour   Intake              620 ml   Output             2500 ml   Net            -1880 ml       Physical Exam   Constitutional:   Chronically ill appearing     HENT:   Head: Normocephalic and atraumatic.   Eyes: Conjunctivae are normal. Pupils are equal, round, and reactive to light.   Neck: Neck supple. No JVD present. No tracheal deviation present. No thyromegaly present.   Cardiovascular: Normal rate and regular rhythm.    Murmur heard.   Systolic murmur is present with a grade of 2/6    Pulmonary/Chest: Tachypnea noted. He has decreased breath sounds in the right middle field, the right lower field and the left lower field. He has rales in the right lower field and the left lower field.   Abdominal: Soft.   Musculoskeletal: He exhibits edema.   Lymphadenopathy:     He has no cervical adenopathy.   Neurological: He is alert. He displays abnormal reflex.   Not orientated to place, date   Skin: Skin is warm and dry.   Fistula in left arm   Nursing note and vitals reviewed.      Vents:  Oxygen Concentration (%): 28 (08/11/17 1117)    Lines/Drains/Airways     Drain                 Hemodialysis AV Fistula Left upper arm 63452 days          Peripheral Intravenous Line                 Peripheral IV - Single Lumen 08/10/17 Right Antecubital 2 days                Significant Labs:    CBC/Anemia Profile:    Recent Labs  Lab 08/10/17  1851 08/11/17  0527 08/12/17  0553   WBC 13.90* 13.10* 15.99*   HGB 12.1* 12.1* 11.2*   HCT 37.2* 37.2* 34.7*    201 212   MCV 85 86 85   RDW 16.7* 16.9* 16.9*        Chemistries:    Recent Labs  Lab 08/10/17  1851 08/11/17  0527 08/12/17  0553    135* 136  136   K 4.7 4.7 4.7  4.7   CL 95 95 98  98   CO2 28 27 26  26   BUN 25* 29* 25*  26*   CREATININE 6.1* 6.5* 5.6*  5.6*   CALCIUM 9.3 9.0 8.9  9.0   ALBUMIN 2.9*  --  2.4*   PROT 8.1  --   --    BILITOT 0.7  --   --    ALKPHOS 107  --   --    ALT 22  --   --    AST 58*  --   --    MG  --  2.0  --    PHOS  --  2.0* 2.0*       Blood Culture:     Recent Labs  Lab 08/10/17  1845 08/10/17  1900   LABBLOO Gram stain aer bottle: Gram positive cocci in clusters resembling Staph   Gram stain mike bottle: Gram positive cocci in clusters resembling Staph   Results called to and read back by: Primitivo Velasco RN  08/11/2017  21:59  STAPHYLOCOCCUS AUREUSSusceptibility pendingID consult required at [facility] and [place] locations. Gram stain aer bottle: Gram positive cocci in clusters resembling Staph   Gram stain mike  bottle: Gram positive cocci in clusters resembling Staph   Results called to and read back by:Kia Velasco RN 08/12/2017  04:05     BMP:     Recent Labs  Lab 08/11/17  0527 08/12/17  0553   * 89  88   * 136  136   K 4.7 4.7  4.7   CL 95 98  98   CO2 27 26  26   BUN 29* 25*  26*   CREATININE 6.5* 5.6*  5.6*   CALCIUM 9.0 8.9  9.0   MG 2.0  --      CMP:     Recent Labs  Lab 08/10/17  1851 08/11/17  0527 08/12/17  0553    135* 136  136   K 4.7 4.7 4.7  4.7   CL 95 95 98  98   CO2 28 27 26  26   * 117* 89  88   BUN 25* 29* 25*  26*   CREATININE 6.1* 6.5* 5.6*  5.6*   CALCIUM 9.3 9.0 8.9  9.0   PROT 8.1  --   --    ALBUMIN 2.9*  --  2.4*   BILITOT 0.7  --   --    ALKPHOS 107  --   --    AST 58*  --   --    ALT 22  --   --    ANIONGAP 13 13 12  12   EGFRNONAA 9* 8* 10*  10*     Coagulation: No results for input(s): INR, APTT in the last 48 hours.    Invalid input(s): PT  Respiratory Culture: No results for input(s): GSRESP, RESPIRATORYC in the last 48 hours.  All pertinent labs within the past 24 hours have been reviewed.    Significant Imaging:   CXR: I have reviewed all pertinent results/findings within the past 24 hours and my personal findings are:  bilateral pleural effusions    Assessment/Plan:     Active Diagnoses:    Diagnosis Date Noted POA    PRINCIPAL PROBLEM:  Gram-positive cocci bacteremia [R78.81]  Yes    Bilateral pleural effusion [J90] 08/12/2017 Yes    Hypophosphatemia [E83.39] 08/11/2017 Unknown    Hypotension with h/o hypertension [I10]  Yes    Dementia without behavioral disturbance [F03.90] 07/29/2016 Yes     Chronic    Type 2 diabetes mellitus with renal complication [E11.29] 07/29/2016 Yes     Chronic    Hyperlipidemia [E78.5] 07/29/2016 Yes     Chronic    ESRD (end stage renal disease) on dialysis [N18.6, Z99.2] 06/26/2013 Not Applicable     Chronic    CAD with remote CABG [Z95.1] 06/26/2013 Not Applicable     Chronic      Problems Resolved  During this Admission:    Diagnosis Date Noted Date Resolved POA       Risks, benefits of thoracentesis discussed in detail with patient. Possible complications of procedure including but not limited to collapse of lung, bleeding into lung, respiratory failure and side effects of anesthesia discussed in detail. Alternatives to procedure discussed. Questions asked and answered. Consent form given to patient to be signed on the day of the procedure.    Medical Decision Making: Moderate to High complexity.  Overall, the multiple problems listed are of moderate to high severity that may impact quality of life and activities of daily living. Side effects of medications, treatment plan as well as options and alternatives reviewed and discussed with patient. Orders for thoracentesis initiated.There was counseling of patient concerning these issues. Time spent 60 minutes        Thank you for your consult. I will follow-up with patient. Please contact us if you have any additional questions.     Je Balderas MD  Pulmonology  [facility] [place]

## 2017-08-12 NOTE — ASSESSMENT & PLAN NOTE
- Recurrent Staph bacteremia.  Last episode 4/2017; SALAZAR negative for vegetation, Indium scan and LUE US were negative at this time. Repeat blood cultures during this stay were negative for MRSA. S/p 6 weeks Vancomycin.     --Cultures presently growing Staph. - Blood cultures. Random Vancomycin level was 13 on 8/11/17.   --Continue IV Vancomycin. Infectious Disease consulted  --No obvious evidence of fistula infection  --Will repeat left upper extremity ultrasound and Echocardiogram

## 2017-08-12 NOTE — PLAN OF CARE
Problem: Patient Care Overview  Goal: Plan of Care Review  Outcome: Ongoing (interventions implemented as appropriate)  Pt is oriented to person only, so POC was reviewed with wife. Indications for medications and possible side effects explained. Pt's wife verbalized understanding. Antibiotics administered per order without any adverse reactions noted. Rec'd call from micro lab in [place]; both aerobic and anaerobic blood cx's have gram + cocci in clusters. MD notified; no new orders given. Glucose readings were WDL and required no supplemental insulin. Pt turned/repositioned q 2 h. Contact precautions maintained. VS stable. Will continue to monitor.

## 2017-08-12 NOTE — NURSING
Pt had paracanthesis done bedside. Pt tolerated well, VS stable. 700ml of fluids taken off. No s/s of distress, will monitor.

## 2017-08-12 NOTE — PROGRESS NOTES
Pt completed 3.5 hours of HD tx with 2 L net fluid removal.  Pt tolerated tx well.  Post tx, blood rinsed back, needles removed, hemostasis achieved.  Verbal and written report to nurse attending.

## 2017-08-12 NOTE — PROGRESS NOTES
[facility] Hospital Medicine  Progress Note    Patient Name: David Hadley  MRN: [id number]  Patient Class: IP- Inpatient   Admission Date: 8/10/2017  Length of Stay: 2 days  Attending Physician: Conner Duarte MD  Primary Care Provider: Isaac Fitch MD    Subjective:     Principal Problem:Gram-positive cocci bacteremia    HPI:  Mr. Hadley is a 61yo male with a PMHx of dementia, ESRD on HD, HTN, CAD with remote CABG, HLD, DM II, CML, and h/o recurrent gram-positive cocci bacteremia, who presented to the ED with c/o increased confusion and decreased appetite over the past 1-2 weeks.  Patient's niece denies patient c/o fever/chills, cough, SOB, chest pain, palpitations, ABD pain, dysuria, N/V/D, lightheadedness/dizziness, focal deficits, seizure-like activity, or syncope.Patient was sent to ED by PCP for positive blood cultures with gram-positive cocci drawn from AV fistula on Monday.  Patient's niece reports patient received IV Vanc during dialysis yesterday.  Upon arrival to ED, patient noted to be hypotensive with BP 97/67.  WBC 14.  Patient last episode of bacteremia was 4/2017.  SALAZAR at that time was negative for vegetation.  Hospital Medicine was consulted for admission.     Hospital Course:  David Hadley is a 62 year old male who was admitted to [facility] with sepsis due to recurrent MRSA Bacteremia. Patient was admitted 4/2017 for the same, but source remained unclear. Repeat cultures on 4/12/17 were negative for MRSA (1/2 bottle grew coag-negative), and patient was discharged with 6 week therapy with Vancomycin. During this admission, medical management, included Vancomycin. Infectious Disease was consulted who recommended repeating TTE and Left Extremity Ultrasound. CXR revealed RLL consolidation and Avelox was started. Subsequent CT Chest/Abdomen revealed large right pleural effusion with compressive atelectasis. Pulmonology was consulted for thoracentesis. He received  enema for fecal impaction.     Interval History: Cultures from 8/10/17 growing Staph. ID consulted. Plans to repeat left extremity ultrasound and Echocardiogram. CT Chest/ABdomen also shows pleural effusion and fecal impaction. Plans for possible thoracentesis and enema today. Spouse at bedside.     Review of Systems   Unable to perform ROS: Dementia      Objective:     Vital Signs (Most Recent):  Temp: 98 °F (36.7 °C) (08/12/17 0833)  Pulse: 87 (08/12/17 0833)  Resp: 16 (08/12/17 0833)  BP: 120/66 (08/12/17 0833)  SpO2: 96 % (08/12/17 0833) Vital Signs (24h Range):  Temp:  [98 °F (36.7 °C)-98.5 °F (36.9 °C)] 98 °F (36.7 °C)  Pulse:  [] 87  Resp:  [16-18] 16  SpO2:  [95 %-100 %] 96 %  BP: (104-120)/(52-83) 120/66     Weight: 92 kg (202 lb 13.2 oz)  Body mass index is 26.04 kg/m².    Intake/Output Summary (Last 24 hours) at 08/12/17 1615  Last data filed at 08/12/17 0400   Gross per 24 hour   Intake              870 ml   Output             2500 ml   Net            -1630 ml      Physical Exam   Constitutional: He appears well-developed and well-nourished. No distress.   HENT:   Head: Normocephalic and atraumatic.   Eyes: Conjunctivae and EOM are normal. Pupils are equal, round, and reactive to light.   Neck: Normal range of motion. Neck supple. No thyromegaly present.   Cardiovascular: Normal rate, regular rhythm and normal heart sounds.    Pulmonary/Chest: Effort normal and breath sounds normal. No respiratory distress. He has no wheezes. He has no rales.   Abdominal: Soft. Bowel sounds are normal. He exhibits no distension. There is no tenderness.   Musculoskeletal: Normal range of motion. He exhibits edema (+1 BLE). He exhibits no tenderness or deformity.   Neurological: He is alert.   Disoriented.  No focal deficits.   Skin: Skin is warm and dry. No rash noted. No erythema.   Left arm fistula +bruit/thrill     Psychiatric: He has a normal mood and affect.   Nursing note and vitals reviewed.    Significant  Labs:   CBC:   Recent Labs  Lab 08/10/17  1851 08/11/17  0527 08/12/17  0553   WBC 13.90* 13.10* 15.99*   HGB 12.1* 12.1* 11.2*   HCT 37.2* 37.2* 34.7*    201 212     CMP:   Recent Labs  Lab 08/10/17  1851 08/11/17  0527 08/12/17  0553    135* 136  136   K 4.7 4.7 4.7  4.7   CL 95 95 98  98   CO2 28 27 26  26   * 117* 89  88   BUN 25* 29* 25*  26*   CREATININE 6.1* 6.5* 5.6*  5.6*   CALCIUM 9.3 9.0 8.9  9.0   PROT 8.1  --   --    ALBUMIN 2.9*  --  2.4*   BILITOT 0.7  --   --    ALKPHOS 107  --   --    AST 58*  --   --    ALT 22  --   --    ANIONGAP 13 13 12  12   EGFRNONAA 9* 8* 10*  10*       Significant Imaging:   Imaging Results          CT Abdomen Pelvis With Contrast (Final result)  Result time 08/11/17 19:22:53    Final result by Shivam Ambrose MD (08/11/17 19:22:53)                 Impression:             Right greater than left pleural effusions, associated with compressive atelectasis/consolidation.    Heavily calcified coronary arteries.    Rectal fecal impaction noted.      Electronically signed by: SHIVAM AMBROSE MD  Date:     08/11/17  Time:    19:22              Narrative:    Exam: CT chest with contrast.    History: Gram-positive cocci bacteremia    Findings: Large right and smaller pleural effusions are present. There is extensive compressive atelectasis/consolidation in the right base. Less pronounced findings in the left base. No mediastinal or hilar adenopathy.    Heavily calcified coronary arteries noted.    No osteolytic or osteoblastic lesions are identified. The    The liver, spleen, pancreas, kidneys and adrenal glands are unremarkable.    No free fluid, adenopathy, mesenteric inflammatory change is apparent.    Rectal fecal impaction noted.                             CT Chest With Contrast (Final result)  Result time 08/11/17 19:22:54    Final result by Shivam Ambrose MD (08/11/17 19:22:54)                 Impression:             Right greater than left  pleural effusions, associated with compressive atelectasis/consolidation.    Heavily calcified coronary arteries.    Rectal fecal impaction noted.      Electronically signed by: SHIVAM AMBROSE MD  Date:     08/11/17  Time:    19:22              Narrative:    Exam: CT chest with contrast.    History: Gram-positive cocci bacteremia    Findings: Large right and smaller pleural effusions are present. There is extensive compressive atelectasis/consolidation in the right base. Less pronounced findings in the left base. No mediastinal or hilar adenopathy.    Heavily calcified coronary arteries noted.    No osteolytic or osteoblastic lesions are identified. The    The liver, spleen, pancreas, kidneys and adrenal glands are unremarkable.    No free fluid, adenopathy, mesenteric inflammatory change is apparent.    Rectal fecal impaction noted.                             X-Ray Chest 1 View (Final result)  Result time 08/10/17 21:10:07    Final result by Shivam Ambrose MD (08/10/17 21:10:07)                 Impression:     Right basilar consolidation. See above. Otherwise no significant change from prior exam.      Electronically signed by: SHIVAM AMBROSE MD  Date:     08/10/17  Time:    21:10              Narrative:    Exam: Chest X-ray, one view.    History: Shortness of breath    Findings: Comparison is made to prior exam dated 05/03/2017. Mild cardiomegaly again noted. Right basilar consolidation is no apparent, likely related to combination of pleural fluid and airspace disease. Sternal wires again noted.                                Assessment/Plan:      * Gram-positive cocci bacteremia    - Recurrent Staph bacteremia.  Last episode 4/2017; SALAZAR negative for vegetation, Indium scan and LUE US were negative at this time. Repeat blood cultures during this stay were negative for MRSA. S/p 6 weeks Vancomycin.     --Cultures presently growing Staph. - Blood cultures. Random Vancomycin level was 13 on 8/11/17.   --Continue  IV Vancomycin. Infectious Disease consulted  --No obvious evidence of fistula infection  --Will repeat left upper extremity ultrasound and Echocardiogram          Bilateral pleural effusion    --R>L with compressive atelectasis  --continue Avelox  --Pulmonology consulted for Thoracentesis and pleural fluid analysis            ESRD (end stage renal disease) on dialysis    - Dialysis M,W,F.  - Nephrology following.        Hypophosphatemia    -mild  -replete        Hypotension with h/o hypertension    - Hold home antihypertensives for now.  Monitor BP trends.        Dementia without behavioral disturbance    -supportive care.   -continue home meds        Type 2 diabetes mellitus with renal complication    - Accuchecks; SSI  - Diabetic, Renal diet.        Hyperlipidemia    --Resume Atorvastatin        CAD with remote CABG    - No angina.  - Continue home medical management with ASA and statin.  - Beta blocker on hold due to hypotension.  Will resume once BP allows.          VTE Risk Mitigation         Ordered     Medium Risk of VTE  Once      08/10/17 2007     Place PABLITO hose  Until discontinued      08/10/17 2007     Place sequential compression device  Until discontinued      08/10/17 2007        Reason for No Pharmacological VTE Prophylaxis  Heparin Temporarily on hold for pending Thoracentesis.  08/10/17 2007          Pk Louis NP  Department of Hospital Medicine   [facility]

## 2017-08-12 NOTE — PLAN OF CARE
Problem: Patient Care Overview  Goal: Plan of Care Review  Outcome: Ongoing (interventions implemented as appropriate)  Pt remains free of injury. No c/o pain. Pt had paracentesis done at bedside today. 700 ml taken off, pt stable. Enema also done today. No s/s of acute distress, will monitor.

## 2017-08-12 NOTE — ASSESSMENT & PLAN NOTE
Patient is on MWF dialysis schedule at [facility] (he does not know name of his outpatient nephrologist). Access is left AVF (no obvious signs of infection).  Next scheduled HD will be Mon. 8/14/17.

## 2017-08-12 NOTE — CONSULTS
Pharmacy Vancomycin Consult Note    WBC=13.10  Tmax=97.7  Dialysis patient MWF  Patient received Dialysis today.     Cultures: NGTD   Dx. bacteremia    Post-dialysis random level=13.3mcg/ml  Giving patient Vancomycin 500mg post-dialysis dose  Next dialysis session due Monday. Pre-dialysis random level due 8/14 with AM labs.    Thank you for allowing us to participate in this patient's care.  Osiris Pereyra, Pharm D 8/11/2017 9:03 PM

## 2017-08-12 NOTE — SUBJECTIVE & OBJECTIVE
Interval History: Cultures from 8/10/17 growing Staph. ID consulted. Plans to repeat left extremity ultrasound and Echocardiogram. CT Chest/ABdomen also shows pleural effusion and fecal impaction. Plans for possible thoracentesis and enema today. Spouse at bedside.     Review of Systems   Unable to perform ROS: Dementia      Objective:     Vital Signs (Most Recent):  Temp: 98 °F (36.7 °C) (08/12/17 0833)  Pulse: 87 (08/12/17 0833)  Resp: 16 (08/12/17 0833)  BP: 120/66 (08/12/17 0833)  SpO2: 96 % (08/12/17 0833) Vital Signs (24h Range):  Temp:  [98 °F (36.7 °C)-98.5 °F (36.9 °C)] 98 °F (36.7 °C)  Pulse:  [] 87  Resp:  [16-18] 16  SpO2:  [95 %-100 %] 96 %  BP: (104-120)/(52-83) 120/66     Weight: 92 kg (202 lb 13.2 oz)  Body mass index is 26.04 kg/m².    Intake/Output Summary (Last 24 hours) at 08/12/17 1615  Last data filed at 08/12/17 0400   Gross per 24 hour   Intake              870 ml   Output             2500 ml   Net            -1630 ml      Physical Exam   Constitutional: He appears well-developed and well-nourished. No distress.   HENT:   Head: Normocephalic and atraumatic.   Eyes: Conjunctivae and EOM are normal. Pupils are equal, round, and reactive to light.   Neck: Normal range of motion. Neck supple. No thyromegaly present.   Cardiovascular: Normal rate, regular rhythm and normal heart sounds.    Pulmonary/Chest: Effort normal and breath sounds normal. No respiratory distress. He has no wheezes. He has no rales.   Abdominal: Soft. Bowel sounds are normal. He exhibits no distension. There is no tenderness.   Musculoskeletal: Normal range of motion. He exhibits edema (+1 BLE). He exhibits no tenderness or deformity.   Neurological: He is alert.   Disoriented.  No focal deficits.   Skin: Skin is warm and dry. No rash noted. No erythema.   Left arm fistula +bruit/thrill     Psychiatric: He has a normal mood and affect.   Nursing note and vitals reviewed.    Significant Labs:   CBC:   Recent Labs  Lab  08/10/17  1851 08/11/17  0527 08/12/17  0553   WBC 13.90* 13.10* 15.99*   HGB 12.1* 12.1* 11.2*   HCT 37.2* 37.2* 34.7*    201 212     CMP:   Recent Labs  Lab 08/10/17  1851 08/11/17  0527 08/12/17  0553    135* 136  136   K 4.7 4.7 4.7  4.7   CL 95 95 98  98   CO2 28 27 26  26   * 117* 89  88   BUN 25* 29* 25*  26*   CREATININE 6.1* 6.5* 5.6*  5.6*   CALCIUM 9.3 9.0 8.9  9.0   PROT 8.1  --   --    ALBUMIN 2.9*  --  2.4*   BILITOT 0.7  --   --    ALKPHOS 107  --   --    AST 58*  --   --    ALT 22  --   --    ANIONGAP 13 13 12  12   EGFRNONAA 9* 8* 10*  10*       Significant Imaging:   Imaging Results          CT Abdomen Pelvis With Contrast (Final result)  Result time 08/11/17 19:22:53    Final result by Shivam Ambrose MD (08/11/17 19:22:53)                 Impression:             Right greater than left pleural effusions, associated with compressive atelectasis/consolidation.    Heavily calcified coronary arteries.    Rectal fecal impaction noted.      Electronically signed by: SHIVAM AMBROSE MD  Date:     08/11/17  Time:    19:22              Narrative:    Exam: CT chest with contrast.    History: Gram-positive cocci bacteremia    Findings: Large right and smaller pleural effusions are present. There is extensive compressive atelectasis/consolidation in the right base. Less pronounced findings in the left base. No mediastinal or hilar adenopathy.    Heavily calcified coronary arteries noted.    No osteolytic or osteoblastic lesions are identified. The    The liver, spleen, pancreas, kidneys and adrenal glands are unremarkable.    No free fluid, adenopathy, mesenteric inflammatory change is apparent.    Rectal fecal impaction noted.                             CT Chest With Contrast (Final result)  Result time 08/11/17 19:22:54    Final result by Shivam Ambrose MD (08/11/17 19:22:54)                 Impression:             Right greater than left pleural effusions, associated with  compressive atelectasis/consolidation.    Heavily calcified coronary arteries.    Rectal fecal impaction noted.      Electronically signed by: SHIVAM AMBROSE MD  Date:     08/11/17  Time:    19:22              Narrative:    Exam: CT chest with contrast.    History: Gram-positive cocci bacteremia    Findings: Large right and smaller pleural effusions are present. There is extensive compressive atelectasis/consolidation in the right base. Less pronounced findings in the left base. No mediastinal or hilar adenopathy.    Heavily calcified coronary arteries noted.    No osteolytic or osteoblastic lesions are identified. The    The liver, spleen, pancreas, kidneys and adrenal glands are unremarkable.    No free fluid, adenopathy, mesenteric inflammatory change is apparent.    Rectal fecal impaction noted.                             X-Ray Chest 1 View (Final result)  Result time 08/10/17 21:10:07    Final result by Shivam Ambrose MD (08/10/17 21:10:07)                 Impression:     Right basilar consolidation. See above. Otherwise no significant change from prior exam.      Electronically signed by: SHIVAM AMBROSE MD  Date:     08/10/17  Time:    21:10              Narrative:    Exam: Chest X-ray, one view.    History: Shortness of breath    Findings: Comparison is made to prior exam dated 05/03/2017. Mild cardiomegaly again noted. Right basilar consolidation is no apparent, likely related to combination of pleural fluid and airspace disease. Sternal wires again noted.

## 2017-08-12 NOTE — PROCEDURES
"David Hadley is a 62 y.o. male patient.    Temp: 98.6 °F (37 °C) (08/12/17 1641)  Pulse: 99 (08/12/17 1712)  Resp: 18 (08/12/17 1712)  BP: 104/64 (08/12/17 1712)  SpO2: 99 % (08/12/17 1712)  Weight: 92 kg (202 lb 13.2 oz) (08/11/17 1400)  Height: 6' 2" (188 cm) (08/11/17 1400)       Thoracentesis  Date/Time: 8/12/2017 5:14 PM  Location procedure was performed: [facility] MEDICAL SURGICAL UNIT  Performed by: ROBBY RODRIGUEZ  Authorized by: ROBBY RODRIGUEZ   Assisting provider: ROBBY RODRIGUEZ  Pre-operative diagnosis: bilateral plerual effusion - thoracentesis on right side   Post-operative diagnosis: same  Consent Done: Yes  Consent: Written consent obtained.  Risks and benefits: risks, benefits and alternatives were discussed  Consent given by: sibling  Patient understanding: patient states understanding of the procedure being performed  Patient consent: the patient's understanding of the procedure matches consent given  Procedure consent: procedure consent matches procedure scheduled  Relevant documents: relevant documents present and verified  Test results: test results available and properly labeled  Site marked: the operative site was marked  Imaging studies: imaging studies available  Required items: required blood products, implants, devices, and special equipment available  Patient identity confirmed: name and verbally with patient  Time out: Immediately prior to procedure a "time out" was called to verify the correct patient, procedure, equipment, support staff and site/side marked as required.  Procedure purpose: diagnostic and therapeutic  Indications: pleural effusion  Preparation: Patient was prepped and draped in the usual sterile fashion.  Local anesthesia used: yes  Anesthesia: local infiltration    Anesthesia:  Local anesthesia used: yes  Local Anesthetic: lidocaine 1% without epinephrine  Anesthetic total: 8 mL  Patient sedated: no  Description of findings: 700 cc of clear fluid removed   Preparation: " skin prepped with ChloraPrep  Patient position: sitting  Ultrasound guidance: yes  Location: right posterior  Intercostal space: 8th  Puncture method: over-the-needle catheter  Needle size: 18  Catheter size: 18 gauge  Number of attempts: 1  Drainage amount: 700 ml  Drainage characteristics: clear  Patient tolerance: Patient tolerated the procedure well with no immediate complications  Chest x-ray performed: yes  Chest x-ray interpreted by me.  Chest x-ray findings: pleural effusion  Technical procedures used: ulstrasound  Significant surgical tasks conducted by the assistant(s): none  Complications: No  Estimated blood loss (mL): 2  Specimens: Yes  Implants: No  Comments: While at the patient's bedside ultrasound was used to localize the greatest extent of pleural fluid. The pleural fluid characteristic were clear. The pleural fluid was 1.5 cm from the skin surface and the depth of the pleural fluid was measured -estimated volume of pleural fluid is 800cc .  The site was marked with a washable marker.             Je Balderas  8/12/2017

## 2017-08-12 NOTE — ASSESSMENT & PLAN NOTE
--R>L with compressive atelectasis  --continue Avelox  --Pulmonology consulted for Thoracentesis and pleural fluid analysis

## 2017-08-13 LAB
ALBUMIN SERPL BCP-MCNC: 2.4 G/DL
ANION GAP SERPL CALC-SCNC: 12 MMOL/L
ANION GAP SERPL CALC-SCNC: 13 MMOL/L
AORTIC VALVE REGURGITATION: ABNORMAL
BASOPHILS # BLD AUTO: 0.02 K/UL
BASOPHILS NFR BLD: 0.1 %
BUN SERPL-MCNC: 35 MG/DL
BUN SERPL-MCNC: 36 MG/DL
CALCIUM SERPL-MCNC: 8.8 MG/DL
CALCIUM SERPL-MCNC: 8.9 MG/DL
CHLORIDE SERPL-SCNC: 95 MMOL/L
CHLORIDE SERPL-SCNC: 96 MMOL/L
CO2 SERPL-SCNC: 25 MMOL/L
CO2 SERPL-SCNC: 27 MMOL/L
CREAT SERPL-MCNC: 6.8 MG/DL
CREAT SERPL-MCNC: 6.8 MG/DL
DIASTOLIC DYSFUNCTION: YES
DIFFERENTIAL METHOD: ABNORMAL
EOSINOPHIL # BLD AUTO: 0 K/UL
EOSINOPHIL NFR BLD: 0.2 %
ERYTHROCYTE [DISTWIDTH] IN BLOOD BY AUTOMATED COUNT: 16.7 %
EST. GFR  (AFRICAN AMERICAN): 9 ML/MIN/1.73 M^2
EST. GFR  (AFRICAN AMERICAN): 9 ML/MIN/1.73 M^2
EST. GFR  (NON AFRICAN AMERICAN): 8 ML/MIN/1.73 M^2
EST. GFR  (NON AFRICAN AMERICAN): 8 ML/MIN/1.73 M^2
ESTIMATED PA SYSTOLIC PRESSURE: 39.4
GLUCOSE FLD-MCNC: 119 MG/DL
GLUCOSE SERPL-MCNC: 104 MG/DL
GLUCOSE SERPL-MCNC: 104 MG/DL
HCT VFR BLD AUTO: 34.8 %
HGB BLD-MCNC: 11.5 G/DL
LYMPHOCYTES # BLD AUTO: 1.2 K/UL
LYMPHOCYTES NFR BLD: 9 %
MCH RBC QN AUTO: 27.8 PG
MCHC RBC AUTO-ENTMCNC: 33 G/DL
MCV RBC AUTO: 84 FL
MITRAL VALVE REGURGITATION: ABNORMAL
MONOCYTES # BLD AUTO: 1.2 K/UL
MONOCYTES NFR BLD: 8.9 %
NEUTROPHILS # BLD AUTO: 11.2 K/UL
NEUTROPHILS NFR BLD: 81.8 %
PHOSPHATE SERPL-MCNC: 3.2 MG/DL
PLATELET # BLD AUTO: 223 K/UL
PMV BLD AUTO: 10.2 FL
POTASSIUM SERPL-SCNC: 4.3 MMOL/L
POTASSIUM SERPL-SCNC: 4.4 MMOL/L
PROT FLD-MCNC: 4 G/DL
RBC # BLD AUTO: 4.13 M/UL
RETIRED EF AND QEF - SEE NOTES: 40 (ref 55–65)
SODIUM SERPL-SCNC: 134 MMOL/L
SODIUM SERPL-SCNC: 134 MMOL/L
SPECIMEN SOURCE: NORMAL
SPECIMEN SOURCE: NORMAL
TRICUSPID VALVE REGURGITATION: ABNORMAL
WBC # BLD AUTO: 13.75 K/UL

## 2017-08-13 PROCEDURE — 36415 COLL VENOUS BLD VENIPUNCTURE: CPT

## 2017-08-13 PROCEDURE — 85025 COMPLETE CBC W/AUTO DIFF WBC: CPT

## 2017-08-13 PROCEDURE — 80048 BASIC METABOLIC PNL TOTAL CA: CPT

## 2017-08-13 PROCEDURE — 25000003 PHARM REV CODE 250: Performed by: EMERGENCY MEDICINE

## 2017-08-13 PROCEDURE — 21400001 HC TELEMETRY ROOM

## 2017-08-13 PROCEDURE — 80069 RENAL FUNCTION PANEL: CPT

## 2017-08-13 PROCEDURE — 93306 TTE W/DOPPLER COMPLETE: CPT | Mod: 26,,, | Performed by: INTERNAL MEDICINE

## 2017-08-13 PROCEDURE — 63600175 PHARM REV CODE 636 W HCPCS: Performed by: INTERNAL MEDICINE

## 2017-08-13 PROCEDURE — 99900035 HC TECH TIME PER 15 MIN (STAT)

## 2017-08-13 PROCEDURE — 93306 TTE W/DOPPLER COMPLETE: CPT

## 2017-08-13 PROCEDURE — 99233 SBSQ HOSP IP/OBS HIGH 50: CPT | Mod: ,,, | Performed by: INTERNAL MEDICINE

## 2017-08-13 PROCEDURE — 25000003 PHARM REV CODE 250: Performed by: INTERNAL MEDICINE

## 2017-08-13 PROCEDURE — 63600175 PHARM REV CODE 636 W HCPCS: Performed by: EMERGENCY MEDICINE

## 2017-08-13 RX ORDER — HEPARIN SODIUM 1000 [USP'U]/ML
1000 INJECTION, SOLUTION INTRAVENOUS; SUBCUTANEOUS
Status: DISCONTINUED | OUTPATIENT
Start: 2017-08-14 | End: 2017-08-15 | Stop reason: SDUPTHER

## 2017-08-13 RX ORDER — DIPHENHYDRAMINE HCL 25 MG
25 CAPSULE ORAL NIGHTLY PRN
Status: DISCONTINUED | OUTPATIENT
Start: 2017-08-13 | End: 2017-08-17 | Stop reason: HOSPADM

## 2017-08-13 RX ORDER — SODIUM CHLORIDE 9 MG/ML
INJECTION, SOLUTION INTRAVENOUS
Status: DISCONTINUED | OUTPATIENT
Start: 2017-08-13 | End: 2017-08-17 | Stop reason: HOSPADM

## 2017-08-13 RX ORDER — SODIUM CHLORIDE 9 MG/ML
INJECTION, SOLUTION INTRAVENOUS ONCE
Status: DISCONTINUED | OUTPATIENT
Start: 2017-08-13 | End: 2017-08-17 | Stop reason: HOSPADM

## 2017-08-13 RX ADMIN — SODIUM BICARBONATE 650 MG TABLET 1300 MG: at 01:08

## 2017-08-13 RX ADMIN — SODIUM BICARBONATE 650 MG TABLET 1300 MG: at 09:08

## 2017-08-13 RX ADMIN — LEVETIRACETAM 500 MG: 500 TABLET, FILM COATED ORAL at 09:08

## 2017-08-13 RX ADMIN — RIVASTIGMINE TRANSDERMAL SYSTEM 1 PATCH: 4.6 PATCH, EXTENDED RELEASE TRANSDERMAL at 08:08

## 2017-08-13 RX ADMIN — ATORVASTATIN CALCIUM 20 MG: 10 TABLET, FILM COATED ORAL at 08:08

## 2017-08-13 RX ADMIN — LEVETIRACETAM 500 MG: 500 TABLET, FILM COATED ORAL at 08:08

## 2017-08-13 RX ADMIN — SERTRALINE HYDROCHLORIDE 50 MG: 50 TABLET ORAL at 08:08

## 2017-08-13 RX ADMIN — DIPHENHYDRAMINE HYDROCHLORIDE 25 MG: 25 CAPSULE ORAL at 09:08

## 2017-08-13 RX ADMIN — MOXIFLOXACIN HYDROCHLORIDE 400 MG: 400 INJECTION, SOLUTION INTRAVENOUS at 01:08

## 2017-08-13 RX ADMIN — VANCOMYCIN HYDROCHLORIDE 1000 MG: 1 INJECTION, POWDER, LYOPHILIZED, FOR SOLUTION INTRAVENOUS at 03:08

## 2017-08-13 RX ADMIN — SODIUM BICARBONATE 650 MG TABLET 1300 MG: at 05:08

## 2017-08-13 NOTE — ASSESSMENT & PLAN NOTE
Patient is on MWF dialysis schedule at [facility] (he does not know name of his outpatient nephrologist). Access is left AVF (no obvious signs of infection).  Next scheduled HD will be tomorrow.

## 2017-08-13 NOTE — SUBJECTIVE & OBJECTIVE
Interval History: awaiting pleural protein to further evaluate pleural fluid. Left extremity ultrasound did now show evidence of abscess. Echocardiogram pending. Fecal impaction relieved with enema.     Review of Systems   Unable to perform ROS: Dementia   Constitutional: Positive for activity change.      Objective:     Vital Signs (Most Recent):  Temp: 97.7 °F (36.5 °C) (08/13/17 0818)  Pulse: 86 (08/13/17 0818)  Resp: 18 (08/13/17 0818)  BP: 112/61 (08/13/17 0818)  SpO2: 95 % (08/13/17 0818) Vital Signs (24h Range):  Temp:  [97.7 °F (36.5 °C)-98.6 °F (37 °C)] 97.7 °F (36.5 °C)  Pulse:  [79-99] 86  Resp:  [16-18] 18  SpO2:  [95 %-99 %] 95 %  BP: (102-131)/(61-87) 112/61     Weight: 92 kg (202 lb 13.2 oz)  Body mass index is 26.04 kg/m².    Intake/Output Summary (Last 24 hours) at 08/13/17 1236  Last data filed at 08/12/17 1808   Gross per 24 hour   Intake              620 ml   Output                3 ml   Net              617 ml      Physical Exam   Constitutional: He appears well-developed and well-nourished. No distress.   HENT:   Head: Normocephalic and atraumatic.   Eyes: Conjunctivae and EOM are normal. Pupils are equal, round, and reactive to light.   Neck: Normal range of motion. Neck supple. No thyromegaly present.   Cardiovascular: Normal rate, regular rhythm and normal heart sounds.    Pulmonary/Chest: Effort normal and breath sounds normal. No respiratory distress. He has no wheezes. He has no rales.   Abdominal: Soft. Bowel sounds are normal. He exhibits no distension. There is no tenderness.   Musculoskeletal: Normal range of motion. He exhibits edema (+1 BLE). He exhibits no tenderness or deformity.   Neurological: He is alert.   Disoriented.  No focal deficits.   Skin: Skin is warm and dry. No rash noted. No erythema.   Left arm fistula +bruit/thrill     Psychiatric: He has a normal mood and affect.   Nursing note and vitals reviewed.    Significant Labs:   CBC:   Recent Labs  Lab 08/12/17  0505  08/13/17  0616   WBC 15.99* 13.75*   HGB 11.2* 11.5*   HCT 34.7* 34.8*    223     CMP:   Recent Labs  Lab 08/12/17  0553 08/12/17  1648 08/13/17  0616     136  --  134*  134*   K 4.7  4.7  --  4.4  4.3   CL 98  98  --  96  95   CO2 26  26  --  25  27   GLU 89  88 111* 104  104   BUN 25*  26*  --  36*  35*   CREATININE 5.6*  5.6*  --  6.8*  6.8*   CALCIUM 8.9  9.0  --  8.8  8.9   PROT  --  8.0  --    ALBUMIN 2.4*  --  2.4*   ANIONGAP 12  12  --  13  12   EGFRNONAA 10*  10*  --  8*  8*       Significant Imaging:   Imaging Results          X-Ray Chest 1 View (Final result)  Result time 08/12/17 17:34:23    Final result by Dikcson Green III, MD (08/12/17 17:34:23)                 Impression:     Improving chest x-ray with decrease in the right pleural effusion consistent with thoracentesis. No evidence of pneumothorax.      Electronically signed by: DICKSON GREEN MD  Date:     08/12/17  Time:    17:34              Narrative:    Chest x-ray, single view.    Clinical indication: Shortness of breath status post thoracentesis.    The chest shows improvement with better aeration of the right base and significant decrease in the pleural effusion. No evidence of pneumothorax status post thoracentesis. Chest otherwise unchanged.                             US Soft Tissue Misc (Final result)  Result time 08/12/17 17:23:38    Final result by Dickson Green III, MD (08/12/17 17:23:38)                 Impression:     Complex/heterogeneous density just above the region of the shunt of indeterminate significance and etiology, please correlate. This is not a simple fluid collection and doesn't have the classic appearance of an abscess.      Electronically signed by: DICKSON GREEN MD  Date:     08/12/17  Time:    17:23              Narrative:    Limited ultrasound near the patient's dialysis shunt, left upper extremity    Along the superior aspect of the shunt there is a complex  focus measuring 2.1 x 0.7 x 0.9 cm in diameter. Etiology is indeterminate. This does not have the classic appearance of an abscess but please correlate. No flow by Doppler analysis. No gross evidence of discomfort/pain.                             US Chest Mediastinum (Edited Result - FINAL)  Result time 08/12/17 17:22:01    Addendum 1 of 1 by Dickson Castro III, MD (08/12/17 17:22:01)    Addendum.    The procedure should be listed as ultrasound of the chest, not CT of the chest.   As above      Electronically signed by: DICKSON CASTRO MD  Date:     08/12/17  Time:    17:22                Final result by Dickson Castro III, MD (08/12/17 17:20:58)                 Impression:     As above.      Electronically signed by: DICKSON CASTRO MD  Date:     08/12/17  Time:    17:20              Narrative:    CT of the chest.    Clinical indication: Pleural effusion.    Ultrasound of the chest reveals a moderate right pleural effusion and minimal left effusion.. Appropriate skin site was marked prior to thoracentesis performed by Dr. Balderas.                             CT Abdomen Pelvis With Contrast (Final result)  Result time 08/11/17 19:22:53    Final result by Shivam Hale MD (08/11/17 19:22:53)                 Impression:             Right greater than left pleural effusions, associated with compressive atelectasis/consolidation.    Heavily calcified coronary arteries.    Rectal fecal impaction noted.      Electronically signed by: SHIVAM HALE MD  Date:     08/11/17  Time:    19:22              Narrative:    Exam: CT chest with contrast.    History: Gram-positive cocci bacteremia    Findings: Large right and smaller pleural effusions are present. There is extensive compressive atelectasis/consolidation in the right base. Less pronounced findings in the left base. No mediastinal or hilar adenopathy.    Heavily calcified coronary arteries noted.    No osteolytic or osteoblastic lesions are  identified. The    The liver, spleen, pancreas, kidneys and adrenal glands are unremarkable.    No free fluid, adenopathy, mesenteric inflammatory change is apparent.    Rectal fecal impaction noted.                             CT Chest With Contrast (Final result)  Result time 08/11/17 19:22:54    Final result by Shivam Ambrose MD (08/11/17 19:22:54)                 Impression:             Right greater than left pleural effusions, associated with compressive atelectasis/consolidation.    Heavily calcified coronary arteries.    Rectal fecal impaction noted.      Electronically signed by: SHIVAM AMBROSE MD  Date:     08/11/17  Time:    19:22              Narrative:    Exam: CT chest with contrast.    History: Gram-positive cocci bacteremia    Findings: Large right and smaller pleural effusions are present. There is extensive compressive atelectasis/consolidation in the right base. Less pronounced findings in the left base. No mediastinal or hilar adenopathy.    Heavily calcified coronary arteries noted.    No osteolytic or osteoblastic lesions are identified. The    The liver, spleen, pancreas, kidneys and adrenal glands are unremarkable.    No free fluid, adenopathy, mesenteric inflammatory change is apparent.    Rectal fecal impaction noted.                             X-Ray Chest 1 View (Final result)  Result time 08/10/17 21:10:07    Final result by Shivam Ambrose MD (08/10/17 21:10:07)                 Impression:     Right basilar consolidation. See above. Otherwise no significant change from prior exam.      Electronically signed by: SHIVAM AMBROSE MD  Date:     08/10/17  Time:    21:10              Narrative:    Exam: Chest X-ray, one view.    History: Shortness of breath    Findings: Comparison is made to prior exam dated 05/03/2017. Mild cardiomegaly again noted. Right basilar consolidation is no apparent, likely related to combination of pleural fluid and airspace disease. Sternal wires again noted.

## 2017-08-13 NOTE — ASSESSMENT & PLAN NOTE
- Recurrent Staph bacteremia.  Last episode 4/2017; SALAZAR negative for vegetation, Indium scan and LUE US were negative at this time. Repeat blood cultures during this stay were negative for MRSA. S/p 6 weeks Vancomycin.     --Cultures presently growing Staph. - Blood cultures. Random Vancomycin level was 13 on 8/11/17.   --Continue IV Vancomycin. Infectious Disease consulted  --No obvious evidence of fistula infection  -repeat ultrasound did not show evidence of abscess (although showing possible pseudoaneurysm).   -repeat Echocardiogram pending.

## 2017-08-13 NOTE — PLAN OF CARE
Problem: Patient Care Overview  Goal: Plan of Care Review  Pt verbalized understanding of plan of care. Fall precautions maintained.  Bed locked and low.  Call light and personal items within reach. SR up x 2. CBG monitoring ordered AC/HS.  Scheduled and SSI admin as ordered. IVAB admin as ordered.

## 2017-08-13 NOTE — PLAN OF CARE
Problem: Patient Care Overview  Goal: Plan of Care Review  Outcome: Ongoing (interventions implemented as appropriate)  Pt free of injury. No c/o pain. Pt is receiving iv antibiotics. Pt is oriented to person, but not place and time. Family remains at bed side. No s/s of acute distress, will monitor.

## 2017-08-13 NOTE — ASSESSMENT & PLAN NOTE
--R>L with compressive atelectasis  -Segs were  --continue Avelox  --s/p Thoracentesis 8/12 removing 700 ml fluid. WBCs were elevated. Awaiting final results for pleural fluid. Repeat CXR shows RLL infiltrate.

## 2017-08-13 NOTE — PROGRESS NOTES
[facility] -   Nephrology  Progress Note    Patient Name: David Hadley  MRN: [id number]  Admission Date: 8/10/2017  Hospital Length of Stay: 3 days  Attending Provider: Conner Duarte MD   Primary Care Physician: Isaac Fitch MD  Principal Problem:Gram-positive cocci bacteremia    Subjective:     HPI: [id number] with multiple histories of MRSA infections with blood cultures being positive.  Has been admitted with positive blood cultures in April 2017 status post  Prolonged antibiotic course of vancomycin.  May 2017 admitted for syncope.  Has ESRD on hemodialysis.  Schedule for dialysis Tuesday Thursday and Saturday.  Last dialysis was today.     [place] [place] History:     8/11/17: Patient today denies any fevers, pain, SOB, nausea.     8/12/17: Patient is resting in chair, no complaints at present.     8/13/17: Patient without complaints today.         Review of patient's allergies indicates:   Allergen Reactions    Benadryl decongestant Itching     Nothing with benadryl; wife states  No allergy to Benadryl     Current Facility-Administered Medications   Medication Frequency    0.9%  NaCl infusion PRN    0.9%  NaCl infusion Once    atorvastatin tablet 20 mg Daily    dextrose 50% injection 12.5 g PRN    dextrose 50% injection 25 g PRN    glucagon (human recombinant) injection 1 mg PRN    glucose chewable tablet 16 g PRN    glucose chewable tablet 24 g PRN    heparin (porcine) injection 1,000 Units PRN    insulin aspart pen 0-5 Units QID (AC + HS) PRN    levetiracetam tablet 500 mg BID    lidocaine (PF) 10 mg/ml (1%) injection 10 mg Once    lorazepam tablet 0.5 mg Q6H PRN    moxifloxacin 400 mg/250 mL IVPB 400 mg Q24H    rivastigmine 4.6 mg/24 hr 1 patch Daily    sertraline tablet 50 mg Daily    sodium bicarbonate tablet 1,300 mg TID    vancomycin 1 g in dextrose 5 % 250 mL IVPB (ready to mix system) Q48H       Objective:     Vital Signs (Most  Recent):  Temp: 97.7 °F (36.5 °C) (08/13/17 0818)  Pulse: 86 (08/13/17 0818)  Resp: 18 (08/13/17 0818)  BP: 112/61 (08/13/17 0818)  SpO2: 95 % (08/13/17 0818)  O2 Device (Oxygen Therapy): room air (08/13/17 0818) Vital Signs (24h Range):  Temp:  [97.7 °F (36.5 °C)-98.6 °F (37 °C)] 97.7 °F (36.5 °C)  Pulse:  [79-99] 86  Resp:  [16-18] 18  SpO2:  [95 %-99 %] 95 %  BP: (102-131)/(61-87) 112/61     Weight: 92 kg (202 lb 13.2 oz) (08/11/17 1400)  Body mass index is 26.04 kg/m².  Body surface area is 2.19 meters squared.    I/O last 3 completed shifts:  In: 1240 [P.O.:240; Other:500; IV Piggyback:500]  Out: 2503 [Other:2500; Stool:3]    Physical Exam   Constitutional: He appears well-developed. He is cooperative.   Alert and responsive.   HENT:   Head: Normocephalic.   Nose: No rhinorrhea.   Mouth/Throat: Mucous membranes are normal. No oropharyngeal exudate.   Neck: No thyroid mass present.   Cardiovascular: Normal rate, regular rhythm, S1 normal, S2 normal and intact distal pulses.    Pulmonary/Chest: Effort normal. No respiratory distress. He has no wheezes.   Abdominal: Soft. Bowel sounds are normal. He exhibits no distension. There is no tenderness. No hernia.   Musculoskeletal:   Mild bilateral LE edema.   Lymphadenopathy:     He has no cervical adenopathy.   Neurological: He is alert.   Skin: Skin is warm and dry.   Psychiatric: He has a normal mood and affect.       Significant Labs:  Lab Results   Component Value Date    CREATININE 6.8 (H) 08/13/2017    CREATININE 6.8 (H) 08/13/2017    BUN 35 (H) 08/13/2017    BUN 36 (H) 08/13/2017     (L) 08/13/2017     (L) 08/13/2017    K 4.3 08/13/2017    K 4.4 08/13/2017    CL 95 08/13/2017    CL 96 08/13/2017    CO2 27 08/13/2017    CO2 25 08/13/2017     Lab Results   Component Value Date     (H) 11/06/2008    CALCIUM 8.9 08/13/2017    CALCIUM 8.8 08/13/2017    PHOS 3.2 08/13/2017     Lab Results   Component Value Date    ALBUMIN 2.4 (L) 08/13/2017     Lab  Results   Component Value Date    WBC 13.75 (H) 08/13/2017    HGB 11.5 (L) 08/13/2017    HCT 34.8 (L) 08/13/2017    MCV 84 08/13/2017     08/13/2017       Recent Labs  Lab 08/11/17  0527   MG 2.0         Significant Imaging:  Imaging Results          X-Ray Chest 1 View (Final result)  Result time 08/12/17 17:34:23    Final result by Dickson Green III, MD (08/12/17 17:34:23)                 Impression:     Improving chest x-ray with decrease in the right pleural effusion consistent with thoracentesis. No evidence of pneumothorax.      Electronically signed by: DICKSON GREEN MD  Date:     08/12/17  Time:    17:34              Narrative:    Chest x-ray, single view.    Clinical indication: Shortness of breath status post thoracentesis.    The chest shows improvement with better aeration of the right base and significant decrease in the pleural effusion. No evidence of pneumothorax status post thoracentesis. Chest otherwise unchanged.                             US Soft Tissue Misc (Final result)  Result time 08/12/17 17:23:38    Final result by Dickson Green III, MD (08/12/17 17:23:38)                 Impression:     Complex/heterogeneous density just above the region of the shunt of indeterminate significance and etiology, please correlate. This is not a simple fluid collection and doesn't have the classic appearance of an abscess.      Electronically signed by: DICKSON GREEN MD  Date:     08/12/17  Time:    17:23              Narrative:    Limited ultrasound near the patient's dialysis shunt, left upper extremity    Along the superior aspect of the shunt there is a complex focus measuring 2.1 x 0.7 x 0.9 cm in diameter. Etiology is indeterminate. This does not have the classic appearance of an abscess but please correlate. No flow by Doppler analysis. No gross evidence of discomfort/pain.                             US Chest Mediastinum (Edited Result - FINAL)  Result time 08/12/17  17:22:01    Addendum 1 of 1 by Dickson Castro III, MD (08/12/17 17:22:01)    Addendum.    The procedure should be listed as ultrasound of the chest, not CT of the chest.   As above      Electronically signed by: DICKSON CASTRO MD  Date:     08/12/17  Time:    17:22                Final result by Dickson Castro III, MD (08/12/17 17:20:58)                 Impression:     As above.      Electronically signed by: DICKSON CASTRO MD  Date:     08/12/17  Time:    17:20              Narrative:    CT of the chest.    Clinical indication: Pleural effusion.    Ultrasound of the chest reveals a moderate right pleural effusion and minimal left effusion.. Appropriate skin site was marked prior to thoracentesis performed by Dr. Balderas.                             CT Abdomen Pelvis With Contrast (Final result)  Result time 08/11/17 19:22:53    Final result by Shivam Hale MD (08/11/17 19:22:53)                 Impression:             Right greater than left pleural effusions, associated with compressive atelectasis/consolidation.    Heavily calcified coronary arteries.    Rectal fecal impaction noted.      Electronically signed by: SHIVAM HALE MD  Date:     08/11/17  Time:    19:22              Narrative:    Exam: CT chest with contrast.    History: Gram-positive cocci bacteremia    Findings: Large right and smaller pleural effusions are present. There is extensive compressive atelectasis/consolidation in the right base. Less pronounced findings in the left base. No mediastinal or hilar adenopathy.    Heavily calcified coronary arteries noted.    No osteolytic or osteoblastic lesions are identified. The    The liver, spleen, pancreas, kidneys and adrenal glands are unremarkable.    No free fluid, adenopathy, mesenteric inflammatory change is apparent.    Rectal fecal impaction noted.                             CT Chest With Contrast (Final result)  Result time 08/11/17 19:22:54    Final result by Shivam  CELI Ambrose MD (08/11/17 19:22:54)                 Impression:             Right greater than left pleural effusions, associated with compressive atelectasis/consolidation.    Heavily calcified coronary arteries.    Rectal fecal impaction noted.      Electronically signed by: SHIVAM AMBROSE MD  Date:     08/11/17  Time:    19:22              Narrative:    Exam: CT chest with contrast.    History: Gram-positive cocci bacteremia    Findings: Large right and smaller pleural effusions are present. There is extensive compressive atelectasis/consolidation in the right base. Less pronounced findings in the left base. No mediastinal or hilar adenopathy.    Heavily calcified coronary arteries noted.    No osteolytic or osteoblastic lesions are identified. The    The liver, spleen, pancreas, kidneys and adrenal glands are unremarkable.    No free fluid, adenopathy, mesenteric inflammatory change is apparent.    Rectal fecal impaction noted.                             X-Ray Chest 1 View (Final result)  Result time 08/10/17 21:10:07    Final result by Shivam Ambrose MD (08/10/17 21:10:07)                 Impression:     Right basilar consolidation. See above. Otherwise no significant change from prior exam.      Electronically signed by: SHIVAM AMBROSE MD  Date:     08/10/17  Time:    21:10              Narrative:    Exam: Chest X-ray, one view.    History: Shortness of breath    Findings: Comparison is made to prior exam dated 05/03/2017. Mild cardiomegaly again noted. Right basilar consolidation is no apparent, likely related to combination of pleural fluid and airspace disease. Sternal wires again noted.                                Assessment/Plan:     Hypophosphatemia    Has resolved.         Hypotension with h/o hypertension    Good BP control at present. No hypotension.         ESRD (end stage renal disease) on dialysis    Patient is on MWF dialysis schedule at [facility] (he does not know name of his outpatient  nephrologist). Access is left AVF (no obvious signs of infection).  Next scheduled HD will be tomorrow.           * Gram-positive cocci bacteremia    Continue Vancomycin. Cultures revealed Staph. Aureus. Sensitivities are pending.             Thank you for your consult. I will follow-up with patient. Please contact us if you have any additional questions.    Alfonso Sanchez MD  Nephrology  [facility] [place]

## 2017-08-13 NOTE — PROGRESS NOTES
[facility] -   Pulmonology  Progress Note    Patient Name: David Hadley  MRN: [id number]  Admission Date: 8/10/2017  Hospital Length of Stay: 3 days  Code Status: Full Code  Attending Provider: Conner Duarte MD  Primary Care Provider: Isaac Fitch MD   Principal Problem: Gram-positive cocci bacteremia    Subjective: breathing easier after thoracentesis     Interval History: right sided fluid analysis - increased WBC  right lower lobe infiltrate on Chest X Ray suggestive of pneumonia vs atelectasis   Will need to review cultures from pleural fluid    Objective:     Vital Signs (Most Recent):  Temp: 97.4 °F (36.3 °C) (08/13/17 1204)  Pulse: 91 (08/13/17 1204)  Resp: 18 (08/13/17 1204)  BP: 117/78 (08/13/17 1204)  SpO2: 95 % (08/13/17 1204) Vital Signs (24h Range):  Temp:  [97.4 °F (36.3 °C)-98.6 °F (37 °C)] 97.4 °F (36.3 °C)  Pulse:  [79-99] 91  Resp:  [16-18] 18  SpO2:  [95 %-99 %] 95 %  BP: (102-131)/(61-87) 117/78     Weight: 92 kg (202 lb 13.2 oz)  Body mass index is 26.04 kg/m².      Intake/Output Summary (Last 24 hours) at 08/13/17 1305  Last data filed at 08/12/17 1808   Gross per 24 hour   Intake              620 ml   Output                3 ml   Net              617 ml       Physical Exam   Constitutional: He appears well-developed and well-nourished.   HENT:   Head: Normocephalic and atraumatic.   Eyes: Conjunctivae are normal. Pupils are equal, round, and reactive to light.   Neck: Neck supple. No JVD present. No tracheal deviation present. No thyromegaly present.   Cardiovascular: Normal rate, regular rhythm and normal heart sounds.    Pulmonary/Chest: Effort normal. He has decreased breath sounds in the right lower field and the left lower field.   Abdominal: Soft.   Musculoskeletal: Normal range of motion.   vistula I left arm   Lymphadenopathy:     He has no cervical adenopathy.   Neurological: He is alert.   dementia   Skin: Skin is warm and dry.   Nursing note and vitals  reviewed.      Vents:  Oxygen Concentration (%): 28 (08/11/17 1117)    Lines/Drains/Airways     Drain                 Hemodialysis AV Fistula Left upper arm [id number] days          Peripheral Intravenous Line                 Peripheral IV - Single Lumen 08/10/17 Right Antecubital 3 days                Significant Labs:    CBC/Anemia Profile:    Recent Labs  Lab 08/12/17  0553 08/13/17  0616   WBC 15.99* 13.75*   HGB 11.2* 11.5*   HCT 34.7* 34.8*    223   MCV 85 84   RDW 16.9* 16.7*        Chemistries:    Recent Labs  Lab 08/12/17  0553 08/12/17  1648 08/13/17  0616     136  --  134*  134*   K 4.7  4.7  --  4.4  4.3   CL 98  98  --  96  95   CO2 26  26  --  25  27   BUN 25*  26*  --  36*  35*   CREATININE 5.6*  5.6*  --  6.8*  6.8*   CALCIUM 8.9  9.0  --  8.8  8.9   ALBUMIN 2.4*  --  2.4*   PROT  --  8.0  --    PHOS 2.0*  --  3.2       Blood Culture: No results for input(s): LABBLOO in the last 48 hours.  BMP:   Recent Labs  Lab 08/13/17  0616     104   *  134*   K 4.4  4.3   CL 96  95   CO2 25  27   BUN 36*  35*   CREATININE 6.8*  6.8*   CALCIUM 8.8  8.9     CMP:   Recent Labs  Lab 08/12/17  0553 08/12/17  1648 08/13/17  0616     136  --  134*  134*   K 4.7  4.7  --  4.4  4.3   CL 98  98  --  96  95   CO2 26  26  --  25  27   GLU 89  88 111* 104  104   BUN 25*  26*  --  36*  35*   CREATININE 5.6*  5.6*  --  6.8*  6.8*   CALCIUM 8.9  9.0  --  8.8  8.9   PROT  --  8.0  --    ALBUMIN 2.4*  --  2.4*   ANIONGAP 12  12  --  13  12   EGFRNONAA 10*  10*  --  8*  8*     Respiratory Culture: No results for input(s): GSRESP, RESPIRATORYC in the last 48 hours.  All pertinent labs within the past 24 hours have been reviewed.    Significant Imaging:  CXR: I have reviewed all pertinent results/findings within the past 24 hours and my personal findings are:  no pnuemonthorax, LLL infiltrate    Assessment/Plan:     Active Diagnoses:    Diagnosis Date Noted POA     PRINCIPAL PROBLEM:  Gram-positive cocci bacteremia [R78.81]  Yes    Bilateral pleural effusion [J90] 08/12/2017 Yes    Hypophosphatemia [E83.39] 08/11/2017 Unknown    Hypotension with h/o hypertension [I10]  Yes    Dementia without behavioral disturbance [F03.90] 07/29/2016 Yes     Chronic    Type 2 diabetes mellitus with renal complication [E11.29] 07/29/2016 Yes     Chronic    Hyperlipidemia [E78.5] 07/29/2016 Yes     Chronic    ESRD (end stage renal disease) on dialysis [N18.6, Z99.2] 06/26/2013 Not Applicable     Chronic    CAD with remote CABG [Z95.1] 06/26/2013 Not Applicable     Chronic      Problems Resolved During this Admission:    Diagnosis Date Noted Date Resolved POA       Review culture results  Continue present therapy     Je Balderas MD  Pulmonology  [facility] -

## 2017-08-13 NOTE — SUBJECTIVE & OBJECTIVE
Interval History:     8/11/17: Patient today denies any fevers, pain, SOB, nausea.     8/12/17: Patient is resting in chair, no complaints at present.     8/13/17: Patient without complaints today.         Review of patient's allergies indicates:   Allergen Reactions    Benadryl decongestant Itching     Nothing with benadryl; wife states  No allergy to Benadryl     Current Facility-Administered Medications   Medication Frequency    0.9%  NaCl infusion PRN    0.9%  NaCl infusion Once    atorvastatin tablet 20 mg Daily    dextrose 50% injection 12.5 g PRN    dextrose 50% injection 25 g PRN    glucagon (human recombinant) injection 1 mg PRN    glucose chewable tablet 16 g PRN    glucose chewable tablet 24 g PRN    heparin (porcine) injection 1,000 Units PRN    insulin aspart pen 0-5 Units QID (AC + HS) PRN    levetiracetam tablet 500 mg BID    lidocaine (PF) 10 mg/ml (1%) injection 10 mg Once    lorazepam tablet 0.5 mg Q6H PRN    moxifloxacin 400 mg/250 mL IVPB 400 mg Q24H    rivastigmine 4.6 mg/24 hr 1 patch Daily    sertraline tablet 50 mg Daily    sodium bicarbonate tablet 1,300 mg TID    vancomycin 1 g in dextrose 5 % 250 mL IVPB (ready to mix system) Q48H       Objective:     Vital Signs (Most Recent):  Temp: 97.7 °F (36.5 °C) (08/13/17 0818)  Pulse: 86 (08/13/17 0818)  Resp: 18 (08/13/17 0818)  BP: 112/61 (08/13/17 0818)  SpO2: 95 % (08/13/17 0818)  O2 Device (Oxygen Therapy): room air (08/13/17 0818) Vital Signs (24h Range):  Temp:  [97.7 °F (36.5 °C)-98.6 °F (37 °C)] 97.7 °F (36.5 °C)  Pulse:  [79-99] 86  Resp:  [16-18] 18  SpO2:  [95 %-99 %] 95 %  BP: (102-131)/(61-87) 112/61     Weight: 92 kg (202 lb 13.2 oz) (08/11/17 1400)  Body mass index is 26.04 kg/m².  Body surface area is 2.19 meters squared.    I/O last 3 completed shifts:  In: 1240 [P.O.:240; Other:500; IV Piggyback:500]  Out: 2503 [Other:2500; Stool:3]    Physical Exam   Constitutional: He appears well-developed. He is  cooperative.   Alert and responsive.   HENT:   Head: Normocephalic.   Nose: No rhinorrhea.   Mouth/Throat: Mucous membranes are normal. No oropharyngeal exudate.   Neck: No thyroid mass present.   Cardiovascular: Normal rate, regular rhythm, S1 normal, S2 normal and intact distal pulses.    Pulmonary/Chest: Effort normal. No respiratory distress. He has no wheezes.   Abdominal: Soft. Bowel sounds are normal. He exhibits no distension. There is no tenderness. No hernia.   Musculoskeletal:   Mild bilateral LE edema.   Lymphadenopathy:     He has no cervical adenopathy.   Neurological: He is alert.   Skin: Skin is warm and dry.   Psychiatric: He has a normal mood and affect.       Significant Labs:  Lab Results   Component Value Date    CREATININE 6.8 (H) 08/13/2017    CREATININE 6.8 (H) 08/13/2017    BUN 35 (H) 08/13/2017    BUN 36 (H) 08/13/2017     (L) 08/13/2017     (L) 08/13/2017    K 4.3 08/13/2017    K 4.4 08/13/2017    CL 95 08/13/2017    CL 96 08/13/2017    CO2 27 08/13/2017    CO2 25 08/13/2017     Lab Results   Component Value Date     (H) 11/06/2008    CALCIUM 8.9 08/13/2017    CALCIUM 8.8 08/13/2017    PHOS 3.2 08/13/2017     Lab Results   Component Value Date    ALBUMIN 2.4 (L) 08/13/2017     Lab Results   Component Value Date    WBC 13.75 (H) 08/13/2017    HGB 11.5 (L) 08/13/2017    HCT 34.8 (L) 08/13/2017    MCV 84 08/13/2017     08/13/2017       Recent Labs  Lab 08/11/17  0527   MG 2.0         Significant Imaging:  Imaging Results          X-Ray Chest 1 View (Final result)  Result time 08/12/17 17:34:23    Final result by Dickson Green III, MD (08/12/17 17:34:23)                 Impression:     Improving chest x-ray with decrease in the right pleural effusion consistent with thoracentesis. No evidence of pneumothorax.      Electronically signed by: DICKSON GREEN MD  Date:     08/12/17  Time:    17:34              Narrative:    Chest x-ray, single view.    Clinical  indication: Shortness of breath status post thoracentesis.    The chest shows improvement with better aeration of the right base and significant decrease in the pleural effusion. No evidence of pneumothorax status post thoracentesis. Chest otherwise unchanged.                             US Soft Tissue Misc (Final result)  Result time 08/12/17 17:23:38    Final result by Dickson Green III, MD (08/12/17 17:23:38)                 Impression:     Complex/heterogeneous density just above the region of the shunt of indeterminate significance and etiology, please correlate. This is not a simple fluid collection and doesn't have the classic appearance of an abscess.      Electronically signed by: DICKSON GREEN MD  Date:     08/12/17  Time:    17:23              Narrative:    Limited ultrasound near the patient's dialysis shunt, left upper extremity    Along the superior aspect of the shunt there is a complex focus measuring 2.1 x 0.7 x 0.9 cm in diameter. Etiology is indeterminate. This does not have the classic appearance of an abscess but please correlate. No flow by Doppler analysis. No gross evidence of discomfort/pain.                             US Chest Mediastinum (Edited Result - FINAL)  Result time 08/12/17 17:22:01    Addendum 1 of 1 by Dickson Green III, MD (08/12/17 17:22:01)    Addendum.    The procedure should be listed as ultrasound of the chest, not CT of the chest.   As above      Electronically signed by: DICKSON GREEN MD  Date:     08/12/17  Time:    17:22                Final result by Dickson Green III, MD (08/12/17 17:20:58)                 Impression:     As above.      Electronically signed by: DICKSON GREEN MD  Date:     08/12/17  Time:    17:20              Narrative:    CT of the chest.    Clinical indication: Pleural effusion.    Ultrasound of the chest reveals a moderate right pleural effusion and minimal left effusion.. Appropriate skin site was marked  prior to thoracentesis performed by Dr. Balderas.                             CT Abdomen Pelvis With Contrast (Final result)  Result time 08/11/17 19:22:53    Final result by Shivam Ambrose MD (08/11/17 19:22:53)                 Impression:             Right greater than left pleural effusions, associated with compressive atelectasis/consolidation.    Heavily calcified coronary arteries.    Rectal fecal impaction noted.      Electronically signed by: SHIVAM AMBROSE MD  Date:     08/11/17  Time:    19:22              Narrative:    Exam: CT chest with contrast.    History: Gram-positive cocci bacteremia    Findings: Large right and smaller pleural effusions are present. There is extensive compressive atelectasis/consolidation in the right base. Less pronounced findings in the left base. No mediastinal or hilar adenopathy.    Heavily calcified coronary arteries noted.    No osteolytic or osteoblastic lesions are identified. The    The liver, spleen, pancreas, kidneys and adrenal glands are unremarkable.    No free fluid, adenopathy, mesenteric inflammatory change is apparent.    Rectal fecal impaction noted.                             CT Chest With Contrast (Final result)  Result time 08/11/17 19:22:54    Final result by Shivam Ambrose MD (08/11/17 19:22:54)                 Impression:             Right greater than left pleural effusions, associated with compressive atelectasis/consolidation.    Heavily calcified coronary arteries.    Rectal fecal impaction noted.      Electronically signed by: SHIVAM AMBROSE MD  Date:     08/11/17  Time:    19:22              Narrative:    Exam: CT chest with contrast.    History: Gram-positive cocci bacteremia    Findings: Large right and smaller pleural effusions are present. There is extensive compressive atelectasis/consolidation in the right base. Less pronounced findings in the left base. No mediastinal or hilar adenopathy.    Heavily calcified coronary arteries noted.    No  osteolytic or osteoblastic lesions are identified. The    The liver, spleen, pancreas, kidneys and adrenal glands are unremarkable.    No free fluid, adenopathy, mesenteric inflammatory change is apparent.    Rectal fecal impaction noted.                             X-Ray Chest 1 View (Final result)  Result time 08/10/17 21:10:07    Final result by Shivam Ambrose MD (08/10/17 21:10:07)                 Impression:     Right basilar consolidation. See above. Otherwise no significant change from prior exam.      Electronically signed by: SHIVAM AMBROSE MD  Date:     08/10/17  Time:    21:10              Narrative:    Exam: Chest X-ray, one view.    History: Shortness of breath    Findings: Comparison is made to prior exam dated 05/03/2017. Mild cardiomegaly again noted. Right basilar consolidation is no apparent, likely related to combination of pleural fluid and airspace disease. Sternal wires again noted.

## 2017-08-13 NOTE — PROGRESS NOTES
[facility] Hospital Medicine  Progress Note    Patient Name: David Hadley  MRN: [id number]  Patient Class: IP- Inpatient   Admission Date: 8/10/2017  Length of Stay: 3 days  Attending Physician: Conner Duarte MD  Primary Care Provider: Isaac Fitch MD        Subjective:     Principal Problem:Gram-positive cocci bacteremia    HPI:  Mr. Hadley is a 63yo male with a PMHx of dementia, ESRD on HD, HTN, CAD with remote CABG, HLD, DM II, CML, and h/o recurrent gram-positive cocci bacteremia, who presented to the ED with c/o increased confusion and decreased appetite over the past 1-2 weeks.  Patient's niece denies patient c/o fever/chills, cough, SOB, chest pain, palpitations, ABD pain, dysuria, N/V/D, lightheadedness/dizziness, focal deficits, seizure-like activity, or syncope.Patient was sent to ED by PCP for positive blood cultures with gram-positive cocci drawn from AV fistula on Monday.  Patient's niece reports patient received IV Vanc during dialysis yesterday.  Upon arrival to ED, patient noted to be hypotensive with BP 97/67.  WBC 14.  Patient last episode of bacteremia was 4/2017.  SALAZAR at that time was negative for vegetation.  Hospital Medicine was consulted for admission.     Hospital Course:  David Hadley is a 62 year old male who was admitted to [facility] with sepsis due to recurrent MRSA Bacteremia. Patient was admitted 4/2017 for the same, but source remained unclear. Repeat cultures on 4/12/17 were negative for MRSA (1/2 bottle grew coag-negative), and patient was discharged with 6 week therapy with Vancomycin. During this admission, medical management, included Vancomycin. Infectious Disease was consulted who recommended repeating TTE and Left Extremity Ultrasound. CXR revealed RLL consolidation and Avelox was started. Subsequent CT Chest/Abdomen revealed large right pleural effusion with compressive atelectasis. Pulmonology was consulted for thoracentesis. Pleural  analysis pending. He received enema for fecal impaction. Left upper extremity ultrasound did not show evidence of abscess.     Interval History: awaiting pleural protein to further evaluate pleural fluid. Left extremity ultrasound did now show evidence of abscess. Echocardiogram pending. Fecal impaction relieved with enema.     Review of Systems   Unable to perform ROS: Dementia   Constitutional: Positive for activity change.      Objective:     Vital Signs (Most Recent):  Temp: 97.7 °F (36.5 °C) (08/13/17 0818)  Pulse: 86 (08/13/17 0818)  Resp: 18 (08/13/17 0818)  BP: 112/61 (08/13/17 0818)  SpO2: 95 % (08/13/17 0818) Vital Signs (24h Range):  Temp:  [97.7 °F (36.5 °C)-98.6 °F (37 °C)] 97.7 °F (36.5 °C)  Pulse:  [79-99] 86  Resp:  [16-18] 18  SpO2:  [95 %-99 %] 95 %  BP: (102-131)/(61-87) 112/61     Weight: 92 kg (202 lb 13.2 oz)  Body mass index is 26.04 kg/m².    Intake/Output Summary (Last 24 hours) at 08/13/17 1236  Last data filed at 08/12/17 1808   Gross per 24 hour   Intake              620 ml   Output                3 ml   Net              617 ml      Physical Exam   Constitutional: He appears well-developed and well-nourished. No distress.   HENT:   Head: Normocephalic and atraumatic.   Eyes: Conjunctivae and EOM are normal. Pupils are equal, round, and reactive to light.   Neck: Normal range of motion. Neck supple. No thyromegaly present.   Cardiovascular: Normal rate, regular rhythm and normal heart sounds.    Pulmonary/Chest: Effort normal and breath sounds normal. No respiratory distress. He has no wheezes. He has no rales.   Abdominal: Soft. Bowel sounds are normal. He exhibits no distension. There is no tenderness.   Musculoskeletal: Normal range of motion. He exhibits edema (+1 BLE). He exhibits no tenderness or deformity.   Neurological: He is alert.   Disoriented.  No focal deficits.   Skin: Skin is warm and dry. No rash noted. No erythema.   Left arm fistula +bruit/thrill     Psychiatric: He has  a normal mood and affect.   Nursing note and vitals reviewed.    Significant Labs:   CBC:   Recent Labs  Lab 08/12/17  0553 08/13/17  0616   WBC 15.99* 13.75*   HGB 11.2* 11.5*   HCT 34.7* 34.8*    223     CMP:   Recent Labs  Lab 08/12/17  0553 08/12/17  1648 08/13/17  0616     136  --  134*  134*   K 4.7  4.7  --  4.4  4.3   CL 98  98  --  96  95   CO2 26  26  --  25  27   GLU 89  88 111* 104  104   BUN 25*  26*  --  36*  35*   CREATININE 5.6*  5.6*  --  6.8*  6.8*   CALCIUM 8.9  9.0  --  8.8  8.9   PROT  --  8.0  --    ALBUMIN 2.4*  --  2.4*   ANIONGAP 12  12  --  13  12   EGFRNONAA 10*  10*  --  8*  8*       Significant Imaging:   Imaging Results          X-Ray Chest 1 View (Final result)  Result time 08/12/17 17:34:23    Final result by Dickson Green III, MD (08/12/17 17:34:23)                 Impression:     Improving chest x-ray with decrease in the right pleural effusion consistent with thoracentesis. No evidence of pneumothorax.      Electronically signed by: DICKSON GREEN MD  Date:     08/12/17  Time:    17:34              Narrative:    Chest x-ray, single view.    Clinical indication: Shortness of breath status post thoracentesis.    The chest shows improvement with better aeration of the right base and significant decrease in the pleural effusion. No evidence of pneumothorax status post thoracentesis. Chest otherwise unchanged.                             US Soft Tissue Misc (Final result)  Result time 08/12/17 17:23:38    Final result by Dickson Green III, MD (08/12/17 17:23:38)                 Impression:     Complex/heterogeneous density just above the region of the shunt of indeterminate significance and etiology, please correlate. This is not a simple fluid collection and doesn't have the classic appearance of an abscess.      Electronically signed by: DICKSON GREEN MD  Date:     08/12/17  Time:    17:23              Narrative:    Limited  ultrasound near the patient's dialysis shunt, left upper extremity    Along the superior aspect of the shunt there is a complex focus measuring 2.1 x 0.7 x 0.9 cm in diameter. Etiology is indeterminate. This does not have the classic appearance of an abscess but please correlate. No flow by Doppler analysis. No gross evidence of discomfort/pain.                             US Chest Mediastinum (Edited Result - FINAL)  Result time 08/12/17 17:22:01    Addendum 1 of 1 by Dickson Castro III, MD (08/12/17 17:22:01)    Addendum.    The procedure should be listed as ultrasound of the chest, not CT of the chest.   As above      Electronically signed by: DICKSON CASTRO MD  Date:     08/12/17  Time:    17:22                Final result by Dickson Castro III, MD (08/12/17 17:20:58)                 Impression:     As above.      Electronically signed by: DICKSON CASTRO MD  Date:     08/12/17  Time:    17:20              Narrative:    CT of the chest.    Clinical indication: Pleural effusion.    Ultrasound of the chest reveals a moderate right pleural effusion and minimal left effusion.. Appropriate skin site was marked prior to thoracentesis performed by Dr. Balderas.                             CT Abdomen Pelvis With Contrast (Final result)  Result time 08/11/17 19:22:53    Final result by Shivam Hale MD (08/11/17 19:22:53)                 Impression:             Right greater than left pleural effusions, associated with compressive atelectasis/consolidation.    Heavily calcified coronary arteries.    Rectal fecal impaction noted.      Electronically signed by: SHIVAM HALE MD  Date:     08/11/17  Time:    19:22              Narrative:    Exam: CT chest with contrast.    History: Gram-positive cocci bacteremia    Findings: Large right and smaller pleural effusions are present. There is extensive compressive atelectasis/consolidation in the right base. Less pronounced findings in the left base. No  mediastinal or hilar adenopathy.    Heavily calcified coronary arteries noted.    No osteolytic or osteoblastic lesions are identified. The    The liver, spleen, pancreas, kidneys and adrenal glands are unremarkable.    No free fluid, adenopathy, mesenteric inflammatory change is apparent.    Rectal fecal impaction noted.                             CT Chest With Contrast (Final result)  Result time 08/11/17 19:22:54    Final result by Shivam Ambrose MD (08/11/17 19:22:54)                 Impression:             Right greater than left pleural effusions, associated with compressive atelectasis/consolidation.    Heavily calcified coronary arteries.    Rectal fecal impaction noted.      Electronically signed by: SHIVAM AMBROSE MD  Date:     08/11/17  Time:    19:22              Narrative:    Exam: CT chest with contrast.    History: Gram-positive cocci bacteremia    Findings: Large right and smaller pleural effusions are present. There is extensive compressive atelectasis/consolidation in the right base. Less pronounced findings in the left base. No mediastinal or hilar adenopathy.    Heavily calcified coronary arteries noted.    No osteolytic or osteoblastic lesions are identified. The    The liver, spleen, pancreas, kidneys and adrenal glands are unremarkable.    No free fluid, adenopathy, mesenteric inflammatory change is apparent.    Rectal fecal impaction noted.                             X-Ray Chest 1 View (Final result)  Result time 08/10/17 21:10:07    Final result by Shivam Ambrose MD (08/10/17 21:10:07)                 Impression:     Right basilar consolidation. See above. Otherwise no significant change from prior exam.      Electronically signed by: SHIVAM AMBROSE MD  Date:     08/10/17  Time:    21:10              Narrative:    Exam: Chest X-ray, one view.    History: Shortness of breath    Findings: Comparison is made to prior exam dated 05/03/2017. Mild cardiomegaly again noted. Right basilar  consolidation is no apparent, likely related to combination of pleural fluid and airspace disease. Sternal wires again noted.                                Assessment/Plan:      * Gram-positive cocci bacteremia    - Recurrent Staph bacteremia.  Last episode 4/2017; SALAZAR negative for vegetation, Indium scan and LUE US were negative at this time. Repeat blood cultures during this stay were negative for MRSA. S/p 6 weeks Vancomycin.     --Cultures presently growing Staph. - Blood cultures. Random Vancomycin level was 13 on 8/11/17.   --Continue IV Vancomycin. Infectious Disease consulted  --No obvious evidence of fistula infection  -repeat ultrasound did not show evidence of abscess (although showing possible pseudoaneurysm).   -repeat Echocardiogram pending.        Bilateral pleural effusion    --R>L with compressive atelectasis  -Segs were  --continue Avelox  --s/p Thoracentesis 8/12 removing 700 ml fluid. WBCs were elevated. Awaiting final results for pleural fluid. Repeat CXR shows RLL infiltrate.          ESRD (end stage renal disease) on dialysis    - Dialysis M,W,F.  - Nephrology following.        Hypophosphatemia    --improved        Hypotension with h/o hypertension    - Hold home antihypertensives for now.  Monitor BP trends.        Dementia without behavioral disturbance    -supportive care.   -continue home meds        Type 2 diabetes mellitus with renal complication    - Accuchecks; SSI  - Diabetic, Renal diet.        Hyperlipidemia    --Resume Atorvastatin        CAD with remote CABG    - No angina.  - Continue home medical management with ASA and statin.  - Beta blocker on hold due to hypotension.  Will resume once BP allows.          VTE Risk Mitigation         Ordered     Medium Risk of VTE  Once      08/10/17 2007     Place PABLITO hose  Until discontinued      08/10/17 2007     Place sequential compression device  Until discontinued      08/10/17 2007     Reason for No Pharmacological VTE Prophylaxis   Once      08/10/17 2007              Pk Louis NP  Department of Hospital Medicine   [facility]

## 2017-08-13 NOTE — PROGRESS NOTES
Home Oxygen Evaluation    Date Performed: 2017    1) Patient's Home O2 Sat on room air, while at rest: 96%      If O2 sats on room air at rest are 88% or below, patient qualifies. No additional testing needed. Document N/A in steps 2 and 3. If 89% or above, complete steps 2.      2) Patient's O2 Sat on room air while exercisin%        If O2 sats on room air while exercising remain 89% or above patient does not qualify, no further testing needed Document N/A in step 3. If O2 sats on room air while exercising are 88% or below, continue to step 3.      3) Patient's O2 Sat while exercising on O2:  at  LPM         (Must show improvement from #2 for patients to qualify)    If O2 sats improve on oxygen, patient qualifies for portable oxygen. If not, the patient does not qualify.

## 2017-08-14 LAB
ALBUMIN SERPL BCP-MCNC: 2.3 G/DL
ANION GAP SERPL CALC-SCNC: 11 MMOL/L
ANION GAP SERPL CALC-SCNC: 14 MMOL/L
BACTERIA BLD CULT: NORMAL
BASOPHILS # BLD AUTO: 0.02 K/UL
BASOPHILS NFR BLD: 0.2 %
BUN SERPL-MCNC: 41 MG/DL
BUN SERPL-MCNC: 44 MG/DL
CALCIUM SERPL-MCNC: 8.5 MG/DL
CALCIUM SERPL-MCNC: 8.8 MG/DL
CHLORIDE SERPL-SCNC: 95 MMOL/L
CHLORIDE SERPL-SCNC: 95 MMOL/L
CO2 SERPL-SCNC: 25 MMOL/L
CO2 SERPL-SCNC: 26 MMOL/L
CREAT SERPL-MCNC: 7.9 MG/DL
CREAT SERPL-MCNC: 7.9 MG/DL
DIFFERENTIAL METHOD: ABNORMAL
EOSINOPHIL # BLD AUTO: 0 K/UL
EOSINOPHIL NFR BLD: 0.2 %
ERYTHROCYTE [DISTWIDTH] IN BLOOD BY AUTOMATED COUNT: 16.8 %
EST. GFR  (AFRICAN AMERICAN): 8 ML/MIN/1.73 M^2
EST. GFR  (AFRICAN AMERICAN): 8 ML/MIN/1.73 M^2
EST. GFR  (NON AFRICAN AMERICAN): 7 ML/MIN/1.73 M^2
EST. GFR  (NON AFRICAN AMERICAN): 7 ML/MIN/1.73 M^2
GLUCOSE SERPL-MCNC: 81 MG/DL
GLUCOSE SERPL-MCNC: 82 MG/DL
HBV SURFACE AG SERPL QL IA: NEGATIVE
HCT VFR BLD AUTO: 34.3 %
HGB BLD-MCNC: 11.3 G/DL
LYMPHOCYTES # BLD AUTO: 1.1 K/UL
LYMPHOCYTES NFR BLD: 8.5 %
MCH RBC QN AUTO: 27.6 PG
MCHC RBC AUTO-ENTMCNC: 32.9 G/DL
MCV RBC AUTO: 84 FL
MONOCYTES # BLD AUTO: 1.2 K/UL
MONOCYTES NFR BLD: 9.2 %
NEUTROPHILS # BLD AUTO: 10.3 K/UL
NEUTROPHILS NFR BLD: 81.9 %
PATH INTERP FLD-IMP: NORMAL
PHOSPHATE SERPL-MCNC: 3.1 MG/DL
PLATELET # BLD AUTO: 243 K/UL
PMV BLD AUTO: 10.3 FL
POCT GLUCOSE: 104 MG/DL (ref 70–110)
POCT GLUCOSE: 106 MG/DL (ref 70–110)
POCT GLUCOSE: 117 MG/DL (ref 70–110)
POCT GLUCOSE: 124 MG/DL (ref 70–110)
POCT GLUCOSE: 137 MG/DL (ref 70–110)
POCT GLUCOSE: 73 MG/DL (ref 70–110)
POCT GLUCOSE: 95 MG/DL (ref 70–110)
POTASSIUM SERPL-SCNC: 4.4 MMOL/L
POTASSIUM SERPL-SCNC: 4.5 MMOL/L
RBC # BLD AUTO: 4.1 M/UL
SODIUM SERPL-SCNC: 132 MMOL/L
SODIUM SERPL-SCNC: 134 MMOL/L
VANCOMYCIN SERPL-MCNC: 16.6 UG/ML
WBC # BLD AUTO: 12.52 K/UL

## 2017-08-14 PROCEDURE — 21400001 HC TELEMETRY ROOM

## 2017-08-14 PROCEDURE — 80069 RENAL FUNCTION PANEL: CPT

## 2017-08-14 PROCEDURE — 25000003 PHARM REV CODE 250: Performed by: EMERGENCY MEDICINE

## 2017-08-14 PROCEDURE — 25000003 PHARM REV CODE 250: Performed by: INTERNAL MEDICINE

## 2017-08-14 PROCEDURE — 80100016 HC MAINTENANCE HEMODIALYSIS

## 2017-08-14 PROCEDURE — 90935 HEMODIALYSIS ONE EVALUATION: CPT | Mod: ,,, | Performed by: INTERNAL MEDICINE

## 2017-08-14 PROCEDURE — 87186 SC STD MICRODIL/AGAR DIL: CPT

## 2017-08-14 PROCEDURE — 87077 CULTURE AEROBIC IDENTIFY: CPT

## 2017-08-14 PROCEDURE — 63600175 PHARM REV CODE 636 W HCPCS: Performed by: INTERNAL MEDICINE

## 2017-08-14 PROCEDURE — 99232 SBSQ HOSP IP/OBS MODERATE 35: CPT | Mod: ,,, | Performed by: INTERNAL MEDICINE

## 2017-08-14 PROCEDURE — 87040 BLOOD CULTURE FOR BACTERIA: CPT

## 2017-08-14 PROCEDURE — 80048 BASIC METABOLIC PNL TOTAL CA: CPT

## 2017-08-14 PROCEDURE — 85025 COMPLETE CBC W/AUTO DIFF WBC: CPT

## 2017-08-14 PROCEDURE — 80202 ASSAY OF VANCOMYCIN: CPT

## 2017-08-14 RX ADMIN — SODIUM BICARBONATE 650 MG TABLET 1300 MG: at 09:08

## 2017-08-14 RX ADMIN — LEVETIRACETAM 500 MG: 500 TABLET, FILM COATED ORAL at 09:08

## 2017-08-14 RX ADMIN — LEVETIRACETAM 500 MG: 500 TABLET, FILM COATED ORAL at 10:08

## 2017-08-14 RX ADMIN — SODIUM BICARBONATE 650 MG TABLET 1300 MG: at 05:08

## 2017-08-14 RX ADMIN — VANCOMYCIN HYDROCHLORIDE 750 MG: 100 INJECTION, POWDER, LYOPHILIZED, FOR SOLUTION INTRAVENOUS at 06:08

## 2017-08-14 RX ADMIN — RIVASTIGMINE TRANSDERMAL SYSTEM 1 PATCH: 4.6 PATCH, EXTENDED RELEASE TRANSDERMAL at 10:08

## 2017-08-14 RX ADMIN — SERTRALINE HYDROCHLORIDE 50 MG: 50 TABLET ORAL at 10:08

## 2017-08-14 RX ADMIN — ATORVASTATIN CALCIUM 20 MG: 10 TABLET, FILM COATED ORAL at 10:08

## 2017-08-14 NOTE — PROGRESS NOTES
[facility] Hospital Medicine  Progress Note    Patient Name: David Hadley  MRN: [id number]  Patient Class: IP- Inpatient   Admission Date: 8/10/2017  Length of Stay: 4 days  Attending Physician: Conner Duarte MD  Primary Care Provider: Isaac Fitch MD    Subjective:     Principal Problem:Staphylococcus aureus bacteremia    HPI:  Mr. Hadley is a 63yo male with a PMHx of dementia, ESRD on HD, HTN, CAD with remote CABG, HLD, DM II, CML, and h/o recurrent gram-positive cocci bacteremia, who presented to the ED with c/o increased confusion and decreased appetite over the past 1-2 weeks.  Patient's niece denies patient c/o fever/chills, cough, SOB, chest pain, palpitations, ABD pain, dysuria, N/V/D, lightheadedness/dizziness, focal deficits, seizure-like activity, or syncope.Patient was sent to ED by PCP for positive blood cultures with gram-positive cocci drawn from AV fistula on Monday.  Patient's niece reports patient received IV Vanc during dialysis yesterday.  Upon arrival to ED, patient noted to be hypotensive with BP 97/67.  WBC 14.  Patient last episode of bacteremia was 4/2017.  SALAZAR at that time was negative for vegetation.  Hospital Medicine was consulted for admission.     Hospital Course:  David Hadley is a 62 year old male who was admitted to [facility] with sepsis due to recurrent MRSA Bacteremia. Patient was admitted 4/2017 for the same, but source remained unclear. Repeat cultures on 4/12/17 were negative for MRSA (1/2 bottle grew coag-negative), and patient was discharged with 6 week therapy with Vancomycin. During this admission, medical management, included Vancomycin. Infectious Disease was consulted who recommended repeating TTE and Left Extremity Ultrasound. CXR revealed RLL consolidation and Avelox was started. Subsequent CT Chest/Abdomen revealed large right pleural effusion with compressive atelectasis. Pulmonology was consulted for thoracentesis. Pleural  cytology pending. He received enema for fecal impaction. Left upper extremity ultrasound did not show evidence of abscess. Vascular believed the same. MRI of Lumbar Spine was negative for abscess.     Interval History: MRI Lumbar spine negative for abscess. Seen by Vascular surgery who did not feel fistula is source of infection.  Infectious Disease following.     Review of Systems   Unable to perform ROS: Dementia     Objective:     Vital Signs (Most Recent):  Temp: 98 °F (36.7 °C) (08/14/17 1210)  Pulse: 87 (08/14/17 1210)  Resp: 18 (08/14/17 1136)  BP: 129/86 (08/14/17 1210)  SpO2: 96 % (08/14/17 1136) Vital Signs (24h Range):  Temp:  [97.4 °F (36.3 °C)-98 °F (36.7 °C)] 98 °F (36.7 °C)  Pulse:  [83-99] 87  Resp:  [16-18] 18  SpO2:  [91 %-96 %] 96 %  BP: (107-129)/(65-91) 129/86     Weight: 92 kg (202 lb 13.2 oz)  Body mass index is 26.04 kg/m².    Intake/Output Summary (Last 24 hours) at 08/14/17 1613  Last data filed at 08/13/17 1735   Gross per 24 hour   Intake              500 ml   Output                1 ml   Net              499 ml      Physical Exam   Constitutional: He appears well-developed and well-nourished. No distress.   HENT:   Head: Normocephalic and atraumatic.   Eyes: Conjunctivae and EOM are normal. Pupils are equal, round, and reactive to light.   Neck: Normal range of motion. Neck supple. No thyromegaly present.   Cardiovascular: Normal rate, regular rhythm and normal heart sounds.    Pulmonary/Chest: Effort normal and breath sounds normal. No respiratory distress. He has no wheezes. He has no rales.   Abdominal: Soft. Bowel sounds are normal. He exhibits no distension. There is no tenderness.   Musculoskeletal: Normal range of motion. He exhibits edema (+1 BLE). He exhibits no tenderness or deformity.   Neurological: He is alert.   Disoriented.  No focal deficits.   Skin: Skin is warm and dry. No rash noted. No erythema.   Left arm fistula +bruit/thrill     Psychiatric: He has a normal mood  and affect.   Nursing note and vitals reviewed.    Significant Labs:   CBC:   Recent Labs  Lab 08/13/17  0616 08/14/17  0525   WBC 13.75* 12.52   HGB 11.5* 11.3*   HCT 34.8* 34.3*    243     CMP:   Recent Labs  Lab 08/12/17  1648 08/13/17  0616 08/14/17  0525   NA  --  134*  134* 134*  132*   K  --  4.4  4.3 4.5  4.4   CL  --  96  95 95  95   CO2  --  25  27 25  26   * 104  104 81  82   BUN  --  36*  35* 44*  41*   CREATININE  --  6.8*  6.8* 7.9*  7.9*   CALCIUM  --  8.8  8.9 8.8  8.5*   PROT 8.0  --   --    ALBUMIN  --  2.4* 2.3*   ANIONGAP  --  13  12 14  11   EGFRNONAA  --  8*  8* 7*  7*       Significant Imaging:   Imaging Results          MRI Lumbar Spine Without Contrast (Final result)  Result time 08/14/17 16:08:31    Final result by Dickson Jaramillo MD (08/14/17 16:08:31)                 Impression:        Small spinal canal and congenital basis.  Broad-based disc bulges at the L4-L5 and L5-S1 levels.  Bilateral degenerative changes of facets at the L3-L4 L4-L5 and L5-S1 levels.  No definite nerve impingement at any level for bilateral L3-L4 and L5 nerve contents cannot be excluded.  Abnormal signal in the vertebral bodies assistant with hematopoietic bone marrow or an infiltrative process.  Would correlate with clinical history and or bone scan for further evaluation.  No evidence of discitis identified.      Electronically signed by: DICKSON JARAMILLO MD  Date:     08/14/17  Time:    16:08              Narrative:    MRI lumbar spine without contrast.08/14/17 14:29:14    History:   low back pain., rule out abscess/discitis    Standard multiplanar noncontrast MRI sequences of the lumbar spine.    The distal cord and conus reveal normal signal and morphology.  Abnormal signal in the vertebral bodies possibly representing anemia versus an infiltrative process including metastatic disease.    L1-2: Normal.    L2-3: Normal.    L3-4: Minimal disc.  Mild neural foraminal  narrowing.    L4-5: Minimal broad-based disc bulge.  Mild desiccation of the disc and mild degenerative changes of the facets with mild bilateral bony neural foraminal narrowing.    L5-S1:  Minimal broad-based disc bulge.  Mild degenerative changes of the facets.  Bilateral bony neural foraminal narrowing with possible but not definite L5 nerve root impingement                             MRI Thoracic Spine Without Contrast (Final result)  Result time 08/14/17 15:33:41    Final result by Dickson Jaramillo MD (08/14/17 15:33:41)                 Impression:        Spinal canal is within normal limits.  No impingements in the cord or abnormal signal in the cord identified.  Bilateral pleural effusions larger on the right side.  Pleural effusions on the left side appear loculated.  No evidence of fluid in the disc spaces to suggest discitis.  No epidural mass or fluid collection identified.      Electronically signed by: DICKSON JARAMILLO MD  Date:     08/14/17  Time:    15:33              Narrative:    MRI Thoracic spine without contrast.08/14/17 14:29:38    History:   Back pain., rule out discitis /abscess    Standard multiplanar noncontrast MRI sequences of the thoracic spine with and without contrast.    Findings:  The spinal canal is within normal limits. No signal abnormality in the spinal cord.Normal vertebral bodies. The disc spaces are normal.  Bilateral pleural effusions larger on the right.  No evidence of discitis identified.                             X-Ray Chest 1 View (Final result)  Result time 08/12/17 17:34:23    Final result by Dickson Green III, MD (08/12/17 17:34:23)                 Impression:     Improving chest x-ray with decrease in the right pleural effusion consistent with thoracentesis. No evidence of pneumothorax.      Electronically signed by: DICKSON GREEN MD  Date:     08/12/17  Time:    17:34              Narrative:    Chest x-ray, single view.    Clinical indication: Shortness of  breath status post thoracentesis.    The chest shows improvement with better aeration of the right base and significant decrease in the pleural effusion. No evidence of pneumothorax status post thoracentesis. Chest otherwise unchanged.                             US Soft Tissue Misc (Final result)  Result time 08/12/17 17:23:38    Final result by Dickson Green III, MD (08/12/17 17:23:38)                 Impression:     Complex/heterogeneous density just above the region of the shunt of indeterminate significance and etiology, please correlate. This is not a simple fluid collection and doesn't have the classic appearance of an abscess.      Electronically signed by: DICKSON GREEN MD  Date:     08/12/17  Time:    17:23              Narrative:    Limited ultrasound near the patient's dialysis shunt, left upper extremity    Along the superior aspect of the shunt there is a complex focus measuring 2.1 x 0.7 x 0.9 cm in diameter. Etiology is indeterminate. This does not have the classic appearance of an abscess but please correlate. No flow by Doppler analysis. No gross evidence of discomfort/pain.                             US Chest Mediastinum (Edited Result - FINAL)  Result time 08/12/17 17:22:01    Addendum 1 of 1 by Dickson Green III, MD (08/12/17 17:22:01)    Addendum.    The procedure should be listed as ultrasound of the chest, not CT of the chest.   As above      Electronically signed by: DICKSON GREEN MD  Date:     08/12/17  Time:    17:22                Final result by Dickson Green III, MD (08/12/17 17:20:58)                 Impression:     As above.      Electronically signed by: DICKSON GREEN MD  Date:     08/12/17  Time:    17:20              Narrative:    CT of the chest.    Clinical indication: Pleural effusion.    Ultrasound of the chest reveals a moderate right pleural effusion and minimal left effusion.. Appropriate skin site was marked prior to thoracentesis  performed by Dr. Balderas.                             CT Abdomen Pelvis With Contrast (Final result)  Result time 08/11/17 19:22:53    Final result by Shivam Ambrose MD (08/11/17 19:22:53)                 Impression:             Right greater than left pleural effusions, associated with compressive atelectasis/consolidation.    Heavily calcified coronary arteries.    Rectal fecal impaction noted.      Electronically signed by: SHIVAM AMBROSE MD  Date:     08/11/17  Time:    19:22              Narrative:    Exam: CT chest with contrast.    History: Gram-positive cocci bacteremia    Findings: Large right and smaller pleural effusions are present. There is extensive compressive atelectasis/consolidation in the right base. Less pronounced findings in the left base. No mediastinal or hilar adenopathy.    Heavily calcified coronary arteries noted.    No osteolytic or osteoblastic lesions are identified. The    The liver, spleen, pancreas, kidneys and adrenal glands are unremarkable.    No free fluid, adenopathy, mesenteric inflammatory change is apparent.    Rectal fecal impaction noted.                             CT Chest With Contrast (Final result)  Result time 08/11/17 19:22:54    Final result by Shivam Ambrose MD (08/11/17 19:22:54)                 Impression:             Right greater than left pleural effusions, associated with compressive atelectasis/consolidation.    Heavily calcified coronary arteries.    Rectal fecal impaction noted.      Electronically signed by: SHIVAM AMBROSE MD  Date:     08/11/17  Time:    19:22              Narrative:    Exam: CT chest with contrast.    History: Gram-positive cocci bacteremia    Findings: Large right and smaller pleural effusions are present. There is extensive compressive atelectasis/consolidation in the right base. Less pronounced findings in the left base. No mediastinal or hilar adenopathy.    Heavily calcified coronary arteries noted.    No osteolytic or  osteoblastic lesions are identified. The    The liver, spleen, pancreas, kidneys and adrenal glands are unremarkable.    No free fluid, adenopathy, mesenteric inflammatory change is apparent.    Rectal fecal impaction noted.                             X-Ray Chest 1 View (Final result)  Result time 08/10/17 21:10:07    Final result by Shivam Ambrose MD (08/10/17 21:10:07)                 Impression:     Right basilar consolidation. See above. Otherwise no significant change from prior exam.      Electronically signed by: SHIVAM AMBROSE MD  Date:     08/10/17  Time:    21:10              Narrative:    Exam: Chest X-ray, one view.    History: Shortness of breath    Findings: Comparison is made to prior exam dated 05/03/2017. Mild cardiomegaly again noted. Right basilar consolidation is no apparent, likely related to combination of pleural fluid and airspace disease. Sternal wires again noted.                                Assessment/Plan:      * Staphylococcus aureus bacteremia    - Recurrent Staph bacteremia.  Last episode 4/2017; SALAZAR negative for vegetation, Indium scan and LUE US were negative at this time. Repeat blood cultures during this stay were negative for MRSA. S/p 6 weeks Vancomycin.   --source remains unclear  --Continue IV Vancomycin. Infectious Disease following  --No obvious evidence of fistula infection  -repeat ultrasound did not show evidence of abscess (although showing possible pseudoaneurysm).   -repeat Echocardiogram did not show evidence of vegetation  -MRI thoracic and Lumbar spine were negative for abscess        Bilateral pleural effusion    -S/p thoracentesis 8/12/17  -Pleural/serum Protein ratio is 0.5  -Awaiting cytology  -Thoracic MRI 8/14/17 shows bilateral pleural effusion. R>L        ESRD (end stage renal disease) on dialysis    - Dialysis M,W,F.  - Nephrology following.        Hypophosphatemia    --improved        Dementia without behavioral disturbance    -supportive care.    -continue home meds        Type 2 diabetes mellitus with renal complication    - Accuchecks; SSI  - Diabetic, Renal diet.        Hyperlipidemia    --Resume Atorvastatin        CAD with remote CABG    - No angina.  - Continue home medical management with ASA and statin.  - Beta blocker on hold due to hypotension.  Will resume once BP allows.            Pk Louis NP  Department of Hospital Medicine   [facility]

## 2017-08-14 NOTE — ASSESSMENT & PLAN NOTE
Patient is on MWF dialysis schedule at [facility] (he does not know name of his outpatient nephrologist). Access is left AVF (no obvious signs of infection).  Next scheduled HD will be today.

## 2017-08-14 NOTE — HPI
62 year old  male with a PMHx of dementia, ESRD on HD, HTN, CAD with remote CABG, HLD, DM II, CML, and h/o  MRSA bacteremia that was managed in April with 6 weeks of IV vancomycin . He presented again at this time with  increased confusion and decreased appetite over the past 1-2 weeks..Patient was sent to ED by PCP for positive blood cultures with gram-positive cocci drawn from AV fistula on Monday.  Patient's niece reports patient received IV Vanc during dialysis yesterday.  Upon arrival to ED, patient noted to be hypotensive with BP 97/67.  WBC 14.  Patient last episode of bacteremia was 4/2017.  SALAZAR at that time was negative for vegetation.  Since admission, case was discussed with primary team and soft tissue ultrasound of the left arm fistula showed-along the superior aspect of the shunt there is a complex focus measuring 2.1 x 0.7 x 0.9 cm in diameter. .  CT scan of the chest showed large right pleural effusion and thoracentesis was done. Pleural fluid did not show josey pus and pleural fluid protein is 4. Total protein is 8.  He also complaints of  intermittent back pain

## 2017-08-14 NOTE — SUBJECTIVE & OBJECTIVE
Interval History:     Tolerates interventions without adverse effect  MRI scheduled  DW Niece at bedside    Objective:     Vital Signs (Most Recent):  Temp: 98 °F (36.7 °C) (08/14/17 1210)  Pulse: 87 (08/14/17 1210)  Resp: 18 (08/14/17 1136)  BP: 129/86 (08/14/17 1210)  SpO2: 96 % (08/14/17 1136) Vital Signs (24h Range):  Temp:  [97.4 °F (36.3 °C)-98 °F (36.7 °C)] 98 °F (36.7 °C)  Pulse:  [83-99] 87  Resp:  [16-18] 18  SpO2:  [91 %-96 %] 96 %  BP: (107-129)/(65-91) 129/86     Weight: 92 kg (202 lb 13.2 oz)  Body mass index is 26.04 kg/m².      Intake/Output Summary (Last 24 hours) at 08/14/17 1349  Last data filed at 08/13/17 1735   Gross per 24 hour   Intake              500 ml   Output                1 ml   Net              499 ml       Physical Exam   Constitutional: He appears well-developed and well-nourished.   HENT:   Head: Normocephalic.   Mouth/Throat: Oropharynx is clear and moist.   Eyes: EOM and lids are normal. Pupils are equal, round, and reactive to light.   Blind   Neck: Normal range of motion. Neck supple. No JVD present.   Cardiovascular: Normal rate, regular rhythm and normal heart sounds.    Pulmonary/Chest: Effort normal and breath sounds normal.   Musculoskeletal: Normal range of motion.   Neurological: He is alert.   Skin: Skin is warm and dry.   Nursing note and vitals reviewed.      Vents:  Oxygen Concentration (%): 28 (08/11/17 1117)    Lines/Drains/Airways     Drain                 Hemodialysis AV Fistula Left upper arm 61689 days          Peripheral Intravenous Line                 Peripheral IV - Single Lumen 08/10/17 Right Antecubital 4 days                Significant Labs:    CBC/Anemia Profile:    Recent Labs  Lab 08/13/17  0616 08/14/17  0525   WBC 13.75* 12.52   HGB 11.5* 11.3*   HCT 34.8* 34.3*    243   MCV 84 84   RDW 16.7* 16.8*        Chemistries:    Recent Labs  Lab 08/12/17  1648 08/13/17  0616 08/14/17  0525   NA  --  134*  134* 134*  132*   K  --  4.4  4.3 4.5   4.4   CL  --  96  95 95  95   CO2  --  25  27 25  26   BUN  --  36*  35* 44*  41*   CREATININE  --  6.8*  6.8* 7.9*  7.9*   CALCIUM  --  8.8  8.9 8.8  8.5*   ALBUMIN  --  2.4* 2.3*   PROT 8.0  --   --    PHOS  --  3.2 3.1       Blood Culture: No results for input(s): LABBLOO in the last 48 hours.  All pertinent labs within the past 24 hours have been reviewed.    Significant Imaging:  I have reviewed and interpreted all pertinent imaging results/findings within the past 24 hours.     WBC, Body Fluid /cu mm 934   Comments: Reference ranges for body fluids not established.   Correlate clinically.    Segs, Fluid % 26   Lymphs, Fluid % 41   Monocytes/Macrophages, Fluid % 31   Baso, Fluid % 1   Mesothelial cells, Fluid % 1

## 2017-08-14 NOTE — SUBJECTIVE & OBJECTIVE
Interval History:  62 year old man with ESRD, MRSA bacteremia, back pain, left upper arm fistula.  He is afebrile.    Review of Systems   Constitutional: Positive for appetite change. Negative for chills, fever and unexpected weight change.   HENT: Negative for trouble swallowing.    Respiratory: Negative for cough, shortness of breath and wheezing.    Cardiovascular: Negative for chest pain.   Gastrointestinal: Negative for abdominal distention, constipation, diarrhea and vomiting.   Neurological: Negative for seizures, syncope, facial asymmetry and speech difficulty.   Psychiatric/Behavioral: Positive for confusion.     Objective:     Vital Signs (Most Recent):  Temp: 98 °F (36.7 °C) (08/14/17 1210)  Pulse: 87 (08/14/17 1210)  Resp: 18 (08/14/17 1136)  BP: 129/86 (08/14/17 1210)  SpO2: 96 % (08/14/17 1136) Vital Signs (24h Range):  Temp:  [97.4 °F (36.3 °C)-98 °F (36.7 °C)] 98 °F (36.7 °C)  Pulse:  [83-99] 87  Resp:  [16-18] 18  SpO2:  [91 %-96 %] 96 %  BP: (107-129)/(65-91) 129/86     Weight: 92 kg (202 lb 13.2 oz)  Body mass index is 26.04 kg/m².    Estimated Creatinine Clearance: 11 mL/min (based on Cr of 7.9).    Physical Exam   Constitutional: He appears well-developed and well-nourished. No distress.   HENT:   Head: Normocephalic and atraumatic.   Eyes: Conjunctivae and EOM are normal. Pupils are equal, round, and reactive to light.   Neck: Normal range of motion. Neck supple. No thyromegaly present.   Cardiovascular: Normal rate, regular rhythm and normal heart sounds.    Pulmonary/Chest: Effort normal and breath sounds normal. No respiratory distress. He has no wheezes. He has no rales.   Abdominal: Soft. Bowel sounds are normal. He exhibits no distension. There is no tenderness.   Musculoskeletal: Normal range of motion. He exhibits edema (+1 BLE). He exhibits no tenderness or deformity.   Has point tenderness over the lower thoracic region   Neurological: He is alert.   Disoriented.  No focal deficits.    Skin: Skin is warm and dry. No rash noted. No erythema.   Left arm fistula +bruit/thrill     Psychiatric: He has a normal mood and affect.   Nursing note and vitals reviewed.      Significant Labs:   Blood Culture:   Recent Labs  Lab 04/08/17  1325 04/12/17  1435 04/12/17  1436 08/10/17  1845 08/10/17  1900   LABBLOO No growth after 5 days. Gram stain aer bottle: Gram positive cocci in clusters resembling Staph  Results called to and read back by:Angie Summers RN 04/13/2017  18:47  COAGULASE-NEGATIVE STAPHYLOCOCCUS SPECIESOrganism is a probable contaminant No growth after 5 days. Gram stain aer bottle: Gram positive cocci in clusters resembling Staph   Gram stain mike bottle: Gram positive cocci in clusters resembling Staph   Results called to and read back by: Primitivo Velasco RN  08/11/2017  21:59  METHICILLIN RESISTANT STAPHYLOCOCCUS AUREUSID consult required at [facility]. Gram stain aer bottle: Gram positive cocci in clusters resembling Staph   Gram stain mike bottle: Gram positive cocci in clusters resembling Staph   Results called to and read back by:Kia Velasco RN 08/12/2017  04:05  METHICILLIN RESISTANT STAPHYLOCOCCUS AUREUSID consult required at [facility] locations.     BMP:   Recent Labs  Lab 08/14/17  0525   GLU 81  82   *  132*   K 4.5  4.4   CL 95  95   CO2 25  26   BUN 44*  41*   CREATININE 7.9*  7.9*   CALCIUM 8.8  8.5*     CBC:   Recent Labs  Lab 08/13/17  0616 08/14/17  0525   WBC 13.75* 12.52   HGB 11.5* 11.3*   HCT 34.8* 34.3*    243     All pertinent labs within the past 24 hours have been reviewed.    Significant Imaging: I have reviewed all pertinent imaging results/findings within the past 24 hours.

## 2017-08-14 NOTE — ASSESSMENT & PLAN NOTE
Source control is needed in all cases of staph bacteremia.  The complex region described in the ultrasound report on the left upper arm fistula is of concern-will  Follow  vascular surgery .This might need to explored further.  Follow MRI of the lumbar/thoracic regions.Cardiac echo did not show any vegetation.  Continue vancomycin with goal trough -15 to 20

## 2017-08-14 NOTE — SUBJECTIVE & OBJECTIVE
Interval History:     8/11/17: Patient today denies any fevers, pain, SOB, nausea.     8/12/17: Patient is resting in chair, no complaints at present.     8/13/17: Patient without complaints today.     8/14/17: Patient is currently on hemodialysis. No complications with procedure. Vital signs are stable. Patient is resting comfortably, he denies any complaints.         Review of patient's allergies indicates:   Allergen Reactions    Benadryl decongestant Itching     Nothing with benadryl; wife states  No allergy to Benadryl     Current Facility-Administered Medications   Medication Frequency    0.9%  NaCl infusion PRN    0.9%  NaCl infusion Once    atorvastatin tablet 20 mg Daily    dextrose 50% injection 12.5 g PRN    dextrose 50% injection 25 g PRN    diphenhydrAMINE capsule 25 mg Nightly PRN    glucagon (human recombinant) injection 1 mg PRN    glucose chewable tablet 16 g PRN    glucose chewable tablet 24 g PRN    heparin (porcine) injection 1,000 Units PRN    insulin aspart pen 0-5 Units QID (AC + HS) PRN    levetiracetam tablet 500 mg BID    lidocaine (PF) 10 mg/ml (1%) injection 10 mg Once    lorazepam tablet 0.5 mg Q6H PRN    moxifloxacin 400 mg/250 mL IVPB 400 mg Q24H    rivastigmine 4.6 mg/24 hr 1 patch Daily    sertraline tablet 50 mg Daily    sodium bicarbonate tablet 1,300 mg TID    vancomycin 1 g in dextrose 5 % 250 mL IVPB (ready to mix system) Q48H    vancomycin 750 mg in dextrose 5 % 250 mL IVPB (ready to mix system) Once       Objective:     Vital Signs (Most Recent):  Temp: 97.7 °F (36.5 °C) (08/14/17 0751)  Pulse: 86 (08/14/17 0751)  Resp: 16 (08/14/17 0751)  BP: 107/79 (08/14/17 0751)  SpO2: 95 % (08/14/17 0751)  O2 Device (Oxygen Therapy): room air (08/14/17 0751) Vital Signs (24h Range):  Temp:  [97.4 °F (36.3 °C)-97.9 °F (36.6 °C)] 97.7 °F (36.5 °C)  Pulse:  [83-99] 86  Resp:  [16-18] 16  SpO2:  [91 %-95 %] 95 %  BP: (107-124)/(65-79) 107/79     Weight: 92 kg (202 lb 13.2  oz) (08/11/17 1400)  Body mass index is 26.04 kg/m².  Body surface area is 2.19 meters squared.    I/O last 3 completed shifts:  In: 500 [IV Piggyback:500]  Out: -     Physical Exam   Constitutional: He appears well-developed. He is cooperative.   Alert and responsive.   HENT:   Head: Normocephalic.   Nose: No rhinorrhea.   Mouth/Throat: Mucous membranes are normal. No oropharyngeal exudate.   Neck: No thyroid mass present.   Cardiovascular: Normal rate, regular rhythm, S1 normal, S2 normal and intact distal pulses.    Pulmonary/Chest: Effort normal. No respiratory distress. He has no wheezes.   Abdominal: Soft. Bowel sounds are normal. He exhibits no distension. There is no tenderness. No hernia.   Musculoskeletal:   Mild bilateral LE edema.   Lymphadenopathy:     He has no cervical adenopathy.   Neurological: He is alert.   Skin: Skin is warm and dry.   Psychiatric: He has a normal mood and affect.       Significant Labs:  Lab Results   Component Value Date    CREATININE 7.9 (H) 08/14/2017    CREATININE 7.9 (H) 08/14/2017    BUN 41 (H) 08/14/2017    BUN 44 (H) 08/14/2017     (L) 08/14/2017     (L) 08/14/2017    K 4.4 08/14/2017    K 4.5 08/14/2017    CL 95 08/14/2017    CL 95 08/14/2017    CO2 26 08/14/2017    CO2 25 08/14/2017     Lab Results   Component Value Date     (H) 11/06/2008    CALCIUM 8.5 (L) 08/14/2017    CALCIUM 8.8 08/14/2017    PHOS 3.1 08/14/2017     Lab Results   Component Value Date    ALBUMIN 2.3 (L) 08/14/2017     Lab Results   Component Value Date    WBC 12.52 08/14/2017    HGB 11.3 (L) 08/14/2017    HCT 34.3 (L) 08/14/2017    MCV 84 08/14/2017     08/14/2017       Recent Labs  Lab 08/11/17  0527   MG 2.0         Significant Imaging:  Imaging Results          X-Ray Chest 1 View (Final result)  Result time 08/12/17 17:34:23    Final result by Dickson Green III, MD (08/12/17 17:34:23)                 Impression:     Improving chest x-ray with decrease in the right  pleural effusion consistent with thoracentesis. No evidence of pneumothorax.      Electronically signed by: DICKSON CASTRO MD  Date:     08/12/17  Time:    17:34              Narrative:    Chest x-ray, single view.    Clinical indication: Shortness of breath status post thoracentesis.    The chest shows improvement with better aeration of the right base and significant decrease in the pleural effusion. No evidence of pneumothorax status post thoracentesis. Chest otherwise unchanged.                             US Soft Tissue Misc (Final result)  Result time 08/12/17 17:23:38    Final result by Dickson Castro III, MD (08/12/17 17:23:38)                 Impression:     Complex/heterogeneous density just above the region of the shunt of indeterminate significance and etiology, please correlate. This is not a simple fluid collection and doesn't have the classic appearance of an abscess.      Electronically signed by: DICKSON CASTRO MD  Date:     08/12/17  Time:    17:23              Narrative:    Limited ultrasound near the patient's dialysis shunt, left upper extremity    Along the superior aspect of the shunt there is a complex focus measuring 2.1 x 0.7 x 0.9 cm in diameter. Etiology is indeterminate. This does not have the classic appearance of an abscess but please correlate. No flow by Doppler analysis. No gross evidence of discomfort/pain.                             US Chest Mediastinum (Edited Result - FINAL)  Result time 08/12/17 17:22:01    Addendum 1 of 1 by Dickson Castro III, MD (08/12/17 17:22:01)    Addendum.    The procedure should be listed as ultrasound of the chest, not CT of the chest.   As above      Electronically signed by: DICKSON CASTRO MD  Date:     08/12/17  Time:    17:22                Final result by Dickson Castro III, MD (08/12/17 17:20:58)                 Impression:     As above.      Electronically signed by: DICKSON CASTRO MD  Date:      08/12/17  Time:    17:20              Narrative:    CT of the chest.    Clinical indication: Pleural effusion.    Ultrasound of the chest reveals a moderate right pleural effusion and minimal left effusion.. Appropriate skin site was marked prior to thoracentesis performed by Dr. Balderas.                             CT Abdomen Pelvis With Contrast (Final result)  Result time 08/11/17 19:22:53    Final result by Shivam Ambrose MD (08/11/17 19:22:53)                 Impression:             Right greater than left pleural effusions, associated with compressive atelectasis/consolidation.    Heavily calcified coronary arteries.    Rectal fecal impaction noted.      Electronically signed by: SHIVAM AMBROSE MD  Date:     08/11/17  Time:    19:22              Narrative:    Exam: CT chest with contrast.    History: Gram-positive cocci bacteremia    Findings: Large right and smaller pleural effusions are present. There is extensive compressive atelectasis/consolidation in the right base. Less pronounced findings in the left base. No mediastinal or hilar adenopathy.    Heavily calcified coronary arteries noted.    No osteolytic or osteoblastic lesions are identified. The    The liver, spleen, pancreas, kidneys and adrenal glands are unremarkable.    No free fluid, adenopathy, mesenteric inflammatory change is apparent.    Rectal fecal impaction noted.                             CT Chest With Contrast (Final result)  Result time 08/11/17 19:22:54    Final result by Shivam Ambrose MD (08/11/17 19:22:54)                 Impression:             Right greater than left pleural effusions, associated with compressive atelectasis/consolidation.    Heavily calcified coronary arteries.    Rectal fecal impaction noted.      Electronically signed by: SHIVAM AMBROSE MD  Date:     08/11/17  Time:    19:22              Narrative:    Exam: CT chest with contrast.    History: Gram-positive cocci bacteremia    Findings: Large right and  smaller pleural effusions are present. There is extensive compressive atelectasis/consolidation in the right base. Less pronounced findings in the left base. No mediastinal or hilar adenopathy.    Heavily calcified coronary arteries noted.    No osteolytic or osteoblastic lesions are identified. The    The liver, spleen, pancreas, kidneys and adrenal glands are unremarkable.    No free fluid, adenopathy, mesenteric inflammatory change is apparent.    Rectal fecal impaction noted.                             X-Ray Chest 1 View (Final result)  Result time 08/10/17 21:10:07    Final result by Shivam Ambrose MD (08/10/17 21:10:07)                 Impression:     Right basilar consolidation. See above. Otherwise no significant change from prior exam.      Electronically signed by: SHIVAM AMBROSE MD  Date:     08/10/17  Time:    21:10              Narrative:    Exam: Chest X-ray, one view.    History: Shortness of breath    Findings: Comparison is made to prior exam dated 05/03/2017. Mild cardiomegaly again noted. Right basilar consolidation is no apparent, likely related to combination of pleural fluid and airspace disease. Sternal wires again noted.

## 2017-08-14 NOTE — PROGRESS NOTES
[facility]  Infectious Disease  Progress Note    Patient Name: David Hadley  MRN: [id number]  Admission Date: 8/10/2017  Length of Stay: 4 days  Attending Physician: Conner Duarte MD  Primary Care Provider: Isaac Fitch MD    Isolation Status: Contact  Assessment/Plan:      Dementia without behavioral disturbance    Continue rivastigmine        Type 2 diabetes mellitus with renal complication    Insulin sliding scale , insulin regime as per primary team         Hyperlipidemia    Continue lipitor         ESRD (end stage renal disease) on dialysis    HD as per primary team        * Staphylococcus aureus bacteremia    Source control is needed in all cases of staph bacteremia.  The complex region described in the ultrasound report on the left upper arm fistula is of concern-will  Follow  vascular surgery .This might need to explored further.  Follow MRI of the lumbar/thoracic regions.Cardiac echo did not show any vegetation.  Continue vancomycin with goal trough -15 to 20            Anticipated Disposition:     Thank you for your consult. I will follow-up with patient. Please contact us if you have any additional questions.    Ganga Choudhury MD  Infectious Disease  [facility]    Subjective:     Principal Problem:Staphylococcus aureus bacteremia    HPI:  62 year old  male with a PMHx of dementia, ESRD on HD, HTN, CAD with remote CABG, HLD, DM II, CML, and h/o  MRSA bacteremia that was managed in April with 6 weeks of IV vancomycin . He presented again at this time with  increased confusion and decreased appetite over the past 1-2 weeks..Patient was sent to ED by PCP for positive blood cultures with gram-positive cocci drawn from AV fistula on Monday.  Patient's niece reports patient received IV Vanc during dialysis yesterday.  Upon arrival to ED, patient noted to be hypotensive with BP 97/67.  WBC 14.  Patient last episode of bacteremia was 4/2017.  SALAZAR at that time was negative for  vegetation.  Since admission, case was discussed with primary team and soft tissue ultrasound of the left arm fistula showed-along the superior aspect of the shunt there is a complex focus measuring 2.1 x 0.7 x 0.9 cm in diameter. .  CT scan of the chest showed large right pleural effusion and thoracentesis was done. Pleural fluid did not show josey pus and pleural fluid protein is 4. Total protein is 8.  He also complaints of  intermittent back pain   Interval History:  62 year old man with ESRD, MRSA bacteremia, back pain, left upper arm fistula.  He is afebrile.    Review of Systems   Constitutional: Positive for appetite change. Negative for chills, fever and unexpected weight change.   HENT: Negative for trouble swallowing.    Respiratory: Negative for cough, shortness of breath and wheezing.    Cardiovascular: Negative for chest pain.   Gastrointestinal: Negative for abdominal distention, constipation, diarrhea and vomiting.   Neurological: Negative for seizures, syncope, facial asymmetry and speech difficulty.   Psychiatric/Behavioral: Positive for confusion.     Objective:     Vital Signs (Most Recent):  Temp: 98 °F (36.7 °C) (08/14/17 1210)  Pulse: 87 (08/14/17 1210)  Resp: 18 (08/14/17 1136)  BP: 129/86 (08/14/17 1210)  SpO2: 96 % (08/14/17 1136) Vital Signs (24h Range):  Temp:  [97.4 °F (36.3 °C)-98 °F (36.7 °C)] 98 °F (36.7 °C)  Pulse:  [83-99] 87  Resp:  [16-18] 18  SpO2:  [91 %-96 %] 96 %  BP: (107-129)/(65-91) 129/86     Weight: 92 kg (202 lb 13.2 oz)  Body mass index is 26.04 kg/m².    Estimated Creatinine Clearance: 11 mL/min (based on Cr of 7.9).    Physical Exam   Constitutional: He appears well-developed and well-nourished. No distress.   HENT:   Head: Normocephalic and atraumatic.   Eyes: Conjunctivae and EOM are normal. Pupils are equal, round, and reactive to light.   Neck: Normal range of motion. Neck supple. No thyromegaly present.   Cardiovascular: Normal rate, regular rhythm and normal  heart sounds.    Pulmonary/Chest: Effort normal and breath sounds normal. No respiratory distress. He has no wheezes. He has no rales.   Abdominal: Soft. Bowel sounds are normal. He exhibits no distension. There is no tenderness.   Musculoskeletal: Normal range of motion. He exhibits edema (+1 BLE). He exhibits no tenderness or deformity.   Has point tenderness over the lower thoracic region   Neurological: He is alert.   Disoriented.  No focal deficits.   Skin: Skin is warm and dry. No rash noted. No erythema.   Left arm fistula +bruit/thrill     Psychiatric: He has a normal mood and affect.   Nursing note and vitals reviewed.      Significant Labs:   Blood Culture:   Recent Labs  Lab 04/08/17  1325 04/12/17  1435 04/12/17  1436 08/10/17  1845 08/10/17  1900   LABBLOO No growth after 5 days. Gram stain aer bottle: Gram positive cocci in clusters resembling Staph  Results called to and read back by:Angie Summers RN 04/13/2017  18:47  COAGULASE-NEGATIVE STAPHYLOCOCCUS SPECIESOrganism is a probable contaminant No growth after 5 days. Gram stain aer bottle: Gram positive cocci in clusters resembling Staph   Gram stain mike bottle: Gram positive cocci in clusters resembling Staph   Results called to and read back by: Primitivo Velasco RN  08/11/2017  21:59  METHICILLIN RESISTANT STAPHYLOCOCCUS AUREUSID consult required at [facility]. Gram stain aer bottle: Gram positive cocci in clusters resembling Staph   Gram stain mike bottle: Gram positive cocci in clusters resembling Staph   Results called to and read back by:Kia Velasco RN 08/12/2017  04:05  METHICILLIN RESISTANT STAPHYLOCOCCUS AUREUSID consult required at [facility].     BMP:   Recent Labs  Lab 08/14/17  0525   GLU 81  82   *  132*   K 4.5  4.4   CL 95  95   CO2 25  26   BUN 44*  41*   CREATININE 7.9*  7.9*   CALCIUM 8.8  8.5*     CBC:   Recent Labs  Lab 08/13/17  0616 08/14/17  0525   WBC 13.75* 12.52    HGB 11.5* 11.3*   HCT 34.8* 34.3*    243     All pertinent labs within the past 24 hours have been reviewed.    Significant Imaging: I have reviewed all pertinent imaging results/findings within the past 24 hours.

## 2017-08-14 NOTE — PROGRESS NOTES
HD today for 3.5 hrs. Tolerated well with no complications. LUAF working fine. Pt was seen by Dr. Sanchez during tx. No medications given. Hemostasis was achieved.

## 2017-08-14 NOTE — ASSESSMENT & PLAN NOTE
- Recurrent Staph bacteremia.  Last episode 4/2017; SALAZAR negative for vegetation, Indium scan and LUE US were negative at this time. Repeat blood cultures during this stay were negative for MRSA. S/p 6 weeks Vancomycin.   --source remains unclear  --Continue IV Vancomycin. Infectious Disease following  --No obvious evidence of fistula infection  -repeat ultrasound did not show evidence of abscess (although showing possible pseudoaneurysm).   -repeat Echocardiogram did not show evidence of vegetation  -MRI thoracic and Lumbar spine were negative for abscess

## 2017-08-14 NOTE — SUBJECTIVE & OBJECTIVE
Past Medical History:   Diagnosis Date    After-cataract, unspecified - Left Eye 11/27/2013    Allergy     Arthritis     Cancer     CHF (congestive heart failure)     Chronic kidney disease     chemo/ dialias    Dementia     Diabetes mellitus     Hypertension     Myocardial infarction        Past Surgical History:   Procedure Laterality Date    AV FISTULA PLACEMENT      CATARACT EXTRACTION      CORONARY ARTERY BYPASS GRAFT  3-2014       Review of patient's allergies indicates:   Allergen Reactions    Benadryl decongestant Itching     Nothing with benadryl; wife states  No allergy to Benadryl       Medications:  Prescriptions Prior to Admission   Medication Sig    amlodipine (NORVASC) 5 MG tablet Take 1 tablet (5 mg total) by mouth once daily.    atorvastatin (LIPITOR) 20 MG tablet Take 1 tablet (20 mg total) by mouth once daily.    FOLIC ACID/VIT BCOMP,C (JOSAFAT-JANICE ORAL) Take by mouth.    gabapentin (NEURONTIN) 100 MG capsule Take 1 capsule (100 mg total) by mouth 3 (three) times daily.    hydrocodone-acetaminophen 5-325mg (NORCO) 5-325 mg per tablet Take 1 tablet by mouth every 6 to 8 hours as needed for Pain.    levetiracetam (KEPPRA) 500 MG Tab Take 1 tablet (500 mg total) by mouth 2 (two) times daily.    lorazepam (ATIVAN) 0.5 MG tablet Take 1 tablet (0.5 mg total) by mouth every 6 (six) hours as needed for Anxiety.    losartan (COZAAR) 50 MG tablet Take by mouth. 1 tablet Oral Every day    metoprolol tartrate (LOPRESSOR) 25 MG tablet Take 25 mg by mouth 2 (two) times daily.    rivastigmine (EXELON) 4.6 mg/24 hr PT24 Place 1 patch onto the skin once daily.    sertraline (ZOLOFT) 50 MG tablet Take 1 tablet (50 mg total) by mouth once daily.    sodium bicarbonate 650 MG tablet Take 2 tablets (1,300 mg total) by mouth 3 (three) times daily.    trazodone (DESYREL) 50 MG tablet Take 1 tablet (50 mg total) by mouth every evening.    SENSIPAR 30 mg Tab Take 1 tablet by mouth once daily.     SPRYCEL 100 mg Tab Take 100 mg by mouth once daily.      Antibiotics     Start     Stop Route Frequency Ordered    08/13/17 1600  vancomycin 1 g in dextrose 5 % 250 mL IVPB (ready to mix system)      -- IV Every 48 hours (non-standard times) 08/10/17 2012    08/11/17 1330  moxifloxacin 400 mg/250 mL IVPB 400 mg      -- IV Every 24 hours (non-standard times) 08/11/17 1218        Antifungals     None        Antivirals     None           Immunization History   Administered Date(s) Administered    Influenza A (H1N1) 2009 Monovalent - IM 11/09/2009    Pneumococcal Conjugate - 13 Valent 02/18/2016    Pneumococcal Polysaccharide - 23 Valent 09/15/2009    Tdap 09/15/2009    Zoster 09/15/2009       Family History     Problem Relation (Age of Onset)    Cancer Brother    Colon cancer Sister    Glaucoma Mother    Pancreatic cancer Brother    Prostate cancer Father        Social History     Social History    Marital status:      Spouse name: N/A    Number of children: 1    Years of education: N/A     Social History Main Topics    Smoking status: Former Smoker     Types: Cigarettes     Quit date: 1/28/2000    Smokeless tobacco: Never Used    Alcohol use No    Drug use: No    Sexual activity: Not Currently     Birth control/ protection: None     Other Topics Concern    None     Social History Narrative    None     Review of Systems   Unable to perform ROS: Dementia   Constitutional: Positive for appetite change. Negative for chills, fever and unexpected weight change.   HENT: Negative for trouble swallowing.    Respiratory: Negative for cough, shortness of breath and wheezing.    Cardiovascular: Negative for chest pain.   Gastrointestinal: Negative for abdominal distention, constipation, diarrhea and vomiting.   Neurological: Negative for seizures, syncope, facial asymmetry and speech difficulty.   Psychiatric/Behavioral: Positive for confusion.     Objective:     Vital Signs (Most Recent):  Temp: 97.5 °F  (36.4 °C) (08/14/17 0100)  Pulse: 92 (08/14/17 0253)  Resp: 18 (08/14/17 0100)  BP: 112/65 (08/14/17 0100)  SpO2: (!) 91 % (08/14/17 0100) Vital Signs (24h Range):  Temp:  [97.4 °F (36.3 °C)-97.9 °F (36.6 °C)] 97.5 °F (36.4 °C)  Pulse:  [86-99] 92  Resp:  [18] 18  SpO2:  [91 %-96 %] 91 %  BP: (102-124)/(61-78) 112/65     Weight: 92 kg (202 lb 13.2 oz)  Body mass index is 26.04 kg/m².    Estimated Creatinine Clearance: 12.8 mL/min (based on Cr of 6.8).    Physical Exam   Constitutional: He appears well-developed and well-nourished. No distress.   HENT:   Head: Normocephalic and atraumatic.   Eyes: Conjunctivae and EOM are normal. Pupils are equal, round, and reactive to light.   Neck: Normal range of motion. Neck supple. No thyromegaly present.   Cardiovascular: Normal rate, regular rhythm and normal heart sounds.    Pulmonary/Chest: Effort normal and breath sounds normal. No respiratory distress. He has no wheezes. He has no rales.   Abdominal: Soft. Bowel sounds are normal. He exhibits no distension. There is no tenderness.   Musculoskeletal: Normal range of motion. He exhibits edema (+1 BLE). He exhibits no tenderness or deformity.   Has point tenderness over the lower thoracic region   Neurological: He is alert.   Disoriented.  No focal deficits.   Skin: Skin is warm and dry. No rash noted. No erythema.   Left arm fistula +bruit/thrill     Psychiatric: He has a normal mood and affect.   Nursing note and vitals reviewed.      Significant Labs:   Blood Culture:   Recent Labs  Lab 04/08/17  1325 04/12/17  1435 04/12/17  1436 08/10/17  1845 08/10/17  1900   LABBLOO No growth after 5 days. Gram stain aer bottle: Gram positive cocci in clusters resembling Staph  Results called to and read back by:Angie Summers RN 04/13/2017  18:47  COAGULASE-NEGATIVE STAPHYLOCOCCUS SPECIESOrganism is a probable contaminant No growth after 5 days. Gram stain aer bottle: Gram positive cocci in clusters resembling Staph   Gram stain  mike bottle: Gram positive cocci in clusters resembling Staph   Results called to and read back by: Primitivo Velasco RN  08/11/2017  21:59  STAPHYLOCOCCUS AUREUSSusceptibility pendingID consult required at [facility]. Gram stain aer bottle: Gram positive cocci in clusters resembling Staph   Gram stain mike bottle: Gram positive cocci in clusters resembling Staph   Results called to and read back by:Kia Velasco RN 08/12/2017  04:05  STAPHYLOCOCCUS AUREUSSusceptibility pendingID consult required at [facility].     C4 Count: No results for input(s): C4 in the last 48 hours.    Significant Imaging: I have reviewed all pertinent imaging results/findings within the past 24 hours.

## 2017-08-14 NOTE — CONSULTS
[facility]  Infectious Disease  Consult Note    Patient Name: David Hadley  MRN: [id number]  Admission Date: 8/10/2017  Hospital Length of Stay: 4 days  Attending Physician: Conner Duarte MD  Primary Care Provider: Isaac Fitch MD     Isolation Status: Contact    Patient information was obtained from patient, past medical records and ER records.      Consults  Assessment/Plan:     Dementia without behavioral disturbance    Continue rivastigmine        Type 2 diabetes mellitus with renal complication    Insulin sliding scale , insulin regime as per primary team         Hyperlipidemia    Continue lipitor         ESRD (end stage renal disease) on dialysis    HD as per primary team        * Staphylococcus aureus bacteremia    Source control is needed in all cases of staph bacteremia.  The complex region described in the ultrasound report on the left upper arm fistula is of concern-will consult vascular surgery .  Will plan to do ESR ,CRP and do MRI of the thoracic and lumbar region.  Do cardiac echo   Continue vancomycin with goal trough -15 to 20            Thank you for your consult. I will follow-up with patient. Please contact us if you have any additional questions.    Ganga Choudhury MD  Infectious Disease  [facility]    Subjective:     Principal Problem: Staphylococcus aureus bacteremia    HPI:  62 year old  male with a PMHx of dementia, ESRD on HD, HTN, CAD with remote CABG, HLD, DM II, CML, and h/o  MRSA bacteremia that was managed in April with 6 weeks of IV vancomycin . He presented again at this time with  increased confusion and decreased appetite over the past 1-2 weeks..Patient was sent to ED by PCP for positive blood cultures with gram-positive cocci drawn from AV fistula on Monday.  Patient's niece reports patient received IV Vanc during dialysis yesterday.  Upon arrival to ED, patient noted to be hypotensive with BP 97/67.  WBC 14.  Patient last episode of  bacteremia was 4/2017.  SALAZAR at that time was negative for vegetation.  Since admission, case was discussed with primary team and soft tissue ultrasound of the left arm fistula showed-along the superior aspect of the shunt there is a complex focus measuring 2.1 x 0.7 x 0.9 cm in diameter. .  CT scan of the chest showed large right pleural effusion and thoracentesis was done. Pleural fluid did not show josey pus and pleural fluid protein is 4. Total protein is 8.  He also complaints of  intermittent back pain     Past Medical History:   Diagnosis Date    After-cataract, unspecified - Left Eye 11/27/2013    Allergy     Arthritis     Cancer     CHF (congestive heart failure)     Chronic kidney disease     chemo/ dialias    Dementia     Diabetes mellitus     Hypertension     Myocardial infarction        Past Surgical History:   Procedure Laterality Date    AV FISTULA PLACEMENT      CATARACT EXTRACTION      CORONARY ARTERY BYPASS GRAFT  3-2014       Review of patient's allergies indicates:   Allergen Reactions    Benadryl decongestant Itching     Nothing with benadryl; wife states  No allergy to Benadryl       Medications:  Prescriptions Prior to Admission   Medication Sig    amlodipine (NORVASC) 5 MG tablet Take 1 tablet (5 mg total) by mouth once daily.    atorvastatin (LIPITOR) 20 MG tablet Take 1 tablet (20 mg total) by mouth once daily.    FOLIC ACID/VIT BCOMP,C (JOSAFAT-JANICE ORAL) Take by mouth.    gabapentin (NEURONTIN) 100 MG capsule Take 1 capsule (100 mg total) by mouth 3 (three) times daily.    hydrocodone-acetaminophen 5-325mg (NORCO) 5-325 mg per tablet Take 1 tablet by mouth every 6 to 8 hours as needed for Pain.    levetiracetam (KEPPRA) 500 MG Tab Take 1 tablet (500 mg total) by mouth 2 (two) times daily.    lorazepam (ATIVAN) 0.5 MG tablet Take 1 tablet (0.5 mg total) by mouth every 6 (six) hours as needed for Anxiety.    losartan (COZAAR) 50 MG tablet Take by mouth. 1 tablet Oral Every  day    metoprolol tartrate (LOPRESSOR) 25 MG tablet Take 25 mg by mouth 2 (two) times daily.    rivastigmine (EXELON) 4.6 mg/24 hr PT24 Place 1 patch onto the skin once daily.    sertraline (ZOLOFT) 50 MG tablet Take 1 tablet (50 mg total) by mouth once daily.    sodium bicarbonate 650 MG tablet Take 2 tablets (1,300 mg total) by mouth 3 (three) times daily.    trazodone (DESYREL) 50 MG tablet Take 1 tablet (50 mg total) by mouth every evening.    SENSIPAR 30 mg Tab Take 1 tablet by mouth once daily.    SPRYCEL 100 mg Tab Take 100 mg by mouth once daily.      Antibiotics     Start     Stop Route Frequency Ordered    08/13/17 1600  vancomycin 1 g in dextrose 5 % 250 mL IVPB (ready to mix system)      -- IV Every 48 hours (non-standard times) 08/10/17 2012    08/11/17 1330  moxifloxacin 400 mg/250 mL IVPB 400 mg      -- IV Every 24 hours (non-standard times) 08/11/17 1218        Antifungals     None        Antivirals     None           Immunization History   Administered Date(s) Administered    Influenza A (H1N1) 2009 Monovalent - IM 11/09/2009    Pneumococcal Conjugate - 13 Valent 02/18/2016    Pneumococcal Polysaccharide - 23 Valent 09/15/2009    Tdap 09/15/2009    Zoster 09/15/2009       Family History     Problem Relation (Age of Onset)    Cancer Brother    Colon cancer Sister    Glaucoma Mother    Pancreatic cancer Brother    Prostate cancer Father        Social History     Social History    Marital status:      Spouse name: N/A    Number of children: 1    Years of education: N/A     Social History Main Topics    Smoking status: Former Smoker     Types: Cigarettes     Quit date: 1/28/2000    Smokeless tobacco: Never Used    Alcohol use No    Drug use: No    Sexual activity: Not Currently     Birth control/ protection: None     Other Topics Concern    None     Social History Narrative    None     Review of Systems   Unable to perform ROS: Dementia   Constitutional: Positive for  appetite change. Negative for chills, fever and unexpected weight change.   HENT: Negative for trouble swallowing.    Respiratory: Negative for cough, shortness of breath and wheezing.    Cardiovascular: Negative for chest pain.   Gastrointestinal: Negative for abdominal distention, constipation, diarrhea and vomiting.   Neurological: Negative for seizures, syncope, facial asymmetry and speech difficulty.   Psychiatric/Behavioral: Positive for confusion.     Objective:     Vital Signs (Most Recent):  Temp: 97.5 °F (36.4 °C) (08/14/17 0100)  Pulse: 92 (08/14/17 0253)  Resp: 18 (08/14/17 0100)  BP: 112/65 (08/14/17 0100)  SpO2: (!) 91 % (08/14/17 0100) Vital Signs (24h Range):  Temp:  [97.4 °F (36.3 °C)-97.9 °F (36.6 °C)] 97.5 °F (36.4 °C)  Pulse:  [86-99] 92  Resp:  [18] 18  SpO2:  [91 %-96 %] 91 %  BP: (102-124)/(61-78) 112/65     Weight: 92 kg (202 lb 13.2 oz)  Body mass index is 26.04 kg/m².    Estimated Creatinine Clearance: 12.8 mL/min (based on Cr of 6.8).    Physical Exam   Constitutional: He appears well-developed and well-nourished. No distress.   HENT:   Head: Normocephalic and atraumatic.   Eyes: Conjunctivae and EOM are normal. Pupils are equal, round, and reactive to light.   Neck: Normal range of motion. Neck supple. No thyromegaly present.   Cardiovascular: Normal rate, regular rhythm and normal heart sounds.    Pulmonary/Chest: Effort normal and breath sounds normal. No respiratory distress. He has no wheezes. He has no rales.   Abdominal: Soft. Bowel sounds are normal. He exhibits no distension. There is no tenderness.   Musculoskeletal: Normal range of motion. He exhibits edema (+1 BLE). He exhibits no tenderness or deformity.   Has point tenderness over the lower thoracic region   Neurological: He is alert.   Disoriented.  No focal deficits.   Skin: Skin is warm and dry. No rash noted. No erythema.   Left arm fistula +bruit/thrill     Psychiatric: He has a normal mood and affect.   Nursing note and  vitals reviewed.      Significant Labs:   Blood Culture:   Recent Labs  Lab 04/08/17  1325 04/12/17  1435 04/12/17  1436 08/10/17  1845 08/10/17  1900   LABBLOO No growth after 5 days. Gram stain aer bottle: Gram positive cocci in clusters resembling Staph  Results called to and read back by:Angie Summers RN 04/13/2017  18:47  COAGULASE-NEGATIVE STAPHYLOCOCCUS SPECIESOrganism is a probable contaminant No growth after 5 days. Gram stain aer bottle: Gram positive cocci in clusters resembling Staph   Gram stain mike bottle: Gram positive cocci in clusters resembling Staph   Results called to and read back by: Primitivo Velasco RN  08/11/2017  21:59  STAPHYLOCOCCUS AUREUSSusceptibility pendingID consult required at [facility]. Gram stain aer bottle: Gram positive cocci in clusters resembling Staph   Gram stain mike bottle: Gram positive cocci in clusters resembling Staph   Results called to and read back by:Kia Velasco RN 08/12/2017  04:05  STAPHYLOCOCCUS AUREUSSusceptibility pendingID consult required at [facility].     C4 Count: No results for input(s): C4 in the last 48 hours.    Significant Imaging: I have reviewed all pertinent imaging results/findings within the past 24 hours.

## 2017-08-14 NOTE — ASSESSMENT & PLAN NOTE
-S/p thoracentesis 8/12/17  -Pleural/serum Protein ratio is 0.5  -Awaiting cytology  -Thoracic MRI 8/14/17 shows bilateral pleural effusion. R>L

## 2017-08-14 NOTE — PROGRESS NOTES
Vancomycin Progress Notes:    Dx: Bactremia-Staph Aureus-sens pending  Random = 16.6 @ 0525  Last dose given at 8/13 @ 1548  Random is about 19.5 hrs from last dose  Scr = 8.5 @ 0525    wbc = 12.52  Tmax = 97.9 F  HD schedule is MWF   Hx: mulitple hx of MRSA  Confirmed with Moe in Dialysis pt is receiving HD today will give  750 mg dose after HD today  Next random due 8/15 @ 0430/Madie Siegel [facility] 8/14/2017 7:26 AM

## 2017-08-14 NOTE — SUBJECTIVE & OBJECTIVE
Interval History: MRI Lumbar spine negative for abscess. Seen by Vascular surgery who did not feel fistula is source of infection.  Infectious Disease following.     Review of Systems   Unable to perform ROS: Dementia     Objective:     Vital Signs (Most Recent):  Temp: 98 °F (36.7 °C) (08/14/17 1210)  Pulse: 87 (08/14/17 1210)  Resp: 18 (08/14/17 1136)  BP: 129/86 (08/14/17 1210)  SpO2: 96 % (08/14/17 1136) Vital Signs (24h Range):  Temp:  [97.4 °F (36.3 °C)-98 °F (36.7 °C)] 98 °F (36.7 °C)  Pulse:  [83-99] 87  Resp:  [16-18] 18  SpO2:  [91 %-96 %] 96 %  BP: (107-129)/(65-91) 129/86     Weight: 92 kg (202 lb 13.2 oz)  Body mass index is 26.04 kg/m².    Intake/Output Summary (Last 24 hours) at 08/14/17 1613  Last data filed at 08/13/17 1735   Gross per 24 hour   Intake              500 ml   Output                1 ml   Net              499 ml      Physical Exam   Constitutional: He appears well-developed and well-nourished. No distress.   HENT:   Head: Normocephalic and atraumatic.   Eyes: Conjunctivae and EOM are normal. Pupils are equal, round, and reactive to light.   Neck: Normal range of motion. Neck supple. No thyromegaly present.   Cardiovascular: Normal rate, regular rhythm and normal heart sounds.    Pulmonary/Chest: Effort normal and breath sounds normal. No respiratory distress. He has no wheezes. He has no rales.   Abdominal: Soft. Bowel sounds are normal. He exhibits no distension. There is no tenderness.   Musculoskeletal: Normal range of motion. He exhibits edema (+1 BLE). He exhibits no tenderness or deformity.   Neurological: He is alert.   Disoriented.  No focal deficits.   Skin: Skin is warm and dry. No rash noted. No erythema.   Left arm fistula +bruit/thrill     Psychiatric: He has a normal mood and affect.   Nursing note and vitals reviewed.    Significant Labs:   CBC:   Recent Labs  Lab 08/13/17  0616 08/14/17  0525   WBC 13.75* 12.52   HGB 11.5* 11.3*   HCT 34.8* 34.3*    243     CMP:    Recent Labs  Lab 08/12/17  1648 08/13/17  0616 08/14/17  0525   NA  --  134*  134* 134*  132*   K  --  4.4  4.3 4.5  4.4   CL  --  96  95 95  95   CO2  --  25  27 25  26   * 104  104 81  82   BUN  --  36*  35* 44*  41*   CREATININE  --  6.8*  6.8* 7.9*  7.9*   CALCIUM  --  8.8  8.9 8.8  8.5*   PROT 8.0  --   --    ALBUMIN  --  2.4* 2.3*   ANIONGAP  --  13  12 14  11   EGFRNONAA  --  8*  8* 7*  7*       Significant Imaging:   Imaging Results          MRI Lumbar Spine Without Contrast (Final result)  Result time 08/14/17 16:08:31    Final result by Dickson Jaramillo MD (08/14/17 16:08:31)                 Impression:        Small spinal canal and congenital basis.  Broad-based disc bulges at the L4-L5 and L5-S1 levels.  Bilateral degenerative changes of facets at the L3-L4 L4-L5 and L5-S1 levels.  No definite nerve impingement at any level for bilateral L3-L4 and L5 nerve contents cannot be excluded.  Abnormal signal in the vertebral bodies assistant with hematopoietic bone marrow or an infiltrative process.  Would correlate with clinical history and or bone scan for further evaluation.  No evidence of discitis identified.      Electronically signed by: DICKSON JARAMILLO MD  Date:     08/14/17  Time:    16:08              Narrative:    MRI lumbar spine without contrast.08/14/17 14:29:14    History:   low back pain., rule out abscess/discitis    Standard multiplanar noncontrast MRI sequences of the lumbar spine.    The distal cord and conus reveal normal signal and morphology.  Abnormal signal in the vertebral bodies possibly representing anemia versus an infiltrative process including metastatic disease.    L1-2: Normal.    L2-3: Normal.    L3-4: Minimal disc.  Mild neural foraminal narrowing.    L4-5: Minimal broad-based disc bulge.  Mild desiccation of the disc and mild degenerative changes of the facets with mild bilateral bony neural foraminal narrowing.    L5-S1:  Minimal broad-based disc bulge.   Mild degenerative changes of the facets.  Bilateral bony neural foraminal narrowing with possible but not definite L5 nerve root impingement                             MRI Thoracic Spine Without Contrast (Final result)  Result time 08/14/17 15:33:41    Final result by Dickson Jaramillo MD (08/14/17 15:33:41)                 Impression:        Spinal canal is within normal limits.  No impingements in the cord or abnormal signal in the cord identified.  Bilateral pleural effusions larger on the right side.  Pleural effusions on the left side appear loculated.  No evidence of fluid in the disc spaces to suggest discitis.  No epidural mass or fluid collection identified.      Electronically signed by: DICKSON JARAMILLO MD  Date:     08/14/17  Time:    15:33              Narrative:    MRI Thoracic spine without contrast.08/14/17 14:29:38    History:   Back pain., rule out discitis /abscess    Standard multiplanar noncontrast MRI sequences of the thoracic spine with and without contrast.    Findings:  The spinal canal is within normal limits. No signal abnormality in the spinal cord.Normal vertebral bodies. The disc spaces are normal.  Bilateral pleural effusions larger on the right.  No evidence of discitis identified.                             X-Ray Chest 1 View (Final result)  Result time 08/12/17 17:34:23    Final result by Dickson Green III, MD (08/12/17 17:34:23)                 Impression:     Improving chest x-ray with decrease in the right pleural effusion consistent with thoracentesis. No evidence of pneumothorax.      Electronically signed by: DICKSON GREEN MD  Date:     08/12/17  Time:    17:34              Narrative:    Chest x-ray, single view.    Clinical indication: Shortness of breath status post thoracentesis.    The chest shows improvement with better aeration of the right base and significant decrease in the pleural effusion. No evidence of pneumothorax status post thoracentesis. Chest otherwise  unchanged.                             US Soft Tissue Misc (Final result)  Result time 08/12/17 17:23:38    Final result by Dickson Green III, MD (08/12/17 17:23:38)                 Impression:     Complex/heterogeneous density just above the region of the shunt of indeterminate significance and etiology, please correlate. This is not a simple fluid collection and doesn't have the classic appearance of an abscess.      Electronically signed by: DICKSON GREEN MD  Date:     08/12/17  Time:    17:23              Narrative:    Limited ultrasound near the patient's dialysis shunt, left upper extremity    Along the superior aspect of the shunt there is a complex focus measuring 2.1 x 0.7 x 0.9 cm in diameter. Etiology is indeterminate. This does not have the classic appearance of an abscess but please correlate. No flow by Doppler analysis. No gross evidence of discomfort/pain.                             US Chest Mediastinum (Edited Result - FINAL)  Result time 08/12/17 17:22:01    Addendum 1 of 1 by Dickson Green III, MD (08/12/17 17:22:01)    Addendum.    The procedure should be listed as ultrasound of the chest, not CT of the chest.   As above      Electronically signed by: DICKSON GREEN MD  Date:     08/12/17  Time:    17:22                Final result by Dickson Green III, MD (08/12/17 17:20:58)                 Impression:     As above.      Electronically signed by: DICKSON GREEN MD  Date:     08/12/17  Time:    17:20              Narrative:    CT of the chest.    Clinical indication: Pleural effusion.    Ultrasound of the chest reveals a moderate right pleural effusion and minimal left effusion.. Appropriate skin site was marked prior to thoracentesis performed by Dr. Balderas.                             CT Abdomen Pelvis With Contrast (Final result)  Result time 08/11/17 19:22:53    Final result by Shivam Ambrose MD (08/11/17 19:22:53)                 Impression:              Right greater than left pleural effusions, associated with compressive atelectasis/consolidation.    Heavily calcified coronary arteries.    Rectal fecal impaction noted.      Electronically signed by: SHIVAM AMBROSE MD  Date:     08/11/17  Time:    19:22              Narrative:    Exam: CT chest with contrast.    History: Gram-positive cocci bacteremia    Findings: Large right and smaller pleural effusions are present. There is extensive compressive atelectasis/consolidation in the right base. Less pronounced findings in the left base. No mediastinal or hilar adenopathy.    Heavily calcified coronary arteries noted.    No osteolytic or osteoblastic lesions are identified. The    The liver, spleen, pancreas, kidneys and adrenal glands are unremarkable.    No free fluid, adenopathy, mesenteric inflammatory change is apparent.    Rectal fecal impaction noted.                             CT Chest With Contrast (Final result)  Result time 08/11/17 19:22:54    Final result by Shivam Ambrose MD (08/11/17 19:22:54)                 Impression:             Right greater than left pleural effusions, associated with compressive atelectasis/consolidation.    Heavily calcified coronary arteries.    Rectal fecal impaction noted.      Electronically signed by: SHIVAM AMBROSE MD  Date:     08/11/17  Time:    19:22              Narrative:    Exam: CT chest with contrast.    History: Gram-positive cocci bacteremia    Findings: Large right and smaller pleural effusions are present. There is extensive compressive atelectasis/consolidation in the right base. Less pronounced findings in the left base. No mediastinal or hilar adenopathy.    Heavily calcified coronary arteries noted.    No osteolytic or osteoblastic lesions are identified. The    The liver, spleen, pancreas, kidneys and adrenal glands are unremarkable.    No free fluid, adenopathy, mesenteric inflammatory change is apparent.    Rectal fecal impaction noted.                              X-Ray Chest 1 View (Final result)  Result time 08/10/17 21:10:07    Final result by Shivam Ambrose MD (08/10/17 21:10:07)                 Impression:     Right basilar consolidation. See above. Otherwise no significant change from prior exam.      Electronically signed by: SHIVAM AMBROSE MD  Date:     08/10/17  Time:    21:10              Narrative:    Exam: Chest X-ray, one view.    History: Shortness of breath    Findings: Comparison is made to prior exam dated 05/03/2017. Mild cardiomegaly again noted. Right basilar consolidation is no apparent, likely related to combination of pleural fluid and airspace disease. Sternal wires again noted.

## 2017-08-14 NOTE — HPI
62 year old male ESRD on HD  Staph bacteremia, suspect from from AV graft left arm  Leucocytosis improved  Wbcc 12.1

## 2017-08-14 NOTE — ASSESSMENT & PLAN NOTE
Source control is needed in all cases of staph bacteremia.  The complex region described in the ultrasound report on the left upper arm fistula is of concern-will consult vascular surgery .  Will plan to do ESR ,CRP and do MRI of the thoracic and lumbar region.  Do cardiac echo   Continue vancomycin with goal trough -15 to 20

## 2017-08-14 NOTE — PLAN OF CARE
Problem: Patient Care Overview  Goal: Plan of Care Review  Outcome: Ongoing (interventions implemented as appropriate)  Pt remains free from falls and injuries bed lock call light within reach. Denies pain. Family at bedside. Pt dialysis today removed 3 liters of fluid. POC reviewed with pt and family verbalized understanding. Blood glucose monitoring done no supplemental insulin given per sliding scale . Pt remains  on contact isolation aseptic technique  Maintained. 12 hr chart check complete.

## 2017-08-14 NOTE — PROGRESS NOTES
[facility] department reviewed pt medicaid status and determined that pt does have medicaid qmb which covers cost of medicare premiums.      Pt is over the income limit and does not qualify for full medicaid.

## 2017-08-14 NOTE — CONSULTS
[facility]  Vascular Surgery  Consult Note    Consults  Subjective:     Chief Complaint/Reason for Admission: eval right arm AVF    History of Present Illness:  62 year old  male with a PMHx of dementia, ESRD on HD, HTN, CAD with remote CABG, HLD, DM II, CML, and h/o  MRSA bacteremia that was managed in April with 6 weeks of IV vancomycin . He presented again at this time with  increased confusion and decreased appetite over the past 1-2 weeks..Patient was sent to ED by PCP for positive blood cultures with gram-positive cocci drawn from AV fistula on Monday.  Patient's niece reports patient received IV Vanc during dialysis yesterday.  Upon arrival to ED, patient noted to be hypotensive with BP 97/67.  WBC 14.  Patient last episode of bacteremia was 4/2017.  SALAZAR at that time was negative for vegetation.  CT scan of the chest showed large right pleural effusion and thoracentesis was done. Pleural fluid did not show josey pus and pleural fluid protein is 4. Total protein is 8.  He also complaints of  intermittent back pain     Currently dialyzed via left arm BC  AVF    Prescriptions Prior to Admission   Medication Sig Dispense Refill Last Dose    amlodipine (NORVASC) 5 MG tablet Take 1 tablet (5 mg total) by mouth once daily. 30 tablet 6 Taking    atorvastatin (LIPITOR) 20 MG tablet Take 1 tablet (20 mg total) by mouth once daily. 90 tablet 3 Taking    FOLIC ACID/VIT BCOMP,C (JOSAFAT-JANICE ORAL) Take by mouth.   Taking    gabapentin (NEURONTIN) 100 MG capsule Take 1 capsule (100 mg total) by mouth 3 (three) times daily. 270 capsule 3 Taking    hydrocodone-acetaminophen 5-325mg (NORCO) 5-325 mg per tablet Take 1 tablet by mouth every 6 to 8 hours as needed for Pain. 60 tablet 0 Taking    levetiracetam (KEPPRA) 500 MG Tab Take 1 tablet (500 mg total) by mouth 2 (two) times daily. 60 tablet 11 Taking    lorazepam (ATIVAN) 0.5 MG tablet Take 1 tablet (0.5 mg total) by mouth every 6 (six) hours as needed  for Anxiety. 30 tablet 1 Taking    losartan (COZAAR) 50 MG tablet Take by mouth. 1 tablet Oral Every day   Taking    metoprolol tartrate (LOPRESSOR) 25 MG tablet Take 25 mg by mouth 2 (two) times daily.   Taking    rivastigmine (EXELON) 4.6 mg/24 hr PT24 Place 1 patch onto the skin once daily. 30 patch 11 Taking    sertraline (ZOLOFT) 50 MG tablet Take 1 tablet (50 mg total) by mouth once daily. 30 tablet 11 Taking    sodium bicarbonate 650 MG tablet Take 2 tablets (1,300 mg total) by mouth 3 (three) times daily.   Taking    trazodone (DESYREL) 50 MG tablet Take 1 tablet (50 mg total) by mouth every evening. 26 tablet 11 Taking    SENSIPAR 30 mg Tab Take 1 tablet by mouth once daily.   Taking    SPRYCEL 100 mg Tab Take 100 mg by mouth once daily.    Taking       Review of patient's allergies indicates:   Allergen Reactions    Benadryl decongestant Itching     Nothing with benadryl; wife states  No allergy to Benadryl       Past Medical History:   Diagnosis Date    After-cataract, unspecified - Left Eye 11/27/2013    Allergy     Arthritis     Cancer     CHF (congestive heart failure)     Chronic kidney disease     chemo/ dialias    Dementia     Diabetes mellitus     Hypertension     Myocardial infarction      Past Surgical History:   Procedure Laterality Date    AV FISTULA PLACEMENT      CATARACT EXTRACTION      CORONARY ARTERY BYPASS GRAFT  3-2014     Family History     Problem Relation (Age of Onset)    Cancer Brother    Colon cancer Sister    Glaucoma Mother    Pancreatic cancer Brother    Prostate cancer Father        Social History Main Topics    Smoking status: Former Smoker     Types: Cigarettes     Quit date: 1/28/2000    Smokeless tobacco: Never Used    Alcohol use No    Drug use: No    Sexual activity: Not Currently     Birth control/ protection: None     Review of Systems   Negative except for above    Objective:     Vital Signs (Most Recent):  Temp: 98 °F (36.7 °C) (08/14/17  1210)  Pulse: 87 (08/14/17 1210)  Resp: 18 (08/14/17 1136)  BP: 129/86 (08/14/17 1210)  SpO2: 96 % (08/14/17 1136) Vital Signs (24h Range):  Temp:  [97.4 °F (36.3 °C)-98 °F (36.7 °C)] 98 °F (36.7 °C)  Pulse:  [83-99] 87  Resp:  [16-18] 18  SpO2:  [91 %-96 %] 96 %  BP: (107-129)/(65-91) 129/86     Weight: 92 kg (202 lb 13.2 oz)  Body mass index is 26.04 kg/m².         Physical Exam  Constitutional: He appears well-developed and well-nourished. No distress.   HENT:   Head: Normocephalic and atraumatic.   Eyes: Conjunctivae and EOM are normal. Pupils are equal, round, and reactive to light.   Neck: Normal range of motion. Neck supple. No thyromegaly present.   Cardiovascular: Normal rate, regular rhythm and normal heart sounds.    Pulmonary/Chest: Effort normal and breath sounds normal. No respiratory distress. He has no wheezes. He has no rales.   Abdominal: Soft. Bowel sounds are normal. He exhibits no distension. There is no tenderness.   Musculoskeletal: Normal range of motion. He exhibits edema (+1 BLE). He exhibits no tenderness or deformity.   Has point tenderness over the lower thoracic region   Neurological: He is alert.   Disoriented.  No focal deficits.   Skin: Skin is warm and dry. No rash noted. No erythema.   Left arm fistula +bruit/thrill, no erythema or signs of infection     Psychiatric: He has a normal mood and affect.   Nursing note and vitals reviewed.  Significant Labs:    BMP:   Recent Labs  Lab 08/14/17  0525   GLU 81  82   *  132*   K 4.5  4.4   CL 95  95   CO2 25  26   BUN 44*  41*   CREATININE 7.9*  7.9*   CALCIUM 8.8  8.5*     CBC:   Recent Labs  Lab 08/14/17  0525   WBC 12.52   RBC 4.10*   HGB 11.3*   HCT 34.3*      MCV 84   MCH 27.6   MCHC 32.9       Significant Diagnostics:  U/S: no evidence of abscess or fluid collection    Assessment/Plan:     Active Diagnoses:    Diagnosis Date Noted POA    PRINCIPAL PROBLEM:  Staphylococcus aureus bacteremia [R78.81]  Yes     Bilateral pleural effusion [J90] 08/12/2017 Yes    Hypophosphatemia [E83.39] 08/11/2017 Unknown    Dementia without behavioral disturbance [F03.90] 07/29/2016 Yes     Chronic    Type 2 diabetes mellitus with renal complication [E11.29] 07/29/2016 Yes     Chronic    Hyperlipidemia [E78.5] 07/29/2016 Yes     Chronic    ESRD (end stage renal disease) on dialysis [N18.6, Z99.2] 06/26/2013 Not Applicable     Chronic    CAD with remote CABG [Z95.1] 06/26/2013 Not Applicable     Chronic      Problems Resolved During this Admission:    Diagnosis Date Noted Date Resolved POA    Hypotension with h/o hypertension [I10]  08/14/2017 Yes     Left arm BC AVF working well  Excellent thrill  No signs of infection or abscess  F/u MRI back  IV Abx per primary team  No vascular surgical intervention required at this time  Will sign off    Thank you for your consult. I will sign off. Please contact us if you have any additional questions.    Jose Street IV, MD  Vascular Surgery  [facility]

## 2017-08-14 NOTE — PROGRESS NOTES
[facility]   Nephrology  Progress Note    Patient Name: David Hadley  MRN: [id number]  Admission Date: 8/10/2017  Hospital Length of Stay: 4 days  Attending Provider: Conner Duarte MD   Primary Care Physician: Isaac Fitch MD  Principal Problem:Staphylococcus aureus bacteremia    Subjective:     HPI: [id number] with multiple histories of MRSA infections with blood cultures being positive.  Has been admitted with positive blood cultures in April 2017 status post  Prolonged antibiotic course of vancomycin.  May 2017 admitted for syncope.  Has ESRD on hemodialysis.  Schedule for dialysis Tuesday Thursday and Saturday.  Last dialysis was today.     [place] [place]     Interval History:     8/11/17: Patient today denies any fevers, pain, SOB, nausea.     8/12/17: Patient is resting in chair, no complaints at present.     8/13/17: Patient without complaints today.     8/14/17: Patient is currently on hemodialysis. No complications with procedure. Vital signs are stable. Patient is resting comfortably, he denies any complaints.         Review of patient's allergies indicates:   Allergen Reactions    Benadryl decongestant Itching     Nothing with benadryl; wife states  No allergy to Benadryl     Current Facility-Administered Medications   Medication Frequency    0.9%  NaCl infusion PRN    0.9%  NaCl infusion Once    atorvastatin tablet 20 mg Daily    dextrose 50% injection 12.5 g PRN    dextrose 50% injection 25 g PRN    diphenhydrAMINE capsule 25 mg Nightly PRN    glucagon (human recombinant) injection 1 mg PRN    glucose chewable tablet 16 g PRN    glucose chewable tablet 24 g PRN    heparin (porcine) injection 1,000 Units PRN    insulin aspart pen 0-5 Units QID (AC + HS) PRN    levetiracetam tablet 500 mg BID    lidocaine (PF) 10 mg/ml (1%) injection 10 mg Once    lorazepam tablet 0.5 mg Q6H PRN    moxifloxacin 400 mg/250 mL IVPB 400 mg Q24H    rivastigmine  4.6 mg/24 hr 1 patch Daily    sertraline tablet 50 mg Daily    sodium bicarbonate tablet 1,300 mg TID    vancomycin 1 g in dextrose 5 % 250 mL IVPB (ready to mix system) Q48H    vancomycin 750 mg in dextrose 5 % 250 mL IVPB (ready to mix system) Once       Objective:     Vital Signs (Most Recent):  Temp: 97.7 °F (36.5 °C) (08/14/17 0751)  Pulse: 86 (08/14/17 0751)  Resp: 16 (08/14/17 0751)  BP: 107/79 (08/14/17 0751)  SpO2: 95 % (08/14/17 0751)  O2 Device (Oxygen Therapy): room air (08/14/17 0751) Vital Signs (24h Range):  Temp:  [97.4 °F (36.3 °C)-97.9 °F (36.6 °C)] 97.7 °F (36.5 °C)  Pulse:  [83-99] 86  Resp:  [16-18] 16  SpO2:  [91 %-95 %] 95 %  BP: (107-124)/(65-79) 107/79     Weight: 92 kg (202 lb 13.2 oz) (08/11/17 1400)  Body mass index is 26.04 kg/m².  Body surface area is 2.19 meters squared.    I/O last 3 completed shifts:  In: 500 [IV Piggyback:500]  Out: -     Physical Exam   Constitutional: He appears well-developed. He is cooperative.   Alert and responsive.   HENT:   Head: Normocephalic.   Nose: No rhinorrhea.   Mouth/Throat: Mucous membranes are normal. No oropharyngeal exudate.   Neck: No thyroid mass present.   Cardiovascular: Normal rate, regular rhythm, S1 normal, S2 normal and intact distal pulses.    Pulmonary/Chest: Effort normal. No respiratory distress. He has no wheezes.   Abdominal: Soft. Bowel sounds are normal. He exhibits no distension. There is no tenderness. No hernia.   Musculoskeletal:   Mild bilateral LE edema.   Lymphadenopathy:     He has no cervical adenopathy.   Neurological: He is alert.   Skin: Skin is warm and dry.   Psychiatric: He has a normal mood and affect.       Significant Labs:  Lab Results   Component Value Date    CREATININE 7.9 (H) 08/14/2017    CREATININE 7.9 (H) 08/14/2017    BUN 41 (H) 08/14/2017    BUN 44 (H) 08/14/2017     (L) 08/14/2017     (L) 08/14/2017    K 4.4 08/14/2017    K 4.5 08/14/2017    CL 95 08/14/2017    CL 95 08/14/2017    CO2  26 08/14/2017    CO2 25 08/14/2017     Lab Results   Component Value Date     (H) 11/06/2008    CALCIUM 8.5 (L) 08/14/2017    CALCIUM 8.8 08/14/2017    PHOS 3.1 08/14/2017     Lab Results   Component Value Date    ALBUMIN 2.3 (L) 08/14/2017     Lab Results   Component Value Date    WBC 12.52 08/14/2017    HGB 11.3 (L) 08/14/2017    HCT 34.3 (L) 08/14/2017    MCV 84 08/14/2017     08/14/2017       Recent Labs  Lab 08/11/17  0527   MG 2.0         Significant Imaging:  Imaging Results          X-Ray Chest 1 View (Final result)  Result time 08/12/17 17:34:23    Final result by Dickson Green III, MD (08/12/17 17:34:23)                 Impression:     Improving chest x-ray with decrease in the right pleural effusion consistent with thoracentesis. No evidence of pneumothorax.      Electronically signed by: DICKSON GREEN MD  Date:     08/12/17  Time:    17:34              Narrative:    Chest x-ray, single view.    Clinical indication: Shortness of breath status post thoracentesis.    The chest shows improvement with better aeration of the right base and significant decrease in the pleural effusion. No evidence of pneumothorax status post thoracentesis. Chest otherwise unchanged.                             US Soft Tissue Misc (Final result)  Result time 08/12/17 17:23:38    Final result by Dickson Green III, MD (08/12/17 17:23:38)                 Impression:     Complex/heterogeneous density just above the region of the shunt of indeterminate significance and etiology, please correlate. This is not a simple fluid collection and doesn't have the classic appearance of an abscess.      Electronically signed by: DICKSON GREEN MD  Date:     08/12/17  Time:    17:23              Narrative:    Limited ultrasound near the patient's dialysis shunt, left upper extremity    Along the superior aspect of the shunt there is a complex focus measuring 2.1 x 0.7 x 0.9 cm in diameter. Etiology is  indeterminate. This does not have the classic appearance of an abscess but please correlate. No flow by Doppler analysis. No gross evidence of discomfort/pain.                             US Chest Mediastinum (Edited Result - FINAL)  Result time 08/12/17 17:22:01    Addendum 1 of 1 by Dickson Castro III, MD (08/12/17 17:22:01)    Addendum.    The procedure should be listed as ultrasound of the chest, not CT of the chest.   As above      Electronically signed by: DICKSON CASTRO MD  Date:     08/12/17  Time:    17:22                Final result by Dickson Castro III, MD (08/12/17 17:20:58)                 Impression:     As above.      Electronically signed by: DICKSON CASTRO MD  Date:     08/12/17  Time:    17:20              Narrative:    CT of the chest.    Clinical indication: Pleural effusion.    Ultrasound of the chest reveals a moderate right pleural effusion and minimal left effusion.. Appropriate skin site was marked prior to thoracentesis performed by Dr. Balderas.                             CT Abdomen Pelvis With Contrast (Final result)  Result time 08/11/17 19:22:53    Final result by Shivam Hale MD (08/11/17 19:22:53)                 Impression:             Right greater than left pleural effusions, associated with compressive atelectasis/consolidation.    Heavily calcified coronary arteries.    Rectal fecal impaction noted.      Electronically signed by: SHIVAM HALE MD  Date:     08/11/17  Time:    19:22              Narrative:    Exam: CT chest with contrast.    History: Gram-positive cocci bacteremia    Findings: Large right and smaller pleural effusions are present. There is extensive compressive atelectasis/consolidation in the right base. Less pronounced findings in the left base. No mediastinal or hilar adenopathy.    Heavily calcified coronary arteries noted.    No osteolytic or osteoblastic lesions are identified. The    The liver, spleen, pancreas, kidneys and adrenal  glands are unremarkable.    No free fluid, adenopathy, mesenteric inflammatory change is apparent.    Rectal fecal impaction noted.                             CT Chest With Contrast (Final result)  Result time 08/11/17 19:22:54    Final result by Shivam Ambrose MD (08/11/17 19:22:54)                 Impression:             Right greater than left pleural effusions, associated with compressive atelectasis/consolidation.    Heavily calcified coronary arteries.    Rectal fecal impaction noted.      Electronically signed by: SHIVAM AMBROSE MD  Date:     08/11/17  Time:    19:22              Narrative:    Exam: CT chest with contrast.    History: Gram-positive cocci bacteremia    Findings: Large right and smaller pleural effusions are present. There is extensive compressive atelectasis/consolidation in the right base. Less pronounced findings in the left base. No mediastinal or hilar adenopathy.    Heavily calcified coronary arteries noted.    No osteolytic or osteoblastic lesions are identified. The    The liver, spleen, pancreas, kidneys and adrenal glands are unremarkable.    No free fluid, adenopathy, mesenteric inflammatory change is apparent.    Rectal fecal impaction noted.                             X-Ray Chest 1 View (Final result)  Result time 08/10/17 21:10:07    Final result by Shivam Ambrose MD (08/10/17 21:10:07)                 Impression:     Right basilar consolidation. See above. Otherwise no significant change from prior exam.      Electronically signed by: SHIVAM AMBROSE MD  Date:     08/10/17  Time:    21:10              Narrative:    Exam: Chest X-ray, one view.    History: Shortness of breath    Findings: Comparison is made to prior exam dated 05/03/2017. Mild cardiomegaly again noted. Right basilar consolidation is no apparent, likely related to combination of pleural fluid and airspace disease. Sternal wires again noted.                                Assessment/Plan:     Hypophosphatemia     Has resolved.         Hypotension with h/o hypertension    Good BP control at present.         ESRD (end stage renal disease) on dialysis    Patient is on MWF dialysis schedule at [facility] (he does not know name of his outpatient nephrologist). Access is left AVF (no obvious signs of infection).  Next scheduled HD will be today.           * Staphylococcus aureus bacteremia    Continue Vancomycin. Cultures revealed Staph. Aureus. Sensitivities are pending.             Thank you for your consult. I will follow-up with patient. Please contact us if you have any additional questions.    Alfonso Sanchez MD  Nephrology  [facility]

## 2017-08-14 NOTE — PROGRESS NOTES
[facility] -   Pulmonology  Progress Note    Patient Name: David Hadley  MRN: [id number]  Admission Date: 8/10/2017  Hospital Length of Stay: 4 days  Code Status: Full Code  Attending Provider: Conner Duarte MD  Primary Care Provider: Isaac Fitch MD   Principal Problem: Staphylococcus aureus bacteremia    Subjective:     Interval History:     Tolerates interventions without adverse effect  MRI scheduled  DW Niece at bedside    Objective:     Vital Signs (Most Recent):  Temp: 98 °F (36.7 °C) (08/14/17 1210)  Pulse: 87 (08/14/17 1210)  Resp: 18 (08/14/17 1136)  BP: 129/86 (08/14/17 1210)  SpO2: 96 % (08/14/17 1136) Vital Signs (24h Range):  Temp:  [97.4 °F (36.3 °C)-98 °F (36.7 °C)] 98 °F (36.7 °C)  Pulse:  [83-99] 87  Resp:  [16-18] 18  SpO2:  [91 %-96 %] 96 %  BP: (107-129)/(65-91) 129/86     Weight: 92 kg (202 lb 13.2 oz)  Body mass index is 26.04 kg/m².      Intake/Output Summary (Last 24 hours) at 08/14/17 1349  Last data filed at 08/13/17 1735   Gross per 24 hour   Intake              500 ml   Output                1 ml   Net              499 ml       Physical Exam   Constitutional: He appears well-developed and well-nourished.   HENT:   Head: Normocephalic.   Mouth/Throat: Oropharynx is clear and moist.   Eyes: EOM and lids are normal. Pupils are equal, round, and reactive to light.   Blind   Neck: Normal range of motion. Neck supple. No JVD present.   Cardiovascular: Normal rate, regular rhythm and normal heart sounds.    Pulmonary/Chest: Effort normal and breath sounds normal.   Musculoskeletal: Normal range of motion.   Neurological: He is alert.   Skin: Skin is warm and dry.   Nursing note and vitals reviewed.      Vents:  Oxygen Concentration (%): 28 (08/11/17 1117)    Lines/Drains/Airways     Drain                 Hemodialysis AV Fistula Left upper arm 82920 days          Peripheral Intravenous Line                 Peripheral IV - Single Lumen 08/10/17 Right Antecubital 4 days                 Significant Labs:    CBC/Anemia Profile:    Recent Labs  Lab 08/13/17  0616 08/14/17  0525   WBC 13.75* 12.52   HGB 11.5* 11.3*   HCT 34.8* 34.3*    243   MCV 84 84   RDW 16.7* 16.8*        Chemistries:    Recent Labs  Lab 08/12/17  1648 08/13/17  0616 08/14/17  0525   NA  --  134*  134* 134*  132*   K  --  4.4  4.3 4.5  4.4   CL  --  96  95 95  95   CO2  --  25  27 25  26   BUN  --  36*  35* 44*  41*   CREATININE  --  6.8*  6.8* 7.9*  7.9*   CALCIUM  --  8.8  8.9 8.8  8.5*   ALBUMIN  --  2.4* 2.3*   PROT 8.0  --   --    PHOS  --  3.2 3.1       Blood Culture: No results for input(s): LABBLOO in the last 48 hours.  All pertinent labs within the past 24 hours have been reviewed.    Significant Imaging:  I have reviewed and interpreted all pertinent imaging results/findings within the past 24 hours.     WBC, Body Fluid /cu mm 934   Comments: Reference ranges for body fluids not established.   Correlate clinically.    Segs, Fluid % 26   Lymphs, Fluid % 41   Monocytes/Macrophages, Fluid % 31   Baso, Fluid % 1   Mesothelial cells, Fluid % 1         Assessment/Plan:     Bilateral pleural effusion    SP right thoracentesis  Follow cytology        ESRD (end stage renal disease) on dialysis    On HD  AV fistula Left are soft  Possible etiology of bacteremia however exam benign        * Staphylococcus aureus bacteremia    Continue Abx: Avelox and Vancomycin, monitor dosing for therapeutic dosing          DW Dr Street  Await MRI imaging         Ron Molina MD  Pulmonology  [facility]

## 2017-08-14 NOTE — PLAN OF CARE
Problem: Patient Care Overview  Goal: Plan of Care Review  Outcome: Ongoing (interventions implemented as appropriate)  Pt to dialysis MWF, pt oriented to self and place. Isolation precautions maintained, aseptic technique utilized, Pt remains free of injury, pain managed adequately, no s/s of distress. 24 hour chart check completed. Will continue to monitor.

## 2017-08-14 NOTE — HOSPITAL COURSE
SP thoracentesis right sided  Prot 4.0    WBC, Body Fluid /cu mm 934   Comments: Reference ranges for body fluids not established.   Correlate clinically.    Segs, Fluid % 26   Lymphs, Fluid % 41   Monocytes/Macrophages, Fluid % 31   Baso, Fluid % 1   Mesothelial cells, Fluid % 1     Cytology pending  ESR 45    MRI spine: Unremarkable      [facility] tagged study was -ve  BC from 08/14 +ve without fever or leucycytosis

## 2017-08-15 PROBLEM — J18.9 HCAP (HEALTHCARE-ASSOCIATED PNEUMONIA): Status: ACTIVE | Noted: 2017-08-15

## 2017-08-15 LAB
ALBUMIN SERPL BCP-MCNC: 2.3 G/DL
ANION GAP SERPL CALC-SCNC: 10 MMOL/L
ANION GAP SERPL CALC-SCNC: 13 MMOL/L
BACTERIA FLD AEROBE CULT: NO GROWTH
BASOPHILS # BLD AUTO: 0.03 K/UL
BASOPHILS NFR BLD: 0.2 %
BUN SERPL-MCNC: 32 MG/DL
BUN SERPL-MCNC: 32 MG/DL
CALCIUM SERPL-MCNC: 8.6 MG/DL
CALCIUM SERPL-MCNC: 8.9 MG/DL
CHLORIDE SERPL-SCNC: 100 MMOL/L
CHLORIDE SERPL-SCNC: 101 MMOL/L
CO2 SERPL-SCNC: 20 MMOL/L
CO2 SERPL-SCNC: 21 MMOL/L
CREAT SERPL-MCNC: 6.6 MG/DL
CREAT SERPL-MCNC: 6.8 MG/DL
CRP SERPL-MCNC: 173.8 MG/L
DIFFERENTIAL METHOD: ABNORMAL
EOSINOPHIL # BLD AUTO: 0 K/UL
EOSINOPHIL NFR BLD: 0.3 %
ERYTHROCYTE [DISTWIDTH] IN BLOOD BY AUTOMATED COUNT: 16.9 %
ERYTHROCYTE [SEDIMENTATION RATE] IN BLOOD BY WESTERGREN METHOD: 45 MM/HR
EST. GFR  (AFRICAN AMERICAN): 9 ML/MIN/1.73 M^2
EST. GFR  (AFRICAN AMERICAN): 9 ML/MIN/1.73 M^2
EST. GFR  (NON AFRICAN AMERICAN): 8 ML/MIN/1.73 M^2
EST. GFR  (NON AFRICAN AMERICAN): 8 ML/MIN/1.73 M^2
GLUCOSE SERPL-MCNC: 90 MG/DL
GLUCOSE SERPL-MCNC: 93 MG/DL
GRAM STN SPEC: NORMAL
GRAM STN SPEC: NORMAL
HCT VFR BLD AUTO: 36.3 %
HGB BLD-MCNC: 11.9 G/DL
LYMPHOCYTES # BLD AUTO: 1.2 K/UL
LYMPHOCYTES NFR BLD: 10 %
MCH RBC QN AUTO: 27.7 PG
MCHC RBC AUTO-ENTMCNC: 32.8 G/DL
MCV RBC AUTO: 84 FL
MONOCYTES # BLD AUTO: 1.2 K/UL
MONOCYTES NFR BLD: 9.6 %
NEUTROPHILS # BLD AUTO: 9.7 K/UL
NEUTROPHILS NFR BLD: 79.9 %
PHOSPHATE SERPL-MCNC: 2.8 MG/DL
PLATELET # BLD AUTO: 259 K/UL
PMV BLD AUTO: 10 FL
POCT GLUCOSE: 137 MG/DL (ref 70–110)
POCT GLUCOSE: 76 MG/DL (ref 70–110)
POCT GLUCOSE: 99 MG/DL (ref 70–110)
POTASSIUM SERPL-SCNC: 4.6 MMOL/L
POTASSIUM SERPL-SCNC: 5.6 MMOL/L
RBC # BLD AUTO: 4.3 M/UL
SODIUM SERPL-SCNC: 132 MMOL/L
SODIUM SERPL-SCNC: 133 MMOL/L
VANCOMYCIN SERPL-MCNC: 22.4 UG/ML
WBC # BLD AUTO: 12.11 K/UL

## 2017-08-15 PROCEDURE — 85025 COMPLETE CBC W/AUTO DIFF WBC: CPT

## 2017-08-15 PROCEDURE — 80048 BASIC METABOLIC PNL TOTAL CA: CPT

## 2017-08-15 PROCEDURE — 63600175 PHARM REV CODE 636 W HCPCS: Performed by: EMERGENCY MEDICINE

## 2017-08-15 PROCEDURE — 80069 RENAL FUNCTION PANEL: CPT

## 2017-08-15 PROCEDURE — 36415 COLL VENOUS BLD VENIPUNCTURE: CPT

## 2017-08-15 PROCEDURE — 86140 C-REACTIVE PROTEIN: CPT

## 2017-08-15 PROCEDURE — 25000003 PHARM REV CODE 250: Performed by: EMERGENCY MEDICINE

## 2017-08-15 PROCEDURE — 85651 RBC SED RATE NONAUTOMATED: CPT

## 2017-08-15 PROCEDURE — 80202 ASSAY OF VANCOMYCIN: CPT

## 2017-08-15 PROCEDURE — 21400001 HC TELEMETRY ROOM

## 2017-08-15 PROCEDURE — 63600175 PHARM REV CODE 636 W HCPCS: Performed by: INTERNAL MEDICINE

## 2017-08-15 PROCEDURE — 99232 SBSQ HOSP IP/OBS MODERATE 35: CPT | Mod: ,,, | Performed by: INTERNAL MEDICINE

## 2017-08-15 PROCEDURE — 99233 SBSQ HOSP IP/OBS HIGH 50: CPT | Mod: ,,, | Performed by: INTERNAL MEDICINE

## 2017-08-15 PROCEDURE — 25000003 PHARM REV CODE 250: Performed by: INTERNAL MEDICINE

## 2017-08-15 RX ORDER — SODIUM CHLORIDE 9 MG/ML
INJECTION, SOLUTION INTRAVENOUS
Status: DISCONTINUED | OUTPATIENT
Start: 2017-08-15 | End: 2017-08-17 | Stop reason: HOSPADM

## 2017-08-15 RX ORDER — HEPARIN SODIUM 1000 [USP'U]/ML
1000 INJECTION, SOLUTION INTRAVENOUS; SUBCUTANEOUS
Status: DISCONTINUED | OUTPATIENT
Start: 2017-08-16 | End: 2017-08-17 | Stop reason: SDUPTHER

## 2017-08-15 RX ORDER — MOXIFLOXACIN HYDROCHLORIDE 400 MG/1
400 TABLET ORAL DAILY
Status: DISCONTINUED | OUTPATIENT
Start: 2017-08-16 | End: 2017-08-17 | Stop reason: HOSPADM

## 2017-08-15 RX ORDER — SODIUM CHLORIDE 9 MG/ML
INJECTION, SOLUTION INTRAVENOUS ONCE
Status: DISCONTINUED | OUTPATIENT
Start: 2017-08-15 | End: 2017-08-17 | Stop reason: HOSPADM

## 2017-08-15 RX ADMIN — SODIUM BICARBONATE 650 MG TABLET 1300 MG: at 09:08

## 2017-08-15 RX ADMIN — SODIUM BICARBONATE 650 MG TABLET 1300 MG: at 02:08

## 2017-08-15 RX ADMIN — RIVASTIGMINE TRANSDERMAL SYSTEM 1 PATCH: 4.6 PATCH, EXTENDED RELEASE TRANSDERMAL at 09:08

## 2017-08-15 RX ADMIN — SODIUM BICARBONATE 650 MG TABLET 1300 MG: at 06:08

## 2017-08-15 RX ADMIN — MOXIFLOXACIN HYDROCHLORIDE 400 MG: 400 INJECTION, SOLUTION INTRAVENOUS at 02:08

## 2017-08-15 RX ADMIN — SERTRALINE HYDROCHLORIDE 50 MG: 50 TABLET ORAL at 09:08

## 2017-08-15 RX ADMIN — DIPHENHYDRAMINE HYDROCHLORIDE 25 MG: 25 CAPSULE ORAL at 09:08

## 2017-08-15 RX ADMIN — LEVETIRACETAM 500 MG: 500 TABLET, FILM COATED ORAL at 09:08

## 2017-08-15 RX ADMIN — ATORVASTATIN CALCIUM 20 MG: 10 TABLET, FILM COATED ORAL at 09:08

## 2017-08-15 RX ADMIN — VANCOMYCIN HYDROCHLORIDE 1000 MG: 1 INJECTION, POWDER, LYOPHILIZED, FOR SOLUTION INTRAVENOUS at 03:08

## 2017-08-15 NOTE — PLAN OF CARE
Met with pt and sister Sherine for d/c planning - discussed tx team recommendation of LTAC for continued IV abx treatment.  Pt sister states that pt previously did six weeks IV abx treatment at outpatient dialysis and would receive IV abx during dialysis sessions.  If continued VI abx's are needed family's preference is for patient to again receive IV abx treatment at the same time he dialyzes at [facility].        08/15/17 2579   Discharge Reassessment   Assessment Type Discharge Planning Reassessment   Can the patient answer the patient profile reliably? No, cognitively impaired   How does the patient rate their overall health at the present time? Poor   Describe the patient's ability to walk at the present time. Minor restrictions or changes   How often would a person be available to care for the patient? Whenever needed   Number of comorbid conditions (as recorded on the chart) Five or more   During the past month, has the patient often been bothered by feeling down, depressed or hopeless? No   During the past month, has the patient often been bothered by little interest or pleasure in doing things? No   Discharge plan remains the same: No   Discharge Plan A Home Health   Discharge Plan B Home with family   Change in patient condition or support system No   Patient choice form signed by patient/caregiver N/A   Explained to the the patient/caregiver why the discharge planned changed: Yes   Involved the patient/caregiver in establishing a new discharge plan: Yes

## 2017-08-15 NOTE — SUBJECTIVE & OBJECTIVE
Interval History:   Afebrile and Wbcc trending down  Blood cultures from yesterdays +ve  Tolerated interventions well      Objective:     Vital Signs (Most Recent):  Temp: 97.5 °F (36.4 °C) (08/15/17 1146)  Pulse: 85 (08/15/17 1146)  Resp: 17 (08/15/17 1146)  BP: 101/72 (08/15/17 1146)  SpO2: 100 % (08/15/17 0754) Vital Signs (24h Range):  Temp:  [97.5 °F (36.4 °C)-98.1 °F (36.7 °C)] 97.5 °F (36.4 °C)  Pulse:  [73-88] 85  Resp:  [16-18] 17  SpO2:  [94 %-100 %] 100 %  BP: (101-143)/(72-85) 101/72     Weight: 92 kg (202 lb 13.2 oz)  Body mass index is 26.04 kg/m².      Intake/Output Summary (Last 24 hours) at 08/15/17 1237  Last data filed at 08/15/17 0845   Gross per 24 hour   Intake              800 ml   Output                0 ml   Net              800 ml       Physical Exam   Constitutional: He is oriented to person, place, and time. He appears well-nourished. No distress.   HENT:   Head: Normocephalic and atraumatic.   Nose: Nose normal.   Mouth/Throat: Oropharynx is clear and moist. No oropharyngeal exudate.   Eyes: Conjunctivae and EOM are normal. Pupils are equal, round, and reactive to light. Left eye exhibits no discharge.   Neck: Normal range of motion. Neck supple. No JVD present. No tracheal deviation present.   Cardiovascular: Normal rate, regular rhythm and normal heart sounds.    Pulmonary/Chest: Effort normal and breath sounds normal. No respiratory distress. He has no wheezes. He has no rales. He exhibits no tenderness.   Abdominal: Soft. Bowel sounds are normal.   Musculoskeletal: Normal range of motion.   On tender left arm graft   Neurological: He is alert and oriented to person, place, and time.   Skin: Skin is warm and dry.   Nursing note and vitals reviewed.      Vents:  Oxygen Concentration (%): 28 (08/11/17 1117)    Lines/Drains/Airways     Drain                 Hemodialysis AV Fistula Left upper arm 48318 days          Peripheral Intravenous Line                 Peripheral IV - Single Lumen  08/10/17 Right Antecubital 5 days                Significant Labs:    CBC/Anemia Profile:    Recent Labs  Lab 08/14/17  0525 08/15/17  0507   WBC 12.52 12.11   HGB 11.3* 11.9*   HCT 34.3* 36.3*    259   MCV 84 84   RDW 16.8* 16.9*        Chemistries:    Recent Labs  Lab 08/14/17  0525 08/15/17  0507   *  132* 133*  132*   K 4.5  4.4 5.6*  4.6   CL 95  95 100  101   CO2 25  26 20*  21*   BUN 44*  41* 32*  32*   CREATININE 7.9*  7.9* 6.8*  6.6*   CALCIUM 8.8  8.5* 8.9  8.6*   ALBUMIN 2.3* 2.3*   PHOS 3.1 2.8       Blood Culture:   Recent Labs  Lab 08/14/17  0525   LABBLOO Gram stain aer bottle: Gram positive cocci in clusters resembling Staph   Results called to and read back by:Sanjiv Beauchamp RN 08/15/2017  02:46  Gram stain aer bottle: Gram positive cocci in clusters resembling Staph   Results called to and read back by:Sanjiv Beauchamp RN 08/15/2017  02:46     All pertinent labs within the past 24 hours have been reviewed.    Significant Imaging:  I have reviewed and interpreted all pertinent imaging results/findings within the past 24 hours.     MRI report reviewed  Await WBC tagged study

## 2017-08-15 NOTE — PROGRESS NOTES
Vancomycin Progress Notes:    Random = 22.4 @ 0507  No dose due today  HD due tomorrow  Random due 8/16 @ 0430  /Madie Siegel [facility] 8/15/2017 7:32 AM

## 2017-08-15 NOTE — PROGRESS NOTES
Pharmacist Intervention IV to PO Note    David Hadley is a 62 y.o. male being treated with IV medication moxifloxacin IVPB.     Patient Data:    Vital Signs (Most Recent):  Temp: 97.5 °F (36.4 °C) (08/15/17 1557)  Pulse: 95 (08/15/17 1557)  Resp: 18 (08/15/17 1557)  BP: 115/76 (08/15/17 1557)  SpO2: 100 % (08/15/17 0754) Vital Signs (72h Range):  Temp:  [97.4 °F (36.3 °C)-98.1 °F (36.7 °C)]   Pulse:  [73-99]   Resp:  [16-18]   BP: (101-143)/(61-91)   SpO2:  [91 %-100 %]      CBC:    Recent Labs     Lab 08/13/17  0616 08/14/17  0525 08/15/17  0507   WBC 13.75* 12.52 12.11   RBC 4.13* 4.10* 4.30*   HGB 11.5* 11.3* 11.9*   HCT 34.8* 34.3* 36.3*    243 259   MCV 84 84 84   MCH 27.8 27.6 27.7   MCHC 33.0 32.9 32.8     CMP:     Recent Labs     Lab 08/10/17  1851  08/12/17  1648 08/13/17  0616 08/14/17  0525 08/15/17  0507   * < > 111* 104  104 81  82 90  93   CALCIUM 9.3 < > --  8.8  8.9 8.8  8.5* 8.9  8.6*   ALBUMIN 2.9* < > --  2.4* 2.3* 2.3*   PROT 8.1 --  8.0 --  --  --     < > --  134*  134* 134*  132* 133*  132*   K 4.7 < > --  4.4  4.3 4.5  4.4 5.6*  4.6   CO2 28 < > --  25  27 25  26 20*  21*   CL 95 < > --  96  95 95  95 100  101   BUN 25* < > --  36*  35* 44*  41* 32*  32*   CREATININE 6.1* < > --  6.8*  6.8* 7.9*  7.9* 6.8*  6.6*   ALKPHOS 107 --  --  --  --  --    ALT 22 --  --  --  --  --    AST 58* --  --  --  --  --    BILITOT 0.7 --  --  --  --  --    < > = values in this interval not displayed.       Dietary Orders:  Diet Orders            Diet Cardiac: Cardiac starting at 08/13 1615            Based on the following criteria, this patient qualifies for intravenous to oral conversion:  [x] The patients gastrointestinal tract is functioning (tolerating medications via oral or enteral route for 24 hours and tolerating food or enteral feeds for 24 hours).  [x] The patient is hemodynamically stable for 24 hours (heart rate <100 beats per minute, systolic blood  pressure >99 mm Hg, and respiratory rate <20 breaths per minute).  [x] The patient shows clinical improvement (afebrile for at least 24 hours and white blood cell count downtrending or normalized). Additionally, the patient must be non-neutropenic (absolute neutrophil count >500 cells/mm3).  [x] For antimicrobials, the patient has received IV therapy for at least 24 hours.    IV medication moxifloxacin IVPB will be changed to oral moxifloxacin tablet 400 mg by mouth every 24 hours.     Pharmacist's Name: Joan Juarez  Pharmacist's Extension: [phone]

## 2017-08-15 NOTE — PROGRESS NOTES
[facility] -   Pulmonology  Progress Note    Patient Name: David Hadley  MRN: [id number]  Admission Date: 8/10/2017  Hospital Length of Stay: 5 days  Code Status: Full Code  Attending Provider: Deisy Ordonez MD  Primary Care Provider: Isaac Fitch MD   Principal Problem: Staphylococcus aureus bacteremia    Subjective:     Interval History:   Afebrile and Wbcc trending down  Blood cultures from yesterdays +ve  Tolerated interventions well      Objective:     Vital Signs (Most Recent):  Temp: 97.5 °F (36.4 °C) (08/15/17 1146)  Pulse: 85 (08/15/17 1146)  Resp: 17 (08/15/17 1146)  BP: 101/72 (08/15/17 1146)  SpO2: 100 % (08/15/17 0754) Vital Signs (24h Range):  Temp:  [97.5 °F (36.4 °C)-98.1 °F (36.7 °C)] 97.5 °F (36.4 °C)  Pulse:  [73-88] 85  Resp:  [16-18] 17  SpO2:  [94 %-100 %] 100 %  BP: (101-143)/(72-85) 101/72     Weight: 92 kg (202 lb 13.2 oz)  Body mass index is 26.04 kg/m².      Intake/Output Summary (Last 24 hours) at 08/15/17 1237  Last data filed at 08/15/17 0845   Gross per 24 hour   Intake              800 ml   Output                0 ml   Net              800 ml       Physical Exam   Constitutional: He is oriented to person, place, and time. He appears well-nourished. No distress.   HENT:   Head: Normocephalic and atraumatic.   Nose: Nose normal.   Mouth/Throat: Oropharynx is clear and moist. No oropharyngeal exudate.   Eyes: Conjunctivae and EOM are normal. Pupils are equal, round, and reactive to light. Left eye exhibits no discharge.   Neck: Normal range of motion. Neck supple. No JVD present. No tracheal deviation present.   Cardiovascular: Normal rate, regular rhythm and normal heart sounds.    Pulmonary/Chest: Effort normal and breath sounds normal. No respiratory distress. He has no wheezes. He has no rales. He exhibits no tenderness.   Abdominal: Soft. Bowel sounds are normal.   Musculoskeletal: Normal range of motion.   On tender left arm graft   Neurological: He is alert and  oriented to person, place, and time.   Skin: Skin is warm and dry.   Nursing note and vitals reviewed.      Vents:  Oxygen Concentration (%): 28 (08/11/17 1117)    Lines/Drains/Airways     Drain                 Hemodialysis AV Fistula Left upper arm 44414 days          Peripheral Intravenous Line                 Peripheral IV - Single Lumen 08/10/17 Right Antecubital 5 days                Significant Labs:    CBC/Anemia Profile:    Recent Labs  Lab 08/14/17  0525 08/15/17  0507   WBC 12.52 12.11   HGB 11.3* 11.9*   HCT 34.3* 36.3*    259   MCV 84 84   RDW 16.8* 16.9*        Chemistries:    Recent Labs  Lab 08/14/17  0525 08/15/17  0507   *  132* 133*  132*   K 4.5  4.4 5.6*  4.6   CL 95  95 100  101   CO2 25  26 20*  21*   BUN 44*  41* 32*  32*   CREATININE 7.9*  7.9* 6.8*  6.6*   CALCIUM 8.8  8.5* 8.9  8.6*   ALBUMIN 2.3* 2.3*   PHOS 3.1 2.8       Blood Culture:   Recent Labs  Lab 08/14/17  0525   LABBLOO Gram stain aer bottle: Gram positive cocci in clusters resembling Staph   Results called to and read back by:Sanjiv Beauchamp RN 08/15/2017  02:46  Gram stain aer bottle: Gram positive cocci in clusters resembling Staph   Results called to and read back by:Sanjiv Beauchamp RN 08/15/2017  02:46     All pertinent labs within the past 24 hours have been reviewed.    Significant Imaging:  I have reviewed and interpreted all pertinent imaging results/findings within the past 24 hours.     MRI report reviewed  Await WBCC tagged study    Assessment/Plan:     Bilateral pleural effusion    SP right thoracentesis  Follow cytology        ESRD (end stage renal disease) on dialysis    On HD  AV fistula Left are soft  Possible etiology of bacteremia however exam benign        * Staphylococcus aureus bacteremia    Continue Abx: Avelox and Vancomycin, monitor dosing for therapeutic dosing  Follow WBCC scan  Inflammatory markers: ESR and CRP elevated          DW Family at bedside  DW team     Ron Molina  MD  Pulmonology  [facility]

## 2017-08-15 NOTE — SUBJECTIVE & OBJECTIVE
Interval History:     8/11/17: Patient today denies any fevers, pain, SOB, nausea.     8/12/17: Patient is resting in chair, no complaints at present.     8/13/17: Patient without complaints today.     8/14/17: Patient is currently on hemodialysis. No complications with procedure. Vital signs are stable. Patient is resting comfortably, he denies any complaints.     8/15/17: Patient currently without complaints.         Review of patient's allergies indicates:   Allergen Reactions    Benadryl decongestant Itching     Nothing with benadryl; wife states  No allergy to Benadryl     Current Facility-Administered Medications   Medication Frequency    0.9%  NaCl infusion PRN    0.9%  NaCl infusion Once    atorvastatin tablet 20 mg Daily    dextrose 50% injection 12.5 g PRN    dextrose 50% injection 25 g PRN    diphenhydrAMINE capsule 25 mg Nightly PRN    glucagon (human recombinant) injection 1 mg PRN    glucose chewable tablet 16 g PRN    glucose chewable tablet 24 g PRN    heparin (porcine) injection 1,000 Units PRN    insulin aspart pen 0-5 Units QID (AC + HS) PRN    levetiracetam tablet 500 mg BID    lidocaine (PF) 10 mg/ml (1%) injection 10 mg Once    lorazepam tablet 0.5 mg Q6H PRN    moxifloxacin 400 mg/250 mL IVPB 400 mg Q24H    rivastigmine 4.6 mg/24 hr 1 patch Daily    sertraline tablet 50 mg Daily    sodium bicarbonate tablet 1,300 mg TID    vancomycin 1 g in dextrose 5 % 250 mL IVPB (ready to mix system) Q48H       Objective:     Vital Signs (Most Recent):  Temp: 97.7 °F (36.5 °C) (08/15/17 0754)  Pulse: 81 (08/15/17 0754)  Resp: 16 (08/15/17 0754)  BP: 126/85 (08/15/17 0754)  SpO2: 100 % (08/15/17 0754)  O2 Device (Oxygen Therapy): room air (08/15/17 0754) Vital Signs (24h Range):  Temp:  [97.5 °F (36.4 °C)-98.1 °F (36.7 °C)] 97.7 °F (36.5 °C)  Pulse:  [73-98] 81  Resp:  [16-18] 16  SpO2:  [94 %-100 %] 100 %  BP: (120-143)/(75-91) 126/85     Weight: 92 kg (202 lb 13.2 oz) (08/11/17  1400)  Body mass index is 26.04 kg/m².  Body surface area is 2.19 meters squared.    I/O last 3 completed shifts:  In: 680 [P.O.:180; IV Piggyback:500]  Out: -     Physical Exam   Constitutional: He appears well-developed. He is cooperative.   Alert and responsive.   HENT:   Head: Normocephalic.   Nose: No rhinorrhea.   Mouth/Throat: Mucous membranes are normal. No oropharyngeal exudate.   Neck: No thyroid mass present.   Cardiovascular: Normal rate, regular rhythm, S1 normal, S2 normal and intact distal pulses.    Pulmonary/Chest: Effort normal. No respiratory distress. He has no wheezes.   Abdominal: Soft. Bowel sounds are normal. He exhibits no distension. There is no tenderness. No hernia.   Musculoskeletal:   Mild bilateral LE edema.   Lymphadenopathy:     He has no cervical adenopathy.   Neurological: He is alert.   Skin: Skin is warm and dry.   Psychiatric: He has a normal mood and affect.       Significant Labs:  Lab Results   Component Value Date    CREATININE 6.8 (H) 08/15/2017    CREATININE 6.6 (H) 08/15/2017    BUN 32 (H) 08/15/2017    BUN 32 (H) 08/15/2017     (L) 08/15/2017     (L) 08/15/2017    K 5.6 (H) 08/15/2017    K 4.6 08/15/2017     08/15/2017     08/15/2017    CO2 20 (L) 08/15/2017    CO2 21 (L) 08/15/2017     Lab Results   Component Value Date     (H) 11/06/2008    CALCIUM 8.9 08/15/2017    CALCIUM 8.6 (L) 08/15/2017    PHOS 2.8 08/15/2017     Lab Results   Component Value Date    ALBUMIN 2.3 (L) 08/15/2017     Lab Results   Component Value Date    WBC 12.11 08/15/2017    HGB 11.9 (L) 08/15/2017    HCT 36.3 (L) 08/15/2017    MCV 84 08/15/2017     08/15/2017       Recent Labs  Lab 08/11/17  0527   MG 2.0         Significant Imaging:  Imaging Results          NM Inflammitory Localization WB Indium (In process)                X-Ray Chest AP Portable (No Result on File)                MRI Lumbar Spine Without Contrast (Final result)  Result time 08/14/17  16:08:31    Final result by Dickson Jaramillo MD (08/14/17 16:08:31)                 Impression:        Small spinal canal and congenital basis.  Broad-based disc bulges at the L4-L5 and L5-S1 levels.  Bilateral degenerative changes of facets at the L3-L4 L4-L5 and L5-S1 levels.  No definite nerve impingement at any level for bilateral L3-L4 and L5 nerve contents cannot be excluded.  Abnormal signal in the vertebral bodies assistant with hematopoietic bone marrow or an infiltrative process.  Would correlate with clinical history and or bone scan for further evaluation.  No evidence of discitis identified.      Electronically signed by: DICKSON JARAMILLO MD  Date:     08/14/17  Time:    16:08              Narrative:    MRI lumbar spine without contrast.08/14/17 14:29:14    History:   low back pain., rule out abscess/discitis    Standard multiplanar noncontrast MRI sequences of the lumbar spine.    The distal cord and conus reveal normal signal and morphology.  Abnormal signal in the vertebral bodies possibly representing anemia versus an infiltrative process including metastatic disease.    L1-2: Normal.    L2-3: Normal.    L3-4: Minimal disc.  Mild neural foraminal narrowing.    L4-5: Minimal broad-based disc bulge.  Mild desiccation of the disc and mild degenerative changes of the facets with mild bilateral bony neural foraminal narrowing.    L5-S1:  Minimal broad-based disc bulge.  Mild degenerative changes of the facets.  Bilateral bony neural foraminal narrowing with possible but not definite L5 nerve root impingement                             MRI Thoracic Spine Without Contrast (Final result)  Result time 08/14/17 15:33:41    Final result by Dickson Jaramillo MD (08/14/17 15:33:41)                 Impression:        Spinal canal is within normal limits.  No impingements in the cord or abnormal signal in the cord identified.  Bilateral pleural effusions larger on the right side.  Pleural effusions on the left side appear  loculated.  No evidence of fluid in the disc spaces to suggest discitis.  No epidural mass or fluid collection identified.      Electronically signed by: DICKSON HOLM MD  Date:     08/14/17  Time:    15:33              Narrative:    MRI Thoracic spine without contrast.08/14/17 14:29:38    History:   Back pain., rule out discitis /abscess    Standard multiplanar noncontrast MRI sequences of the thoracic spine with and without contrast.    Findings:  The spinal canal is within normal limits. No signal abnormality in the spinal cord.Normal vertebral bodies. The disc spaces are normal.  Bilateral pleural effusions larger on the right.  No evidence of discitis identified.                             X-Ray Chest 1 View (Final result)  Result time 08/12/17 17:34:23    Final result by Dickson Green III, MD (08/12/17 17:34:23)                 Impression:     Improving chest x-ray with decrease in the right pleural effusion consistent with thoracentesis. No evidence of pneumothorax.      Electronically signed by: DICKSON GREEN MD  Date:     08/12/17  Time:    17:34              Narrative:    Chest x-ray, single view.    Clinical indication: Shortness of breath status post thoracentesis.    The chest shows improvement with better aeration of the right base and significant decrease in the pleural effusion. No evidence of pneumothorax status post thoracentesis. Chest otherwise unchanged.                             US Soft Tissue Misc (Final result)  Result time 08/12/17 17:23:38    Final result by Dickson Green III, MD (08/12/17 17:23:38)                 Impression:     Complex/heterogeneous density just above the region of the shunt of indeterminate significance and etiology, please correlate. This is not a simple fluid collection and doesn't have the classic appearance of an abscess.      Electronically signed by: DICKSON GREEN MD  Date:     08/12/17  Time:    17:23              Narrative:    Limited  ultrasound near the patient's dialysis shunt, left upper extremity    Along the superior aspect of the shunt there is a complex focus measuring 2.1 x 0.7 x 0.9 cm in diameter. Etiology is indeterminate. This does not have the classic appearance of an abscess but please correlate. No flow by Doppler analysis. No gross evidence of discomfort/pain.                             US Chest Mediastinum (Edited Result - FINAL)  Result time 08/12/17 17:22:01    Addendum 1 of 1 by Dickson Castro III, MD (08/12/17 17:22:01)    Addendum.    The procedure should be listed as ultrasound of the chest, not CT of the chest.   As above      Electronically signed by: DICKSON CASTRO MD  Date:     08/12/17  Time:    17:22                Final result by Dickson Castro III, MD (08/12/17 17:20:58)                 Impression:     As above.      Electronically signed by: DICKSON CASTRO MD  Date:     08/12/17  Time:    17:20              Narrative:    CT of the chest.    Clinical indication: Pleural effusion.    Ultrasound of the chest reveals a moderate right pleural effusion and minimal left effusion.. Appropriate skin site was marked prior to thoracentesis performed by Dr. Balderas.                             CT Abdomen Pelvis With Contrast (Final result)  Result time 08/11/17 19:22:53    Final result by Shivam Hale MD (08/11/17 19:22:53)                 Impression:             Right greater than left pleural effusions, associated with compressive atelectasis/consolidation.    Heavily calcified coronary arteries.    Rectal fecal impaction noted.      Electronically signed by: SHIVAM HALE MD  Date:     08/11/17  Time:    19:22              Narrative:    Exam: CT chest with contrast.    History: Gram-positive cocci bacteremia    Findings: Large right and smaller pleural effusions are present. There is extensive compressive atelectasis/consolidation in the right base. Less pronounced findings in the left base. No  mediastinal or hilar adenopathy.    Heavily calcified coronary arteries noted.    No osteolytic or osteoblastic lesions are identified. The    The liver, spleen, pancreas, kidneys and adrenal glands are unremarkable.    No free fluid, adenopathy, mesenteric inflammatory change is apparent.    Rectal fecal impaction noted.                             CT Chest With Contrast (Final result)  Result time 08/11/17 19:22:54    Final result by Shivam Ambrose MD (08/11/17 19:22:54)                 Impression:             Right greater than left pleural effusions, associated with compressive atelectasis/consolidation.    Heavily calcified coronary arteries.    Rectal fecal impaction noted.      Electronically signed by: SHIVAM AMBROSE MD  Date:     08/11/17  Time:    19:22              Narrative:    Exam: CT chest with contrast.    History: Gram-positive cocci bacteremia    Findings: Large right and smaller pleural effusions are present. There is extensive compressive atelectasis/consolidation in the right base. Less pronounced findings in the left base. No mediastinal or hilar adenopathy.    Heavily calcified coronary arteries noted.    No osteolytic or osteoblastic lesions are identified. The    The liver, spleen, pancreas, kidneys and adrenal glands are unremarkable.    No free fluid, adenopathy, mesenteric inflammatory change is apparent.    Rectal fecal impaction noted.                             X-Ray Chest 1 View (Final result)  Result time 08/10/17 21:10:07    Final result by Shivam Ambrose MD (08/10/17 21:10:07)                 Impression:     Right basilar consolidation. See above. Otherwise no significant change from prior exam.      Electronically signed by: SHIVAM AMBROSE MD  Date:     08/10/17  Time:    21:10              Narrative:    Exam: Chest X-ray, one view.    History: Shortness of breath    Findings: Comparison is made to prior exam dated 05/03/2017. Mild cardiomegaly again noted. Right basilar  consolidation is no apparent, likely related to combination of pleural fluid and airspace disease. Sternal wires again noted.

## 2017-08-15 NOTE — PROGRESS NOTES
[facility] -   Nephrology  Progress Note    Patient Name: David Hadley  MRN: [id number]  Admission Date: 8/10/2017  Hospital Length of Stay: 5 days  Attending Provider: Deisy Ordonez MD   Primary Care Physician: Isaac Fitch MD  Principal Problem:Staphylococcus aureus bacteremia    Subjective:     HPI: [id number] with multiple histories of MRSA infections with blood cultures being positive.  Has been admitted with positive blood cultures in April 2017 status post  Prolonged antibiotic course of vancomycin.  May 2017 admitted for syncope.  Has ESRD on hemodialysis.  Schedule for dialysis Tuesday Thursday and Saturday.  Last dialysis was today.     Gets his dialysis in [place] [place]     Interval History:     8/11/17: Patient today denies any fevers, pain, SOB, nausea.     8/12/17: Patient is resting in chair, no complaints at present.     8/13/17: Patient without complaints today.     8/14/17: Patient is currently on hemodialysis. No complications with procedure. Vital signs are stable. Patient is resting comfortably, he denies any complaints.     8/15/17: Patient currently without complaints.         Review of patient's allergies indicates:   Allergen Reactions    Benadryl decongestant Itching     Nothing with benadryl; wife states  No allergy to Benadryl     Current Facility-Administered Medications   Medication Frequency    0.9%  NaCl infusion PRN    0.9%  NaCl infusion Once    atorvastatin tablet 20 mg Daily    dextrose 50% injection 12.5 g PRN    dextrose 50% injection 25 g PRN    diphenhydrAMINE capsule 25 mg Nightly PRN    glucagon (human recombinant) injection 1 mg PRN    glucose chewable tablet 16 g PRN    glucose chewable tablet 24 g PRN    heparin (porcine) injection 1,000 Units PRN    insulin aspart pen 0-5 Units QID (AC + HS) PRN    levetiracetam tablet 500 mg BID    lidocaine (PF) 10 mg/ml (1%) injection 10 mg Once    lorazepam tablet 0.5 mg Q6H PRN    moxifloxacin  400 mg/250 mL IVPB 400 mg Q24H    rivastigmine 4.6 mg/24 hr 1 patch Daily    sertraline tablet 50 mg Daily    sodium bicarbonate tablet 1,300 mg TID    vancomycin 1 g in dextrose 5 % 250 mL IVPB (ready to mix system) Q48H       Objective:     Vital Signs (Most Recent):  Temp: 97.7 °F (36.5 °C) (08/15/17 0754)  Pulse: 81 (08/15/17 0754)  Resp: 16 (08/15/17 0754)  BP: 126/85 (08/15/17 0754)  SpO2: 100 % (08/15/17 0754)  O2 Device (Oxygen Therapy): room air (08/15/17 0754) Vital Signs (24h Range):  Temp:  [97.5 °F (36.4 °C)-98.1 °F (36.7 °C)] 97.7 °F (36.5 °C)  Pulse:  [73-98] 81  Resp:  [16-18] 16  SpO2:  [94 %-100 %] 100 %  BP: (120-143)/(75-91) 126/85     Weight: 92 kg (202 lb 13.2 oz) (08/11/17 1400)  Body mass index is 26.04 kg/m².  Body surface area is 2.19 meters squared.    I/O last 3 completed shifts:  In: 680 [P.O.:180; IV Piggyback:500]  Out: -     Physical Exam   Constitutional: He appears well-developed. He is cooperative.   Alert and responsive.   HENT:   Head: Normocephalic.   Nose: No rhinorrhea.   Mouth/Throat: Mucous membranes are normal. No oropharyngeal exudate.   Neck: No thyroid mass present.   Cardiovascular: Normal rate, regular rhythm, S1 normal, S2 normal and intact distal pulses.    Pulmonary/Chest: Effort normal. No respiratory distress. He has no wheezes.   Abdominal: Soft. Bowel sounds are normal. He exhibits no distension. There is no tenderness. No hernia.   Musculoskeletal:   Mild bilateral LE edema.   Lymphadenopathy:     He has no cervical adenopathy.   Neurological: He is alert.   Skin: Skin is warm and dry.   Psychiatric: He has a normal mood and affect.       Significant Labs:  Lab Results   Component Value Date    CREATININE 6.8 (H) 08/15/2017    CREATININE 6.6 (H) 08/15/2017    BUN 32 (H) 08/15/2017    BUN 32 (H) 08/15/2017     (L) 08/15/2017     (L) 08/15/2017    K 5.6 (H) 08/15/2017    K 4.6 08/15/2017     08/15/2017     08/15/2017    CO2 20 (L)  08/15/2017    CO2 21 (L) 08/15/2017     Lab Results   Component Value Date     (H) 11/06/2008    CALCIUM 8.9 08/15/2017    CALCIUM 8.6 (L) 08/15/2017    PHOS 2.8 08/15/2017     Lab Results   Component Value Date    ALBUMIN 2.3 (L) 08/15/2017     Lab Results   Component Value Date    WBC 12.11 08/15/2017    HGB 11.9 (L) 08/15/2017    HCT 36.3 (L) 08/15/2017    MCV 84 08/15/2017     08/15/2017       Recent Labs  Lab 08/11/17  0527   MG 2.0         Significant Imaging:  Imaging Results          NM Inflammitory Localization WB Indium (In process)                X-Ray Chest AP Portable (No Result on File)                MRI Lumbar Spine Without Contrast (Final result)  Result time 08/14/17 16:08:31    Final result by Dickson Jaramillo MD (08/14/17 16:08:31)                 Impression:        Small spinal canal and congenital basis.  Broad-based disc bulges at the L4-L5 and L5-S1 levels.  Bilateral degenerative changes of facets at the L3-L4 L4-L5 and L5-S1 levels.  No definite nerve impingement at any level for bilateral L3-L4 and L5 nerve contents cannot be excluded.  Abnormal signal in the vertebral bodies assistant with hematopoietic bone marrow or an infiltrative process.  Would correlate with clinical history and or bone scan for further evaluation.  No evidence of discitis identified.      Electronically signed by: DICKSON JARAMILLO MD  Date:     08/14/17  Time:    16:08              Narrative:    MRI lumbar spine without contrast.08/14/17 14:29:14    History:   low back pain., rule out abscess/discitis    Standard multiplanar noncontrast MRI sequences of the lumbar spine.    The distal cord and conus reveal normal signal and morphology.  Abnormal signal in the vertebral bodies possibly representing anemia versus an infiltrative process including metastatic disease.    L1-2: Normal.    L2-3: Normal.    L3-4: Minimal disc.  Mild neural foraminal narrowing.    L4-5: Minimal broad-based disc bulge.  Mild  desiccation of the disc and mild degenerative changes of the facets with mild bilateral bony neural foraminal narrowing.    L5-S1:  Minimal broad-based disc bulge.  Mild degenerative changes of the facets.  Bilateral bony neural foraminal narrowing with possible but not definite L5 nerve root impingement                             MRI Thoracic Spine Without Contrast (Final result)  Result time 08/14/17 15:33:41    Final result by Dickson Jaramillo MD (08/14/17 15:33:41)                 Impression:        Spinal canal is within normal limits.  No impingements in the cord or abnormal signal in the cord identified.  Bilateral pleural effusions larger on the right side.  Pleural effusions on the left side appear loculated.  No evidence of fluid in the disc spaces to suggest discitis.  No epidural mass or fluid collection identified.      Electronically signed by: DICKSON JARAMILLO MD  Date:     08/14/17  Time:    15:33              Narrative:    MRI Thoracic spine without contrast.08/14/17 14:29:38    History:   Back pain., rule out discitis /abscess    Standard multiplanar noncontrast MRI sequences of the thoracic spine with and without contrast.    Findings:  The spinal canal is within normal limits. No signal abnormality in the spinal cord.Normal vertebral bodies. The disc spaces are normal.  Bilateral pleural effusions larger on the right.  No evidence of discitis identified.                             X-Ray Chest 1 View (Final result)  Result time 08/12/17 17:34:23    Final result by Dickson Green III, MD (08/12/17 17:34:23)                 Impression:     Improving chest x-ray with decrease in the right pleural effusion consistent with thoracentesis. No evidence of pneumothorax.      Electronically signed by: DICKSON GREEN MD  Date:     08/12/17  Time:    17:34              Narrative:    Chest x-ray, single view.    Clinical indication: Shortness of breath status post thoracentesis.    The chest shows  improvement with better aeration of the right base and significant decrease in the pleural effusion. No evidence of pneumothorax status post thoracentesis. Chest otherwise unchanged.                             US Soft Tissue Misc (Final result)  Result time 08/12/17 17:23:38    Final result by Dickson Green III, MD (08/12/17 17:23:38)                 Impression:     Complex/heterogeneous density just above the region of the shunt of indeterminate significance and etiology, please correlate. This is not a simple fluid collection and doesn't have the classic appearance of an abscess.      Electronically signed by: DICKSON GREEN MD  Date:     08/12/17  Time:    17:23              Narrative:    Limited ultrasound near the patient's dialysis shunt, left upper extremity    Along the superior aspect of the shunt there is a complex focus measuring 2.1 x 0.7 x 0.9 cm in diameter. Etiology is indeterminate. This does not have the classic appearance of an abscess but please correlate. No flow by Doppler analysis. No gross evidence of discomfort/pain.                             US Chest Mediastinum (Edited Result - FINAL)  Result time 08/12/17 17:22:01    Addendum 1 of 1 by Dickson Green III, MD (08/12/17 17:22:01)    Addendum.    The procedure should be listed as ultrasound of the chest, not CT of the chest.   As above      Electronically signed by: DICKSON GREEN MD  Date:     08/12/17  Time:    17:22                Final result by Dickson Green III, MD (08/12/17 17:20:58)                 Impression:     As above.      Electronically signed by: DICKSON GREEN MD  Date:     08/12/17  Time:    17:20              Narrative:    CT of the chest.    Clinical indication: Pleural effusion.    Ultrasound of the chest reveals a moderate right pleural effusion and minimal left effusion.. Appropriate skin site was marked prior to thoracentesis performed by Dr. Balderas.                             CT  Abdomen Pelvis With Contrast (Final result)  Result time 08/11/17 19:22:53    Final result by Shivam Ambrose MD (08/11/17 19:22:53)                 Impression:             Right greater than left pleural effusions, associated with compressive atelectasis/consolidation.    Heavily calcified coronary arteries.    Rectal fecal impaction noted.      Electronically signed by: SHIVAM AMBROSE MD  Date:     08/11/17  Time:    19:22              Narrative:    Exam: CT chest with contrast.    History: Gram-positive cocci bacteremia    Findings: Large right and smaller pleural effusions are present. There is extensive compressive atelectasis/consolidation in the right base. Less pronounced findings in the left base. No mediastinal or hilar adenopathy.    Heavily calcified coronary arteries noted.    No osteolytic or osteoblastic lesions are identified. The    The liver, spleen, pancreas, kidneys and adrenal glands are unremarkable.    No free fluid, adenopathy, mesenteric inflammatory change is apparent.    Rectal fecal impaction noted.                             CT Chest With Contrast (Final result)  Result time 08/11/17 19:22:54    Final result by Shivam Ambrose MD (08/11/17 19:22:54)                 Impression:             Right greater than left pleural effusions, associated with compressive atelectasis/consolidation.    Heavily calcified coronary arteries.    Rectal fecal impaction noted.      Electronically signed by: SHIVAM AMBROSE MD  Date:     08/11/17  Time:    19:22              Narrative:    Exam: CT chest with contrast.    History: Gram-positive cocci bacteremia    Findings: Large right and smaller pleural effusions are present. There is extensive compressive atelectasis/consolidation in the right base. Less pronounced findings in the left base. No mediastinal or hilar adenopathy.    Heavily calcified coronary arteries noted.    No osteolytic or osteoblastic lesions are identified. The    The liver, spleen,  pancreas, kidneys and adrenal glands are unremarkable.    No free fluid, adenopathy, mesenteric inflammatory change is apparent.    Rectal fecal impaction noted.                             X-Ray Chest 1 View (Final result)  Result time 08/10/17 21:10:07    Final result by Shivam Ambrose MD (08/10/17 21:10:07)                 Impression:     Right basilar consolidation. See above. Otherwise no significant change from prior exam.      Electronically signed by: SHIVAM AMBROSE MD  Date:     08/10/17  Time:    21:10              Narrative:    Exam: Chest X-ray, one view.    History: Shortness of breath    Findings: Comparison is made to prior exam dated 05/03/2017. Mild cardiomegaly again noted. Right basilar consolidation is no apparent, likely related to combination of pleural fluid and airspace disease. Sternal wires again noted.                                Assessment/Plan:     Hypophosphatemia    Has resolved.         Hyperkalemia    Mild hyperkalemia. Continue sodium bicarbonate.         ESRD (end stage renal disease) on dialysis    Patient is on MWF dialysis schedule at [facility] (he does not know name of his outpatient nephrologist). Access is left AVF (no obvious signs of infection).  Next scheduled HD will be tomorrow.          * Staphylococcus aureus bacteremia    Continue Vancomycin. Patient with MRSA.             Thank you for your consult. I will follow-up with patient. Please contact us if you have any additional questions.    Alfonso Sanchez MD  Nephrology  [facility] -

## 2017-08-15 NOTE — SUBJECTIVE & OBJECTIVE
Interval History: Pt's niece is sitting at the bedside. Pt is pleasantly confused, no specific complaints voiced.     Review of Systems   Unable to perform ROS: Dementia     Objective:     Vital Signs (Most Recent):  Temp: 97.5 °F (36.4 °C) (08/15/17 1146)  Pulse: 85 (08/15/17 1146)  Resp: 17 (08/15/17 1146)  BP: 101/72 (08/15/17 1146)  SpO2: 100 % (08/15/17 0754) Vital Signs (24h Range):  Temp:  [97.5 °F (36.4 °C)-98.1 °F (36.7 °C)] 97.5 °F (36.4 °C)  Pulse:  [73-88] 85  Resp:  [16-18] 17  SpO2:  [94 %-100 %] 100 %  BP: (101-143)/(72-85) 101/72     Weight: 92 kg (202 lb 13.2 oz)  Body mass index is 26.04 kg/m².    Intake/Output Summary (Last 24 hours) at 08/15/17 1431  Last data filed at 08/15/17 1421   Gross per 24 hour   Intake             1040 ml   Output                0 ml   Net             1040 ml      Physical Exam   Constitutional: He appears well-developed and well-nourished. No distress.   HENT:   Head: Normocephalic and atraumatic.   Eyes: Conjunctivae and EOM are normal.   Neck: Normal range of motion. Neck supple. No thyromegaly present.   Cardiovascular: Normal rate and normal heart sounds.  An irregular rhythm present.   Pulmonary/Chest: Effort normal and breath sounds normal. No respiratory distress. He has no wheezes. He has no rales.   Abdominal: Soft. Bowel sounds are normal. He exhibits no distension. There is no tenderness.   Musculoskeletal: Normal range of motion. He exhibits edema (+1 BLE). He exhibits no tenderness or deformity.   Neurological: He is alert.   Disoriented.  No focal deficits.   Skin: Skin is warm and dry. No rash noted. No erythema.   Left arm fistula +bruit/thrill     Psychiatric: He has a normal mood and affect.   Nursing note and vitals reviewed.      Significant Labs:   Blood Culture:   Recent Labs  Lab 08/14/17  0525   LABBLOO Gram stain aer bottle: Gram positive cocci in clusters resembling Staph   Results called to and read back by:Sanjiv Beauchamp RN 08/15/2017  02:46   Gram stain aer bottle: Gram positive cocci in clusters resembling Staph   Results called to and read back by:Sanjiv Beauchamp RN 08/15/2017  02:46     CBC:   Recent Labs  Lab 08/14/17  0525 08/15/17  0507   WBC 12.52 12.11   HGB 11.3* 11.9*   HCT 34.3* 36.3*    259     CMP:   Recent Labs  Lab 08/14/17  0525 08/15/17  0507   *  132* 133*  132*   K 4.5  4.4 5.6*  4.6   CL 95  95 100  101   CO2 25  26 20*  21*   GLU 81  82 90  93   BUN 44*  41* 32*  32*   CREATININE 7.9*  7.9* 6.8*  6.6*   CALCIUM 8.8  8.5* 8.9  8.6*   ALBUMIN 2.3* 2.3*   ANIONGAP 14  11 13  10   EGFRNONAA 7*  7* 8*  8*     All pertinent labs within the past 24 hours have been reviewed.    Significant Imaging: I have reviewed all pertinent imaging results/findings within the past 24 hours.

## 2017-08-15 NOTE — ASSESSMENT & PLAN NOTE
- Recurrent Staph bacteremia.  Last episode 4/2017; SALAZAR negative for vegetation, Indium scan and LUE US were negative at this time. Repeat blood cultures during this stay were negative for MRSA. S/p 6 weeks Vancomycin.   --source remains unclear  --Continue IV Vancomycin. Infectious Disease following  Second set of blood cultures from 8/14/17 growing gram + cocci resembling STAPH  Indium scan pending 8/15/17  --No obvious evidence of fistula infection  -repeat ultrasound did not show evidence of abscess (although showing possible pseudoaneurysm).   -repeat Echocardiogram did not show evidence of vegetation  -MRI thoracic and Lumbar spine were negative for abscess

## 2017-08-15 NOTE — ASSESSMENT & PLAN NOTE
Presumed HCAP as pt is a dialysis patient  Imaging indicates possible right sided consolidation  Pt is receiving Vanco and Avelox

## 2017-08-15 NOTE — PLAN OF CARE
Problem: Patient Care Overview  Goal: Plan of Care Review  Outcome: Ongoing (interventions implemented as appropriate)  Injury free this shift. Disoriented x3. IV abx infused as ordered. Fistula with thrill and bruit. No complaints at this time. Will monitor.

## 2017-08-15 NOTE — PROGRESS NOTES
[facility] Hospital Medicine  Progress Note    Patient Name: David Hadley  MRN: [id number]  Patient Class: IP- Inpatient   Admission Date: 8/10/2017  Length of Stay: 5 days  Attending Physician: Deisy Ordonez MD  Primary Care Provider: Isaac Fitch MD        Subjective:     Principal Problem:Staphylococcus aureus bacteremia    HPI:  Mr. Hadley is a 61yo male with a PMHx of dementia, ESRD on HD, HTN, CAD with remote CABG, HLD, DM II, CML, and h/o recurrent gram-positive cocci bacteremia, who presented to the ED with c/o increased confusion and decreased appetite over the past 1-2 weeks.  Patient's niece denies patient c/o fever/chills, cough, SOB, chest pain, palpitations, ABD pain, dysuria, N/V/D, lightheadedness/dizziness, focal deficits, seizure-like activity, or syncope.Patient was sent to ED by PCP for positive blood cultures with gram-positive cocci drawn from AV fistula on Monday.  Patient's niece reports patient received IV Vanc during dialysis yesterday.  Upon arrival to ED, patient noted to be hypotensive with BP 97/67.  WBC 14.  Patient last episode of bacteremia was 4/2017.  SALAZAR at that time was negative for vegetation.  Hospital Medicine was consulted for admission.     Hospital Course:  David Hadley is a 62 year old male who was admitted to [facility] with sepsis due to recurrent MRSA Bacteremia. Patient was admitted 4/2017 for the same, but source remained unclear. Repeat cultures on 4/12/17 were negative for MRSA (1/2 bottle grew coag-negative), and patient was discharged with 6 week therapy with Vancomycin. During this admission, medical management, included Vancomycin. Infectious Disease was consulted who recommended repeating TTE which proved negative for vegetation. A soft tissue US of Left Extremity at dialysis access was negative for abscess.  CXR revealed RLL consolidation and Avelox was started. Subsequent CT Chest/Abdomen revealed large right pleural effusion  with compressive atelectasis. Pulmonology was consulted for thoracentesis. Pleural cytology pending. He received enema for fecal impaction. Left upper extremity ultrasound did not show evidence of abscess and Vascular physician indicated there were no clinical signs of abscess. MRI of Lumbar/Thoracic Spine were negative for abscess. Elevations were noted in the ESR and CRP. Again, blood cultures collected on 8/14/17 isolated gram + cocci resembling STAPH. An Indium scan was ordered by Infectious Ds.     Interval History: Pt's niece is sitting at the bedside. Pt is pleasantly confused, no specific complaints voiced.     Review of Systems   Unable to perform ROS: Dementia     Objective:     Vital Signs (Most Recent):  Temp: 97.5 °F (36.4 °C) (08/15/17 1146)  Pulse: 85 (08/15/17 1146)  Resp: 17 (08/15/17 1146)  BP: 101/72 (08/15/17 1146)  SpO2: 100 % (08/15/17 0754) Vital Signs (24h Range):  Temp:  [97.5 °F (36.4 °C)-98.1 °F (36.7 °C)] 97.5 °F (36.4 °C)  Pulse:  [73-88] 85  Resp:  [16-18] 17  SpO2:  [94 %-100 %] 100 %  BP: (101-143)/(72-85) 101/72     Weight: 92 kg (202 lb 13.2 oz)  Body mass index is 26.04 kg/m².    Intake/Output Summary (Last 24 hours) at 08/15/17 1431  Last data filed at 08/15/17 1421   Gross per 24 hour   Intake             1040 ml   Output                0 ml   Net             1040 ml      Physical Exam   Constitutional: He appears well-developed and well-nourished. No distress.   HENT:   Head: Normocephalic and atraumatic.   Eyes: Conjunctivae and EOM are normal.   Neck: Normal range of motion. Neck supple. No thyromegaly present.   Cardiovascular: Normal rate and normal heart sounds.  An irregular rhythm present.   Pulmonary/Chest: Effort normal and breath sounds normal. No respiratory distress. He has no wheezes. He has no rales.   Abdominal: Soft. Bowel sounds are normal. He exhibits no distension. There is no tenderness.   Musculoskeletal: Normal range of motion. He exhibits edema (+1 BLE).  He exhibits no tenderness or deformity.   Neurological: He is alert.   Disoriented.  No focal deficits.   Skin: Skin is warm and dry. No rash noted. No erythema.   Left arm fistula +bruit/thrill     Psychiatric: He has a normal mood and affect.   Nursing note and vitals reviewed.      Significant Labs:   Blood Culture:   Recent Labs  Lab 08/14/17  0525   LABBLOO Gram stain aer bottle: Gram positive cocci in clusters resembling Staph   Results called to and read back by:Sanjiv Beauchamp RN 08/15/2017  02:46  Gram stain aer bottle: Gram positive cocci in clusters resembling Staph   Results called to and read back by:Sanjiv Beauchamp RN 08/15/2017  02:46     CBC:   Recent Labs  Lab 08/14/17  0525 08/15/17  0507   WBC 12.52 12.11   HGB 11.3* 11.9*   HCT 34.3* 36.3*    259     CMP:   Recent Labs  Lab 08/14/17  0525 08/15/17  0507   *  132* 133*  132*   K 4.5  4.4 5.6*  4.6   CL 95  95 100  101   CO2 25  26 20*  21*   GLU 81  82 90  93   BUN 44*  41* 32*  32*   CREATININE 7.9*  7.9* 6.8*  6.6*   CALCIUM 8.8  8.5* 8.9  8.6*   ALBUMIN 2.3* 2.3*   ANIONGAP 14  11 13  10   EGFRNONAA 7*  7* 8*  8*     All pertinent labs within the past 24 hours have been reviewed.    Significant Imaging: I have reviewed all pertinent imaging results/findings within the past 24 hours.    Assessment/Plan:      * Staphylococcus aureus bacteremia    - Recurrent Staph bacteremia.  Last episode 4/2017; SALAZAR negative for vegetation, Indium scan and LUE US were negative at this time. Repeat blood cultures during this stay were negative for MRSA. S/p 6 weeks Vancomycin.   --source remains unclear  --Continue IV Vancomycin. Infectious Disease following  Second set of blood cultures from 8/14/17 growing gram + cocci resembling STAPH  Indium scan pending 8/15/17  --No obvious evidence of fistula infection  -repeat ultrasound did not show evidence of abscess (although showing possible pseudoaneurysm).   -repeat Echocardiogram did not  show evidence of vegetation  -MRI thoracic and Lumbar spine were negative for abscess        HCAP (healthcare-associated pneumonia)    Presumed HCAP as pt is a dialysis patient  Imaging indicates possible right sided consolidation  Pt is receiving Vanco and Avelox          CAD with remote CABG    - No angina.  - Continue home medical management with ASA and statin.  - Beta blocker on hold due to hypotension.  Will resume once BP allows.        Dementia without behavioral disturbance    -supportive care.   -continue home meds        ESRD (end stage renal disease) on dialysis    - Dialysis M,W,F.  - Nephrology following.        Bilateral pleural effusion    -S/p thoracentesis 8/12/17  -Pleural/serum Protein ratio is 0.5  -Awaiting cytology  -Thoracic MRI 8/14/17 shows bilateral pleural effusion. R>L        Type 2 diabetes mellitus with renal complication    - Accuchecks; SSI  - Diabetic, Renal diet.        Hyperlipidemia    --Resume Atorvastatin        Hypophosphatemia    --improved  ESRD on HD with Nephrology following  Pt is not taking phosphate binders        Hyperkalemia    Pt has ESRD on HD  Nephrology following            VTE Risk Mitigation         Ordered     Medium Risk of VTE  Once      08/10/17 2007     Place PABLITO hose  Until discontinued      08/10/17 2007     Place sequential compression device  Until discontinued      08/10/17 2007     Reason for No Pharmacological VTE Prophylaxis  Once      08/10/17 2007              Jigna Llanos NP  Department of Hospital Medicine   [facility]

## 2017-08-15 NOTE — ASSESSMENT & PLAN NOTE
Patient is on MWF dialysis schedule at [facility] (he does not know name of his outpatient nephrologist). Access is left AVF (no obvious signs of infection).  Next scheduled HD will be tomorrow.

## 2017-08-15 NOTE — ASSESSMENT & PLAN NOTE
Continue Abx: Avelox and Vancomycin, monitor dosing for therapeutic dosing  Follow WBCC scan  Inflammatory markers: ESR and CRP elevated

## 2017-08-16 LAB
ALBUMIN SERPL BCP-MCNC: 2.2 G/DL
ANION GAP SERPL CALC-SCNC: 11 MMOL/L
ANION GAP SERPL CALC-SCNC: 12 MMOL/L
BASOPHILS # BLD AUTO: 0.03 K/UL
BASOPHILS NFR BLD: 0.3 %
BUN SERPL-MCNC: 39 MG/DL
BUN SERPL-MCNC: 40 MG/DL
CALCIUM SERPL-MCNC: 8.3 MG/DL
CALCIUM SERPL-MCNC: 8.6 MG/DL
CHLORIDE SERPL-SCNC: 98 MMOL/L
CHLORIDE SERPL-SCNC: 99 MMOL/L
CO2 SERPL-SCNC: 22 MMOL/L
CO2 SERPL-SCNC: 23 MMOL/L
CREAT SERPL-MCNC: 7.5 MG/DL
CREAT SERPL-MCNC: 7.6 MG/DL
DIFFERENTIAL METHOD: ABNORMAL
EOSINOPHIL # BLD AUTO: 0 K/UL
EOSINOPHIL NFR BLD: 0.4 %
ERYTHROCYTE [DISTWIDTH] IN BLOOD BY AUTOMATED COUNT: 16.9 %
EST. GFR  (AFRICAN AMERICAN): 8 ML/MIN/1.73 M^2
EST. GFR  (AFRICAN AMERICAN): 8 ML/MIN/1.73 M^2
EST. GFR  (NON AFRICAN AMERICAN): 7 ML/MIN/1.73 M^2
EST. GFR  (NON AFRICAN AMERICAN): 7 ML/MIN/1.73 M^2
GLUCOSE SERPL-MCNC: 110 MG/DL
GLUCOSE SERPL-MCNC: 110 MG/DL
HCT VFR BLD AUTO: 34.8 %
HEP. B SURF AB, QUAL: POSITIVE
HEP. B SURF AB, QUANT.: 854 MIU/ML
HGB BLD-MCNC: 11.4 G/DL
LYMPHOCYTES # BLD AUTO: 1.3 K/UL
LYMPHOCYTES NFR BLD: 11.9 %
MCH RBC QN AUTO: 27.2 PG
MCHC RBC AUTO-ENTMCNC: 32.8 G/DL
MCV RBC AUTO: 83 FL
MONOCYTES # BLD AUTO: 0.9 K/UL
MONOCYTES NFR BLD: 8 %
NEUTROPHILS # BLD AUTO: 8.9 K/UL
NEUTROPHILS NFR BLD: 79.4 %
PHOSPHATE SERPL-MCNC: 3 MG/DL
PLATELET # BLD AUTO: 289 K/UL
PMV BLD AUTO: 9.8 FL
POCT GLUCOSE: 115 MG/DL (ref 70–110)
POCT GLUCOSE: 117 MG/DL (ref 70–110)
POCT GLUCOSE: 145 MG/DL (ref 70–110)
POCT GLUCOSE: 146 MG/DL (ref 70–110)
POCT GLUCOSE: 99 MG/DL (ref 70–110)
POTASSIUM SERPL-SCNC: 4.6 MMOL/L
POTASSIUM SERPL-SCNC: 4.8 MMOL/L
RBC # BLD AUTO: 4.19 M/UL
SODIUM SERPL-SCNC: 132 MMOL/L
SODIUM SERPL-SCNC: 133 MMOL/L
VANCOMYCIN SERPL-MCNC: 30.5 UG/ML
WBC # BLD AUTO: 11.23 K/UL

## 2017-08-16 PROCEDURE — 80048 BASIC METABOLIC PNL TOTAL CA: CPT

## 2017-08-16 PROCEDURE — 25000003 PHARM REV CODE 250: Performed by: EMERGENCY MEDICINE

## 2017-08-16 PROCEDURE — 25000003 PHARM REV CODE 250: Performed by: INTERNAL MEDICINE

## 2017-08-16 PROCEDURE — 99232 SBSQ HOSP IP/OBS MODERATE 35: CPT | Mod: ,,, | Performed by: INTERNAL MEDICINE

## 2017-08-16 PROCEDURE — 99900035 HC TECH TIME PER 15 MIN (STAT)

## 2017-08-16 PROCEDURE — 80069 RENAL FUNCTION PANEL: CPT

## 2017-08-16 PROCEDURE — 80202 ASSAY OF VANCOMYCIN: CPT

## 2017-08-16 PROCEDURE — 80100016 HC MAINTENANCE HEMODIALYSIS

## 2017-08-16 PROCEDURE — 97803 MED NUTRITION INDIV SUBSEQ: CPT

## 2017-08-16 PROCEDURE — 21400001 HC TELEMETRY ROOM

## 2017-08-16 PROCEDURE — 87040 BLOOD CULTURE FOR BACTERIA: CPT

## 2017-08-16 PROCEDURE — 85025 COMPLETE CBC W/AUTO DIFF WBC: CPT

## 2017-08-16 PROCEDURE — 36415 COLL VENOUS BLD VENIPUNCTURE: CPT

## 2017-08-16 RX ADMIN — RIVASTIGMINE TRANSDERMAL SYSTEM 1 PATCH: 4.6 PATCH, EXTENDED RELEASE TRANSDERMAL at 09:08

## 2017-08-16 RX ADMIN — DIPHENHYDRAMINE HYDROCHLORIDE 25 MG: 25 CAPSULE ORAL at 10:08

## 2017-08-16 RX ADMIN — SODIUM BICARBONATE 650 MG TABLET 1300 MG: at 02:08

## 2017-08-16 RX ADMIN — SODIUM BICARBONATE 650 MG TABLET 1300 MG: at 06:08

## 2017-08-16 RX ADMIN — MOXIFLOXACIN HYDROCHLORIDE 400 MG: 400 TABLET, FILM COATED ORAL at 09:08

## 2017-08-16 RX ADMIN — ATORVASTATIN CALCIUM 20 MG: 10 TABLET, FILM COATED ORAL at 09:08

## 2017-08-16 RX ADMIN — SODIUM BICARBONATE 650 MG TABLET 1300 MG: at 10:08

## 2017-08-16 RX ADMIN — SERTRALINE HYDROCHLORIDE 50 MG: 50 TABLET ORAL at 09:08

## 2017-08-16 RX ADMIN — LEVETIRACETAM 500 MG: 500 TABLET, FILM COATED ORAL at 10:08

## 2017-08-16 RX ADMIN — LEVETIRACETAM 500 MG: 500 TABLET, FILM COATED ORAL at 09:08

## 2017-08-16 NOTE — SUBJECTIVE & OBJECTIVE
Interval History: no acute events overnight. Blood cultures drawn today - pt will be dc'd once cultures have no growth. Continues M-W-F hemodialysis. Wife at bedside    Review of Systems   Unable to perform ROS: Dementia     Objective:     Vital Signs (Most Recent):  Temp: 98.1 °F (36.7 °C) (08/16/17 1600)  Pulse: 90 (08/16/17 1600)  Resp: 18 (08/16/17 1600)  BP: 118/66 (08/16/17 1600)  SpO2: 97 % (08/16/17 1600) Vital Signs (24h Range):  Temp:  [97.3 °F (36.3 °C)-98.1 °F (36.7 °C)] 98.1 °F (36.7 °C)  Pulse:  [85-97] 90  Resp:  [18] 18  SpO2:  [94 %-97 %] 97 %  BP: (112-150)/(66-80) 118/66     Weight: 92 kg (202 lb 13.2 oz)  Body mass index is 26.04 kg/m².    Intake/Output Summary (Last 24 hours) at 08/16/17 1812  Last data filed at 08/16/17 1200   Gross per 24 hour   Intake              360 ml   Output                0 ml   Net              360 ml      Physical Exam   Constitutional: He appears well-developed and well-nourished. No distress.   HENT:   Head: Normocephalic and atraumatic.   Eyes: Conjunctivae and EOM are normal.   Neck: Normal range of motion. Neck supple. No thyromegaly present.   Cardiovascular: Normal rate and normal heart sounds.  An irregular rhythm present.   Pulmonary/Chest: Effort normal and breath sounds normal. No respiratory distress. He has no wheezes. He has no rales.   Abdominal: Soft. Bowel sounds are normal. He exhibits no distension. There is no tenderness.   Musculoskeletal: Normal range of motion. He exhibits edema (+1 BLE). He exhibits no tenderness or deformity.   Neurological: He is alert.   Disoriented.  No focal deficits.   Skin: Skin is warm and dry. No rash noted. No erythema.   Left arm fistula +bruit/thrill     Psychiatric: He has a normal mood and affect.   Nursing note and vitals reviewed.      Significant Labs: All pertinent labs within the past 24 hours have been reviewed.    Significant Imaging: I have reviewed all pertinent imaging results/findings within the past 24  hours.

## 2017-08-16 NOTE — PLAN OF CARE
Problem: Patient Care Overview  Goal: Plan of Care Review  Outcome: Ongoing (interventions implemented as appropriate)  Pt remains free from falls and injuries fall precautions maintained. Denies pain. Family at bedside. Blood glucose monitoring done  no supplement insulin given per sliding scale. POC reviewed with pt and family verbalized understanding and teach back. Pt being dialysis today. Fistula noted with thrill and bruit present. 12 hr chart check complete.

## 2017-08-16 NOTE — SUBJECTIVE & OBJECTIVE
Interval History:     8/11/17: Patient today denies any fevers, pain, SOB, nausea.     8/12/17: Patient is resting in chair, no complaints at present.     8/13/17: Patient without complaints today.     8/14/17: Patient is currently on hemodialysis. No complications with procedure. Vital signs are stable. Patient is resting comfortably, he denies any complaints.     8/15/17: Patient currently without complaints.     8/16/17: Patient is resting comfortably in bed, denies any complaints at present.         Review of patient's allergies indicates:   Allergen Reactions    Benadryl decongestant Itching     Nothing with benadryl; wife states  No allergy to Benadryl     Current Facility-Administered Medications   Medication Frequency    0.9%  NaCl infusion PRN    0.9%  NaCl infusion Once    0.9%  NaCl infusion PRN    0.9%  NaCl infusion Once    atorvastatin tablet 20 mg Daily    dextrose 50% injection 12.5 g PRN    dextrose 50% injection 25 g PRN    diphenhydrAMINE capsule 25 mg Nightly PRN    glucagon (human recombinant) injection 1 mg PRN    glucose chewable tablet 16 g PRN    glucose chewable tablet 24 g PRN    heparin (porcine) injection 1,000 Units PRN    insulin aspart pen 0-5 Units QID (AC + HS) PRN    levetiracetam tablet 500 mg BID    lidocaine (PF) 10 mg/ml (1%) injection 10 mg Once    lorazepam tablet 0.5 mg Q6H PRN    moxifloxacin tablet 400 mg Daily    rivastigmine 4.6 mg/24 hr 1 patch Daily    sertraline tablet 50 mg Daily    sodium bicarbonate tablet 1,300 mg TID    vancomycin 1 g in dextrose 5 % 250 mL IVPB (ready to mix system) Q48H       Objective:     Vital Signs (Most Recent):  Temp: 97.7 °F (36.5 °C) (08/16/17 0744)  Pulse: 85 (08/16/17 0744)  Resp: 18 (08/16/17 0744)  BP: 126/77 (08/16/17 0744)  SpO2: 95 % (08/16/17 0744)  O2 Device (Oxygen Therapy): room air (08/16/17 0744) Vital Signs (24h Range):  Temp:  [97.5 °F (36.4 °C)-97.9 °F (36.6 °C)] 97.7 °F (36.5 °C)  Pulse:  [85-97]  85  Resp:  [17-18] 18  SpO2:  [95 %] 95 %  BP: (101-150)/(72-78) 126/77     Weight: 92 kg (202 lb 13.2 oz) (08/11/17 1400)  Body mass index is 26.04 kg/m².  Body surface area is 2.19 meters squared.    I/O last 3 completed shifts:  In: 920 [P.O.:420; IV Piggyback:500]  Out: -     Physical Exam   Constitutional: He appears well-developed. He is cooperative.   Alert and responsive.   HENT:   Head: Normocephalic.   Nose: No rhinorrhea.   Mouth/Throat: Mucous membranes are normal. No oropharyngeal exudate.   Neck: No thyroid mass present.   Cardiovascular: Normal rate, regular rhythm, S1 normal, S2 normal and intact distal pulses.    Pulmonary/Chest: Effort normal. No respiratory distress. He has no wheezes.   Abdominal: Soft. Bowel sounds are normal. He exhibits no distension. There is no tenderness. No hernia.   Musculoskeletal:   Mild bilateral LE edema.   Lymphadenopathy:     He has no cervical adenopathy.   Neurological: He is alert.   Skin: Skin is warm and dry.   Psychiatric: He has a normal mood and affect.       Significant Labs:  Lab Results   Component Value Date    CREATININE 7.6 (H) 08/16/2017    CREATININE 7.5 (H) 08/16/2017    BUN 39 (H) 08/16/2017    BUN 40 (H) 08/16/2017     (L) 08/16/2017     (L) 08/16/2017    K 4.8 08/16/2017    K 4.6 08/16/2017    CL 99 08/16/2017    CL 98 08/16/2017    CO2 23 08/16/2017    CO2 22 (L) 08/16/2017     Lab Results   Component Value Date     (H) 11/06/2008    CALCIUM 8.6 (L) 08/16/2017    CALCIUM 8.3 (L) 08/16/2017    PHOS 3.0 08/16/2017     Lab Results   Component Value Date    ALBUMIN 2.2 (L) 08/16/2017     Lab Results   Component Value Date    WBC 11.23 08/16/2017    HGB 11.4 (L) 08/16/2017    HCT 34.8 (L) 08/16/2017    MCV 83 08/16/2017     08/16/2017       Recent Labs  Lab 08/11/17  0527   MG 2.0         Significant Imaging:  Imaging Results          X-Ray Chest AP Portable (Final result)  Result time 08/15/17 10:55:12    Final result by IRMA  Edward Jorge Sr., MD (08/15/17 10:55:12)                 Impression:        1. There is a moderate amount of interstitial and alveolar opacities seen in both lungs. This is characteristic of pulmonary edema.  2. There is opacification of the base of the right hemithorax. This is characteristic of a small right pleural effusion.  3. There is mild cardiomegaly.  4. There are multiple sternotomy wires in place.       Electronically signed by: IRMA JORGE MD  Date:     08/15/17  Time:    10:55              Narrative:    One-view chest x-ray    Clinical History: Pleural effusion    Finding: Comparison was made to prior examination performed on 8/12/2017. There are multiple sternotomy wires in place. There is mild cardiomegaly. There is a moderate amount of interstitial and alveolar opacities seen in both lungs. There is opacification of the base of the right hemithorax. The left costophrenic angle is sharp. There is no pneumothorax.                             NM Inflammitory Localization WB Indium (In process)                MRI Lumbar Spine Without Contrast (Final result)  Result time 08/14/17 16:08:31    Final result by Dickson Jaramillo MD (08/14/17 16:08:31)                 Impression:        Small spinal canal and congenital basis.  Broad-based disc bulges at the L4-L5 and L5-S1 levels.  Bilateral degenerative changes of facets at the L3-L4 L4-L5 and L5-S1 levels.  No definite nerve impingement at any level for bilateral L3-L4 and L5 nerve contents cannot be excluded.  Abnormal signal in the vertebral bodies assistant with hematopoietic bone marrow or an infiltrative process.  Would correlate with clinical history and or bone scan for further evaluation.  No evidence of discitis identified.      Electronically signed by: DICKSON JARAMILLO MD  Date:     08/14/17  Time:    16:08              Narrative:    MRI lumbar spine without contrast.08/14/17 14:29:14    History:   low back pain., rule out abscess/discitis    Standard  multiplanar noncontrast MRI sequences of the lumbar spine.    The distal cord and conus reveal normal signal and morphology.  Abnormal signal in the vertebral bodies possibly representing anemia versus an infiltrative process including metastatic disease.    L1-2: Normal.    L2-3: Normal.    L3-4: Minimal disc.  Mild neural foraminal narrowing.    L4-5: Minimal broad-based disc bulge.  Mild desiccation of the disc and mild degenerative changes of the facets with mild bilateral bony neural foraminal narrowing.    L5-S1:  Minimal broad-based disc bulge.  Mild degenerative changes of the facets.  Bilateral bony neural foraminal narrowing with possible but not definite L5 nerve root impingement                             MRI Thoracic Spine Without Contrast (Final result)  Result time 08/14/17 15:33:41    Final result by Dickson Jaramillo MD (08/14/17 15:33:41)                 Impression:        Spinal canal is within normal limits.  No impingements in the cord or abnormal signal in the cord identified.  Bilateral pleural effusions larger on the right side.  Pleural effusions on the left side appear loculated.  No evidence of fluid in the disc spaces to suggest discitis.  No epidural mass or fluid collection identified.      Electronically signed by: DICKSON JARAMILLO MD  Date:     08/14/17  Time:    15:33              Narrative:    MRI Thoracic spine without contrast.08/14/17 14:29:38    History:   Back pain., rule out discitis /abscess    Standard multiplanar noncontrast MRI sequences of the thoracic spine with and without contrast.    Findings:  The spinal canal is within normal limits. No signal abnormality in the spinal cord.Normal vertebral bodies. The disc spaces are normal.  Bilateral pleural effusions larger on the right.  No evidence of discitis identified.                             X-Ray Chest 1 View (Final result)  Result time 08/12/17 17:34:23    Final result by Dickson Green III, MD (08/12/17 17:34:23)                  Impression:     Improving chest x-ray with decrease in the right pleural effusion consistent with thoracentesis. No evidence of pneumothorax.      Electronically signed by: DICKSON CASTRO MD  Date:     08/12/17  Time:    17:34              Narrative:    Chest x-ray, single view.    Clinical indication: Shortness of breath status post thoracentesis.    The chest shows improvement with better aeration of the right base and significant decrease in the pleural effusion. No evidence of pneumothorax status post thoracentesis. Chest otherwise unchanged.                             US Soft Tissue Misc (Final result)  Result time 08/12/17 17:23:38    Final result by Dickson Castro III, MD (08/12/17 17:23:38)                 Impression:     Complex/heterogeneous density just above the region of the shunt of indeterminate significance and etiology, please correlate. This is not a simple fluid collection and doesn't have the classic appearance of an abscess.      Electronically signed by: DICKSON CASTRO MD  Date:     08/12/17  Time:    17:23              Narrative:    Limited ultrasound near the patient's dialysis shunt, left upper extremity    Along the superior aspect of the shunt there is a complex focus measuring 2.1 x 0.7 x 0.9 cm in diameter. Etiology is indeterminate. This does not have the classic appearance of an abscess but please correlate. No flow by Doppler analysis. No gross evidence of discomfort/pain.                             US Chest Mediastinum (Edited Result - FINAL)  Result time 08/12/17 17:22:01    Addendum 1 of 1 by Dickson Castro III, MD (08/12/17 17:22:01)    Addendum.    The procedure should be listed as ultrasound of the chest, not CT of the chest.   As above      Electronically signed by: DICKSON CASTRO MD  Date:     08/12/17  Time:    17:22                Final result by Dickson Castro III, MD (08/12/17 17:20:58)                 Impression:     As  above.      Electronically signed by: VLAD CASTRO MD  Date:     08/12/17  Time:    17:20              Narrative:    CT of the chest.    Clinical indication: Pleural effusion.    Ultrasound of the chest reveals a moderate right pleural effusion and minimal left effusion.. Appropriate skin site was marked prior to thoracentesis performed by Dr. Balderas.                             CT Abdomen Pelvis With Contrast (Final result)  Result time 08/11/17 19:22:53    Final result by Shivam Ambrose MD (08/11/17 19:22:53)                 Impression:             Right greater than left pleural effusions, associated with compressive atelectasis/consolidation.    Heavily calcified coronary arteries.    Rectal fecal impaction noted.      Electronically signed by: SHIVAM AMBROSE MD  Date:     08/11/17  Time:    19:22              Narrative:    Exam: CT chest with contrast.    History: Gram-positive cocci bacteremia    Findings: Large right and smaller pleural effusions are present. There is extensive compressive atelectasis/consolidation in the right base. Less pronounced findings in the left base. No mediastinal or hilar adenopathy.    Heavily calcified coronary arteries noted.    No osteolytic or osteoblastic lesions are identified. The    The liver, spleen, pancreas, kidneys and adrenal glands are unremarkable.    No free fluid, adenopathy, mesenteric inflammatory change is apparent.    Rectal fecal impaction noted.                             CT Chest With Contrast (Final result)  Result time 08/11/17 19:22:54    Final result by Shivam Ambrose MD (08/11/17 19:22:54)                 Impression:             Right greater than left pleural effusions, associated with compressive atelectasis/consolidation.    Heavily calcified coronary arteries.    Rectal fecal impaction noted.      Electronically signed by: SHIVAM AMBROSE MD  Date:     08/11/17  Time:    19:22              Narrative:    Exam: CT chest with  contrast.    History: Gram-positive cocci bacteremia    Findings: Large right and smaller pleural effusions are present. There is extensive compressive atelectasis/consolidation in the right base. Less pronounced findings in the left base. No mediastinal or hilar adenopathy.    Heavily calcified coronary arteries noted.    No osteolytic or osteoblastic lesions are identified. The    The liver, spleen, pancreas, kidneys and adrenal glands are unremarkable.    No free fluid, adenopathy, mesenteric inflammatory change is apparent.    Rectal fecal impaction noted.                             X-Ray Chest 1 View (Final result)  Result time 08/10/17 21:10:07    Final result by Shivam Ambrose MD (08/10/17 21:10:07)                 Impression:     Right basilar consolidation. See above. Otherwise no significant change from prior exam.      Electronically signed by: SHIVAM AMBROSE MD  Date:     08/10/17  Time:    21:10              Narrative:    Exam: Chest X-ray, one view.    History: Shortness of breath    Findings: Comparison is made to prior exam dated 05/03/2017. Mild cardiomegaly again noted. Right basilar consolidation is no apparent, likely related to combination of pleural fluid and airspace disease. Sternal wires again noted.

## 2017-08-16 NOTE — ASSESSMENT & PLAN NOTE
Source control is needed in all cases of staph bacteremia.    Repeat blood culture remains positive .  Will follow indium scan .  Will repeat blood culture in am.  Random vanco is 30

## 2017-08-16 NOTE — CONSULTS
[facility] -   Adult Nutrition  Consult Note    SUMMARY     Recommendations    Recommendation/Intervention: 1.Add Diabetic/ Renal restriction to current diet. 2. Will continue to encourage PO intake  3. Assistance with meals as needed 4. Will continue to monitor   Goals: Intake of % of meals and supplements  Nutrition Goal Status: progressing towards goal  Communication of RD Recs:  (care plan and sticky note )    Reason for Assessment    Reason for Assessment: RD follow-up  Diagnosis:  (Bacteremia, ESRD on HD, CAD with remote CABG)  Hx:   Past Medical History:   Diagnosis Date    After-cataract, unspecified - Left Eye 11/27/2013    Allergy     Arthritis     Cancer     CHF (congestive heart failure)     Chronic kidney disease     chemo/ dialias    Dementia     Diabetes mellitus     Hypertension     Myocardial infarction      General Information Comments:  8/11/17 Patient with hx of dementia, ill feeling with decreased appetite, consumed 25% of lunch today  8/16/17  Pt on HD. Family at bedside. She reports Pt's oral intake is good (PO intake ~70 %). She reports Pt was not consuming oral supplements (Novasource Renal) because Pt dislikes renal supplements.   Nutrition Discharge Planning: Home on Renal Diabetic Diet    Nutrition Prescription Ordered    Current Diet Order: Cardiac Diet    Evaluation of Received Nutrients/Fluid Intake    Energy Calories Required: not meeting needs  Protein Required: not meeting needs  Fluid Required: not meeting needs  Tolerance: tolerating  % Intake of Estimated Energy Needs: 50 - 75 %  % Meal Intake: 75%     Nutrition Risk Screen     Nutrition Risk Screen: other (see comments) (poor appetite)    Nutrition/Diet History    Typical Food/Fluid Intake: Good intake and appetite at home  Food Preferences: none reported inculding Bahai or cultural    Labs/Tests/Procedures/Meds    Pertinent Labs Reviewed: reviewed  Pertinent Labs Comments: Na 132, CO2 22, BUN  "40, Crea 7.5, GFR 8, Calcium 8.3, Alb 2.2   Pertinent Medications Reviewed: reviewed       Physical Findings    Overall Physical Appearance:  (overweight)  Oral/Mouth Cavity: tooth/teeth missing  Skin:  (Heriberto Score 18)    Anthropometrics    Height: 6' 2" (188 cm)  Weight Method: Bed Scale  Weight: 92 kg (202 lb 13.2 oz)  Ideal Body Weight (IBW), Male: 190 lb  % Ideal Body Weight, Male (lb): 106.75 lb  BMI (Calculated): 26.1  BMI Grade: 25 - 29.9 - overweight     Estimated/Assessed Needs    Weight Used For Calorie Calculations: 92 kg (202 lb 13.2 oz)   25 kcal/kg (kcal): 2300 and 30 kcal/kg (kcal): 2760   RMR ([place]-St. Jeor Equation): 1779.75  Weight Used For Protein Calculations: 92 kg (202 lb 13.2 oz)  1.2 gm Protein (gm): 110.63 and 1.5 gm Protein (gm): 138.29  Fluid Need Method: RDA Method (1ml/krunal or as needed)     Assessment and Plan    Dementia without behavioral disturbance    Nutrition Diagnosis:  Suboptimal oral food and beverage intake    Related to (etiology):   Dementia and decreased appetite    Signs and Symptoms (as evidenced by):   PO intake of 70 % and decreased appetite     Interventions/Recommendations (treatment strategy):  1.Add Diabetic/ Renal restriction to current diet. 2. Will continue to encourage PO intake     Nutrition Diagnosis Status:   Improving 8/16/17 ~ PO intake of 70 %             Monitor and Evaluation    Food and Nutrient Intake: food and beverage intake  Food and Nutrient Adminstration: diet order  Knowledge/Beliefs/Attitudes: beliefs and attitudes  Physical Activity and Function: nutrition-related ADLs and IADLs  Anthropometric Measurements: weight  Biochemical Data, Medical Tests and Procedures: electrolyte and renal panel, glucose/endocrine profile     Nutrition Follow-Up    RD Follow-up?: Yes (1xweekly)    "

## 2017-08-16 NOTE — PHYSICIAN QUERY
"PT Name: David Hadley  MR #: [id number]    Physician Query Form - Heart  Condition Clarification     CDS/: Shahnaz Bolden RN              Contact information:[email or web address]  This form is a permanent document in the medical record.     Query Date: August 16, 2017    By submitting this query, we are merely seeking further clarification of documentation. Please utilize your independent clinical judgment when addressing the question(s) below.    The medical record contains the following   Indicators     Supporting Clinical Findings Location in Medical Record    BNP     x EF EF 40% ECHO 8/13    Radiology findings Mild cardiomegaly. R basilar consolidation    CXR 8/10   x Echo Results CONCLUSIONS :     1 - Mildly to moderately depressed left ventricular systolic function (EF 40%).   2 - Impaired LV relaxation, elevated LAP (grade 2 diastolic dysfunction).   3 - Low normal right ventricular systolic function   4 - Mild left ventricular enlargement.   5 - Biatrial enlargement.    ECHO 8/13    "Ascites" documented      "SOB" or "MONTANEZ" documented      "Hypoxia" documented     x Heart Failure documented CHF (congestive heart failure)  H&P past medical history    "Edema" documented     x Diuretics/Meds amlodipine (NORVASC)      losartan (COZAAR)          metoprolol tartrate (LOPRESSOR)   H&P outpatient medications    Treatment:      Other:          Provider, please specify diagnosis or diagnoses associated with above clinical findings.    [  ] Chronic Systolic Heart Failure (EF < 40)*  [  x] Chronic Diastolic Heart Failure (EF > 40)*  [  ] Chronic Combined Systolic and Diastolic Heart Failure  [  ] Other Type of Heart Failure (please specify type): _________________________  [  ] Heart Failure Ruled Out  [  ] Other (please specify): ___________________________________  [  ] Clinically Undetermined            *American Heart Association                                                                              "                             Please document in your progress notes daily for the duration of treatment until resolved and include in your discharge summary.

## 2017-08-16 NOTE — PROGRESS NOTES
[facility] Hospital Medicine  Progress Note    Patient Name: David Hadley  MRN: [id number]  Patient Class: IP- Inpatient   Admission Date: 8/10/2017  Length of Stay: 6 days  Attending Physician: Deisy Ordonez MD  Primary Care Provider: Isaac Fitch MD        Subjective:     Principal Problem:Staphylococcus aureus bacteremia    HPI:  Mr. Hadley is a 63yo male with a PMHx of dementia, ESRD on HD, HTN, CAD with remote CABG, HLD, DM II, CML, and h/o recurrent gram-positive cocci bacteremia, who presented to the ED with c/o increased confusion and decreased appetite over the past 1-2 weeks.  Patient's niece denies patient c/o fever/chills, cough, SOB, chest pain, palpitations, ABD pain, dysuria, N/V/D, lightheadedness/dizziness, focal deficits, seizure-like activity, or syncope.Patient was sent to ED by PCP for positive blood cultures with gram-positive cocci drawn from AV fistula on Monday.  Patient's niece reports patient received IV Vanc during dialysis yesterday.  Upon arrival to ED, patient noted to be hypotensive with BP 97/67.  WBC 14.  Patient last episode of bacteremia was 4/2017.  SALAZAR at that time was negative for vegetation.  Hospital Medicine was consulted for admission.     Hospital Course:  David Hadley is a 62 year old male who was admitted to [facility] with sepsis due to recurrent MRSA Bacteremia. Patient was admitted 4/2017 for the same, but source remained unclear. Repeat cultures on 4/12/17 were negative for MRSA (1/2 bottle grew coag-negative), and patient was discharged with 6 week therapy with Vancomycin. During this admission, medical management, included Vancomycin. Infectious Disease was consulted who recommended repeating TTE which proved negative for vegetation. A soft tissue US of Left Extremity at dialysis access was negative for abscess.  CXR revealed RLL consolidation and Avelox was started. Subsequent CT Chest/Abdomen revealed large right pleural effusion  with compressive atelectasis. Pulmonology was consulted for thoracentesis. Pleural cytology pending. He received enema for fecal impaction. Left upper extremity ultrasound did not show evidence of abscess and Vascular physician indicated there were no clinical signs of abscess. MRI of Lumbar/Thoracic Spine were negative for abscess. Elevations were noted in the ESR and CRP. Again, blood cultures collected on 8/14/17 isolated gram + cocci resembling STAPH. An Indium scan was ordered by Infectious Ds - showed no clear site of Indium WBC's.    Interval History: no acute events overnight. Blood cultures drawn today - pt will be dc'd once cultures have no growth. Continues M-W-F hemodialysis. Wife at bedside    Review of Systems   Unable to perform ROS: Dementia     Objective:     Vital Signs (Most Recent):  Temp: 98.1 °F (36.7 °C) (08/16/17 1600)  Pulse: 90 (08/16/17 1600)  Resp: 18 (08/16/17 1600)  BP: 118/66 (08/16/17 1600)  SpO2: 97 % (08/16/17 1600) Vital Signs (24h Range):  Temp:  [97.3 °F (36.3 °C)-98.1 °F (36.7 °C)] 98.1 °F (36.7 °C)  Pulse:  [85-97] 90  Resp:  [18] 18  SpO2:  [94 %-97 %] 97 %  BP: (112-150)/(66-80) 118/66     Weight: 92 kg (202 lb 13.2 oz)  Body mass index is 26.04 kg/m².    Intake/Output Summary (Last 24 hours) at 08/16/17 1812  Last data filed at 08/16/17 1200   Gross per 24 hour   Intake              360 ml   Output                0 ml   Net              360 ml      Physical Exam   Constitutional: He appears well-developed and well-nourished. No distress.   HENT:   Head: Normocephalic and atraumatic.   Eyes: Conjunctivae and EOM are normal.   Neck: Normal range of motion. Neck supple. No thyromegaly present.   Cardiovascular: Normal rate and normal heart sounds.  An irregular rhythm present.   Pulmonary/Chest: Effort normal and breath sounds normal. No respiratory distress. He has no wheezes. He has no rales.   Abdominal: Soft. Bowel sounds are normal. He exhibits no distension. There is no  tenderness.   Musculoskeletal: Normal range of motion. He exhibits edema (+1 BLE). He exhibits no tenderness or deformity.   Neurological: He is alert.   Disoriented.  No focal deficits.   Skin: Skin is warm and dry. No rash noted. No erythema.   Left arm fistula +bruit/thrill     Psychiatric: He has a normal mood and affect.   Nursing note and vitals reviewed.      Significant Labs: All pertinent labs within the past 24 hours have been reviewed.    Significant Imaging: I have reviewed all pertinent imaging results/findings within the past 24 hours.    Assessment/Plan:      HCAP (healthcare-associated pneumonia)    Presumed HCAP as pt is a dialysis patient  Imaging indicates possible right sided consolidation  Pt is receiving Vanco and Avelox          Bilateral pleural effusion    -S/p thoracentesis 8/12/17  -Pleural/serum Protein ratio is 0.5  -Awaiting cytology  -Thoracic MRI 8/14/17 shows bilateral pleural effusion. R>L        Hypophosphatemia    --improved  ESRD on HD with Nephrology following  Pt is not taking phosphate binders        Hyperkalemia    Pt has ESRD on HD  Nephrology following          Dementia without behavioral disturbance    -supportive care.   -continue home meds        Type 2 diabetes mellitus with renal complication    - Accuchecks; SSI  - Diabetic, Renal diet.        Hyperlipidemia    --Resume Atorvastatin        CAD with remote CABG    - No angina.  - Continue home medical management with ASA and statin.  - Beta blocker on hold due to hypotension.  Will resume once BP allows.        ESRD (end stage renal disease) on dialysis    - Dialysis M,W,F.  - Nephrology following.        * Staphylococcus aureus bacteremia    - Recurrent Staph bacteremia.  Last episode 4/2017; SALAZAR negative for vegetation, Indium scan and LUE US were negative at this time. Repeat blood cultures during this stay were negative for MRSA. S/p 6 weeks Vancomycin.   --source remains unclear  --Continue IV Vancomycin.  Infectious Disease following  Second set of blood cultures from 8/14/17 growing gram + cocci resembling STAPH  --Indium scan with no clear evidence of WBC collection  --No obvious evidence of fistula infection  -repeat ultrasound did not show evidence of abscess (although showing possible pseudoaneurysm).   -repeat Echocardiogram did not show evidence of vegetation  -MRI thoracic and Lumbar spine were negative for abscess          VTE Risk Mitigation         Ordered     Medium Risk of VTE  Once      08/10/17 2007     Place PABLITO hose  Until discontinued      08/10/17 2007     Place sequential compression device  Until discontinued      08/10/17 2007     Reason for No Pharmacological VTE Prophylaxis  Once      08/10/17 2007              JASON Gonzales  Department of Hospital Medicine   [facility]

## 2017-08-16 NOTE — PLAN OF CARE
Problem: Patient Care Overview  Goal: Plan of Care Review  Outcome: Ongoing (interventions implemented as appropriate)  Recommendations     Recommendation/Intervention: 1.Add Diabetic/ Renal restriction to current diet. 2. Will continue to encourage PO intake  3. Assistance with meals as needed 4. Will continue to monitor   Goals: Intake of % of meals and supplements  Nutrition Goal Status: progressing towards goal  Communication of RD Recs:  (care plan and sticky note )

## 2017-08-16 NOTE — PROGRESS NOTES
Pt completed 3.5 hours of HD tx with 3 L net fluid removal.  Pt tolerated tx well.  Post tx, blood rinsed back, needles removed x2, hemostasis achieved.  Verbal and written report to nurse attending.

## 2017-08-16 NOTE — PLAN OF CARE
Problem: Patient Care Overview  Goal: Plan of Care Review  Outcome: Ongoing (interventions implemented as appropriate)  Pt oriented to person. Pt remains free of injury, pain managed adequately, no s/s of distress. 24 hour chart check completed. Will continue to monitor.

## 2017-08-16 NOTE — PHYSICIAN QUERY
PT Name: David Hadley  MR #: [id number]    Physician Query Form -Systemic Infectious Process Clarification     CDS/: Shahnaz Bolden RN               Contact information:[email or web address]  This form is a permanent document in the medical record.     Query Date: August 16, 2017     By submitting this query, we are merely seeking further clarification of documentation. Please utilize your independent clinical judgment when addressing the question(s) below.    The Medical record contains the following:     Indicators   Supporting Clinical Findings   Location in Medical Record   x HR RR BP Temp BP= 97/67 HR= 73 RR=18  T= 97.7 °F     BP= 104/52  HR= 119  EX=803/91  HR= 98  T= 97.9  BP= 126/85    T= 97.7 ED Vital signs      8/11 @2335 Vital signs  8/12@ 0009 Vital signs  8/14@ 1200 Vital signs  8/15@ 0759 Vital signs   x Lactic Acid             Procalcitonin Lactic Acid 2.2 Lab 8/10   x WBC                Bands                     CRP WBC  13.9 > 15.99> 11.23    .8 Lab 8/10>8/12>8/16    Lab 8/15   x Culture(s) Gram stain aer bottle: Gram positive cocci in clusters resembling Staph    Gram stain mike bottle: Gram positive cocci in clusters resembling Staph    Methicillin Resistant Staphylococcus aureus     Gram stain aer bottle: Gram positive cocci in clusters resembling Staph   Staphylococcus aureus  8/10 Blood cultures                  8/14 Blood cultures    AMS, Confusion, LOC, etc.      Organ Dysfunction / Failure     x Bacteremia or Sepsis / Septic Staphylococcus aureus bacteremia .    Infectious Disease 8/15 PN    Known or Suspected Source of Infection documented        (Failed) Outpatient Treatment     x Medication moxifloxacin IVPB  vancomycin IVPB  moxifloxacin PO MAR 8/11-15  MAR 8/10-present  MAR 8/16-present    Treatment     x Other Sedimentation Rate    PMHx of dementia, ESRD on HD, DM II, CML, and h/o recurrent  gram-positive cocci bacteremia. 8/10= Presented to the ED with   increased confusion and decreased appetite over the past 1-2 weeks  positive blood cultures with gram-positive cocci drawn from AV fistula on 8/7. IV Vanc 8/9 during dialysis.  8/10=hypotensive with BP 97/67.  WBC 14. last episode of bacteremia was 4/2017.    HCAP (healthcare-associated pneumonia)    Lab 8/15    H&P                                  Infectious Disease 8/15 PN     Provider, please specify diagnosis or diagnoses associated with above clinical findings.    [ x ] Sepsis  [  ] Severe Sepsis with Acute Organ Dysfunction/Failure (please specify         organ dysfunction/failure): ___________________  [  ] Sepsis with Septic Shock  [  ] Other Infectious Disease (please specify): _________________________________  [  ] Other: __________________________________  [  ] Clinically Undetermined    Please document in your progress notes daily for the duration of treatment until resolved and include in your discharge summary.

## 2017-08-16 NOTE — SUBJECTIVE & OBJECTIVE
Interval History:  62 year old man with ESRD, MRSA bacteremia, back pain, left upper arm fistula.  He is afebrile.  Repeat blood cultures remain positive.  MRI scan reviewed -No obvious discitis or abscess noted.    Review of Systems   Constitutional: Positive for appetite change. Negative for chills, fever and unexpected weight change.   HENT: Negative for trouble swallowing.    Respiratory: Negative for cough, shortness of breath and wheezing.    Cardiovascular: Negative for chest pain.   Gastrointestinal: Negative for abdominal distention, constipation, diarrhea and vomiting.   Neurological: Negative for seizures, syncope, facial asymmetry and speech difficulty.   Psychiatric/Behavioral: Positive for confusion.     Objective:     Vital Signs (Most Recent):  Temp: 97.8 °F (36.6 °C) (08/16/17 0444)  Pulse: 91 (08/16/17 0559)  Resp: 18 (08/16/17 0444)  BP: (!) 150/78 (08/16/17 0444)  SpO2: 95 % (08/16/17 0444) Vital Signs (24h Range):  Temp:  [97.5 °F (36.4 °C)-97.9 °F (36.6 °C)] 97.8 °F (36.6 °C)  Pulse:  [81-97] 91  Resp:  [16-18] 18  SpO2:  [95 %-100 %] 95 %  BP: (101-150)/(72-85) 150/78     Weight: 92 kg (202 lb 13.2 oz)  Body mass index is 26.04 kg/m².    Estimated Creatinine Clearance: 11.4 mL/min (based on Cr of 7.6).    Physical Exam   Constitutional: He appears well-developed and well-nourished. No distress.   HENT:   Head: Normocephalic and atraumatic.   Eyes: Conjunctivae and EOM are normal. Pupils are equal, round, and reactive to light.   Neck: Normal range of motion. Neck supple. No thyromegaly present.   Cardiovascular: Normal rate, regular rhythm and normal heart sounds.    Pulmonary/Chest: Effort normal and breath sounds normal. No respiratory distress. He has no wheezes. He has no rales.   Abdominal: Soft. Bowel sounds are normal. He exhibits no distension. There is no tenderness.   Musculoskeletal: Normal range of motion. He exhibits edema (+1 BLE). He exhibits no tenderness or deformity.   Has  point tenderness over the lower thoracic region   Neurological: He is alert.   Disoriented.  No focal deficits.   Skin: Skin is warm and dry. No rash noted. No erythema.   Left arm fistula +bruit/thrill     Psychiatric: He has a normal mood and affect.   Nursing note and vitals reviewed.      Significant Labs:   Blood Culture:     Recent Labs  Lab 04/12/17  1435 04/12/17  1436 08/10/17  1845 08/10/17  1900 08/14/17  0525   LABBLOO Gram stain aer bottle: Gram positive cocci in clusters resembling Staph  Results called to and read back by:Angie Summers RN 04/13/2017  18:47  COAGULASE-NEGATIVE STAPHYLOCOCCUS SPECIESOrganism is a probable contaminant No growth after 5 days. Gram stain aer bottle: Gram positive cocci in clusters resembling Staph   Gram stain mike bottle: Gram positive cocci in clusters resembling Staph   Results called to and read back by: Primitivo Velasco RN  08/11/2017  21:59  METHICILLIN RESISTANT STAPHYLOCOCCUS AUREUSID consult required at [facility]. Gram stain aer bottle: Gram positive cocci in clusters resembling Staph   Gram stain mike bottle: Gram positive cocci in clusters resembling Staph   Results called to and read back by:Kia Velasco RN 08/12/2017  04:05  METHICILLIN RESISTANT STAPHYLOCOCCUS AUREUSID consult required at [facility]. Gram stain aer bottle: Gram positive cocci in clusters resembling Staph   Results called to and read back by:Sanjiv Beauchamp RN 08/15/2017  02:46  Gram stain aer bottle: Gram positive cocci in clusters resembling Staph   Results called to and read back by:Sanjiv Beauchamp RN 08/15/2017  02:46     BMP:     Recent Labs  Lab 08/16/17  0516     110   *  133*   K 4.6  4.8   CL 98  99   CO2 22*  23   BUN 40*  39*   CREATININE 7.5*  7.6*   CALCIUM 8.3*  8.6*     CBC:     Recent Labs  Lab 08/15/17  0507 08/16/17  0516   WBC 12.11 11.23   HGB 11.9* 11.4*   HCT 36.3* 34.8*    289     All pertinent labs  within the past 24 hours have been reviewed.    Significant Imaging: I have reviewed all pertinent imaging results/findings within the past 24 hours.

## 2017-08-16 NOTE — PROGRESS NOTES
Vancomycin Progress Notes:      Dx: Staphylococcus aureus bacteremia  HD on MWF schedule  Random this am = 30.5 @ 0516  Will Hold vanco per protocol for level >25 today  Random due 8/18 @ 0430  Madie Siegel [facility] 8/16/2017 7:58 AM

## 2017-08-16 NOTE — ASSESSMENT & PLAN NOTE
Patient is on MWF dialysis schedule at [facility] (he does not know name of his outpatient nephrologist). Access is left AVF (no obvious signs of infection).  Next scheduled HD will be today.

## 2017-08-16 NOTE — PROGRESS NOTES
[facility] -   Nephrology  Progress Note    Patient Name: David Hadley  MRN: [id number]  Admission Date: 8/10/2017  Hospital Length of Stay: 6 days  Attending Provider: Deisy Ordonez MD   Primary Care Physician: Isaac Fitch MD  Principal Problem:Staphylococcus aureus bacteremia    Subjective:     HPI: [id number] with multiple histories of MRSA infections with blood cultures being positive.  Has been admitted with positive blood cultures in April 2017 status post  Prolonged antibiotic course of vancomycin.  May 2017 admitted for syncope.  Has ESRD on hemodialysis.  Schedule for dialysis Tuesday Thursday and Saturday.  Last dialysis was today.     [place] [place] History:     8/11/17: Patient today denies any fevers, pain, SOB, nausea.     8/12/17: Patient is resting in chair, no complaints at present.     8/13/17: Patient without complaints today.     8/14/17: Patient is currently on hemodialysis. No complications with procedure. Vital signs are stable. Patient is resting comfortably, he denies any complaints.     8/15/17: Patient currently without complaints.     8/16/17: Patient is resting comfortably in bed, denies any complaints at present.         Review of patient's allergies indicates:   Allergen Reactions    Benadryl decongestant Itching     Nothing with benadryl; wife states  No allergy to Benadryl     Current Facility-Administered Medications   Medication Frequency    0.9%  NaCl infusion PRN    0.9%  NaCl infusion Once    0.9%  NaCl infusion PRN    0.9%  NaCl infusion Once    atorvastatin tablet 20 mg Daily    dextrose 50% injection 12.5 g PRN    dextrose 50% injection 25 g PRN    diphenhydrAMINE capsule 25 mg Nightly PRN    glucagon (human recombinant) injection 1 mg PRN    glucose chewable tablet 16 g PRN    glucose chewable tablet 24 g PRN    heparin (porcine) injection 1,000 Units PRN    insulin aspart pen 0-5 Units QID (AC + HS) PRN     levetiracetam tablet 500 mg BID    lidocaine (PF) 10 mg/ml (1%) injection 10 mg Once    lorazepam tablet 0.5 mg Q6H PRN    moxifloxacin tablet 400 mg Daily    rivastigmine 4.6 mg/24 hr 1 patch Daily    sertraline tablet 50 mg Daily    sodium bicarbonate tablet 1,300 mg TID    vancomycin 1 g in dextrose 5 % 250 mL IVPB (ready to mix system) Q48H       Objective:     Vital Signs (Most Recent):  Temp: 97.7 °F (36.5 °C) (08/16/17 0744)  Pulse: 85 (08/16/17 0744)  Resp: 18 (08/16/17 0744)  BP: 126/77 (08/16/17 0744)  SpO2: 95 % (08/16/17 0744)  O2 Device (Oxygen Therapy): room air (08/16/17 0744) Vital Signs (24h Range):  Temp:  [97.5 °F (36.4 °C)-97.9 °F (36.6 °C)] 97.7 °F (36.5 °C)  Pulse:  [85-97] 85  Resp:  [17-18] 18  SpO2:  [95 %] 95 %  BP: (101-150)/(72-78) 126/77     Weight: 92 kg (202 lb 13.2 oz) (08/11/17 1400)  Body mass index is 26.04 kg/m².  Body surface area is 2.19 meters squared.    I/O last 3 completed shifts:  In: 920 [P.O.:420; IV Piggyback:500]  Out: -     Physical Exam   Constitutional: He appears well-developed. He is cooperative.   Alert and responsive.   HENT:   Head: Normocephalic.   Nose: No rhinorrhea.   Mouth/Throat: Mucous membranes are normal. No oropharyngeal exudate.   Neck: No thyroid mass present.   Cardiovascular: Normal rate, regular rhythm, S1 normal, S2 normal and intact distal pulses.    Pulmonary/Chest: Effort normal. No respiratory distress. He has no wheezes.   Abdominal: Soft. Bowel sounds are normal. He exhibits no distension. There is no tenderness. No hernia.   Musculoskeletal:   Mild bilateral LE edema.   Lymphadenopathy:     He has no cervical adenopathy.   Neurological: He is alert.   Skin: Skin is warm and dry.   Psychiatric: He has a normal mood and affect.       Significant Labs:  Lab Results   Component Value Date    CREATININE 7.6 (H) 08/16/2017    CREATININE 7.5 (H) 08/16/2017    BUN 39 (H) 08/16/2017    BUN 40 (H) 08/16/2017     (L) 08/16/2017    NA  132 (L) 08/16/2017    K 4.8 08/16/2017    K 4.6 08/16/2017    CL 99 08/16/2017    CL 98 08/16/2017    CO2 23 08/16/2017    CO2 22 (L) 08/16/2017     Lab Results   Component Value Date     (H) 11/06/2008    CALCIUM 8.6 (L) 08/16/2017    CALCIUM 8.3 (L) 08/16/2017    PHOS 3.0 08/16/2017     Lab Results   Component Value Date    ALBUMIN 2.2 (L) 08/16/2017     Lab Results   Component Value Date    WBC 11.23 08/16/2017    HGB 11.4 (L) 08/16/2017    HCT 34.8 (L) 08/16/2017    MCV 83 08/16/2017     08/16/2017       Recent Labs  Lab 08/11/17  0527   MG 2.0         Significant Imaging:  Imaging Results          X-Ray Chest AP Portable (Final result)  Result time 08/15/17 10:55:12    Final result by IRMA Jorge Sr., MD (08/15/17 10:55:12)                 Impression:        1. There is a moderate amount of interstitial and alveolar opacities seen in both lungs. This is characteristic of pulmonary edema.  2. There is opacification of the base of the right hemithorax. This is characteristic of a small right pleural effusion.  3. There is mild cardiomegaly.  4. There are multiple sternotomy wires in place.       Electronically signed by: IRMA JORGE MD  Date:     08/15/17  Time:    10:55              Narrative:    One-view chest x-ray    Clinical History: Pleural effusion    Finding: Comparison was made to prior examination performed on 8/12/2017. There are multiple sternotomy wires in place. There is mild cardiomegaly. There is a moderate amount of interstitial and alveolar opacities seen in both lungs. There is opacification of the base of the right hemithorax. The left costophrenic angle is sharp. There is no pneumothorax.                             NM Inflammitory Localization WB Indium (In process)                MRI Lumbar Spine Without Contrast (Final result)  Result time 08/14/17 16:08:31    Final result by Dickson Jaramillo MD (08/14/17 16:08:31)                 Impression:        Small spinal canal and  congenital basis.  Broad-based disc bulges at the L4-L5 and L5-S1 levels.  Bilateral degenerative changes of facets at the L3-L4 L4-L5 and L5-S1 levels.  No definite nerve impingement at any level for bilateral L3-L4 and L5 nerve contents cannot be excluded.  Abnormal signal in the vertebral bodies assistant with hematopoietic bone marrow or an infiltrative process.  Would correlate with clinical history and or bone scan for further evaluation.  No evidence of discitis identified.      Electronically signed by: DICKSON JARAMILLO MD  Date:     08/14/17  Time:    16:08              Narrative:    MRI lumbar spine without contrast.08/14/17 14:29:14    History:   low back pain., rule out abscess/discitis    Standard multiplanar noncontrast MRI sequences of the lumbar spine.    The distal cord and conus reveal normal signal and morphology.  Abnormal signal in the vertebral bodies possibly representing anemia versus an infiltrative process including metastatic disease.    L1-2: Normal.    L2-3: Normal.    L3-4: Minimal disc.  Mild neural foraminal narrowing.    L4-5: Minimal broad-based disc bulge.  Mild desiccation of the disc and mild degenerative changes of the facets with mild bilateral bony neural foraminal narrowing.    L5-S1:  Minimal broad-based disc bulge.  Mild degenerative changes of the facets.  Bilateral bony neural foraminal narrowing with possible but not definite L5 nerve root impingement                             MRI Thoracic Spine Without Contrast (Final result)  Result time 08/14/17 15:33:41    Final result by Dickson Jaramillo MD (08/14/17 15:33:41)                 Impression:        Spinal canal is within normal limits.  No impingements in the cord or abnormal signal in the cord identified.  Bilateral pleural effusions larger on the right side.  Pleural effusions on the left side appear loculated.  No evidence of fluid in the disc spaces to suggest discitis.  No epidural mass or fluid collection  identified.      Electronically signed by: DICKSON HOLM MD  Date:     08/14/17  Time:    15:33              Narrative:    MRI Thoracic spine without contrast.08/14/17 14:29:38    History:   Back pain., rule out discitis /abscess    Standard multiplanar noncontrast MRI sequences of the thoracic spine with and without contrast.    Findings:  The spinal canal is within normal limits. No signal abnormality in the spinal cord.Normal vertebral bodies. The disc spaces are normal.  Bilateral pleural effusions larger on the right.  No evidence of discitis identified.                             X-Ray Chest 1 View (Final result)  Result time 08/12/17 17:34:23    Final result by Dickson Green III, MD (08/12/17 17:34:23)                 Impression:     Improving chest x-ray with decrease in the right pleural effusion consistent with thoracentesis. No evidence of pneumothorax.      Electronically signed by: DICKSON GREEN MD  Date:     08/12/17  Time:    17:34              Narrative:    Chest x-ray, single view.    Clinical indication: Shortness of breath status post thoracentesis.    The chest shows improvement with better aeration of the right base and significant decrease in the pleural effusion. No evidence of pneumothorax status post thoracentesis. Chest otherwise unchanged.                             US Soft Tissue Misc (Final result)  Result time 08/12/17 17:23:38    Final result by Dickson Green III, MD (08/12/17 17:23:38)                 Impression:     Complex/heterogeneous density just above the region of the shunt of indeterminate significance and etiology, please correlate. This is not a simple fluid collection and doesn't have the classic appearance of an abscess.      Electronically signed by: DICKSON GREEN MD  Date:     08/12/17  Time:    17:23              Narrative:    Limited ultrasound near the patient's dialysis shunt, left upper extremity    Along the superior aspect of the shunt there  is a complex focus measuring 2.1 x 0.7 x 0.9 cm in diameter. Etiology is indeterminate. This does not have the classic appearance of an abscess but please correlate. No flow by Doppler analysis. No gross evidence of discomfort/pain.                             US Chest Mediastinum (Edited Result - FINAL)  Result time 08/12/17 17:22:01    Addendum 1 of 1 by Dickson Castro III, MD (08/12/17 17:22:01)    Addendum.    The procedure should be listed as ultrasound of the chest, not CT of the chest.   As above      Electronically signed by: DICKSON CASTRO MD  Date:     08/12/17  Time:    17:22                Final result by Dickson Castro III, MD (08/12/17 17:20:58)                 Impression:     As above.      Electronically signed by: DICKSON CASTRO MD  Date:     08/12/17  Time:    17:20              Narrative:    CT of the chest.    Clinical indication: Pleural effusion.    Ultrasound of the chest reveals a moderate right pleural effusion and minimal left effusion.. Appropriate skin site was marked prior to thoracentesis performed by Dr. Balderas.                             CT Abdomen Pelvis With Contrast (Final result)  Result time 08/11/17 19:22:53    Final result by Shivam Hale MD (08/11/17 19:22:53)                 Impression:             Right greater than left pleural effusions, associated with compressive atelectasis/consolidation.    Heavily calcified coronary arteries.    Rectal fecal impaction noted.      Electronically signed by: SHIVAM HALE MD  Date:     08/11/17  Time:    19:22              Narrative:    Exam: CT chest with contrast.    History: Gram-positive cocci bacteremia    Findings: Large right and smaller pleural effusions are present. There is extensive compressive atelectasis/consolidation in the right base. Less pronounced findings in the left base. No mediastinal or hilar adenopathy.    Heavily calcified coronary arteries noted.    No osteolytic or osteoblastic lesions  are identified. The    The liver, spleen, pancreas, kidneys and adrenal glands are unremarkable.    No free fluid, adenopathy, mesenteric inflammatory change is apparent.    Rectal fecal impaction noted.                             CT Chest With Contrast (Final result)  Result time 08/11/17 19:22:54    Final result by Shivam Ambrose MD (08/11/17 19:22:54)                 Impression:             Right greater than left pleural effusions, associated with compressive atelectasis/consolidation.    Heavily calcified coronary arteries.    Rectal fecal impaction noted.      Electronically signed by: SHIVAM AMBROSE MD  Date:     08/11/17  Time:    19:22              Narrative:    Exam: CT chest with contrast.    History: Gram-positive cocci bacteremia    Findings: Large right and smaller pleural effusions are present. There is extensive compressive atelectasis/consolidation in the right base. Less pronounced findings in the left base. No mediastinal or hilar adenopathy.    Heavily calcified coronary arteries noted.    No osteolytic or osteoblastic lesions are identified. The    The liver, spleen, pancreas, kidneys and adrenal glands are unremarkable.    No free fluid, adenopathy, mesenteric inflammatory change is apparent.    Rectal fecal impaction noted.                             X-Ray Chest 1 View (Final result)  Result time 08/10/17 21:10:07    Final result by Shivam Ambrose MD (08/10/17 21:10:07)                 Impression:     Right basilar consolidation. See above. Otherwise no significant change from prior exam.      Electronically signed by: SHIVAM AMBROSE MD  Date:     08/10/17  Time:    21:10              Narrative:    Exam: Chest X-ray, one view.    History: Shortness of breath    Findings: Comparison is made to prior exam dated 05/03/2017. Mild cardiomegaly again noted. Right basilar consolidation is no apparent, likely related to combination of pleural fluid and airspace disease. Sternal wires again  noted.                                Assessment/Plan:     Hypophosphatemia    Has resolved.         Hyperkalemia    Hyperkalemia has resolved.         ESRD (end stage renal disease) on dialysis    Patient is on MWF dialysis schedule at [facility] (he does not know name of his outpatient nephrologist). Access is left AVF (no obvious signs of infection).  Next scheduled HD will be today.           * Staphylococcus aureus bacteremia    Continue Vancomycin. Patient with MRSA.             Thank you for your consult. I will follow-up with patient. Please contact us if you have any additional questions.    Alfonso Sanchez MD  Nephrology  [facility]

## 2017-08-16 NOTE — PROGRESS NOTES
[facility]  Infectious Disease  Progress Note    Patient Name: David Hadley  MRN: [id number]  Admission Date: 8/10/2017  Length of Stay: 6 days  Attending Physician: Deisy Ordonez MD  Primary Care Provider: Isaac Fitch MD    Isolation Status: Contact  Assessment/Plan:      HCAP (healthcare-associated pneumonia)    On Avelox/ Vanco.  Will continue close monitoring         Dementia without behavioral disturbance    Continue rivastigmine        Type 2 diabetes mellitus with renal complication    Insulin sliding scale , insulin regime as per primary team         ESRD (end stage renal disease) on dialysis    HD as per primary team        * Staphylococcus aureus bacteremia    Source control is needed in all cases of staph bacteremia.    Repeat blood culture remains positive .  Will follow indium scan .  Will repeat blood culture in am.  Random vanco is 30              Anticipated Disposition:     Thank you for your consult. I will follow-up with patient. Please contact us if you have any additional questions.    Ganga Choudhury MD  Infectious Disease  [facility]    Subjective:     Principal Problem:Staphylococcus aureus bacteremia    HPI:  62 year old  male with a PMHx of dementia, ESRD on HD, HTN, CAD with remote CABG, HLD, DM II, CML, and h/o  MRSA bacteremia that was managed in April with 6 weeks of IV vancomycin . He presented again at this time with  increased confusion and decreased appetite over the past 1-2 weeks..Patient was sent to ED by PCP for positive blood cultures with gram-positive cocci drawn from AV fistula on Monday.  Patient's niece reports patient received IV Vanc during dialysis yesterday.  Upon arrival to ED, patient noted to be hypotensive with BP 97/67.  WBC 14.  Patient last episode of bacteremia was 4/2017.  SALAZAR at that time was negative for vegetation.  Since admission, case was discussed with primary team and soft tissue ultrasound of the left arm fistula  showed-along the superior aspect of the shunt there is a complex focus measuring 2.1 x 0.7 x 0.9 cm in diameter. .  CT scan of the chest showed large right pleural effusion and thoracentesis was done. Pleural fluid did not show josey pus and pleural fluid protein is 4. Total protein is 8.  He also complaints of  intermittent back pain   Interval History:  62 year old man with ESRD, MRSA bacteremia, back pain, left upper arm fistula.  He is afebrile.  Repeat blood cultures remain positive.  MRI scan reviewed -No obvious discitis or abscess noted.    Review of Systems   Constitutional: Positive for appetite change. Negative for chills, fever and unexpected weight change.   HENT: Negative for trouble swallowing.    Respiratory: Negative for cough, shortness of breath and wheezing.    Cardiovascular: Negative for chest pain.   Gastrointestinal: Negative for abdominal distention, constipation, diarrhea and vomiting.   Neurological: Negative for seizures, syncope, facial asymmetry and speech difficulty.   Psychiatric/Behavioral: Positive for confusion.     Objective:     Vital Signs (Most Recent):  Temp: 97.8 °F (36.6 °C) (08/16/17 0444)  Pulse: 91 (08/16/17 0559)  Resp: 18 (08/16/17 0444)  BP: (!) 150/78 (08/16/17 0444)  SpO2: 95 % (08/16/17 0444) Vital Signs (24h Range):  Temp:  [97.5 °F (36.4 °C)-97.9 °F (36.6 °C)] 97.8 °F (36.6 °C)  Pulse:  [81-97] 91  Resp:  [16-18] 18  SpO2:  [95 %-100 %] 95 %  BP: (101-150)/(72-85) 150/78     Weight: 92 kg (202 lb 13.2 oz)  Body mass index is 26.04 kg/m².    Estimated Creatinine Clearance: 11.4 mL/min (based on Cr of 7.6).    Physical Exam   Constitutional: He appears well-developed and well-nourished. No distress.   HENT:   Head: Normocephalic and atraumatic.   Eyes: Conjunctivae and EOM are normal. Pupils are equal, round, and reactive to light.   Neck: Normal range of motion. Neck supple. No thyromegaly present.   Cardiovascular: Normal rate, regular rhythm and normal heart  sounds.    Pulmonary/Chest: Effort normal and breath sounds normal. No respiratory distress. He has no wheezes. He has no rales.   Abdominal: Soft. Bowel sounds are normal. He exhibits no distension. There is no tenderness.   Musculoskeletal: Normal range of motion. He exhibits edema (+1 BLE). He exhibits no tenderness or deformity.   Has point tenderness over the lower thoracic region   Neurological: He is alert.   Disoriented.  No focal deficits.   Skin: Skin is warm and dry. No rash noted. No erythema.   Left arm fistula +bruit/thrill     Psychiatric: He has a normal mood and affect.   Nursing note and vitals reviewed.      Significant Labs:   Blood Culture:     Recent Labs  Lab 04/12/17  1435 04/12/17  1436 08/10/17  1845 08/10/17  1900 08/14/17  0525   LABBLOO Gram stain aer bottle: Gram positive cocci in clusters resembling Staph  Results called to and read back by:Angie Summers RN 04/13/2017  18:47  COAGULASE-NEGATIVE STAPHYLOCOCCUS SPECIESOrganism is a probable contaminant No growth after 5 days. Gram stain aer bottle: Gram positive cocci in clusters resembling Staph   Gram stain mike bottle: Gram positive cocci in clusters resembling Staph   Results called to and read back by: Primitivo Velacso RN  08/11/2017  21:59  METHICILLIN RESISTANT STAPHYLOCOCCUS AUREUSID consult required at [facility]. Gram stain aer bottle: Gram positive cocci in clusters resembling Staph   Gram stain mike bottle: Gram positive cocci in clusters resembling Staph   Results called to and read back by:Kia Velasco RN 08/12/2017  04:05  METHICILLIN RESISTANT STAPHYLOCOCCUS AUREUSID consult required at [facility]. Gram stain aer bottle: Gram positive cocci in clusters resembling Staph   Results called to and read back by:Sanjiv Beauchamp RN 08/15/2017  02:46  Gram stain aer bottle: Gram positive cocci in clusters resembling Staph   Results called to and read back by:Sanjiv Beauchamp RN 08/15/2017   02:46     BMP:     Recent Labs  Lab 08/16/17  0516     110   *  133*   K 4.6  4.8   CL 98  99   CO2 22*  23   BUN 40*  39*   CREATININE 7.5*  7.6*   CALCIUM 8.3*  8.6*     CBC:     Recent Labs  Lab 08/15/17  0507 08/16/17  0516   WBC 12.11 11.23   HGB 11.9* 11.4*   HCT 36.3* 34.8*    289     All pertinent labs within the past 24 hours have been reviewed.    Significant Imaging: I have reviewed all pertinent imaging results/findings within the past 24 hours.

## 2017-08-17 VITALS
BODY MASS INDEX: 26.03 KG/M2 | OXYGEN SATURATION: 95 % | TEMPERATURE: 98 F | DIASTOLIC BLOOD PRESSURE: 64 MMHG | RESPIRATION RATE: 18 BRPM | HEART RATE: 94 BPM | HEIGHT: 74 IN | SYSTOLIC BLOOD PRESSURE: 98 MMHG | WEIGHT: 202.81 LBS

## 2017-08-17 LAB
ANION GAP SERPL CALC-SCNC: 13 MMOL/L
BACTERIA BLD CULT: NORMAL
BASOPHILS # BLD AUTO: 0.03 K/UL
BASOPHILS NFR BLD: 0.3 %
BUN SERPL-MCNC: 28 MG/DL
CALCIUM SERPL-MCNC: 8.4 MG/DL
CHLORIDE SERPL-SCNC: 98 MMOL/L
CO2 SERPL-SCNC: 24 MMOL/L
CREAT SERPL-MCNC: 6.3 MG/DL
DIFFERENTIAL METHOD: ABNORMAL
EOSINOPHIL # BLD AUTO: 0.1 K/UL
EOSINOPHIL NFR BLD: 0.5 %
ERYTHROCYTE [DISTWIDTH] IN BLOOD BY AUTOMATED COUNT: 16.7 %
EST. GFR  (AFRICAN AMERICAN): 10 ML/MIN/1.73 M^2
EST. GFR  (NON AFRICAN AMERICAN): 9 ML/MIN/1.73 M^2
GLUCOSE SERPL-MCNC: 88 MG/DL
HCT VFR BLD AUTO: 33.2 %
HGB BLD-MCNC: 10.6 G/DL
LYMPHOCYTES # BLD AUTO: 1.1 K/UL
LYMPHOCYTES NFR BLD: 9.7 %
MCH RBC QN AUTO: 26.8 PG
MCHC RBC AUTO-ENTMCNC: 31.9 G/DL
MCV RBC AUTO: 84 FL
MONOCYTES # BLD AUTO: 1 K/UL
MONOCYTES NFR BLD: 8.7 %
NEUTROPHILS # BLD AUTO: 9.3 K/UL
NEUTROPHILS NFR BLD: 80.8 %
PLATELET # BLD AUTO: 267 K/UL
PMV BLD AUTO: 9.5 FL
POCT GLUCOSE: 130 MG/DL (ref 70–110)
POCT GLUCOSE: 95 MG/DL (ref 70–110)
POTASSIUM SERPL-SCNC: 4.3 MMOL/L
RBC # BLD AUTO: 3.95 M/UL
SODIUM SERPL-SCNC: 135 MMOL/L
WBC # BLD AUTO: 11.45 K/UL

## 2017-08-17 PROCEDURE — 99232 SBSQ HOSP IP/OBS MODERATE 35: CPT | Mod: ,,, | Performed by: INTERNAL MEDICINE

## 2017-08-17 PROCEDURE — 85025 COMPLETE CBC W/AUTO DIFF WBC: CPT

## 2017-08-17 PROCEDURE — 80048 BASIC METABOLIC PNL TOTAL CA: CPT

## 2017-08-17 PROCEDURE — 25000003 PHARM REV CODE 250: Performed by: EMERGENCY MEDICINE

## 2017-08-17 PROCEDURE — 99900035 HC TECH TIME PER 15 MIN (STAT)

## 2017-08-17 PROCEDURE — 36415 COLL VENOUS BLD VENIPUNCTURE: CPT

## 2017-08-17 PROCEDURE — 25000003 PHARM REV CODE 250: Performed by: INTERNAL MEDICINE

## 2017-08-17 RX ORDER — HEPARIN SODIUM 1000 [USP'U]/ML
1000 INJECTION, SOLUTION INTRAVENOUS; SUBCUTANEOUS
Status: DISCONTINUED | OUTPATIENT
Start: 2017-08-18 | End: 2017-08-17 | Stop reason: HOSPADM

## 2017-08-17 RX ORDER — SODIUM CHLORIDE 9 MG/ML
INJECTION, SOLUTION INTRAVENOUS
Status: DISCONTINUED | OUTPATIENT
Start: 2017-08-17 | End: 2017-08-17 | Stop reason: HOSPADM

## 2017-08-17 RX ORDER — SODIUM CHLORIDE 9 MG/ML
INJECTION, SOLUTION INTRAVENOUS ONCE
Status: DISCONTINUED | OUTPATIENT
Start: 2017-08-17 | End: 2017-08-17 | Stop reason: HOSPADM

## 2017-08-17 RX ADMIN — SERTRALINE HYDROCHLORIDE 50 MG: 50 TABLET ORAL at 09:08

## 2017-08-17 RX ADMIN — LEVETIRACETAM 500 MG: 500 TABLET, FILM COATED ORAL at 09:08

## 2017-08-17 RX ADMIN — ATORVASTATIN CALCIUM 20 MG: 10 TABLET, FILM COATED ORAL at 09:08

## 2017-08-17 RX ADMIN — SODIUM BICARBONATE 650 MG TABLET 1300 MG: at 01:08

## 2017-08-17 RX ADMIN — RIVASTIGMINE TRANSDERMAL SYSTEM 1 PATCH: 4.6 PATCH, EXTENDED RELEASE TRANSDERMAL at 09:08

## 2017-08-17 RX ADMIN — MOXIFLOXACIN HYDROCHLORIDE 400 MG: 400 TABLET, FILM COATED ORAL at 09:08

## 2017-08-17 RX ADMIN — SODIUM BICARBONATE 650 MG TABLET 1300 MG: at 05:08

## 2017-08-17 NOTE — PLAN OF CARE
Problem: Patient Care Overview  Goal: Plan of Care Review  Pt oriented to self,Pt remains free of injury, pain managed adequately, no s/s of distress. 24 hour chart check completed. Will continue to monitor.

## 2017-08-17 NOTE — ASSESSMENT & PLAN NOTE
Patient is on MWF dialysis schedule at [facility] (he does not know name of his outpatient nephrologist). Access is left AVF (no obvious signs of infection).  Next scheduled HD will be tomorrow.

## 2017-08-17 NOTE — PROGRESS NOTES
[facility] -   Nephrology  Progress Note    Patient Name: David Hadley  MRN: [id number]  Admission Date: 8/10/2017  Hospital Length of Stay: 7 days  Attending Provider: Deisy Ordonez MD   Primary Care Physician: Isaac Fitch MD  Principal Problem:Staphylococcus aureus bacteremia    Subjective:     HPI: [id number] with multiple histories of MRSA infections with blood cultures being positive.  Has been admitted with positive blood cultures in April 2017 status post  Prolonged antibiotic course of vancomycin.  May 2017 admitted for syncope.  Has ESRD on hemodialysis.  Schedule for dialysis Tuesday Thursday and Saturday.  Last dialysis was today.     G[place] dialysis in [place] [place]     Interval History:     8/11/17: Patient today denies any fevers, pain, SOB, nausea.     8/12/17: Patient is resting in chair, no complaints at present.     8/13/17: Patient without complaints today.     8/14/17: Patient is currently on hemodialysis. No complications with procedure. Vital signs are stable. Patient is resting comfortably, he denies any complaints.     8/15/17: Patient currently without complaints.     8/16/17: Patient is resting comfortably in bed, denies any complaints at present.     8/17/17: Patient denies any pain, SOB, nausea.         Review of patient's allergies indicates:   Allergen Reactions    Benadryl decongestant Itching     Nothing with benadryl; wife states  No allergy to Benadryl     Current Facility-Administered Medications   Medication Frequency    0.9%  NaCl infusion PRN    0.9%  NaCl infusion Once    0.9%  NaCl infusion PRN    0.9%  NaCl infusion Once    atorvastatin tablet 20 mg Daily    dextrose 50% injection 12.5 g PRN    dextrose 50% injection 25 g PRN    diphenhydrAMINE capsule 25 mg Nightly PRN    glucagon (human recombinant) injection 1 mg PRN    glucose chewable tablet 16 g PRN    glucose chewable tablet 24 g PRN    heparin (porcine) injection 1,000 Units PRN     insulin aspart pen 0-5 Units QID (AC + HS) PRN    levetiracetam tablet 500 mg BID    lidocaine (PF) 10 mg/ml (1%) injection 10 mg Once    lorazepam tablet 0.5 mg Q6H PRN    moxifloxacin tablet 400 mg Daily    rivastigmine 4.6 mg/24 hr 1 patch Daily    sertraline tablet 50 mg Daily    sodium bicarbonate tablet 1,300 mg TID    vancomycin 1 g in dextrose 5 % 250 mL IVPB (ready to mix system) Q48H       Objective:     Vital Signs (Most Recent):  Temp: 98.2 °F (36.8 °C) (08/17/17 0752)  Pulse: 94 (08/17/17 0752)  Resp: 16 (08/17/17 0752)  BP: 99/63 (08/17/17 0752)  SpO2: 97 % (08/17/17 0752)  O2 Device (Oxygen Therapy): room air (08/17/17 0752) Vital Signs (24h Range):  Temp:  [97.3 °F (36.3 °C)-98.2 °F (36.8 °C)] 98.2 °F (36.8 °C)  Pulse:  [84-99] 94  Resp:  [16-18] 16  SpO2:  [94 %-98 %] 97 %  BP: ()/(60-83) 99/63     Weight: 92 kg (202 lb 13.2 oz) (08/11/17 1400)  Body mass index is 26.04 kg/m².  Body surface area is 2.19 meters squared.    I/O last 3 completed shifts:  In: 980 [P.O.:480; Other:500]  Out: 3500 [Other:3500]    Physical Exam   Constitutional: He appears well-developed. He is cooperative.   Alert and responsive.   HENT:   Head: Normocephalic.   Nose: No rhinorrhea.   Mouth/Throat: Mucous membranes are normal. No oropharyngeal exudate.   Neck: No thyroid mass present.   Cardiovascular: Normal rate, regular rhythm, S1 normal, S2 normal and intact distal pulses.    Pulmonary/Chest: Effort normal. No respiratory distress. He has no wheezes.   Abdominal: Soft. Bowel sounds are normal. He exhibits no distension. There is no tenderness. No hernia.   Musculoskeletal:   Mild bilateral LE edema.   Lymphadenopathy:     He has no cervical adenopathy.   Neurological: He is alert.   Skin: Skin is warm and dry.   Psychiatric: He has a normal mood and affect.       Significant Labs:  Lab Results   Component Value Date    CREATININE 6.3 (H) 08/17/2017    BUN 28 (H) 08/17/2017     (L) 08/17/2017     K 4.3 08/17/2017    CL 98 08/17/2017    CO2 24 08/17/2017     Lab Results   Component Value Date     (H) 11/06/2008    CALCIUM 8.4 (L) 08/17/2017    PHOS 3.0 08/16/2017     Lab Results   Component Value Date    ALBUMIN 2.2 (L) 08/16/2017     Lab Results   Component Value Date    WBC 11.45 08/17/2017    HGB 10.6 (L) 08/17/2017    HCT 33.2 (L) 08/17/2017    MCV 84 08/17/2017     08/17/2017       Recent Labs  Lab 08/11/17  0527   MG 2.0         Significant Imaging:  Imaging Results          NM Inflammitory Localization WB Indium (Final result)  Result time 08/16/17 11:14:51    Final result by Dickson Muñoz MD (08/16/17 11:14:51)                 Impression:      Negative indium-111 labeled white blood cell scan.        Electronically signed by: DICKSON MUÑOZ MD  Date:     08/16/17  Time:    11:14              Narrative:    Exam: NM INFLAMMATORY WHOLE BODY INDIUM,    Date: 8/16/17 at 1043 hrs.    History: Persistent bacteremia.    Comparison: 4/11/17    Findings: Whole body indium-111 labeled white blood cell scan performed with 360 uCi.    Normal liver and spleen uptake.  Normal marrow uptake.  No localized uptake is present.                             X-Ray Chest AP Portable (Final result)  Result time 08/15/17 10:55:12    Final result by IRMA Jorge Sr., MD (08/15/17 10:55:12)                 Impression:        1. There is a moderate amount of interstitial and alveolar opacities seen in both lungs. This is characteristic of pulmonary edema.  2. There is opacification of the base of the right hemithorax. This is characteristic of a small right pleural effusion.  3. There is mild cardiomegaly.  4. There are multiple sternotomy wires in place.       Electronically signed by: IRMA JORGE MD  Date:     08/15/17  Time:    10:55              Narrative:    One-view chest x-ray    Clinical History: Pleural effusion    Finding: Comparison was made to prior examination performed on 8/12/2017. There are  multiple sternotomy wires in place. There is mild cardiomegaly. There is a moderate amount of interstitial and alveolar opacities seen in both lungs. There is opacification of the base of the right hemithorax. The left costophrenic angle is sharp. There is no pneumothorax.                             MRI Lumbar Spine Without Contrast (Final result)  Result time 08/14/17 16:08:31    Final result by Dickson Jaramillo MD (08/14/17 16:08:31)                 Impression:        Small spinal canal and congenital basis.  Broad-based disc bulges at the L4-L5 and L5-S1 levels.  Bilateral degenerative changes of facets at the L3-L4 L4-L5 and L5-S1 levels.  No definite nerve impingement at any level for bilateral L3-L4 and L5 nerve contents cannot be excluded.  Abnormal signal in the vertebral bodies assistant with hematopoietic bone marrow or an infiltrative process.  Would correlate with clinical history and or bone scan for further evaluation.  No evidence of discitis identified.      Electronically signed by: DICKSON JARAMILLO MD  Date:     08/14/17  Time:    16:08              Narrative:    MRI lumbar spine without contrast.08/14/17 14:29:14    History:   low back pain., rule out abscess/discitis    Standard multiplanar noncontrast MRI sequences of the lumbar spine.    The distal cord and conus reveal normal signal and morphology.  Abnormal signal in the vertebral bodies possibly representing anemia versus an infiltrative process including metastatic disease.    L1-2: Normal.    L2-3: Normal.    L3-4: Minimal disc.  Mild neural foraminal narrowing.    L4-5: Minimal broad-based disc bulge.  Mild desiccation of the disc and mild degenerative changes of the facets with mild bilateral bony neural foraminal narrowing.    L5-S1:  Minimal broad-based disc bulge.  Mild degenerative changes of the facets.  Bilateral bony neural foraminal narrowing with possible but not definite L5 nerve root impingement                             MRI  Thoracic Spine Without Contrast (Final result)  Result time 08/14/17 15:33:41    Final result by Dickson Jaramillo MD (08/14/17 15:33:41)                 Impression:        Spinal canal is within normal limits.  No impingements in the cord or abnormal signal in the cord identified.  Bilateral pleural effusions larger on the right side.  Pleural effusions on the left side appear loculated.  No evidence of fluid in the disc spaces to suggest discitis.  No epidural mass or fluid collection identified.      Electronically signed by: DICKSON JARAMILLO MD  Date:     08/14/17  Time:    15:33              Narrative:    MRI Thoracic spine without contrast.08/14/17 14:29:38    History:   Back pain., rule out discitis /abscess    Standard multiplanar noncontrast MRI sequences of the thoracic spine with and without contrast.    Findings:  The spinal canal is within normal limits. No signal abnormality in the spinal cord.Normal vertebral bodies. The disc spaces are normal.  Bilateral pleural effusions larger on the right.  No evidence of discitis identified.                             X-Ray Chest 1 View (Final result)  Result time 08/12/17 17:34:23    Final result by Dickson Green III, MD (08/12/17 17:34:23)                 Impression:     Improving chest x-ray with decrease in the right pleural effusion consistent with thoracentesis. No evidence of pneumothorax.      Electronically signed by: DICKSON GREEN MD  Date:     08/12/17  Time:    17:34              Narrative:    Chest x-ray, single view.    Clinical indication: Shortness of breath status post thoracentesis.    The chest shows improvement with better aeration of the right base and significant decrease in the pleural effusion. No evidence of pneumothorax status post thoracentesis. Chest otherwise unchanged.                             US Soft Tissue Misc (Final result)  Result time 08/12/17 17:23:38    Final result by Dickson Green III, MD (08/12/17 17:23:38)                  Impression:     Complex/heterogeneous density just above the region of the shunt of indeterminate significance and etiology, please correlate. This is not a simple fluid collection and doesn't have the classic appearance of an abscess.      Electronically signed by: DICKSON CASTRO MD  Date:     08/12/17  Time:    17:23              Narrative:    Limited ultrasound near the patient's dialysis shunt, left upper extremity    Along the superior aspect of the shunt there is a complex focus measuring 2.1 x 0.7 x 0.9 cm in diameter. Etiology is indeterminate. This does not have the classic appearance of an abscess but please correlate. No flow by Doppler analysis. No gross evidence of discomfort/pain.                             US Chest Mediastinum (Edited Result - FINAL)  Result time 08/12/17 17:22:01    Addendum 1 of 1 by Dickson Castro III, MD (08/12/17 17:22:01)    Addendum.    The procedure should be listed as ultrasound of the chest, not CT of the chest.   As above      Electronically signed by: DICKSON CASTRO MD  Date:     08/12/17  Time:    17:22                Final result by Dickson Castro III, MD (08/12/17 17:20:58)                 Impression:     As above.      Electronically signed by: DICKSON CASTRO MD  Date:     08/12/17  Time:    17:20              Narrative:    CT of the chest.    Clinical indication: Pleural effusion.    Ultrasound of the chest reveals a moderate right pleural effusion and minimal left effusion.. Appropriate skin site was marked prior to thoracentesis performed by Dr. Balderas.                             CT Abdomen Pelvis With Contrast (Final result)  Result time 08/11/17 19:22:53    Final result by Shivam Ambrose MD (08/11/17 19:22:53)                 Impression:             Right greater than left pleural effusions, associated with compressive atelectasis/consolidation.    Heavily calcified coronary arteries.    Rectal fecal impaction  noted.      Electronically signed by: SHIVAM AMBROSE MD  Date:     08/11/17  Time:    19:22              Narrative:    Exam: CT chest with contrast.    History: Gram-positive cocci bacteremia    Findings: Large right and smaller pleural effusions are present. There is extensive compressive atelectasis/consolidation in the right base. Less pronounced findings in the left base. No mediastinal or hilar adenopathy.    Heavily calcified coronary arteries noted.    No osteolytic or osteoblastic lesions are identified. The    The liver, spleen, pancreas, kidneys and adrenal glands are unremarkable.    No free fluid, adenopathy, mesenteric inflammatory change is apparent.    Rectal fecal impaction noted.                             CT Chest With Contrast (Final result)  Result time 08/11/17 19:22:54    Final result by Shivam Ambrose MD (08/11/17 19:22:54)                 Impression:             Right greater than left pleural effusions, associated with compressive atelectasis/consolidation.    Heavily calcified coronary arteries.    Rectal fecal impaction noted.      Electronically signed by: SHIVAM AMBROSE MD  Date:     08/11/17  Time:    19:22              Narrative:    Exam: CT chest with contrast.    History: Gram-positive cocci bacteremia    Findings: Large right and smaller pleural effusions are present. There is extensive compressive atelectasis/consolidation in the right base. Less pronounced findings in the left base. No mediastinal or hilar adenopathy.    Heavily calcified coronary arteries noted.    No osteolytic or osteoblastic lesions are identified. The    The liver, spleen, pancreas, kidneys and adrenal glands are unremarkable.    No free fluid, adenopathy, mesenteric inflammatory change is apparent.    Rectal fecal impaction noted.                             X-Ray Chest 1 View (Final result)  Result time 08/10/17 21:10:07    Final result by Shivam Ambrose MD (08/10/17 21:10:07)                  Impression:     Right basilar consolidation. See above. Otherwise no significant change from prior exam.      Electronically signed by: MICAH HALE MD  Date:     08/10/17  Time:    21:10              Narrative:    Exam: Chest X-ray, one view.    History: Shortness of breath    Findings: Comparison is made to prior exam dated 05/03/2017. Mild cardiomegaly again noted. Right basilar consolidation is no apparent, likely related to combination of pleural fluid and airspace disease. Sternal wires again noted.                                Assessment/Plan:     Hypophosphatemia    Has resolved.         Hyperkalemia    Hyperkalemia has resolved.         ESRD (end stage renal disease) on dialysis    Patient is on MWF dialysis schedule at [facility] (he does not know name of his outpatient nephrologist). Access is left AVF (no obvious signs of infection).  Next scheduled HD will be tomorrow.           * Staphylococcus aureus bacteremia    Continue Vancomycin. Patient with MRSA.             Thank you for your consult. I will follow-up with patient. Please contact us if you have any additional questions.    Alfonso Sanchez MD  Nephrology  [facility] -

## 2017-08-17 NOTE — SUBJECTIVE & OBJECTIVE
Interval History:   No adverse interval events    Objective:     Vital Signs (Most Recent):  Temp: 98.1 °F (36.7 °C) (08/16/17 1600)  Pulse: 98 (08/16/17 1830)  Resp: 18 (08/16/17 1600)  BP: 125/83 (08/16/17 1830)  SpO2: 97 % (08/16/17 1600) Vital Signs (24h Range):  Temp:  [97.3 °F (36.3 °C)-98.1 °F (36.7 °C)] 98.1 °F (36.7 °C)  Pulse:  [84-98] 98  Resp:  [18] 18  SpO2:  [94 %-97 %] 97 %  BP: (112-150)/(66-83) 125/83     Weight: 92 kg (202 lb 13.2 oz)  Body mass index is 26.04 kg/m².      Intake/Output Summary (Last 24 hours) at 08/16/17 1913  Last data filed at 08/16/17 1200   Gross per 24 hour   Intake              480 ml   Output                0 ml   Net              480 ml       Physical Exam   Constitutional: He is oriented to person, place, and time. He appears well-developed and well-nourished.   HENT:   Head: Normocephalic and atraumatic.   Nose: Nose normal.   Mouth/Throat: Oropharynx is clear and moist.   Eyes: EOM are normal. Pupils are equal, round, and reactive to light.   Neck: Normal range of motion. Neck supple.   Cardiovascular: Normal rate, regular rhythm and normal heart sounds.    Pulmonary/Chest: Effort normal and breath sounds normal. No respiratory distress.   Abdominal: Soft. Bowel sounds are normal.   Musculoskeletal: Normal range of motion.   Neurological: He is alert and oriented to person, place, and time.   Skin: Skin is warm and dry.   Psychiatric: He has a normal mood and affect.   Nursing note and vitals reviewed.      Vents:  Oxygen Concentration (%): 28 (08/11/17 1117)    Lines/Drains/Airways     Drain                 Hemodialysis AV Fistula Left upper arm 18630 days          Peripheral Intravenous Line                 Peripheral IV - Single Lumen 08/10/17 Right Antecubital 6 days                Significant Labs:    CBC/Anemia Profile:    Recent Labs  Lab 08/15/17  0507 08/16/17  0516   WBC 12.11 11.23   HGB 11.9* 11.4*   HCT 36.3* 34.8*    289   MCV 84 83   RDW 16.9* 16.9*         Chemistries:    Recent Labs  Lab 08/15/17  0507 08/16/17  0516   *  132* 132*  133*   K 5.6*  4.6 4.6  4.8     101 98  99   CO2 20*  21* 22*  23   BUN 32*  32* 40*  39*   CREATININE 6.8*  6.6* 7.5*  7.6*   CALCIUM 8.9  8.6* 8.3*  8.6*   ALBUMIN 2.3* 2.2*   PHOS 2.8 3.0       Blood Culture: No results for input(s): LABBLOO in the last 48 hours.  All pertinent labs within the past 24 hours have been reviewed.    Significant Imaging:  I have reviewed and interpreted all pertinent imaging results/findings within the past 24 hours.

## 2017-08-17 NOTE — NURSING
Pt's spouse is refusing all treatments at this point.  States nothing else will be given to or done to pt.  She is requesting discharge.  Dr. Ordonez and Dr. Choudhury have been notified.

## 2017-08-17 NOTE — PROGRESS NOTES
Copy of cytology given to patient and spouse    Sign off    ORDERING PHYSICIAN(S)  ROBBY RODRIGUEZ ANDREW  CLINICAL DIAGNOSIS/INFORMATION  Clinical information: persisent pleural effusion  Gross Description  4mls cloudy yellow fluid  1 thin prep  1 cb  gr  SPECIMEN  1) Pleural Fluid from Thoracentesis  FINAL PATHOLOGIC DIAGNOSIS  Pleural fluid from thoracentesis:  Lymphocytes and acute inflammation;  Mesothelial cells;  Negative for malignant cells.  [facility]  Diagnosed by: Clifford Ronquillo M.D.  (Electronically Signed: 2017-08-17 10:46:50)

## 2017-08-17 NOTE — PLAN OF CARE
Problem: Patient Care Overview  Goal: Plan of Care Review  Outcome: Ongoing (interventions implemented as appropriate)  Reviewed plan of care with patient.  Patient verbalized understanding and teachback.      12h chart check completed.

## 2017-08-17 NOTE — DISCHARGE SUMMARY
[facility] Hospital Medicine  Discharge Summary      Patient Name: David Hadley  MRN: [id number]  Admission Date: 8/10/2017  Hospital Length of Stay: 7 days  Discharge Date and Time:  08/17/2017 3:09 PM  Attending Physician: Deisy Ordonez MD   Discharging Provider: JASON Gonzales  Primary Care Provider: Isaac Fitch MD      HPI:   Mr. Hadley is a 61yo male with a PMHx of dementia, ESRD on HD, HTN, CAD with remote CABG, HLD, DM II, CML, and h/o recurrent gram-positive cocci bacteremia, who presented to the ED with c/o increased confusion and decreased appetite over the past 1-2 weeks.  Patient's niece denies patient c/o fever/chills, cough, SOB, chest pain, palpitations, ABD pain, dysuria, N/V/D, lightheadedness/dizziness, focal deficits, seizure-like activity, or syncope.Patient was sent to ED by PCP for positive blood cultures with gram-positive cocci drawn from AV fistula on Monday.  Patient's niece reports patient received IV Vanc during dialysis yesterday.  Upon arrival to ED, patient noted to be hypotensive with BP 97/67.  WBC 14.  Patient last episode of bacteremia was 4/2017.  SALAZAR at that time was negative for vegetation.  Hospital Medicine was consulted for admission.     * No surgery found *      Indwelling Lines/Drains at time of discharge:   Lines/Drains/Airways     Drain                 Hemodialysis AV Fistula Left upper arm 37[address] days              Hospital Course:   David Hadley is a 62 year old male who was admitted to [facility] with sepsis due to recurrent MRSA Bacteremia. Patient was admitted 4/2017 for the same, but source remained unclear. Repeat cultures on 4/12/17 were negative for MRSA (1/2 bottle grew coag-negative), and patient was discharged with 6 week therapy with Vancomycin. During this admission, medical management, included Vancomycin. Infectious Disease was consulted who recommended repeating TTE which proved negative for vegetation. A soft  tissue US of Left Extremity at dialysis access was negative for abscess.  CXR revealed RLL consolidation and Avelox was started. Subsequent CT Chest/Abdomen revealed large right pleural effusion with compressive atelectasis. Pulmonology was consulted for thoracentesis. Pleural cytology pending. He received enema for fecal impaction. Left upper extremity ultrasound did not show evidence of abscess and Vascular physician indicated there were no clinical signs of abscess. MRI of Lumbar/Thoracic Spine were negative for abscess. Elevations were noted in the ESR and CRP. Again, blood cultures collected on 8/14/17 isolated gram + cocci resembling STAPH. An Indium scan was ordered by Infectious Ds. Which was negative. Blood cultures drawn 8/16/17 with no growth to date. ID agreed for pt to be discharged and f/u with HD for ABX X 6-8 weeks. Pt was seen and examined today and deemed stable for discharge home.      Consults:   Consults         Status Ordering Provider     Inpatient consult to Infectious Diseases  Once     Provider:  Kassie Choudhury MD    Acknowledged YOKO STRONG     Inpatient consult to Nephrology  Once     Provider:  Bernard Marks MD    Acknowledged ZENY SCHAFFER JR     Inpatient consult to Pulmonology  Once     Provider:  Je Balderas MD    Completed YOKO STRONG     Inpatient consult to Vascular Surgery  Once     Specialty:  Vascular Surgery  Provider:  Conner Velazquez MD    Completed KASSIE CHOUDHURY     Pharmacy to dose Vancomycin consult  Once     Provider:  (Not yet assigned)    Acknowledged ZENY SCHAFFER JR     Pharmacy to dose Vancomycin consult  Once     Provider:  (Not yet assigned)    Acknowledged ZENY SCHAFFER JR          Significant Diagnostic Studies: Labs:   BMP:   Recent Labs  Lab 08/16/17  0516 08/17/17  0629     110 88   *  133* 135*   K 4.6  4.8 4.3   CL 98  99 98   CO2 22*  23 24   BUN 40*  39* 28*   CREATININE 7.5*  7.6* 6.3*   CALCIUM 8.3*   8.6* 8.4*   , CBC   Recent Labs  Lab 08/16/17  0516 08/17/17  0629   WBC 11.23 11.45   HGB 11.4* 10.6*   HCT 34.8* 33.2*    267    and All labs within the past 24 hours have been reviewed  Microbiology:   Blood Culture   Lab Results   Component Value Date    LABBLOO No Growth to date 08/16/2017       Pending Diagnostic Studies:     None        Final Active Diagnoses:    Diagnosis Date Noted POA    PRINCIPAL PROBLEM:  Staphylococcus aureus bacteremia [R78.81]  Yes    HCAP (healthcare-associated pneumonia) [J18.9] 08/15/2017 Yes    Bilateral pleural effusion [J90] 08/12/2017 Yes    Hypophosphatemia [E83.39] 08/11/2017 Unknown    Hyperkalemia [E87.5] 04/10/2017 Yes    Dementia without behavioral disturbance [F03.90] 07/29/2016 Yes     Chronic    Type 2 diabetes mellitus with renal complication [E11.29] 07/29/2016 Yes     Chronic    Hyperlipidemia [E78.5] 07/29/2016 Yes     Chronic    ESRD (end stage renal disease) on dialysis [N18.6, Z99.2] 06/26/2013 Not Applicable     Chronic    CAD with remote CABG [Z95.1] 06/26/2013 Not Applicable     Chronic      Problems Resolved During this Admission:    Diagnosis Date Noted Date Resolved POA    Hypotension with h/o hypertension [I10]  08/14/2017 Yes      No new Assessment & Plan notes have been filed under this hospital service since the last note was generated.  Service: Hospital Medicine      Discharged Condition: stable    Disposition: Home or Self Care    Follow Up:  Follow-up Information     Isaac Fitch MD In 1 week.    Specialty:  Family Medicine  Contact information:  [address] [address] [address] [phone]                 Patient Instructions:     Diet renal       Medications:  Reconciled Home Medications:   Current Discharge Medication List      CONTINUE these medications which have NOT CHANGED    Details   amlodipine (NORVASC) 5 MG tablet Take 1 tablet (5 mg total) by mouth once daily.  Qty: 30 tablet, Refills: 6       atorvastatin (LIPITOR) 20 MG tablet Take 1 tablet (20 mg total) by mouth once daily.  Qty: 90 tablet, Refills: 3      FOLIC ACID/VIT BCOMP,C (JOSAFAT-JANICE ORAL) Take by mouth.      gabapentin (NEURONTIN) 100 MG capsule Take 1 capsule (100 mg total) by mouth 3 (three) times daily.  Qty: 270 capsule, Refills: 3      hydrocodone-acetaminophen 5-325mg (NORCO) 5-325 mg per tablet Take 1 tablet by mouth every 6 to 8 hours as needed for Pain.  Qty: 60 tablet, Refills: 0      levetiracetam (KEPPRA) 500 MG Tab Take 1 tablet (500 mg total) by mouth 2 (two) times daily.  Qty: 60 tablet, Refills: 11      lorazepam (ATIVAN) 0.5 MG tablet Take 1 tablet (0.5 mg total) by mouth every 6 (six) hours as needed for Anxiety.  Qty: 30 tablet, Refills: 1      losartan (COZAAR) 50 MG tablet Take by mouth. 1 tablet Oral Every day      metoprolol tartrate (LOPRESSOR) 25 MG tablet Take 25 mg by mouth 2 (two) times daily.      rivastigmine (EXELON) 4.6 mg/24 hr PT24 Place 1 patch onto the skin once daily.  Qty: 30 patch, Refills: 11      sertraline (ZOLOFT) 50 MG tablet Take 1 tablet (50 mg total) by mouth once daily.  Qty: 30 tablet, Refills: 11      sodium bicarbonate 650 MG tablet Take 2 tablets (1,300 mg total) by mouth 3 (three) times daily.      trazodone (DESYREL) 50 MG tablet Take 1 tablet (50 mg total) by mouth every evening.  Qty: 26 tablet, Refills: 11      SENSIPAR 30 mg Tab Take 1 tablet by mouth once daily.      SPRYCEL 100 mg Tab Take 100 mg by mouth once daily.            Time spent on the discharge of patient: 45 minutes    HOS POC IP DISCHARGE SUMMARY     JASON Gonzales  Department of Hospital Medicine  [facility]

## 2017-08-17 NOTE — ASSESSMENT & PLAN NOTE
On HD  AV fistula Left are soft  Possible etiology of bacteremia however exam benign and all radiological w/u was -ve

## 2017-08-17 NOTE — ASSESSMENT & PLAN NOTE
Source control is needed in all cases of staph bacteremia.    Repeat blood culture remains positive .   indium scan  Is  negative  Follow repeat   blood culture   Discuss with Pulmonology about chest imaging   Continue vanco.  Will prefer to have negative blood cultures prior to discharge .

## 2017-08-17 NOTE — PROGRESS NOTES
[facility]   Pulmonology  Progress Note    Patient Name: David Hadley  MRN: [id number]  Admission Date: 8/10/2017  Hospital Length of Stay: 6 days  Code Status: Full Code  Attending Provider: Deisy Ordonez MD  Primary Care Provider: Isaac Fitch MD   Principal Problem: Staphylococcus aureus bacteremia    Subjective:     Interval History:   No adverse interval events    Objective:     Vital Signs (Most Recent):  Temp: 98.1 °F (36.7 °C) (08/16/17 1600)  Pulse: 98 (08/16/17 1830)  Resp: 18 (08/16/17 1600)  BP: 125/83 (08/16/17 1830)  SpO2: 97 % (08/16/17 1600) Vital Signs (24h Range):  Temp:  [97.3 °F (36.3 °C)-98.1 °F (36.7 °C)] 98.1 °F (36.7 °C)  Pulse:  [84-98] 98  Resp:  [18] 18  SpO2:  [94 %-97 %] 97 %  BP: (112-150)/(66-83) 125/83     Weight: 92 kg (202 lb 13.2 oz)  Body mass index is 26.04 kg/m².      Intake/Output Summary (Last 24 hours) at 08/16/17 1913  Last data filed at 08/16/17 1200   Gross per 24 hour   Intake              480 ml   Output                0 ml   Net              480 ml       Physical Exam   Constitutional: He is oriented to person, place, and time. He appears well-developed and well-nourished.   HENT:   Head: Normocephalic and atraumatic.   Nose: Nose normal.   Mouth/Throat: Oropharynx is clear and moist.   Eyes: EOM are normal. Pupils are equal, round, and reactive to light.   Neck: Normal range of motion. Neck supple.   Cardiovascular: Normal rate, regular rhythm and normal heart sounds.    Pulmonary/Chest: Effort normal and breath sounds normal. No respiratory distress.   Abdominal: Soft. Bowel sounds are normal.   Musculoskeletal: Normal range of motion.   Neurological: He is alert and oriented to person, place, and time.   Skin: Skin is warm and dry.   Psychiatric: He has a normal mood and affect.   Nursing note and vitals reviewed.      Vents:  Oxygen Concentration (%): 28 (08/11/17 1117)    Lines/Drains/Airways     Drain                 Hemodialysis AV Fistula Left  upper arm [address] days          Peripheral Intravenous Line                 Peripheral IV - Single Lumen 08/10/17 Right Antecubital 6 days                Significant Labs:    CBC/Anemia Profile:    Recent Labs  Lab 08/15/17  0507 08/16/17  0516   WBC 12.11 11.23   HGB 11.9* 11.4*   HCT 36.3* 34.8*    289   MCV 84 83   RDW 16.9* 16.9*        Chemistries:    Recent Labs  Lab 08/15/17  0507 08/16/17  0516   *  132* 132*  133*   K 5.6*  4.6 4.6  4.8     101 98  99   CO2 20*  21* 22*  23   BUN 32*  32* 40*  39*   CREATININE 6.8*  6.6* 7.5*  7.6*   CALCIUM 8.9  8.6* 8.3*  8.6*   ALBUMIN 2.3* 2.2*   PHOS 2.8 3.0       Blood Culture: No results for input(s): LABBLOO in the last 48 hours.  All pertinent labs within the past 24 hours have been reviewed.    Significant Imaging:  I have reviewed and interpreted all pertinent imaging results/findings within the past 24 hours.    Assessment/Plan:     Bilateral pleural effusion    SP right thoracentesis  Follow cytology        ESRD (end stage renal disease) on dialysis    On HD  AV fistula Left are soft  Possible etiology of bacteremia however exam benign and all radiological w/u was -ve        * Staphylococcus aureus bacteremia    Continue Abx: Avelox and Vancomycin, monitor dosing for therapeutic dosing  Follow WBCC scan  Inflammatory markers: ESR and CRP elevated  Last culture 08/16 -ve          Extensive w/u for source of bacteremia not revealing  Will sign off, follow cytology results  DW Family at bedside       Ron Molina MD  Pulmonology  [facility]

## 2017-08-17 NOTE — SUBJECTIVE & OBJECTIVE
Interval History:  62 year old man with ESRD, MRSA bacteremia, back pain, left upper arm fistula.  He is afebrile.  Repeat blood cultures remain positive.  MRI scan reviewed -No obvious discitis or abscess noted.  Indium scan is negative   Review of Systems   Constitutional: Positive for appetite change. Negative for chills, fever and unexpected weight change.   HENT: Negative for trouble swallowing.    Respiratory: Negative for cough, shortness of breath and wheezing.    Cardiovascular: Negative for chest pain.   Gastrointestinal: Negative for abdominal distention, constipation, diarrhea and vomiting.   Neurological: Negative for seizures, syncope, facial asymmetry and speech difficulty.   Psychiatric/Behavioral: Positive for confusion.     Objective:     Vital Signs (Most Recent):  Temp: 98.2 °F (36.8 °C) (08/17/17 0752)  Pulse: 94 (08/17/17 0752)  Resp: 16 (08/17/17 0752)  BP: 99/63 (08/17/17 0752)  SpO2: 97 % (08/17/17 0752) Vital Signs (24h Range):  Temp:  [97.3 °F (36.3 °C)-98.2 °F (36.8 °C)] 98.2 °F (36.8 °C)  Pulse:  [84-99] 94  Resp:  [16-18] 16  SpO2:  [94 %-98 %] 97 %  BP: ()/(60-83) 99/63     Weight: 92 kg (202 lb 13.2 oz)  Body mass index is 26.04 kg/m².    Estimated Creatinine Clearance: 13.8 mL/min (based on Cr of 6.3).    Physical Exam   Constitutional: He appears well-developed and well-nourished. No distress.   HENT:   Head: Normocephalic and atraumatic.   Eyes: Conjunctivae and EOM are normal. Pupils are equal, round, and reactive to light.   Neck: Normal range of motion. Neck supple. No thyromegaly present.   Cardiovascular: Normal rate, regular rhythm and normal heart sounds.    Pulmonary/Chest: Effort normal and breath sounds normal. No respiratory distress. He has no wheezes. He has no rales.   Abdominal: Soft. Bowel sounds are normal. He exhibits no distension. There is no tenderness.   Musculoskeletal: Normal range of motion. He exhibits edema (+1 BLE). He exhibits no tenderness or  deformity.   Has point tenderness over the lower thoracic region   Neurological: He is alert.   Disoriented.  No focal deficits.   Skin: Skin is warm and dry. No rash noted. No erythema.   Left arm fistula +bruit/thrill     Psychiatric: He has a normal mood and affect.   Nursing note and vitals reviewed.      Significant Labs:   Blood Culture:     Recent Labs  Lab 04/12/17  1436 08/10/17  1845 08/10/17  1900 08/14/17  0525 08/16/17  1137   LABBLOO No growth after 5 days. Gram stain aer bottle: Gram positive cocci in clusters resembling Staph   Gram stain mike bottle: Gram positive cocci in clusters resembling Staph   Results called to and read back by: Primitivo Velasco RN  08/11/2017  21:59  METHICILLIN RESISTANT STAPHYLOCOCCUS AUREUSID consult required at [facility]. Gram stain aer bottle: Gram positive cocci in clusters resembling Staph   Gram stain mike bottle: Gram positive cocci in clusters resembling Staph   Results called to and read back by:Kia Velasco RN 08/12/2017  04:05  METHICILLIN RESISTANT STAPHYLOCOCCUS AUREUSID consult required at [facility]. Gram stain aer bottle: Gram positive cocci in clusters resembling Staph   Results called to and read back by:Sanjiv Beauchamp RN 08/15/2017  02:46  STAPHYLOCOCCUS AUREUSID consult required at [facility].For susceptibility see order #[id number]  Gram stain aer bottle: Gram positive cocci in clusters resembling Staph   Results called to and read back by:Sanjiv Beauchamp RN 08/15/2017  02:46  STAPHYLOCOCCUS AUREUSSusceptibility pendingID consult required at [facility]. No Growth to date     BMP:     Recent Labs  Lab 08/17/17  0629   GLU 88   *   K 4.3   CL 98   CO2 24   BUN 28*   CREATININE 6.3*   CALCIUM 8.4*     CBC:     Recent Labs  Lab 08/16/17  0516 08/17/17  0629   WBC 11.23 11.45   HGB 11.4* 10.6*   HCT 34.8* 33.2*    267     All pertinent labs within the past  24 hours have been reviewed.    Significant Imaging: I have reviewed all pertinent imaging results/findings within the past 24 hours.

## 2017-08-17 NOTE — ED NOTES
Harvinder Gregorio called to report that the patient's aerobic culture was positive for gram positive cocci and clusters. Spoke with Dr. Ordonez and gave her these results today at 1712.

## 2017-08-17 NOTE — PROGRESS NOTES
[facility]  Infectious Disease  Progress Note    Patient Name: David Hadley  MRN: [id number]  Admission Date: 8/10/2017  Length of Stay: 7 days  Attending Physician: Deisy Ordonez MD  Primary Care Provider: Isaac Fitch MD    Isolation Status: Contact  Assessment/Plan:      HCAP (healthcare-associated pneumonia)    On Avelox/ Vanco.  Might need repeat imaging         Type 2 diabetes mellitus with renal complication    Insulin sliding scale , insulin regime as per primary team         ESRD (end stage renal disease) on dialysis    HD as per nephrology         * Staphylococcus aureus bacteremia    Source control is needed in all cases of staph bacteremia.    Repeat blood culture remains positive .   indium scan  Is  negative  Follow repeat   blood culture   Discuss with Pulmonology about chest imaging   Continue vanco.  Will prefer to have negative blood cultures prior to discharge .              Anticipated Disposition:     Thank you for your consult. I will follow-up with patient. Please contact us if you have any additional questions.    Ganga Choudhury MD  Infectious Disease  [facility]    Subjective:     Principal Problem:Staphylococcus aureus bacteremia    HPI:  62 year old  male with a PMHx of dementia, ESRD on HD, HTN, CAD with remote CABG, HLD, DM II, CML, and h/o  MRSA bacteremia that was managed in April with 6 weeks of IV vancomycin . He presented again at this time with  increased confusion and decreased appetite over the past 1-2 weeks..Patient was sent to ED by PCP for positive blood cultures with gram-positive cocci drawn from AV fistula on Monday.  Patient's niece reports patient received IV Vanc during dialysis yesterday.  Upon arrival to ED, patient noted to be hypotensive with BP 97/67.  WBC 14.  Patient last episode of bacteremia was 4/2017.  SALAZAR at that time was negative for vegetation.  Since admission, case was discussed with primary team and soft tissue  ultrasound of the left arm fistula showed-along the superior aspect of the shunt there is a complex focus measuring 2.1 x 0.7 x 0.9 cm in diameter. .  CT scan of the chest showed large right pleural effusion and thoracentesis was done. Pleural fluid did not show josey pus and pleural fluid protein is 4. Total protein is 8.  He also complaints of  intermittent back pain   Interval History:  62 year old man with ESRD, MRSA bacteremia, back pain, left upper arm fistula.  He is afebrile.  Repeat blood cultures remain positive.  MRI scan reviewed -No obvious discitis or abscess noted.  Indium scan is negative   Review of Systems   Constitutional: Positive for appetite change. Negative for chills, fever and unexpected weight change.   HENT: Negative for trouble swallowing.    Respiratory: Negative for cough, shortness of breath and wheezing.    Cardiovascular: Negative for chest pain.   Gastrointestinal: Negative for abdominal distention, constipation, diarrhea and vomiting.   Neurological: Negative for seizures, syncope, facial asymmetry and speech difficulty.   Psychiatric/Behavioral: Positive for confusion.     Objective:     Vital Signs (Most Recent):  Temp: 98.2 °F (36.8 °C) (08/17/17 0752)  Pulse: 94 (08/17/17 0752)  Resp: 16 (08/17/17 0752)  BP: 99/63 (08/17/17 0752)  SpO2: 97 % (08/17/17 0752) Vital Signs (24h Range):  Temp:  [97.3 °F (36.3 °C)-98.2 °F (36.8 °C)] 98.2 °F (36.8 °C)  Pulse:  [84-99] 94  Resp:  [16-18] 16  SpO2:  [94 %-98 %] 97 %  BP: ()/(60-83) 99/63     Weight: 92 kg (202 lb 13.2 oz)  Body mass index is 26.04 kg/m².    Estimated Creatinine Clearance: 13.8 mL/min (based on Cr of 6.3).    Physical Exam   Constitutional: He appears well-developed and well-nourished. No distress.   HENT:   Head: Normocephalic and atraumatic.   Eyes: Conjunctivae and EOM are normal. Pupils are equal, round, and reactive to light.   Neck: Normal range of motion. Neck supple. No thyromegaly present.    Cardiovascular: Normal rate, regular rhythm and normal heart sounds.    Pulmonary/Chest: Effort normal and breath sounds normal. No respiratory distress. He has no wheezes. He has no rales.   Abdominal: Soft. Bowel sounds are normal. He exhibits no distension. There is no tenderness.   Musculoskeletal: Normal range of motion. He exhibits edema (+1 BLE). He exhibits no tenderness or deformity.   Has point tenderness over the lower thoracic region   Neurological: He is alert.   Disoriented.  No focal deficits.   Skin: Skin is warm and dry. No rash noted. No erythema.   Left arm fistula +bruit/thrill     Psychiatric: He has a normal mood and affect.   Nursing note and vitals reviewed.      Significant Labs:   Blood Culture:     Recent Labs  Lab 04/12/17  1436 08/10/17  1845 08/10/17  1900 08/14/17  0525 08/16/17  1137   LABBLOO No growth after 5 days. Gram stain aer bottle: Gram positive cocci in clusters resembling Staph   Gram stain mike bottle: Gram positive cocci in clusters resembling Staph   Results called to and read back by: Primitivo Velasco RN  08/11/2017  21:59  METHICILLIN RESISTANT STAPHYLOCOCCUS AUREUSID consult required at [facility]. Gram stain aer bottle: Gram positive cocci in clusters resembling Staph   Gram stain mike bottle: Gram positive cocci in clusters resembling Staph   Results called to and read back by:Kia Velasco RN 08/12/2017  04:05  METHICILLIN RESISTANT STAPHYLOCOCCUS AUREUSID consult required at [facility]. Gram stain aer bottle: Gram positive cocci in clusters resembling Staph   Results called to and read back by:Sanjiv Beauchamp RN 08/15/2017  02:46  STAPHYLOCOCCUS AUREUSID consult required at [facility].For susceptibility see order #[id number]  Gram stain aer bottle: Gram positive cocci in clusters resembling Staph   Results called to and read back by:Sanjiv Beauchamp RN 08/15/2017  02:46  STAPHYLOCOCCUS  AUREUSSusceptibility pendingID consult required at [facility] and [place] locations. No Growth to date     BMP:     Recent Labs  Lab 08/17/17  0629   GLU 88   *   K 4.3   CL 98   CO2 24   BUN 28*   CREATININE 6.3*   CALCIUM 8.4*     CBC:     Recent Labs  Lab 08/16/17  0516 08/17/17  0629   WBC 11.23 11.45   HGB 11.4* 10.6*   HCT 34.8* 33.2*    267     All pertinent labs within the past 24 hours have been reviewed.    Significant Imaging: I have reviewed all pertinent imaging results/findings within the past 24 hours.

## 2017-08-17 NOTE — ASSESSMENT & PLAN NOTE
Continue Abx: Avelox and Vancomycin, monitor dosing for therapeutic dosing  Follow WBCC scan  Inflammatory markers: ESR and CRP elevated  Last culture 08/16 -ve

## 2017-08-17 NOTE — SUBJECTIVE & OBJECTIVE
Interval History:     8/11/17: Patient today denies any fevers, pain, SOB, nausea.     8/12/17: Patient is resting in chair, no complaints at present.     8/13/17: Patient without complaints today.     8/14/17: Patient is currently on hemodialysis. No complications with procedure. Vital signs are stable. Patient is resting comfortably, he denies any complaints.     8/15/17: Patient currently without complaints.     8/16/17: Patient is resting comfortably in bed, denies any complaints at present.     8/17/17: Patient denies any pain, SOB, nausea.         Review of patient's allergies indicates:   Allergen Reactions    Benadryl decongestant Itching     Nothing with benadryl; wife states  No allergy to Benadryl     Current Facility-Administered Medications   Medication Frequency    0.9%  NaCl infusion PRN    0.9%  NaCl infusion Once    0.9%  NaCl infusion PRN    0.9%  NaCl infusion Once    atorvastatin tablet 20 mg Daily    dextrose 50% injection 12.5 g PRN    dextrose 50% injection 25 g PRN    diphenhydrAMINE capsule 25 mg Nightly PRN    glucagon (human recombinant) injection 1 mg PRN    glucose chewable tablet 16 g PRN    glucose chewable tablet 24 g PRN    heparin (porcine) injection 1,000 Units PRN    insulin aspart pen 0-5 Units QID (AC + HS) PRN    levetiracetam tablet 500 mg BID    lidocaine (PF) 10 mg/ml (1%) injection 10 mg Once    lorazepam tablet 0.5 mg Q6H PRN    moxifloxacin tablet 400 mg Daily    rivastigmine 4.6 mg/24 hr 1 patch Daily    sertraline tablet 50 mg Daily    sodium bicarbonate tablet 1,300 mg TID    vancomycin 1 g in dextrose 5 % 250 mL IVPB (ready to mix system) Q48H       Objective:     Vital Signs (Most Recent):  Temp: 98.2 °F (36.8 °C) (08/17/17 0752)  Pulse: 94 (08/17/17 0752)  Resp: 16 (08/17/17 0752)  BP: 99/63 (08/17/17 0752)  SpO2: 97 % (08/17/17 0752)  O2 Device (Oxygen Therapy): room air (08/17/17 0752) Vital Signs (24h Range):  Temp:  [97.3 °F (36.3 °C)-98.2  °F (36.8 °C)] 98.2 °F (36.8 °C)  Pulse:  [84-99] 94  Resp:  [16-18] 16  SpO2:  [94 %-98 %] 97 %  BP: ()/(60-83) 99/63     Weight: 92 kg (202 lb 13.2 oz) (08/11/17 1400)  Body mass index is 26.04 kg/m².  Body surface area is 2.19 meters squared.    I/O last 3 completed shifts:  In: 980 [P.O.:480; Other:500]  Out: 3500 [Other:3500]    Physical Exam   Constitutional: He appears well-developed. He is cooperative.   Alert and responsive.   HENT:   Head: Normocephalic.   Nose: No rhinorrhea.   Mouth/Throat: Mucous membranes are normal. No oropharyngeal exudate.   Neck: No thyroid mass present.   Cardiovascular: Normal rate, regular rhythm, S1 normal, S2 normal and intact distal pulses.    Pulmonary/Chest: Effort normal. No respiratory distress. He has no wheezes.   Abdominal: Soft. Bowel sounds are normal. He exhibits no distension. There is no tenderness. No hernia.   Musculoskeletal:   Mild bilateral LE edema.   Lymphadenopathy:     He has no cervical adenopathy.   Neurological: He is alert.   Skin: Skin is warm and dry.   Psychiatric: He has a normal mood and affect.       Significant Labs:  Lab Results   Component Value Date    CREATININE 6.3 (H) 08/17/2017    BUN 28 (H) 08/17/2017     (L) 08/17/2017    K 4.3 08/17/2017    CL 98 08/17/2017    CO2 24 08/17/2017     Lab Results   Component Value Date     (H) 11/06/2008    CALCIUM 8.4 (L) 08/17/2017    PHOS 3.0 08/16/2017     Lab Results   Component Value Date    ALBUMIN 2.2 (L) 08/16/2017     Lab Results   Component Value Date    WBC 11.45 08/17/2017    HGB 10.6 (L) 08/17/2017    HCT 33.2 (L) 08/17/2017    MCV 84 08/17/2017     08/17/2017       Recent Labs  Lab 08/11/17  0527   MG 2.0         Significant Imaging:  Imaging Results          NM Inflammitory Localization WB Indium (Final result)  Result time 08/16/17 11:14:51    Final result by Dickson Salmon MD (08/16/17 11:14:51)                 Impression:      Negative indium-111 labeled  white blood cell scan.        Electronically signed by: DICKSON MUÑOZ MD  Date:     08/16/17  Time:    11:14              Narrative:    Exam: NM INFLAMMATORY WHOLE BODY INDIUM,    Date: 8/16/17 at 1043 hrs.    History: Persistent bacteremia.    Comparison: 4/11/17    Findings: Whole body indium-111 labeled white blood cell scan performed with 360 uCi.    Normal liver and spleen uptake.  Normal marrow uptake.  No localized uptake is present.                             X-Ray Chest AP Portable (Final result)  Result time 08/15/17 10:55:12    Final result by IRMA Jorge Sr., MD (08/15/17 10:55:12)                 Impression:        1. There is a moderate amount of interstitial and alveolar opacities seen in both lungs. This is characteristic of pulmonary edema.  2. There is opacification of the base of the right hemithorax. This is characteristic of a small right pleural effusion.  3. There is mild cardiomegaly.  4. There are multiple sternotomy wires in place.       Electronically signed by: IRMA JORGE MD  Date:     08/15/17  Time:    10:55              Narrative:    One-view chest x-ray    Clinical History: Pleural effusion    Finding: Comparison was made to prior examination performed on 8/12/2017. There are multiple sternotomy wires in place. There is mild cardiomegaly. There is a moderate amount of interstitial and alveolar opacities seen in both lungs. There is opacification of the base of the right hemithorax. The left costophrenic angle is sharp. There is no pneumothorax.                             MRI Lumbar Spine Without Contrast (Final result)  Result time 08/14/17 16:08:31    Final result by Dickson Jaramillo MD (08/14/17 16:08:31)                 Impression:        Small spinal canal and congenital basis.  Broad-based disc bulges at the L4-L5 and L5-S1 levels.  Bilateral degenerative changes of facets at the L3-L4 L4-L5 and L5-S1 levels.  No definite nerve impingement at any level for bilateral L3-L4 and  L5 nerve contents cannot be excluded.  Abnormal signal in the vertebral bodies assistant with hematopoietic bone marrow or an infiltrative process.  Would correlate with clinical history and or bone scan for further evaluation.  No evidence of discitis identified.      Electronically signed by: DICKSON JARAMILLO MD  Date:     08/14/17  Time:    16:08              Narrative:    MRI lumbar spine without contrast.08/14/17 14:29:14    History:   low back pain., rule out abscess/discitis    Standard multiplanar noncontrast MRI sequences of the lumbar spine.    The distal cord and conus reveal normal signal and morphology.  Abnormal signal in the vertebral bodies possibly representing anemia versus an infiltrative process including metastatic disease.    L1-2: Normal.    L2-3: Normal.    L3-4: Minimal disc.  Mild neural foraminal narrowing.    L4-5: Minimal broad-based disc bulge.  Mild desiccation of the disc and mild degenerative changes of the facets with mild bilateral bony neural foraminal narrowing.    L5-S1:  Minimal broad-based disc bulge.  Mild degenerative changes of the facets.  Bilateral bony neural foraminal narrowing with possible but not definite L5 nerve root impingement                             MRI Thoracic Spine Without Contrast (Final result)  Result time 08/14/17 15:33:41    Final result by Dickson Jaramillo MD (08/14/17 15:33:41)                 Impression:        Spinal canal is within normal limits.  No impingements in the cord or abnormal signal in the cord identified.  Bilateral pleural effusions larger on the right side.  Pleural effusions on the left side appear loculated.  No evidence of fluid in the disc spaces to suggest discitis.  No epidural mass or fluid collection identified.      Electronically signed by: DICKSON JARAMILLO MD  Date:     08/14/17  Time:    15:33              Narrative:    MRI Thoracic spine without contrast.08/14/17 14:29:38    History:   Back pain., rule out discitis  /abscess    Standard multiplanar noncontrast MRI sequences of the thoracic spine with and without contrast.    Findings:  The spinal canal is within normal limits. No signal abnormality in the spinal cord.Normal vertebral bodies. The disc spaces are normal.  Bilateral pleural effusions larger on the right.  No evidence of discitis identified.                             X-Ray Chest 1 View (Final result)  Result time 08/12/17 17:34:23    Final result by Dickson Green III, MD (08/12/17 17:34:23)                 Impression:     Improving chest x-ray with decrease in the right pleural effusion consistent with thoracentesis. No evidence of pneumothorax.      Electronically signed by: DICKSON GREEN MD  Date:     08/12/17  Time:    17:34              Narrative:    Chest x-ray, single view.    Clinical indication: Shortness of breath status post thoracentesis.    The chest shows improvement with better aeration of the right base and significant decrease in the pleural effusion. No evidence of pneumothorax status post thoracentesis. Chest otherwise unchanged.                             US Soft Tissue Misc (Final result)  Result time 08/12/17 17:23:38    Final result by Dickson Green III, MD (08/12/17 17:23:38)                 Impression:     Complex/heterogeneous density just above the region of the shunt of indeterminate significance and etiology, please correlate. This is not a simple fluid collection and doesn't have the classic appearance of an abscess.      Electronically signed by: DICKSON GREEN MD  Date:     08/12/17  Time:    17:23              Narrative:    Limited ultrasound near the patient's dialysis shunt, left upper extremity    Along the superior aspect of the shunt there is a complex focus measuring 2.1 x 0.7 x 0.9 cm in diameter. Etiology is indeterminate. This does not have the classic appearance of an abscess but please correlate. No flow by Doppler analysis. No gross evidence of  discomfort/pain.                             US Chest Mediastinum (Edited Result - FINAL)  Result time 08/12/17 17:22:01    Addendum 1 of 1 by Dickson Castro III, MD (08/12/17 17:22:01)    Addendum.    The procedure should be listed as ultrasound of the chest, not CT of the chest.   As above      Electronically signed by: DICKSON CASTRO MD  Date:     08/12/17  Time:    17:22                Final result by iDckson Castro III, MD (08/12/17 17:20:58)                 Impression:     As above.      Electronically signed by: DICKSON CASTRO MD  Date:     08/12/17  Time:    17:20              Narrative:    CT of the chest.    Clinical indication: Pleural effusion.    Ultrasound of the chest reveals a moderate right pleural effusion and minimal left effusion.. Appropriate skin site was marked prior to thoracentesis performed by Dr. Balderas.                             CT Abdomen Pelvis With Contrast (Final result)  Result time 08/11/17 19:22:53    Final result by Shivam Hale MD (08/11/17 19:22:53)                 Impression:             Right greater than left pleural effusions, associated with compressive atelectasis/consolidation.    Heavily calcified coronary arteries.    Rectal fecal impaction noted.      Electronically signed by: SHIVAM HALE MD  Date:     08/11/17  Time:    19:22              Narrative:    Exam: CT chest with contrast.    History: Gram-positive cocci bacteremia    Findings: Large right and smaller pleural effusions are present. There is extensive compressive atelectasis/consolidation in the right base. Less pronounced findings in the left base. No mediastinal or hilar adenopathy.    Heavily calcified coronary arteries noted.    No osteolytic or osteoblastic lesions are identified. The    The liver, spleen, pancreas, kidneys and adrenal glands are unremarkable.    No free fluid, adenopathy, mesenteric inflammatory change is apparent.    Rectal fecal impaction noted.                              CT Chest With Contrast (Final result)  Result time 08/11/17 19:22:54    Final result by Shivam Ambrose MD (08/11/17 19:22:54)                 Impression:             Right greater than left pleural effusions, associated with compressive atelectasis/consolidation.    Heavily calcified coronary arteries.    Rectal fecal impaction noted.      Electronically signed by: SHIVAM AMBROSE MD  Date:     08/11/17  Time:    19:22              Narrative:    Exam: CT chest with contrast.    History: Gram-positive cocci bacteremia    Findings: Large right and smaller pleural effusions are present. There is extensive compressive atelectasis/consolidation in the right base. Less pronounced findings in the left base. No mediastinal or hilar adenopathy.    Heavily calcified coronary arteries noted.    No osteolytic or osteoblastic lesions are identified. The    The liver, spleen, pancreas, kidneys and adrenal glands are unremarkable.    No free fluid, adenopathy, mesenteric inflammatory change is apparent.    Rectal fecal impaction noted.                             X-Ray Chest 1 View (Final result)  Result time 08/10/17 21:10:07    Final result by Shivam Ambrose MD (08/10/17 21:10:07)                 Impression:     Right basilar consolidation. See above. Otherwise no significant change from prior exam.      Electronically signed by: SHIVAM AMBROSE MD  Date:     08/10/17  Time:    21:10              Narrative:    Exam: Chest X-ray, one view.    History: Shortness of breath    Findings: Comparison is made to prior exam dated 05/03/2017. Mild cardiomegaly again noted. Right basilar consolidation is no apparent, likely related to combination of pleural fluid and airspace disease. Sternal wires again noted.

## 2017-08-17 NOTE — NURSING
Spouse was concerned about antibiotic therapy to continue outpatient,  Stated that she was contacted by dialysis a couple of weeks ago stating that they had received an order to give IV antibiotics during dialysis.  Spouse wanted to know if we were sending pt home with IV antibiotics.  Spoke with NP and she stated that she had spoken with spouse and she stated that the dialysis center had all they needed.  The antibiotics were already there.  Went over discharge instructions with the spouse and showed her on the discharge paperwork that there were no changes being made to his medications.  Asked for further clarification for the IV antibiotics so if need be we can get that arranged before leaving the facility.  Stated she wanted to leave and would talk with the dialysis center on tomorrow and if anything further was needed she would contact his doctor.

## 2017-08-17 NOTE — ED NOTES
[facility] called to report that the patient's aerobic culture was positive for gram positive cocci and clusters.

## 2017-08-18 ENCOUNTER — TELEPHONE (OUTPATIENT)
Dept: FAMILY MEDICINE | Facility: CLINIC | Age: 62
End: 2017-08-18

## 2017-08-18 DIAGNOSIS — Z99.2 END STAGE RENAL DISEASE ON DIALYSIS: ICD-10-CM

## 2017-08-18 DIAGNOSIS — B95.8 BACTEREMIA DUE TO STAPHYLOCOCCUS: ICD-10-CM

## 2017-08-18 DIAGNOSIS — N18.6 END STAGE RENAL DISEASE ON DIALYSIS: ICD-10-CM

## 2017-08-18 DIAGNOSIS — J90 PLEURAL EFFUSION: ICD-10-CM

## 2017-08-18 DIAGNOSIS — R78.81 BACTEREMIA: ICD-10-CM

## 2017-08-18 DIAGNOSIS — F03.90 SENILE DEMENTIA, UNCOMPLICATED: Primary | ICD-10-CM

## 2017-08-18 DIAGNOSIS — R78.81 BACTEREMIA DUE TO STAPHYLOCOCCUS: ICD-10-CM

## 2017-08-18 LAB — POCT GLUCOSE: 119 MG/DL (ref 70–110)

## 2017-08-18 NOTE — PLAN OF CARE
Pt discharged home with family and will receive IV abx's at dialysis center ([facility]).  No further CM needs identified.        08/18/17 0919   Final Note   Assessment Type Final Discharge Note   Discharge Disposition Home   Discharge planning education complete? Yes   Hospital Follow Up  Appt(s) scheduled? Yes   Discharge plans and expectations educations in teach back method with documentation complete? Yes   Offered [facility] -- Bedside Delivery? n/a   Discharge/Hospital Encounter Summary to (non-[facility]) PCP n/a   Referral to Outpatient Case Management complete? n/a   Referral to / orders for Home Health Complete? No   30 day supply of medicines given at discharge, if documented non-compliance / non-adherence? n/a   Any social issues identified prior to discharge? Yes   Did you assess the readiness or willingness of the family or caregiver to support self management of care? Yes

## 2017-08-18 NOTE — TELEPHONE ENCOUNTER
----- Message from Bc Worley sent at 8/18/2017 12:10 PM CDT -----  Contact: Wife Nakia  [phone]  Nakia came into the office and asked if Dr Fitch could get home health to help with her  David.  He just came out of the hospital yesterday and has Dementia real bad.  She would like some type of help for him.  She didn't know where to start.

## 2017-08-19 LAB
BACTERIA BLD CULT: NORMAL

## 2017-08-21 ENCOUNTER — TELEPHONE (OUTPATIENT)
Dept: FAMILY MEDICINE | Facility: CLINIC | Age: 62
End: 2017-08-21

## 2017-08-21 NOTE — TELEPHONE ENCOUNTER
"----- Message from Debbie Cunningham RN sent at 8/21/2017  9:00 AM CDT -----  Morning,  Just spoke with the wife of David Hadley for a TCC call.  He was discharged from [facility] on 8/17/17 with "Staphylococcus aureus bacteremia".  She indicated to me that he had to go back into the hospital and was readmitted @ [facility] and is in ICU.  I wanted to let you know this information.  Thanks.  Debbie Cunningham, RN  Care Coordination Call Center   Region    "

## 2017-08-22 LAB — BACTERIA BLD CULT: NORMAL

## 2017-09-07 ENCOUNTER — OFFICE VISIT (OUTPATIENT)
Dept: FAMILY MEDICINE | Facility: CLINIC | Age: 62
End: 2017-09-07
Payer: MEDICARE

## 2017-09-07 VITALS
DIASTOLIC BLOOD PRESSURE: 58 MMHG | SYSTOLIC BLOOD PRESSURE: 118 MMHG | TEMPERATURE: 98 F | HEIGHT: 74 IN | WEIGHT: 201.06 LBS | BODY MASS INDEX: 25.8 KG/M2

## 2017-09-07 DIAGNOSIS — Z99.2 END STAGE RENAL DISEASE ON DIALYSIS: ICD-10-CM

## 2017-09-07 DIAGNOSIS — J30.9 CHRONIC ALLERGIC RHINITIS, UNSPECIFIED SEASONALITY, UNSPECIFIED TRIGGER: Primary | ICD-10-CM

## 2017-09-07 DIAGNOSIS — N18.6 END STAGE RENAL DISEASE ON DIALYSIS: ICD-10-CM

## 2017-09-07 DIAGNOSIS — R78.81 BACTEREMIA DUE TO STAPHYLOCOCCUS: ICD-10-CM

## 2017-09-07 DIAGNOSIS — B95.8 BACTEREMIA DUE TO STAPHYLOCOCCUS: ICD-10-CM

## 2017-09-07 PROCEDURE — 99999 PR PBB SHADOW E&M-EST. PATIENT-LVL III: CPT | Mod: PBBFAC,,,

## 2017-09-07 PROCEDURE — 99213 OFFICE O/P EST LOW 20 MIN: CPT | Mod: S$PBB,,,

## 2017-09-07 PROCEDURE — 99213 OFFICE O/P EST LOW 20 MIN: CPT | Mod: PBBFAC,PO

## 2017-09-07 RX ORDER — DEXTROMETHORPHAN HYDROBROMIDE, GUAIFENESIN 5; 100 MG/5ML; MG/5ML
650 LIQUID ORAL
COMMUNITY
End: 2017-09-07

## 2017-09-07 RX ORDER — LEVETIRACETAM 500 MG/1
500 TABLET ORAL
COMMUNITY
Start: 2017-05-09 | End: 2017-09-07

## 2017-09-07 RX ORDER — FLUTICASONE PROPIONATE 50 MCG
1 SPRAY, SUSPENSION (ML) NASAL 2 TIMES DAILY
Qty: 1 BOTTLE | Refills: 5 | Status: SHIPPED | OUTPATIENT
Start: 2017-09-07 | End: 2018-03-06

## 2017-09-07 RX ORDER — ATORVASTATIN CALCIUM 20 MG/1
20 TABLET, FILM COATED ORAL
COMMUNITY
Start: 2016-10-28 | End: 2017-09-07 | Stop reason: SDUPTHER

## 2017-09-07 RX ORDER — ASPIRIN 81 MG/1
81 TABLET ORAL
COMMUNITY
Start: 2016-08-16

## 2017-09-07 RX ORDER — SEVELAMER CARBONATE 800 MG/1
800 TABLET, FILM COATED ORAL
COMMUNITY

## 2017-09-07 RX ORDER — RIVASTIGMINE 4.6 MG/24H
1 PATCH, EXTENDED RELEASE TRANSDERMAL
COMMUNITY
Start: 2016-12-21 | End: 2017-09-07

## 2017-09-07 RX ORDER — VANCOMYCIN HYDROCHLORIDE 500 MG/100ML
500 INJECTION, SOLUTION INTRAVENOUS
COMMUNITY
Start: 2017-08-25 | End: 2017-09-07 | Stop reason: ALTCHOICE

## 2017-09-07 RX ORDER — LORAZEPAM 0.5 MG/1
0.5 TABLET ORAL
COMMUNITY
Start: 2017-05-09 | End: 2017-12-14 | Stop reason: SDUPTHER

## 2017-09-07 RX ORDER — BIMATOPROST 0.3 MG/ML
1 SOLUTION/ DROPS OPHTHALMIC
COMMUNITY
End: 2017-12-14

## 2017-09-07 RX ORDER — TRAZODONE HYDROCHLORIDE 50 MG/1
50 TABLET ORAL
COMMUNITY
Start: 2016-10-28 | End: 2017-09-07 | Stop reason: SDUPTHER

## 2017-09-07 RX ORDER — TRAMADOL HYDROCHLORIDE 50 MG/1
50 TABLET ORAL
COMMUNITY
End: 2017-09-07

## 2017-09-07 RX ORDER — METOPROLOL SUCCINATE 25 MG/1
12.5 TABLET, EXTENDED RELEASE ORAL
COMMUNITY
Start: 2017-08-25 | End: 2017-12-14

## 2017-09-07 RX ORDER — CLOPIDOGREL BISULFATE 75 MG/1
75 TABLET ORAL
COMMUNITY
Start: 2017-08-25 | End: 2017-09-28 | Stop reason: SDUPTHER

## 2017-09-07 RX ORDER — AMLODIPINE BESYLATE 5 MG/1
5 TABLET ORAL
COMMUNITY
Start: 2017-05-09 | End: 2017-09-07 | Stop reason: SDUPTHER

## 2017-09-07 RX ORDER — SERTRALINE HYDROCHLORIDE 50 MG/1
50 TABLET, FILM COATED ORAL
COMMUNITY
Start: 2016-12-21 | End: 2017-09-07 | Stop reason: SDUPTHER

## 2017-09-08 NOTE — PROGRESS NOTES
Subjective:       Patient ID: David Hadley is a 62 y.o. male.    Chief Complaint: Follow-up (hospital stay)    HPI   The patient presents for follow-up from hospital discharge.  The patient was admitted on 8/10/2017 for weakness and fever chills.  Blood cultures were drawn and was noted to be positive for Staphylococcus bacteremia.  He was treated inpatient with IV vancomycin.  Infectious disease was consulted who recommended a TTE which was negative for any vegetation.  An ultrasound of his hemodialysis access was negative for any abscess.  He was found to have a large pleural effusion a thoracentesis was performed.  The patient improved and was discharged with IV vancomycin to be given for the next 6 weeks while on hemodialysis.  The patient has some dementia and is unable to answer questions appropriately he has a family member with a says he has been fever free since discharge.  There are only complaint is chronic rhinorrhea.  A copy of the narrative from the discharge summary will be included.       Discharge Summaries  [facility]ia [facility]   Cosigned by: Rinku Loyd MD at 8/22/2017  6:46 AM      []Hide copied text  []Rashidver for attribution information  [facility] Hospital Medicine  Discharge Summary        Patient Name: David Hadley  MRN: [id number]  Admission Date: 8/10/2017  Hospital Length of Stay: 7 days  Discharge Date and Time:  08/17/2017 3:09 PM  Attending Physician: Deisy Ordonez MD   Discharging Provider: Sia Colon [facility]  Primary Care Provider: Isaac Fitch MD        HPI:   Mr. Hadley is a 61yo male with a PMHx of dementia, ESRD on HD, HTN, CAD with remote CABG, HLD, DM II, CML, and h/o recurrent gram-positive cocci bacteremia, who presented to the ED with c/o increased confusion and decreased appetite over the past 1-2 weeks.  Patient's niece denies patient c/o fever/chills, cough, SOB, chest pain, palpitations, ABD pain, dysuria, N/V/D,  lightheadedness/dizziness, focal deficits, seizure-like activity, or syncope.Patient was sent to ED by PCP for positive blood cultures with gram-positive cocci drawn from AV fistula on Monday.  Patient's niece reports patient received IV Vanc during dialysis yesterday.  Upon arrival to ED, patient noted to be hypotensive with BP 97/67.  WBC 14.  Patient last episode of bacteremia was 4/2017.  SALAZAR at that time was negative for vegetation.  Hospital Medicine was consulted for admission.      * No surgery found *      Indwelling Lines/Drains at time of discharge:       Lines/Drains/Airways            Drain                          Hemodialysis AV Fistula Left upper arm 94337 days                   Hospital Course:   David Hadley is a 62 year old male who was admitted to [facility] with sepsis due to recurrent MRSA Bacteremia. Patient was admitted 4/2017 for the same, but source remained unclear. Repeat cultures on 4/12/17 were negative for MRSA (1/2 bottle grew coag-negative), and patient was discharged with 6 week therapy with Vancomycin. During this admission, medical management, included Vancomycin. Infectious Disease was consulted who recommended repeating TTE which proved negative for vegetation. A soft tissue US of Left Extremity at dialysis access was negative for abscess.  CXR revealed RLL consolidation and Avelox was started. Subsequent CT Chest/Abdomen revealed large right pleural effusion with compressive atelectasis. Pulmonology was consulted for thoracentesis. Pleural cytology pending. He received enema for fecal impaction. Left upper extremity ultrasound did not show evidence of abscess and Vascular physician indicated there were no clinical signs of abscess. MRI of Lumbar/Thoracic Spine were negative for abscess. Elevations were noted in the ESR and CRP. Again, blood cultures collected on 8/14/17 isolated gram + cocci resembling STAPH. An Indium scan was ordered by Infectious Ds. Which was  negative. Blood cultures drawn 8/16/17 with no growth to date. ID agreed for pt to be discharged and f/u with HD for ABX X 6-8 weeks. Pt was seen and examined today and deemed stable for discharge home.       Consults:          Consults          Status Ordering Provider       Inpatient consult to Infectious Diseases  Once     Provider:  Kassie Choudhury MD     Acknowledged YOKO STRONG       Inpatient consult to Nephrology  Once     Provider:  Bernard Marks MD     Acknowledged ZENY SCHAFFER JR       Inpatient consult to Pulmonology  Once     Provider:  Je Balderas MD     Completed YOKO STRONG       Inpatient consult to Vascular Surgery  Once     Specialty:  Vascular Surgery  Provider:  Conner Velazquez MD     Completed KASSIE CHOUDHURY       Pharmacy to dose Vancomycin consult  Once     Provider:  (Not yet assigned)     Acknowledged ZENY SCHAFFER JR       Pharmacy to dose Vancomycin consult  Once     Provider:  (Not yet assigned)     Acknowledged ZENY SCHAFFER JR             Significant Diagnostic Studies: Labs:   BMP:   Recent Labs  Lab 08/16/17  0516 08/17/17  0629     110 88   *  133* 135*   K 4.6  4.8 4.3   CL 98  99 98   CO2 22*  23 24   BUN 40*  39* 28*   CREATININE 7.5*  7.6* 6.3*   CALCIUM 8.3*  8.6* 8.4*   , CBC   Recent Labs  Lab 08/16/17  0516 08/17/17  0629   WBC 11.23 11.45   HGB 11.4* 10.6*   HCT 34.8* 33.2*    267    and All labs within the past 24 hours have been reviewed  Microbiology:   Blood Culture         Lab Results   Component Value Date     LABBLOO No Growth to date 08/16/2017             Pending Diagnostic Studies:      None                  Final Active Diagnoses:     Diagnosis Date Noted POA    PRINCIPAL PROBLEM:  Staphylococcus aureus bacteremia [R78.81]   Yes    HCAP (healthcare-associated pneumonia) [J18.9] 08/15/2017 Yes    Bilateral pleural effusion [J90] 08/12/2017 Yes    Hypophosphatemia [E83.39] 08/11/2017 Unknown     Hyperkalemia [E87.5] 04/10/2017 Yes    Dementia without behavioral disturbance [F03.90] 07/29/2016 Yes       Chronic    Type 2 diabetes mellitus with renal complication [E11.29] 07/29/2016 Yes       Chronic    Hyperlipidemia [E78.5] 07/29/2016 Yes       Chronic    ESRD (end stage renal disease) on dialysis [N18.6, Z99.2] 06/26/2013 Not Applicable       Chronic    CAD with remote CABG [Z95.1] 06/26/2013 Not Applicable       Chronic       Problems Resolved During this Admission:     Diagnosis Date Noted Date Resolved POA    Hypotension with h/o hypertension [I10]   08/14/2017 Yes      No new Assessment & Plan notes have been filed under this hospital service since the last note was generated.  Service: Hospital Medicine        Discharged Condition: stable     Disposition: Home or Self Care     Follow Up:      Follow-up Information      Isaac Fitch MD In 1 week.    Specialty:  Family Medicine  Contact information:  [address] [place] [id number] [phone]                      Patient Instructions:      Diet renal       Medications:  Reconciled Home Medications:       Current Discharge Medication List           CONTINUE these medications which have NOT CHANGED     Details   amlodipine (NORVASC) 5 MG tablet Take 1 tablet (5 mg total) by mouth once daily.  Qty: 30 tablet, Refills: 6       atorvastatin (LIPITOR) 20 MG tablet Take 1 tablet (20 mg total) by mouth once daily.  Qty: 90 tablet, Refills: 3       FOLIC ACID/VIT BCOMP,C (JOSAFAT-JANICE ORAL) Take by mouth.       gabapentin (NEURONTIN) 100 MG capsule Take 1 capsule (100 mg total) by mouth 3 (three) times daily.  Qty: 270 capsule, Refills: 3       hydrocodone-acetaminophen 5-325mg (NORCO) 5-325 mg per tablet Take 1 tablet by mouth every 6 to 8 hours as needed for Pain.  Qty: 60 tablet, Refills: 0       levetiracetam (KEPPRA) 500 MG Tab Take 1 tablet (500 mg total) by mouth 2 (two) times daily.  Qty: 60 tablet, Refills: 11       lorazepam  (ATIVAN) 0.5 MG tablet Take 1 tablet (0.5 mg total) by mouth every 6 (six) hours as needed for Anxiety.  Qty: 30 tablet, Refills: 1       losartan (COZAAR) 50 MG tablet Take by mouth. 1 tablet Oral Every day       metoprolol tartrate (LOPRESSOR) 25 MG tablet Take 25 mg by mouth 2 (two) times daily.       rivastigmine (EXELON) 4.6 mg/24 hr PT24 Place 1 patch onto the skin once daily.  Qty: 30 patch, Refills: 11       sertraline (ZOLOFT) 50 MG tablet Take 1 tablet (50 mg total) by mouth once daily.  Qty: 30 tablet, Refills: 11       sodium bicarbonate 650 MG tablet Take 2 tablets (1,300 mg total) by mouth 3 (three) times daily.       trazodone (DESYREL) 50 MG tablet Take 1 tablet (50 mg total) by mouth every evening.  Qty: 26 tablet, Refills: 11       SENSIPAR 30 mg Tab Take 1 tablet by mouth once daily.       SPRYCEL 100 mg Tab Take 100 mg by mouth once daily.               Time spent on the discharge of patient: 45 minutes     HOS POC IP DISCHARGE SUMMARY      JASON Gonzales  Department of [facility] Medicine  [facility]      Electronically signed by JASON Pandya at 8/17/2017  3:09 PM  Electronically signed by Rinku Loyd MD at 8/22/2017  6:46 AM            Review of Systems   Constitutional: Negative for activity change, appetite change, fatigue, fever and unexpected weight change.   HENT: Positive for rhinorrhea.    Eyes: Negative.    Respiratory: Negative for cough, chest tightness, shortness of breath and wheezing.    Cardiovascular: Negative for chest pain, palpitations and leg swelling.   Gastrointestinal: Negative for constipation, diarrhea, nausea and vomiting.   Endocrine: Negative.    Genitourinary: Negative.    Musculoskeletal: Negative.    Skin: Negative for color change.   Allergic/Immunologic: Negative.    Neurological: Negative for dizziness, weakness and light-headedness.   Hematological: Negative.    Psychiatric/Behavioral: Negative for sleep  disturbance.         Objective:      Physical Exam   Constitutional: He is oriented to person, place, and time. Vital signs are normal. He appears well-developed and well-nourished. He is active and cooperative. No distress.   HENT:   Head: Normocephalic and atraumatic.   Eyes: Conjunctivae are normal. Pupils are equal, round, and reactive to light. No scleral icterus.   Neck: Normal range of motion. Neck supple.   Cardiovascular: Normal rate, regular rhythm, normal heart sounds and intact distal pulses.  Exam reveals no gallop and no friction rub.    No murmur heard.  Pulses:       Carotid pulses are 1+ on the right side, and 1+ on the left side.       Radial pulses are 1+ on the right side, and 1+ on the left side.        Dorsalis pedis pulses are 1+ on the right side, and 1+ on the left side.   The patient has a left upper arm access with a bruit and thrill.   Pulmonary/Chest: Effort normal and breath sounds normal. No respiratory distress. He has no wheezes. He has no rales. He exhibits no tenderness.   Musculoskeletal: Normal range of motion. He exhibits no edema or tenderness.   Neurological: He is alert and oriented to person, place, and time. He exhibits normal muscle tone. Coordination normal.   Skin: Skin is warm and dry. No rash noted. He is not diaphoretic. No erythema. No pallor.   Psychiatric: He has a normal mood and affect. His speech is normal and behavior is normal. Judgment and thought content normal.       Assessment:       1. Chronic allergic rhinitis, unspecified seasonality, unspecified trigger    2. Bacteremia due to Staphylococcus    3. End stage renal disease on dialysis          Plan:   Chronic allergic rhinitis, unspecified seasonality, unspecified trigger  -     fluticasone (FLONASE) 50 mcg/actuation nasal spray; 1 spray by Each Nare route 2 (two) times daily.  Dispense: 1 Bottle; Refill: 5    Bacteremia due to Staphylococcus        -     The patient is continuing IV vancomycin with  hemodialysis        -      He will have a follow-up with infectious disease    End stage renal disease on dialysis        -     Continue hemodialysis and follow-up with nephrology          Disclaimer: This note is prepared using voice recognition software.  As such there may be errors in the dictation.  It has not been proofread.

## 2017-09-12 LAB — FUNGUS SPEC CULT: NORMAL

## 2017-09-28 RX ORDER — CLOPIDOGREL BISULFATE 75 MG/1
75 TABLET ORAL DAILY
Qty: 30 TABLET | Refills: 11 | Status: SHIPPED | OUTPATIENT
Start: 2017-09-28

## 2017-09-28 RX ORDER — LOSARTAN POTASSIUM 50 MG/1
50 TABLET ORAL DAILY
Qty: 30 TABLET | Refills: 11 | Status: SHIPPED | OUTPATIENT
Start: 2017-09-28

## 2017-10-15 LAB
ACID FAST MOD KINY STN SPEC: NORMAL
MYCOBACTERIUM SPEC QL CULT: NORMAL

## 2017-11-01 RX ORDER — TRAZODONE HYDROCHLORIDE 50 MG/1
TABLET ORAL
Qty: 26 TABLET | Refills: 11 | Status: SHIPPED | OUTPATIENT
Start: 2017-11-01

## 2017-11-01 RX ORDER — ATORVASTATIN CALCIUM 20 MG/1
TABLET, FILM COATED ORAL
Qty: 90 TABLET | Refills: 3 | Status: SHIPPED | OUTPATIENT
Start: 2017-11-01

## 2017-12-04 RX ORDER — LORAZEPAM 0.5 MG/1
TABLET ORAL
Qty: 30 TABLET | Refills: 1 | Status: SHIPPED | OUTPATIENT
Start: 2017-12-04

## 2017-12-14 ENCOUNTER — OFFICE VISIT (OUTPATIENT)
Dept: FAMILY MEDICINE | Facility: CLINIC | Age: 62
DRG: 291 | End: 2017-12-14
Payer: MEDICARE

## 2017-12-14 ENCOUNTER — HOSPITAL ENCOUNTER (INPATIENT)
Facility: HOSPITAL | Age: 62
LOS: 3 days | Discharge: HOME | DRG: 291 | End: 2017-12-17
Attending: EMERGENCY MEDICINE | Admitting: EMERGENCY MEDICINE
Payer: MEDICARE

## 2017-12-14 VITALS
DIASTOLIC BLOOD PRESSURE: 74 MMHG | BODY MASS INDEX: 26 KG/M2 | WEIGHT: 202.63 LBS | TEMPERATURE: 97 F | OXYGEN SATURATION: 87 % | HEIGHT: 74 IN | RESPIRATION RATE: 14 BRPM | HEART RATE: 111 BPM | SYSTOLIC BLOOD PRESSURE: 117 MMHG

## 2017-12-14 DIAGNOSIS — I50.23 ACUTE ON CHRONIC SYSTOLIC HEART FAILURE: ICD-10-CM

## 2017-12-14 DIAGNOSIS — Z99.2 ESRD (END STAGE RENAL DISEASE) ON DIALYSIS: Chronic | ICD-10-CM

## 2017-12-14 DIAGNOSIS — N18.6 ESRD (END STAGE RENAL DISEASE) ON DIALYSIS: Chronic | ICD-10-CM

## 2017-12-14 DIAGNOSIS — R06.02 SHORTNESS OF BREATH: Primary | ICD-10-CM

## 2017-12-14 DIAGNOSIS — K62.5 RECTAL BLEED: ICD-10-CM

## 2017-12-14 DIAGNOSIS — R06.00 DYSPNEA, UNSPECIFIED TYPE: Primary | ICD-10-CM

## 2017-12-14 DIAGNOSIS — I50.9 CHF (CONGESTIVE HEART FAILURE): ICD-10-CM

## 2017-12-14 DIAGNOSIS — I50.33 ACUTE ON CHRONIC DIASTOLIC CONGESTIVE HEART FAILURE: ICD-10-CM

## 2017-12-14 DIAGNOSIS — K52.9 CHRONIC DIARRHEA: ICD-10-CM

## 2017-12-14 LAB
ALBUMIN SERPL BCP-MCNC: 2.8 G/DL
ALP SERPL-CCNC: 92 U/L
ALT SERPL W/O P-5'-P-CCNC: 11 U/L
ANION GAP SERPL CALC-SCNC: 12 MMOL/L
APTT BLDCRRT: 30.6 SEC
AST SERPL-CCNC: 26 U/L
BASOPHILS # BLD AUTO: 0.02 K/UL
BASOPHILS NFR BLD: 0.3 %
BILIRUB SERPL-MCNC: 0.6 MG/DL
BNP SERPL-MCNC: >4900 PG/ML
BUN SERPL-MCNC: 15 MG/DL
CALCIUM SERPL-MCNC: 8.3 MG/DL
CHLORIDE SERPL-SCNC: 99 MMOL/L
CK MB SERPL-MCNC: 1.3 NG/ML
CK MB SERPL-RTO: 1.5 %
CK SERPL-CCNC: 84 U/L
CK SERPL-CCNC: 84 U/L
CO2 SERPL-SCNC: 29 MMOL/L
CREAT SERPL-MCNC: 4.2 MG/DL
DIFFERENTIAL METHOD: ABNORMAL
EOSINOPHIL # BLD AUTO: 0 K/UL
EOSINOPHIL NFR BLD: 0.2 %
ERYTHROCYTE [DISTWIDTH] IN BLOOD BY AUTOMATED COUNT: 17.3 %
EST. GFR  (AFRICAN AMERICAN): 16 ML/MIN/1.73 M^2
EST. GFR  (NON AFRICAN AMERICAN): 14 ML/MIN/1.73 M^2
GLUCOSE SERPL-MCNC: 75 MG/DL
HCT VFR BLD AUTO: 36.5 %
HGB BLD-MCNC: 12.1 G/DL
INR PPP: 1.3
LYMPHOCYTES # BLD AUTO: 1.9 K/UL
LYMPHOCYTES NFR BLD: 33 %
MAGNESIUM SERPL-MCNC: 2.2 MG/DL
MCH RBC QN AUTO: 28.3 PG
MCHC RBC AUTO-ENTMCNC: 33.2 G/DL
MCV RBC AUTO: 85 FL
MONOCYTES # BLD AUTO: 0.7 K/UL
MONOCYTES NFR BLD: 11.8 %
NEUTROPHILS # BLD AUTO: 3.2 K/UL
NEUTROPHILS NFR BLD: 54.7 %
PHOSPHATE SERPL-MCNC: 3 MG/DL
PLATELET # BLD AUTO: 117 K/UL
PMV BLD AUTO: 10.3 FL
POTASSIUM SERPL-SCNC: 5 MMOL/L
PROT SERPL-MCNC: 8.8 G/DL
PROTHROMBIN TIME: 13.4 SEC
RBC # BLD AUTO: 4.28 M/UL
SODIUM SERPL-SCNC: 140 MMOL/L
TROPONIN I SERPL DL<=0.01 NG/ML-MCNC: 1.18 NG/ML
WBC # BLD AUTO: 5.76 K/UL

## 2017-12-14 PROCEDURE — 84100 ASSAY OF PHOSPHORUS: CPT

## 2017-12-14 PROCEDURE — 99213 OFFICE O/P EST LOW 20 MIN: CPT | Mod: PBBFAC,PO | Performed by: FAMILY MEDICINE

## 2017-12-14 PROCEDURE — 99999 PR PBB SHADOW E&M-EST. PATIENT-LVL III: CPT | Mod: PBBFAC,,, | Performed by: FAMILY MEDICINE

## 2017-12-14 PROCEDURE — 93005 ELECTROCARDIOGRAM TRACING: CPT

## 2017-12-14 PROCEDURE — 85610 PROTHROMBIN TIME: CPT

## 2017-12-14 PROCEDURE — 80053 COMPREHEN METABOLIC PANEL: CPT

## 2017-12-14 PROCEDURE — 84484 ASSAY OF TROPONIN QUANT: CPT

## 2017-12-14 PROCEDURE — 83735 ASSAY OF MAGNESIUM: CPT

## 2017-12-14 PROCEDURE — 96372 THER/PROPH/DIAG INJ SC/IM: CPT

## 2017-12-14 PROCEDURE — 83880 ASSAY OF NATRIURETIC PEPTIDE: CPT

## 2017-12-14 PROCEDURE — 85025 COMPLETE CBC W/AUTO DIFF WBC: CPT

## 2017-12-14 PROCEDURE — 82553 CREATINE MB FRACTION: CPT

## 2017-12-14 PROCEDURE — 99214 OFFICE O/P EST MOD 30 MIN: CPT | Mod: S$PBB,,, | Performed by: FAMILY MEDICINE

## 2017-12-14 PROCEDURE — 85730 THROMBOPLASTIN TIME PARTIAL: CPT

## 2017-12-14 PROCEDURE — 63600175 PHARM REV CODE 636 W HCPCS: Performed by: NURSE PRACTITIONER

## 2017-12-14 PROCEDURE — 93010 ELECTROCARDIOGRAM REPORT: CPT | Mod: ,,, | Performed by: INTERNAL MEDICINE

## 2017-12-14 PROCEDURE — 11000001 HC ACUTE MED/SURG PRIVATE ROOM

## 2017-12-14 PROCEDURE — 99285 EMERGENCY DEPT VISIT HI MDM: CPT | Mod: 25,27

## 2017-12-14 RX ORDER — HEPARIN SODIUM 5000 [USP'U]/ML
5000 INJECTION, SOLUTION INTRAVENOUS; SUBCUTANEOUS EVERY 8 HOURS
Status: DISCONTINUED | OUTPATIENT
Start: 2017-12-14 | End: 2017-12-15

## 2017-12-14 RX ORDER — ATORVASTATIN CALCIUM 10 MG/1
20 TABLET, FILM COATED ORAL DAILY
Status: DISCONTINUED | OUTPATIENT
Start: 2017-12-15 | End: 2017-12-17 | Stop reason: HOSPADM

## 2017-12-14 RX ORDER — DIPHENOXYLATE HYDROCHLORIDE AND ATROPINE SULFATE 2.5; .025 MG/1; MG/1
TABLET ORAL
COMMUNITY
Start: 2017-12-07

## 2017-12-14 RX ORDER — SERTRALINE HYDROCHLORIDE 50 MG/1
50 TABLET, FILM COATED ORAL DAILY
Status: DISCONTINUED | OUTPATIENT
Start: 2017-12-15 | End: 2017-12-17 | Stop reason: HOSPADM

## 2017-12-14 RX ORDER — CLOPIDOGREL BISULFATE 75 MG/1
75 TABLET ORAL DAILY
Status: DISCONTINUED | OUTPATIENT
Start: 2017-12-15 | End: 2017-12-17 | Stop reason: HOSPADM

## 2017-12-14 RX ORDER — FAMOTIDINE 20 MG/1
20 TABLET, FILM COATED ORAL DAILY
Status: DISCONTINUED | OUTPATIENT
Start: 2017-12-15 | End: 2017-12-15

## 2017-12-14 RX ORDER — CINACALCET 30 MG/1
30 TABLET, FILM COATED ORAL DAILY
Status: DISCONTINUED | OUTPATIENT
Start: 2017-12-15 | End: 2017-12-17 | Stop reason: HOSPADM

## 2017-12-14 RX ORDER — AMLODIPINE BESYLATE 5 MG/1
5 TABLET ORAL DAILY
Status: DISCONTINUED | OUTPATIENT
Start: 2017-12-15 | End: 2017-12-17 | Stop reason: HOSPADM

## 2017-12-14 RX ORDER — ASPIRIN 81 MG/1
81 TABLET ORAL DAILY
Status: DISCONTINUED | OUTPATIENT
Start: 2017-12-15 | End: 2017-12-17 | Stop reason: HOSPADM

## 2017-12-14 RX ORDER — LOSARTAN POTASSIUM 50 MG/1
50 TABLET ORAL DAILY
Status: DISCONTINUED | OUTPATIENT
Start: 2017-12-15 | End: 2017-12-16

## 2017-12-14 RX ORDER — SEVELAMER CARBONATE 800 MG/1
800 TABLET, FILM COATED ORAL
Status: DISCONTINUED | OUTPATIENT
Start: 2017-12-15 | End: 2017-12-17 | Stop reason: HOSPADM

## 2017-12-14 RX ORDER — RIVASTIGMINE 4.6 MG/24H
1 PATCH, EXTENDED RELEASE TRANSDERMAL DAILY
Status: DISCONTINUED | OUTPATIENT
Start: 2017-12-15 | End: 2017-12-17 | Stop reason: HOSPADM

## 2017-12-14 RX ORDER — PANTOPRAZOLE SODIUM 40 MG/1
40 TABLET, DELAYED RELEASE ORAL DAILY
Status: DISCONTINUED | OUTPATIENT
Start: 2017-12-15 | End: 2017-12-14

## 2017-12-14 RX ADMIN — HEPARIN SODIUM 5000 UNITS: 5000 INJECTION, SOLUTION INTRAVENOUS; SUBCUTANEOUS at 10:12

## 2017-12-14 NOTE — PROGRESS NOTES
Chief Complaint:    Chief Complaint   Patient presents with    Edema     bilateral foot    history as provided by spouse and sister.  Patient has advanced dementia and nonverbal.    History of Present Illness:  Patient presents today with his wife and his sister they're complaining that patient has become increasingly short of breath and he is developing edema.  He has systolic heart failure with ejection fraction of 25%.  He also been having diarrhea for the last 1 month and over the last couple days he is having bloody diarrhea.  Denies any fever.  Patient has end-stage renal disease and is on dialysis also has advanced dementia.      ROS:  Review of Systems   Unable to perform ROS: Patient nonverbal   Constitutional: Negative for activity change, chills, fatigue, fever and unexpected weight change.   HENT: Negative for congestion, ear discharge, ear pain, hearing loss, postnasal drip and rhinorrhea.    Eyes: Negative for pain and visual disturbance.   Respiratory: Positive for shortness of breath. Negative for cough and chest tightness.    Cardiovascular: Negative for chest pain and palpitations.   Gastrointestinal: Positive for blood in stool and diarrhea. Negative for abdominal pain and vomiting.   Endocrine: Negative for heat intolerance.   Genitourinary: Negative for dysuria, flank pain, frequency and hematuria.   Musculoskeletal: Negative for back pain, gait problem and neck pain.   Skin: Negative for color change and rash.   Neurological: Negative for dizziness, tremors, seizures, numbness and headaches.   Psychiatric/Behavioral: Negative for agitation, hallucinations, self-injury, sleep disturbance and suicidal ideas. The patient is not nervous/anxious.        Past Medical History:   Diagnosis Date    After-cataract, unspecified - Left Eye 11/27/2013    Allergy     Arthritis     Cancer     CHF (congestive heart failure)     Chronic kidney disease     chemo/ dialias    Dementia     Diabetes  "mellitus     Hypertension     Myocardial infarction        Social History:  Social History     Social History    Marital status:      Spouse name: N/A    Number of children: 1    Years of education: N/A     Social History Main Topics    Smoking status: Former Smoker     Types: Cigarettes     Quit date: 1/28/2000    Smokeless tobacco: Never Used    Alcohol use No    Drug use: No    Sexual activity: Not Currently     Birth control/ protection: None     Other Topics Concern    None     Social History Narrative    None       Family History:   family history includes Cancer in his brother; Colon cancer in his sister; Glaucoma in his mother; Pancreatic cancer in his brother; Prostate cancer in his father.    Health Maintenance   Topic Date Due    Pneumococcal PPSV23 (High Risk) (2) 04/14/2016    Foot Exam  02/18/2017    Influenza Vaccine  08/01/2017    Lipid Panel  08/05/2017    Hemoglobin A1c  02/11/2018    Eye Exam  03/20/2018    TETANUS VACCINE  09/15/2019    Colonoscopy  01/28/2020    Hepatitis C Screening  Completed    Zoster Vaccine  Completed    Pneumococcal PCV13 (High Risk)  Completed       Physical Exam:    Vital Signs  Temp: 96.6 °F (35.9 °C)  Temp src: Tympanic  Pulse: (!) 111  Resp: 14  SpO2: (!) 87 %  BP: 117/74  BP Location: Left arm  Patient Position: Sitting  Pain Score: 0-No pain  Height and Weight  Height: 6' 2" (188 cm)  Weight: 91.9 kg (202 lb 9.6 oz)  BSA (Calculated - sq m): 2.19 sq meters  BMI (Calculated): 26.1  Weight in (lb) to have BMI = 25: 194.3]    Body mass index is 26.01 kg/m².    Physical Exam   Constitutional: He appears well-developed.   HENT:   Mouth/Throat: Oropharynx is clear and moist.   Eyes: Conjunctivae are normal. Pupils are equal, round, and reactive to light.   Neck: Normal range of motion. Neck supple.   Cardiovascular: Regular rhythm and normal heart sounds.  Tachycardia present.    No murmur heard.  Pulmonary/Chest: Effort normal. No " respiratory distress. He has decreased breath sounds. He has no wheezes. He has no rales. He exhibits no tenderness.   Abdominal: Soft. He exhibits no distension and no mass. There is no tenderness. There is no guarding.   Musculoskeletal: He exhibits edema (2 +). He exhibits no tenderness.   Lymphadenopathy:     He has no cervical adenopathy.   Neurological: He has normal reflexes.   Skin: Skin is warm and dry.   Psychiatric: He has a normal mood and affect. His behavior is normal. Judgment and thought content normal.       Lab Results   Component Value Date    CHOL 116 (L) 08/05/2016    CHOL 135 03/06/2014    CHOL 122 05/10/2011    TRIG 41 08/05/2016    TRIG 64 03/06/2014    TRIG 41 05/10/2011    HDL 46 08/05/2016    HDL 56 03/06/2014    HDL 56 05/10/2011    TOTALCHOLEST 2.5 08/05/2016    TOTALCHOLEST 2.4 03/06/2014    TOTALCHOLEST 2.2 05/10/2011    NONHDLCHOL 70 08/05/2016    NONHDLCHOL 79 03/06/2014       Lab Results   Component Value Date    HGBA1C 4.6 08/11/2017       Assessment:      ICD-10-CM ICD-9-CM   1. Dyspnea, unspecified type R06.00 786.09   2. Rectal bleed K62.5 569.3   3. Chronic diarrhea K52.9 787.91   4. ESRD (end stage renal disease) on dialysis N18.6 585.6    Z99.2 V45.11   5. Acute on chronic systolic heart failure I50.23 428.23         Plan:  Patient is tachycardic and dyspneic with underlying history of systolic heart failure and more recent gastrointestinal bleeding.  too unstable to be treated outpatient prescription with family recommend that he be sent to [facility] ED for further evaluation and treatment and admission to the hospital.    He is continuing to take his Plavix and aspirin inspite of the gastrointestinal bleed.  No orders of the defined types were placed in this encounter.      Current Outpatient Prescriptions   Medication Sig Dispense Refill    amlodipine (NORVASC) 5 MG tablet Take 1 tablet (5 mg total) by mouth once daily. 30 tablet 6    aspirin (ECOTRIN) 81 MG EC tablet Take  81 mg by mouth.      atorvastatin (LIPITOR) 20 MG tablet TAKE ONE TABLET BY MOUTH ONCE DAILY 90 tablet 3    clopidogrel (PLAVIX) 75 mg tablet Take 1 tablet (75 mg total) by mouth once daily. 30 tablet 11    diphenoxylate-atropine 2.5-0.025 mg (LOMOTIL) 2.5-0.025 mg per tablet       fluticasone (FLONASE) 50 mcg/actuation nasal spray 1 spray by Each Nare route 2 (two) times daily. 1 Bottle 5    FOLIC ACID/VIT BCOMP,C (JOSAFAT-JANICE ORAL) Take by mouth.      LORazepam (ATIVAN) 0.5 MG tablet TAKE ONE TABLET BY MOUTH EVERY 6 HOURS AS NEEDED FOR ANXIETY 30 tablet 1    losartan (COZAAR) 50 MG tablet Take 1 tablet (50 mg total) by mouth once daily. 1 tablet Oral Every day 30 tablet 11    multivitamin with minerals tablet Take 1 tablet by mouth.      rivastigmine (EXELON) 4.6 mg/24 hr PT24 Place 1 patch onto the skin once daily. 30 patch 11    SENSIPAR 30 mg Tab Take 1 tablet by mouth once daily.      sertraline (ZOLOFT) 50 MG tablet Take 1 tablet (50 mg total) by mouth once daily. 30 tablet 11    sevelamer carbonate (RENVELA) 800 mg Tab Take 800 mg by mouth.      SPRYCEL 100 mg Tab Take 100 mg by mouth once daily.       traZODone (DESYREL) 50 MG tablet TAKE ONE TABLET BY MOUTH EVERY EVENING 26 tablet 11     No current facility-administered medications for this visit.        Medications Discontinued During This Encounter   Medication Reason    bimatoprost (LUMIGAN) 0.03 % ophthalmic drops Patient no longer taking    gabapentin (NEURONTIN) 100 MG capsule Patient no longer taking    levetiracetam (KEPPRA) 500 MG Tab Therapy completed    lorazepam (ATIVAN) 0.5 MG tablet Duplicate Order    metoprolol succinate (TOPROL-XL) 25 MG 24 hr tablet Patient no longer taking       No Follow-up on file.      Isaac Fitch MD

## 2017-12-14 NOTE — ED PROVIDER NOTES
SCRIBE #1 NOTE: I, Angelina Hoff, am scribing for, and in the presence of, Hans Oviedo Jr., [facility]. I have scribed the entire note.      History      Chief Complaint   Patient presents with    Rectal Bleeding     rectal bleeding x one month       Review of patient's allergies indicates:   Allergen Reactions    Benadryl decongestant Itching     Nothing with benadryl; wife states  No allergy to Benadryl        HPI   HPI    12/14/2017, 5:18 PM   History obtained from the patient      History of Present Illness: David Hadley is a 62 y.o. male patient with a hx of dementia who presents to the Emergency Department for blood in stool which onset about a couple weeks ago. Pt's wife states that he has been having diarrhea for the last month, but has recently been experiencing bloody diarrhea. Pt's wife states that their PCP, Dr. Fitch, advised them to go to the ER for further evaluation and possible admission to the hospital.  Symptoms are intermittent and moderate in severity. No mitigating or exacerbating factors reported. Associated sxs include increased foot swelling, fatigue, and SOB. Patient denies any fever, chills, nausea, vomiting, abd pain, weakness/numbness, dizziness, HA, leg pain, chest pain, cough, and all other sxs at this time. Pt has end-stage renal disease and goes to dialysis 3x a week (MWF). No further complaints or concerns at this time.         Arrival mode: Personal vehicle      PCP: Isaac Fitch MD       Past Medical History:  Past Medical History:   Diagnosis Date    After-cataract, unspecified - Left Eye 11/27/2013    Allergy     Arthritis     Cancer     CHF (congestive heart failure)     Chronic kidney disease     chemo/ dialias    Dementia     Diabetes mellitus     Hypertension     Myocardial infarction        Past Surgical History:  Past Surgical History:   Procedure Laterality Date    AV FISTULA PLACEMENT      CARDIAC SURGERY      CATARACT EXTRACTION      CORONARY  ARTERY BYPASS GRAFT  3-2014         Family History:  Family History   Problem Relation Age of Onset    Colon cancer Sister     Cancer Brother      colon    Pancreatic cancer Brother     Prostate cancer Father     Glaucoma Mother        Social History:  Social History     Social History Main Topics    Smoking status: Former Smoker     Types: Cigarettes     Quit date: 1/28/2000    Smokeless tobacco: Never Used    Alcohol use No    Drug use: No    Sexual activity: Not Currently     Birth control/ protection: None       ROS   Review of Systems   Constitutional: Positive for fatigue. Negative for chills and fever.   HENT: Negative for sore throat.    Respiratory: Positive for shortness of breath. Negative for cough.    Cardiovascular: Negative for chest pain.        (+) foot swelling   Gastrointestinal: Positive for blood in stool and diarrhea. Negative for abdominal pain, nausea and vomiting.   Genitourinary: Negative for dysuria.   Musculoskeletal: Negative for back pain.        (-) leg pain   Skin: Negative for rash.   Neurological: Negative for dizziness, weakness, numbness and headaches.   Hematological: Does not bruise/bleed easily.   All other systems reviewed and are negative.      Physical Exam      Initial Vitals [12/14/17 1549]   BP Pulse Resp Temp SpO2   117/74 100 18 98.2 °F (36.8 °C) 99 %      MAP       88.33          Physical Exam  Nursing Notes and Vital Signs Reviewed.  Constitutional: Patient is in no apparent distress.   Head: Atraumatic. Normocephalic.  Eyes: PERRL. EOM intact. Conjunctivae are not pale. No scleral icterus.  ENT: Mucous membranes are moist.   Neck: Supple. Full ROM. No lymphadenopathy.  Cardiovascular: Regular rate. Regular rhythm. No murmurs, rubs, or gallops. Distal pulses are 2+ and symmetric.  Pulmonary/Chest: No respiratory distress. Clear to auscultation bilaterally. No wheezing or rales.  Abdominal: Soft and non-distended.  There is no tenderness.  No rebound,  guarding, or rigidity.   Musculoskeletal: Moves all extremities. No obvious deformities. No calf tenderness. +2 edema in lower extremities.  Skin: Warm and dry.  Neurological:  Alert to person.    Psychiatric: . Appropriate in content.    ED Course    Procedures  ED Vital Signs:  Vitals:    12/14/17 1549 12/14/17 1806 12/14/17 1916   BP: 117/74 111/76 115/83   Pulse: 100 95    Resp: 18     Temp: 98.2 °F (36.8 °C)     TempSrc: Oral     SpO2: 99%         Abnormal Lab Results:  Labs Reviewed   CBC W/ AUTO DIFFERENTIAL - Abnormal; Notable for the following:        Result Value    RBC 4.28 (*)     Hemoglobin 12.1 (*)     Hematocrit 36.5 (*)     RDW 17.3 (*)     Platelets 117 (*)     All other components within normal limits   COMPREHENSIVE METABOLIC PANEL - Abnormal; Notable for the following:     Creatinine 4.2 (*)     Calcium 8.3 (*)     Total Protein 8.8 (*)     Albumin 2.8 (*)     eGFR if  16 (*)     eGFR if non  14 (*)     All other components within normal limits   PROTIME-INR - Abnormal; Notable for the following:     Prothrombin Time 13.4 (*)     INR 1.3 (*)     All other components within normal limits   B-TYPE NATRIURETIC PEPTIDE - Abnormal; Notable for the following:     BNP >4,900 (*)     All other components within normal limits   TROPONIN I - Abnormal; Notable for the following:     Troponin I 1.182 (*)     All other components within normal limits   APTT   CK-MB   CK   APTT   OCCULT BLOOD X 1, STOOL   OCCULT BLOOD X 1, STOOL   MAGNESIUM   PHOSPHORUS        All Lab Results:  Results for orders placed or performed during the hospital encounter of 12/14/17   CBC auto differential   Result Value Ref Range    WBC 5.76 3.90 - 12.70 K/uL    RBC 4.28 (L) 4.60 - 6.20 M/uL    Hemoglobin 12.1 (L) 14.0 - 18.0 g/dL    Hematocrit 36.5 (L) 40.0 - 54.0 %    MCV 85 82 - 98 fL    MCH 28.3 27.0 - 31.0 pg    MCHC 33.2 32.0 - 36.0 g/dL    RDW 17.3 (H) 11.5 - 14.5 %    Platelets 117 (L) 150 - 350  K/uL    MPV 10.3 9.2 - 12.9 fL    Gran # 3.2 1.8 - 7.7 K/uL    Lymph # 1.9 1.0 - 4.8 K/uL    Mono # 0.7 0.3 - 1.0 K/uL    Eos # 0.0 0.0 - 0.5 K/uL    Baso # 0.02 0.00 - 0.20 K/uL    Gran% 54.7 38.0 - 73.0 %    Lymph% 33.0 18.0 - 48.0 %    Mono% 11.8 4.0 - 15.0 %    Eosinophil% 0.2 0.0 - 8.0 %    Basophil% 0.3 0.0 - 1.9 %    Differential Method Automated    Comprehensive metabolic panel   Result Value Ref Range    Sodium 140 136 - 145 mmol/L    Potassium 5.0 3.5 - 5.1 mmol/L    Chloride 99 95 - 110 mmol/L    CO2 29 23 - 29 mmol/L    Glucose 75 70 - 110 mg/dL    BUN, Bld 15 8 - 23 mg/dL    Creatinine 4.2 (H) 0.5 - 1.4 mg/dL    Calcium 8.3 (L) 8.7 - 10.5 mg/dL    Total Protein 8.8 (H) 6.0 - 8.4 g/dL    Albumin 2.8 (L) 3.5 - 5.2 g/dL    Total Bilirubin 0.6 0.1 - 1.0 mg/dL    Alkaline Phosphatase 92 55 - 135 U/L    AST 26 10 - 40 U/L    ALT 11 10 - 44 U/L    Anion Gap 12 8 - 16 mmol/L    eGFR if African American 16 (A) >60 mL/min/1.73 m^2    eGFR if non African American 14 (A) >60 mL/min/1.73 m^2   Protime-INR   Result Value Ref Range    Prothrombin Time 13.4 (H) 9.0 - 12.5 sec    INR 1.3 (H) 0.8 - 1.2   APTT   Result Value Ref Range    aPTT 30.6 21.0 - 32.0 sec   CK-MB   Result Value Ref Range    CPK 84 20 - 200 U/L    CPK MB 1.3 0.1 - 6.5 ng/mL    MB% 1.5 0.0 - 5.0 %   CPK   Result Value Ref Range    CPK 84 20 - 200 U/L   Brain natriuretic peptide   Result Value Ref Range    BNP >4,900 (H) 0 - 99 pg/mL   Troponin I   Result Value Ref Range    Troponin I 1.182 (H) 0.000 - 0.026 ng/mL       Imaging Results:  Imaging Results          X-Ray Chest PA And Lateral (Final result)  Result time 12/14/17 19:03:04    Final result by Tevin Peña III, MD (12/14/17 19:03:04)                 Impression:     Stable cardiomegaly without evidence of overt alveolar edema.  Stable moderate right pleural effusion with adjacent airspace consolidation/compressive atelectasis.      Electronically signed by: TEVIN PEÑA MD  Date:      12/14/17  Time:    19:03              Narrative:    XR CHEST PA AND LATERAL    Clinical history: Shortness of breath    Comparison: 08/15/2017    Findings: Prior sternotomy is again noted.  There is stable cardiomegaly without evidence of overt alveolar edema. There has been no significant interval change in the moderate right pleural effusion with adjacent airspace consolidation/compressive atelectasis in the adjacent right mid to lower lung.  The left lung appears grossly clear of active disease.  No acute osseous abnormality detected.                             The EKG was ordered, reviewed, and independently interpreted by the ED provider.  Interpretation time: 1752  Rate: 97 BPM  Rhythm: Sinus rhythm with AV dissociation and accelerated junctional rhythm with occaisonal premature ventricular complexes  Interpretation: Left axis deviation. Septal Infarct. Possible lateral infarct. No STEMI.           The Emergency Provider reviewed the vital signs and test results, which are outlined above.    ED Discussion     7:57 PM: Discussed case with Dickson Carr NP ([facility]). Dr. Charlie Rodriguez agrees with current care and management of pt and accepts admission.   Admitting Service: Hospital medicine   Admitting Physician: Dr. Charlie Rodriguez  Admit to: Tele    8:10 PM: Re-evaluated pt. I have discussed test results, shared treatment plan, and the need for admission with patient and family at bedside. Pt and family express understanding at this time and agree with all information. All questions answered. Pt and family have no further questions or concerns at this time. Pt is ready for admit.    8:54 PM:  Hans Oviedo Jr., HAMZAH discussed the pt's case with Dr. Coleman (nephrology) who agrees with plan to admit and states that he will follow up with pt tomorrow for routine dialysis in the hospital.        ED Medication(s):  Medications - No data to display    New Prescriptions    No medications on file              Medical Decision Making    Medical Decision Making:   Clinical Tests:   Lab Tests: Ordered and Reviewed  Radiological Study: Ordered and Reviewed  Medical Tests: Ordered and Reviewed           Scribe Attestation:   Scribe #1: I performed the above scribed service and the documentation accurately describes the services I performed. I attest to the accuracy of the note.    Attending:   Physician Attestation Statement for Scribe #1: I, Hans Oviedo Jr., FNP, personally performed the services described in this documentation, as scribed by Angelina Hoff, in my presence, and it is both accurate and complete.          Clinical Impression       ICD-10-CM ICD-9-CM   1. Shortness of breath R06.02 786.05       Disposition:   Disposition: Admitted  Condition: Fair         Juan Joiner MD  12/15/17 0108

## 2017-12-15 PROBLEM — I50.33 ACUTE ON CHRONIC DIASTOLIC CONGESTIVE HEART FAILURE: Status: ACTIVE | Noted: 2017-12-15

## 2017-12-15 PROBLEM — I50.43 ACUTE ON CHRONIC COMBINED SYSTOLIC AND DIASTOLIC CONGESTIVE HEART FAILURE: Status: ACTIVE | Noted: 2017-12-15

## 2017-12-15 PROBLEM — D62 ACUTE BLOOD LOSS ANEMIA: Status: ACTIVE | Noted: 2017-12-15

## 2017-12-15 PROBLEM — R79.89 TROPONIN I ABOVE REFERENCE RANGE: Status: ACTIVE | Noted: 2017-12-15

## 2017-12-15 LAB
ABO + RH BLD: NORMAL
ALBUMIN SERPL BCP-MCNC: 2.5 G/DL
ALP SERPL-CCNC: 74 U/L
ALT SERPL W/O P-5'-P-CCNC: 6 U/L
ANION GAP SERPL CALC-SCNC: 10 MMOL/L
AST SERPL-CCNC: 20 U/L
BASOPHILS # BLD AUTO: 0.03 K/UL
BASOPHILS NFR BLD: 0.7 %
BILIRUB SERPL-MCNC: 0.5 MG/DL
BLD GP AB SCN CELLS X3 SERPL QL: NORMAL
BUN SERPL-MCNC: 18 MG/DL
CALCIUM SERPL-MCNC: 8.3 MG/DL
CHLORIDE SERPL-SCNC: 99 MMOL/L
CHOLEST SERPL-MCNC: 126 MG/DL
CHOLEST/HDLC SERPL: 2.8 {RATIO}
CO2 SERPL-SCNC: 32 MMOL/L
CREAT SERPL-MCNC: 4.7 MG/DL
DIFFERENTIAL METHOD: ABNORMAL
EOSINOPHIL # BLD AUTO: 0 K/UL
EOSINOPHIL NFR BLD: 0.5 %
ERYTHROCYTE [DISTWIDTH] IN BLOOD BY AUTOMATED COUNT: 17.5 %
EST. GFR  (AFRICAN AMERICAN): 14 ML/MIN/1.73 M^2
EST. GFR  (NON AFRICAN AMERICAN): 12 ML/MIN/1.73 M^2
ESTIMATED AVG GLUCOSE: 77 MG/DL
GLUCOSE SERPL-MCNC: 65 MG/DL
HBA1C MFR BLD HPLC: 4.3 %
HCT VFR BLD AUTO: 29.8 %
HCT VFR BLD AUTO: 30.7 %
HCT VFR BLD AUTO: 31.5 %
HCT VFR BLD AUTO: 31.5 %
HCT VFR BLD AUTO: 33.9 %
HDLC SERPL-MCNC: 45 MG/DL
HDLC SERPL: 35.7 %
HGB BLD-MCNC: 10 G/DL
HGB BLD-MCNC: 10.3 G/DL
HGB BLD-MCNC: 10.3 G/DL
HGB BLD-MCNC: 11.2 G/DL
HGB BLD-MCNC: 9.8 G/DL
LDLC SERPL CALC-MCNC: 66.8 MG/DL
LYMPHOCYTES # BLD AUTO: 1.5 K/UL
LYMPHOCYTES NFR BLD: 38.1 %
MCH RBC QN AUTO: 27.6 PG
MCHC RBC AUTO-ENTMCNC: 32.7 G/DL
MCV RBC AUTO: 85 FL
MONOCYTES # BLD AUTO: 0.5 K/UL
MONOCYTES NFR BLD: 12.6 %
NEUTROPHILS # BLD AUTO: 1.9 K/UL
NEUTROPHILS NFR BLD: 48.1 %
NONHDLC SERPL-MCNC: 81 MG/DL
OB PNL STL: POSITIVE
PLATELET # BLD AUTO: 107 K/UL
PLATELET # BLD AUTO: 120 K/UL
PMV BLD AUTO: 10.3 FL
PMV BLD AUTO: 9.8 FL
POTASSIUM SERPL-SCNC: 3.6 MMOL/L
PROT SERPL-MCNC: 7.6 G/DL
RBC # BLD AUTO: 3.73 M/UL
SODIUM SERPL-SCNC: 141 MMOL/L
TRIGL SERPL-MCNC: 71 MG/DL
TROPONIN I SERPL DL<=0.01 NG/ML-MCNC: 1 NG/ML
TROPONIN I SERPL DL<=0.01 NG/ML-MCNC: 1.07 NG/ML
TSH SERPL DL<=0.005 MIU/L-ACNC: 2.97 UIU/ML
WBC # BLD AUTO: 4.04 K/UL

## 2017-12-15 PROCEDURE — 85014 HEMATOCRIT: CPT | Mod: 91

## 2017-12-15 PROCEDURE — 21400001 HC TELEMETRY ROOM

## 2017-12-15 PROCEDURE — 85018 HEMOGLOBIN: CPT | Mod: 91

## 2017-12-15 PROCEDURE — 85049 AUTOMATED PLATELET COUNT: CPT

## 2017-12-15 PROCEDURE — 80053 COMPREHEN METABOLIC PANEL: CPT

## 2017-12-15 PROCEDURE — 85025 COMPLETE CBC W/AUTO DIFF WBC: CPT

## 2017-12-15 PROCEDURE — 63600175 PHARM REV CODE 636 W HCPCS: Performed by: INTERNAL MEDICINE

## 2017-12-15 PROCEDURE — 84443 ASSAY THYROID STIM HORMONE: CPT

## 2017-12-15 PROCEDURE — 96374 THER/PROPH/DIAG INJ IV PUSH: CPT

## 2017-12-15 PROCEDURE — 85018 HEMOGLOBIN: CPT

## 2017-12-15 PROCEDURE — 80100016 HC MAINTENANCE HEMODIALYSIS

## 2017-12-15 PROCEDURE — 85014 HEMATOCRIT: CPT

## 2017-12-15 PROCEDURE — 83036 HEMOGLOBIN GLYCOSYLATED A1C: CPT

## 2017-12-15 PROCEDURE — 82272 OCCULT BLD FECES 1-3 TESTS: CPT

## 2017-12-15 PROCEDURE — 36415 COLL VENOUS BLD VENIPUNCTURE: CPT

## 2017-12-15 PROCEDURE — 99222 1ST HOSP IP/OBS MODERATE 55: CPT | Mod: ,,, | Performed by: INTERNAL MEDICINE

## 2017-12-15 PROCEDURE — 86901 BLOOD TYPING SEROLOGIC RH(D): CPT

## 2017-12-15 PROCEDURE — 86920 COMPATIBILITY TEST SPIN: CPT

## 2017-12-15 PROCEDURE — 84484 ASSAY OF TROPONIN QUANT: CPT

## 2017-12-15 PROCEDURE — 84484 ASSAY OF TROPONIN QUANT: CPT | Mod: 91

## 2017-12-15 PROCEDURE — 80061 LIPID PANEL: CPT

## 2017-12-15 PROCEDURE — 96375 TX/PRO/DX INJ NEW DRUG ADDON: CPT

## 2017-12-15 PROCEDURE — 25000003 PHARM REV CODE 250: Performed by: NURSE PRACTITIONER

## 2017-12-15 RX ORDER — METOPROLOL TARTRATE 25 MG/1
12.5 TABLET ORAL 2 TIMES DAILY
Status: DISCONTINUED | OUTPATIENT
Start: 2017-12-16 | End: 2017-12-16

## 2017-12-15 RX ORDER — PANTOPRAZOLE SODIUM 40 MG/10ML
40 INJECTION, POWDER, LYOPHILIZED, FOR SOLUTION INTRAVENOUS 2 TIMES DAILY
Status: DISCONTINUED | OUTPATIENT
Start: 2017-12-15 | End: 2017-12-15

## 2017-12-15 RX ORDER — HEPARIN SODIUM 1000 [USP'U]/ML
3000 INJECTION, SOLUTION INTRAVENOUS; SUBCUTANEOUS ONCE
Status: COMPLETED | OUTPATIENT
Start: 2017-12-15 | End: 2017-12-15

## 2017-12-15 RX ADMIN — Medication 1 CAPSULE: at 09:12

## 2017-12-15 RX ADMIN — ASPIRIN 81 MG: 81 TABLET, COATED ORAL at 09:12

## 2017-12-15 RX ADMIN — CINACALCET HYDROCHLORIDE 30 MG: 30 TABLET, COATED ORAL at 09:12

## 2017-12-15 RX ADMIN — HEPARIN SODIUM 3000 UNITS: 1000 INJECTION, SOLUTION INTRAVENOUS; SUBCUTANEOUS at 12:12

## 2017-12-15 RX ADMIN — LOSARTAN POTASSIUM 50 MG: 50 TABLET, FILM COATED ORAL at 09:12

## 2017-12-15 RX ADMIN — AMLODIPINE BESYLATE 5 MG: 5 TABLET ORAL at 09:12

## 2017-12-15 RX ADMIN — RIVASTIGMINE TRANSDERMAL SYSTEM 1 PATCH: 4.6 PATCH, EXTENDED RELEASE TRANSDERMAL at 10:12

## 2017-12-15 RX ADMIN — ATORVASTATIN CALCIUM 20 MG: 10 TABLET, FILM COATED ORAL at 09:12

## 2017-12-15 RX ADMIN — SEVELAMER CARBONATE 800 MG: 800 TABLET, FILM COATED ORAL at 09:12

## 2017-12-15 RX ADMIN — ERYTHROPOIETIN 10000 UNITS: 10000 INJECTION, SOLUTION INTRAVENOUS; SUBCUTANEOUS at 12:12

## 2017-12-15 RX ADMIN — SERTRALINE HYDROCHLORIDE 50 MG: 50 TABLET ORAL at 09:12

## 2017-12-15 NOTE — PROGRESS NOTES
[facility] Hospital Medicine  Progress Note    Patient Name: David Hadley  MRN: [id number]  Patient Class: IP- Inpatient   Admission Date: 12/14/2017  Length of Stay: 1 days  Attending Physician: Dena Griffith MD  Primary Care Provider: Isaac Fitch MD        Subjective:     Principal Problem:Acute on chronic diastolic congestive heart failure    HPI:  History is limited and obtained from the patients wife at bedside, due to patients dementia. David Hadley is a 62 y.o. male patient with a hx of dementia who presented to the Emergency Department for evaluation of SOB. The wife reports that they went to doctor Fitch's office today and was recommended to present to the ER for further evaluation. At this time the wife reports patients SOB is improved and the patient is in no resp. Distress at present. Symptoms are intermittent and moderate in severity. No mitigating or exacerbating factors reported. Associated sxs include increased foot swelling, fatigue, and SOB. Additionally the wife reports noticing blood (dark red) on the tissue paper when wiping the patient that started a couple of weeks ago and has had an increase in stools. Wife states patient does not complain of pain or show signs of pain when wiping him.   Pt has end-stage renal disease and goes to dialysis 3x a week. No further complaints or concerns at this time. . Patient denies any fever, chills, nausea, vomiting, abd pain, weakness/numbness, dizziness, HA, leg pain, chest pain, cough, and all other sxs at this time. Patient was evaluated in the ER, BNP >4900 and elevated troponin, likely in response to  CKD and CHF. Chest Xray Impression:Stable cardiomegaly without evidence of overt alveolar edema.  Stable moderate right pleural effusion with adjacent airspace consolidation/compressive atelectasis. Patient admitted for Fluid overload in ESRD patient on HD with CHF exacerbation and for further evaluation of rectal bleeding    Dr. Fitch  office visit note 12/14/17:  History of Present Illness:  Patient presents today with his wife and his sister they're complaining that patient has become increasingly short of breath and he is developing edema.  He has systolic heart failure with ejection fraction of 25%.  He also been having diarrhea for the last 1 month and over the last couple days he is having bloody diarrhea.  Denies any fever.  Patient has end-stage renal disease and is on dialysis also has advanced dementia     Plan:  Patient is tachycardic and dyspneic with underlying history of systolic heart failure and more recent gastrointestinal bleeding.  too unstable to be treated outpatient prescription with family recommend that he be sent to [facility] ED for further evaluation and treatment and admission to the hospital.     He is continuing to take his Plavix and aspirin inspite of the gastrointestinal bleed.  No orders of the defined types were placed in this encounter.           Hospital Course:  12/15 The patient was seen at bedside in the ER. Nephrology following, plans for dialysis today.     Interval History: The patient was seen at bedside in the ER. Nephrology following, plans for dialysis today.       Review of Systems   Unable to perform ROS: Dementia     Objective:     Vital Signs (Most Recent):  Temp: 97.8 °F (36.6 °C) (12/15/17 1222)  Pulse: 95 (12/15/17 1530)  Resp: 12 (12/15/17 1405)  BP: 94/67 (12/15/17 1530)  SpO2: 96 % (12/14/17 2131) Vital Signs (24h Range):  Temp:  [97.8 °F (36.6 °C)] 97.8 °F (36.6 °C)  Pulse:  [85-95] 95  Resp:  [12-18] 12  SpO2:  [96 %] 96 %  BP: ()/(62-83) 94/67     Weight: 90.3 kg (199 lb)  Body mass index is 25.55 kg/m².  No intake or output data in the 24 hours ending 12/15/17 1630   Physical Exam   Constitutional: He is oriented to person, place, and time. He appears well-developed and well-nourished. No distress.   Pleasantly confused elderly male     HENT:   Head: Normocephalic and atraumatic.    Nose: Nose normal.   Eyes: Conjunctivae and EOM are normal. Pupils are equal, round, and reactive to light. No scleral icterus.   Neck: Normal range of motion. Neck supple. No tracheal deviation present.   Cardiovascular: Normal rate, regular rhythm and intact distal pulses.    Murmur heard.  Bilateral lower ext edema +3   Pulmonary/Chest: Effort normal and breath sounds normal. No stridor. No respiratory distress. He has no wheezes. He has no rales.   Course and decreased aeration throughout    Abdominal: Soft. Bowel sounds are normal. He exhibits no distension. There is no tenderness. There is no guarding.   Genitourinary:   Genitourinary Comments: Check stool for occult blood    Musculoskeletal: Normal range of motion. He exhibits no edema, tenderness or deformity.   Neurological: He is alert and oriented to person, place, and time. No cranial nerve deficit.   Patient oriented to self and situation at this time  Able to follow some simple commands and answer some simple questions.     Family reports patient is at baseline     Skin: Skin is warm and dry. Capillary refill takes less than 2 seconds. No rash noted. He is not diaphoretic. No erythema.   Psychiatric: He has a normal mood and affect. His behavior is normal.   Unable to fully assess  No agitation or irritability at this time.   Patient calm and cooperative.    Nursing note and vitals reviewed.      Significant Labs: All pertinent labs within the past 24 hours have been reviewed.    Significant Imaging:   Imaging Results          X-Ray Chest PA And Lateral (Final result)  Result time 12/14/17 19:03:04    Final result by Tevin Peña III, MD (12/14/17 19:03:04)                 Impression:     Stable cardiomegaly without evidence of overt alveolar edema.  Stable moderate right pleural effusion with adjacent airspace consolidation/compressive atelectasis.      Electronically signed by: TEVIN PEÑA MD  Date:     12/14/17  Time:    19:03               Narrative:    XR CHEST PA AND LATERAL    Clinical history: Shortness of breath    Comparison: 08/15/2017    Findings: Prior sternotomy is again noted.  There is stable cardiomegaly without evidence of overt alveolar edema. There has been no significant interval change in the moderate right pleural effusion with adjacent airspace consolidation/compressive atelectasis in the adjacent right mid to lower lung.  The left lung appears grossly clear of active disease.  No acute osseous abnormality detected.                                 Assessment/Plan:      * Acute on chronic diastolic congestive heart failure    Last EF in 4 months ago 40  Patient wife reports patient complaint with HD, diet, medical POC  Consider acute change, trend troponins.   EKG reviewed no ST elevation- appears to have had rhythm change.  -check electrolytes   Repeat ECHO  Consider mediation profile   Outpatient Cardiology follow up               Acute blood loss anemia    Likely source GI  Occult blood ordered  Drop in H/H noted   Type and Scree  Transfuse if need.   Gi consult.           Troponin I above reference range    Elevated higher than baseline  likely related to demand from fluid overload, CHF  Continue statin, plavix, asa,  Trend  Repeat echo        Rectal bleed    CBC stable   Monitor h/h  Check occult blood  Type and screen  Consider gi consult pending course.             Pleural effusion on right    Patient no longer makes urine per family.   Diuresis with HD, renal consulted  Monitor for improvement with diuresis  May need thoracentesis and consider cellular evaluation of fluid (patient with leukemia and is currently being treated with oral Sprycel)  Consider medication profile.   Consider consult to heme/oncology            Type 2 diabetes mellitus with renal complication    Check a1c  ssi   montior           ESRD (end stage renal disease) on dialysis    Renal consulted, plan for dialysis today.   CMP in am               VTE Risk  Mitigation         Ordered     Medium Risk of VTE  Once      12/14/17 7007              Guilherme Solares NP  Department of Hospital Medicine   [facility]

## 2017-12-15 NOTE — ASSESSMENT & PLAN NOTE
Check echo  Remove fluid  Monitor effusions, consider lasix  If not improving may need IR consult for tap

## 2017-12-15 NOTE — HPI
History is limited and obtained from the patients wife at bedside, due to patients dementia. David Hadley is a 62 y.o. male patient with a hx of dementia who presented to the Emergency Department for evaluation of SOB. The wife reports that they went to doctor Fitch's office today and was recommended to present to the ER for further evaluation. At this time the wife reports patients SOB is improved and the patient is in no resp. Distress at present. Symptoms are intermittent and moderate in severity. No mitigating or exacerbating factors reported. Associated sxs include increased foot swelling, fatigue, and SOB. Additionally the wife reports noticing blood (dark red) on the tissue paper when wiping the patient that started a couple of weeks ago and has had an increase in stools. Wife states patient does not complain of pain or show signs of pain when wiping him.   Pt has end-stage renal disease and goes to dialysis 3x a week. No further complaints or concerns at this time. . Patient denies any fever, chills, nausea, vomiting, abd pain, weakness/numbness, dizziness, HA, leg pain, chest pain, cough, and all other sxs at this time. Patient was evaluated in the ER, BNP >4900 and elevated troponin, likely in response to  CKD and CHF. Chest Xray Impression:Stable cardiomegaly without evidence of overt alveolar edema.  Stable moderate right pleural effusion with adjacent airspace consolidation/compressive atelectasis. Patient admitted for Fluid overload in ESRD patient on HD with CHF exacerbation and for further evaluation of rectal bleeding    Dr. Fitch office visit note 12/14/17:  History of Present Illness:  Patient presents today with his wife and his sister they're complaining that patient has become increasingly short of breath and he is developing edema.  He has systolic heart failure with ejection fraction of 25%.  He also been having diarrhea for the last 1 month and over the last couple days he is having  bloody diarrhea.  Denies any fever.  Patient has end-stage renal disease and is on dialysis also has advanced dementia     Plan:  Patient is tachycardic and dyspneic with underlying history of systolic heart failure and more recent gastrointestinal bleeding.  too unstable to be treated outpatient prescription with family recommend that he be sent to [facility] ED for further evaluation and treatment and admission to the hospital.     He is continuing to take his Plavix and aspirin inspite of the gastrointestinal bleed.  No orders of the defined types were placed in this encounter.

## 2017-12-15 NOTE — ASSESSMENT & PLAN NOTE
Patient has ESRD with fluid overload.  Patient probably will need aggressive dialysis starting today.  Agree with hospital management per hospital medicine.

## 2017-12-15 NOTE — SUBJECTIVE & OBJECTIVE
Past Medical History:   Diagnosis Date    After-cataract, unspecified - Left Eye 11/27/2013    Allergy     Arthritis     Cancer     CHF (congestive heart failure)     Chronic kidney disease     chemo/ dialias    Dementia     Diabetes mellitus     Hypertension     Myocardial infarction        Past Surgical History:   Procedure Laterality Date    AV FISTULA PLACEMENT      CARDIAC SURGERY      CATARACT EXTRACTION      CORONARY ARTERY BYPASS GRAFT  3-2014       Review of patient's allergies indicates:   Allergen Reactions    Benadryl decongestant Itching     Nothing with benadryl; wife states  No allergy to Benadryl       No current facility-administered medications on file prior to encounter.      Current Outpatient Prescriptions on File Prior to Encounter   Medication Sig    amlodipine (NORVASC) 5 MG tablet Take 1 tablet (5 mg total) by mouth once daily.    aspirin (ECOTRIN) 81 MG EC tablet Take 81 mg by mouth.    atorvastatin (LIPITOR) 20 MG tablet TAKE ONE TABLET BY MOUTH ONCE DAILY    clopidogrel (PLAVIX) 75 mg tablet Take 1 tablet (75 mg total) by mouth once daily.    diphenoxylate-atropine 2.5-0.025 mg (LOMOTIL) 2.5-0.025 mg per tablet     fluticasone (FLONASE) 50 mcg/actuation nasal spray 1 spray by Each Nare route 2 (two) times daily.    FOLIC ACID/VIT BCOMP,C (JOSAFAT-JANICE ORAL) Take by mouth.    LORazepam (ATIVAN) 0.5 MG tablet TAKE ONE TABLET BY MOUTH EVERY 6 HOURS AS NEEDED FOR ANXIETY    losartan (COZAAR) 50 MG tablet Take 1 tablet (50 mg total) by mouth once daily. 1 tablet Oral Every day    multivitamin with minerals tablet Take 1 tablet by mouth.    rivastigmine (EXELON) 4.6 mg/24 hr PT24 Place 1 patch onto the skin once daily.    SENSIPAR 30 mg Tab Take 1 tablet by mouth once daily.    sertraline (ZOLOFT) 50 MG tablet Take 1 tablet (50 mg total) by mouth once daily.    sevelamer carbonate (RENVELA) 800 mg Tab Take 800 mg by mouth.    SPRYCEL 100 mg Tab Take 100 mg by mouth  once daily.     traZODone (DESYREL) 50 MG tablet TAKE ONE TABLET BY MOUTH EVERY EVENING     Family History     Problem Relation (Age of Onset)    Cancer Brother    Colon cancer Sister    Glaucoma Mother    Pancreatic cancer Brother    Prostate cancer Father        Social History Main Topics    Smoking status: Former Smoker     Types: Cigarettes     Quit date: 1/28/2000    Smokeless tobacco: Never Used    Alcohol use No    Drug use: No    Sexual activity: Not Currently     Birth control/ protection: None     Review of Systems   Unable to perform ROS: Dementia     Objective:     Vital Signs (Most Recent):  Temp: 98.2 °F (36.8 °C) (12/14/17 1549)  Pulse: 85 (12/15/17 0620)  Resp: 12 (12/15/17 0620)  BP: 115/69 (12/15/17 0620)  SpO2: 96 % (12/14/17 2131) Vital Signs (24h Range):  Temp:  [96.6 °F (35.9 °C)-98.2 °F (36.8 °C)] 98.2 °F (36.8 °C)  Pulse:  [] 85  Resp:  [12-18] 12  SpO2:  [87 %-99 %] 96 %  BP: (109-117)/(68-83) 115/69     Weight: 90.3 kg (199 lb)  Body mass index is 25.55 kg/m².    Physical Exam   Constitutional: He appears well-developed and well-nourished. No distress.   Pleasantly confused elderly male     HENT:   Head: Normocephalic and atraumatic.   Nose: Nose normal.   Eyes: Conjunctivae and EOM are normal. Pupils are equal, round, and reactive to light. No scleral icterus.   Neck: Normal range of motion. Neck supple. No tracheal deviation present.   Cardiovascular: Normal rate, regular rhythm and intact distal pulses.    Murmur heard.  Bilateral lower ext edema +3   Pulmonary/Chest: Effort normal. No stridor. No respiratory distress. He has no wheezes. He has no rales.   Course and decreased aeration throughout    Abdominal: Soft. Bowel sounds are normal. He exhibits no distension. There is no tenderness. There is no guarding.   Genitourinary:   Genitourinary Comments: Check stool for occult blood    Musculoskeletal: Normal range of motion. He exhibits no edema or deformity.   LUAF +B/T    Neurological: He is alert. No cranial nerve deficit.   Patient oriented to self and situation at this time  Able to follow some simple commands and answer some simple questions.     Family reports patient is at baseline     Skin: Skin is warm and dry. Capillary refill takes less than 2 seconds. No rash noted. He is not diaphoretic.   Psychiatric:   Unable to fully assess  No agitation or irritability at this time.   Patient calm and cooperative.    Nursing note and vitals reviewed.        CRANIAL NERVES     CN III, IV, VI   Pupils are equal, round, and reactive to light.  Extraocular motions are normal.        Significant Labs: All pertinent labs within the past 24 hours have been reviewed.   Results for orders placed or performed during the hospital encounter of 12/14/17   CBC auto differential   Result Value Ref Range    WBC 5.76 3.90 - 12.70 K/uL    RBC 4.28 (L) 4.60 - 6.20 M/uL    Hemoglobin 12.1 (L) 14.0 - 18.0 g/dL    Hematocrit 36.5 (L) 40.0 - 54.0 %    MCV 85 82 - 98 fL    MCH 28.3 27.0 - 31.0 pg    MCHC 33.2 32.0 - 36.0 g/dL    RDW 17.3 (H) 11.5 - 14.5 %    Platelets 117 (L) 150 - 350 K/uL    MPV 10.3 9.2 - 12.9 fL    Gran # 3.2 1.8 - 7.7 K/uL    Lymph # 1.9 1.0 - 4.8 K/uL    Mono # 0.7 0.3 - 1.0 K/uL    Eos # 0.0 0.0 - 0.5 K/uL    Baso # 0.02 0.00 - 0.20 K/uL    Gran% 54.7 38.0 - 73.0 %    Lymph% 33.0 18.0 - 48.0 %    Mono% 11.8 4.0 - 15.0 %    Eosinophil% 0.2 0.0 - 8.0 %    Basophil% 0.3 0.0 - 1.9 %    Differential Method Automated    Comprehensive metabolic panel   Result Value Ref Range    Sodium 140 136 - 145 mmol/L    Potassium 5.0 3.5 - 5.1 mmol/L    Chloride 99 95 - 110 mmol/L    CO2 29 23 - 29 mmol/L    Glucose 75 70 - 110 mg/dL    BUN, Bld 15 8 - 23 mg/dL    Creatinine 4.2 (H) 0.5 - 1.4 mg/dL    Calcium 8.3 (L) 8.7 - 10.5 mg/dL    Total Protein 8.8 (H) 6.0 - 8.4 g/dL    Albumin 2.8 (L) 3.5 - 5.2 g/dL    Total Bilirubin 0.6 0.1 - 1.0 mg/dL    Alkaline Phosphatase 92 55 - 135 U/L    AST 26 10 - 40  U/L    ALT 11 10 - 44 U/L    Anion Gap 12 8 - 16 mmol/L    eGFR if African American 16 (A) >60 mL/min/1.73 m^2    eGFR if non African American 14 (A) >60 mL/min/1.73 m^2   Protime-INR   Result Value Ref Range    Prothrombin Time 13.4 (H) 9.0 - 12.5 sec    INR 1.3 (H) 0.8 - 1.2   APTT   Result Value Ref Range    aPTT 30.6 21.0 - 32.0 sec   CK-MB   Result Value Ref Range    CPK 84 20 - 200 U/L    CPK MB 1.3 0.1 - 6.5 ng/mL    MB% 1.5 0.0 - 5.0 %   CPK   Result Value Ref Range    CPK 84 20 - 200 U/L   Brain natriuretic peptide   Result Value Ref Range    BNP >4,900 (H) 0 - 99 pg/mL   Troponin I   Result Value Ref Range    Troponin I 1.182 (H) 0.000 - 0.026 ng/mL   Occult blood x 1, stool   Result Value Ref Range    Occult Blood Positive (A) Negative   Phosphorus   Result Value Ref Range    Phosphorus 3.0 2.7 - 4.5 mg/dL   Magnesium   Result Value Ref Range    Magnesium 2.2 1.6 - 2.6 mg/dL   Hemoglobin   Result Value Ref Range    Hemoglobin 9.8 (L) 14.0 - 18.0 g/dL   Hematocrit   Result Value Ref Range    Hematocrit 29.8 (L) 40.0 - 54.0 %   Troponin I   Result Value Ref Range    Troponin I 0.999 (H) 0.000 - 0.026 ng/mL   Hemoglobin   Result Value Ref Range    Hemoglobin 10.3 (L) 14.0 - 18.0 g/dL   Hematocrit   Result Value Ref Range    Hematocrit 31.5 (L) 40.0 - 54.0 %   CBC auto differential   Result Value Ref Range    WBC 4.04 3.90 - 12.70 K/uL    RBC 3.73 (L) 4.60 - 6.20 M/uL    Hemoglobin 10.3 (L) 14.0 - 18.0 g/dL    Hematocrit 31.5 (L) 40.0 - 54.0 %    MCV 85 82 - 98 fL    MCH 27.6 27.0 - 31.0 pg    MCHC 32.7 32.0 - 36.0 g/dL    RDW 17.5 (H) 11.5 - 14.5 %    Platelets 120 (L) 150 - 350 K/uL    MPV 10.3 9.2 - 12.9 fL    Gran # 1.9 1.8 - 7.7 K/uL    Lymph # 1.5 1.0 - 4.8 K/uL    Mono # 0.5 0.3 - 1.0 K/uL    Eos # 0.0 0.0 - 0.5 K/uL    Baso # 0.03 0.00 - 0.20 K/uL    Gran% 48.1 38.0 - 73.0 %    Lymph% 38.1 18.0 - 48.0 %    Mono% 12.6 4.0 - 15.0 %    Eosinophil% 0.5 0.0 - 8.0 %    Basophil% 0.7 0.0 - 1.9 %     Differential Method Automated    Platelet count   Result Value Ref Range    Platelets 107 (L) 150 - 350 K/uL    MPV 9.8 9.2 - 12.9 fL   TSH   Result Value Ref Range    TSH 2.966 0.400 - 4.000 uIU/mL   Troponin I   Result Value Ref Range    Troponin I 1.075 (H) 0.000 - 0.026 ng/mL   Comprehensive metabolic panel   Result Value Ref Range    Sodium 141 136 - 145 mmol/L    Potassium 3.6 3.5 - 5.1 mmol/L    Chloride 99 95 - 110 mmol/L    CO2 32 (H) 23 - 29 mmol/L    Glucose 65 (L) 70 - 110 mg/dL    BUN, Bld 18 8 - 23 mg/dL    Creatinine 4.7 (H) 0.5 - 1.4 mg/dL    Calcium 8.3 (L) 8.7 - 10.5 mg/dL    Total Protein 7.6 6.0 - 8.4 g/dL    Albumin 2.5 (L) 3.5 - 5.2 g/dL    Total Bilirubin 0.5 0.1 - 1.0 mg/dL    Alkaline Phosphatase 74 55 - 135 U/L    AST 20 10 - 40 U/L    ALT 6 (L) 10 - 44 U/L    Anion Gap 10 8 - 16 mmol/L    eGFR if African American 14 (A) >60 mL/min/1.73 m^2    eGFR if non African American 12 (A) >60 mL/min/1.73 m^2   Type & Screen   Result Value Ref Range    Group & Rh B POS     Indirect Noah NEG    Prepare RBC 2 Units; bleed   Result Value Ref Range    UNIT NUMBER [id number]     PRODUCT CODE [id number]     DISPENSE STATUS CROSSMATCHED     CODING SYSTEM NBXY371     Unit Blood Type Code 7300     Unit Blood Type B POS     Unit Expiration [id number]     UNIT NUMBER [id number]     PRODUCT CODE R6987L75     DISPENSE STATUS CROSSMATCHED     CODING SYSTEM CXIO741     Unit Blood Type Code 7300     Unit Blood Type B POS     Unit Expiration [id number]          Significant Imaging: I have reviewed all pertinent imaging results/findings within the past 24 hours.   Imaging Results          X-Ray Chest PA And Lateral (Final result)  Result time 12/14/17 19:03:04    Final result by Rinku Cook III, MD (12/14/17 19:03:04)                 Impression:     Stable cardiomegaly without evidence of overt alveolar edema.  Stable moderate right pleural effusion with adjacent airspace consolidation/compressive  atelectasis.      Electronically signed by: TEVIN PEÑA MD  Date:     12/14/17  Time:    19:03              Narrative:    XR CHEST PA AND LATERAL    Clinical history: Shortness of breath    Comparison: 08/15/2017    Findings: Prior sternotomy is again noted.  There is stable cardiomegaly without evidence of overt alveolar edema. There has been no significant interval change in the moderate right pleural effusion with adjacent airspace consolidation/compressive atelectasis in the adjacent right mid to lower lung.  The left lung appears grossly clear of active disease.  No acute osseous abnormality detected.

## 2017-12-15 NOTE — ASSESSMENT & PLAN NOTE
Patient no longer makes urine per family.   Diuresis with HD, renal consulted  Monitor for improvement with diuresis  May need thoracentesis and consider cellular evaluation of fluid (patient with leukemia and is currently being treated with oral Sprycel)  Consider medication profile.   Consider consult to heme/oncology

## 2017-12-15 NOTE — SUBJECTIVE & OBJECTIVE
Past Medical History:   Diagnosis Date    After-cataract, unspecified - Left Eye 11/27/2013    Allergy     Arthritis     Cancer     CHF (congestive heart failure)     Chronic kidney disease     chemo/ dialias    Dementia     Diabetes mellitus     Hypertension     Myocardial infarction        Past Surgical History:   Procedure Laterality Date    AV FISTULA PLACEMENT      CARDIAC SURGERY      CATARACT EXTRACTION      CORONARY ARTERY BYPASS GRAFT  3-2014       Review of patient's allergies indicates:   Allergen Reactions    Benadryl decongestant Itching     Nothing with benadryl; wife states  No allergy to Benadryl       No current facility-administered medications on file prior to encounter.      Current Outpatient Prescriptions on File Prior to Encounter   Medication Sig    amlodipine (NORVASC) 5 MG tablet Take 1 tablet (5 mg total) by mouth once daily.    aspirin (ECOTRIN) 81 MG EC tablet Take 81 mg by mouth.    atorvastatin (LIPITOR) 20 MG tablet TAKE ONE TABLET BY MOUTH ONCE DAILY    clopidogrel (PLAVIX) 75 mg tablet Take 1 tablet (75 mg total) by mouth once daily.    diphenoxylate-atropine 2.5-0.025 mg (LOMOTIL) 2.5-0.025 mg per tablet     fluticasone (FLONASE) 50 mcg/actuation nasal spray 1 spray by Each Nare route 2 (two) times daily.    FOLIC ACID/VIT BCOMP,C (JOSAFAT-JANICE ORAL) Take by mouth.    LORazepam (ATIVAN) 0.5 MG tablet TAKE ONE TABLET BY MOUTH EVERY 6 HOURS AS NEEDED FOR ANXIETY    losartan (COZAAR) 50 MG tablet Take 1 tablet (50 mg total) by mouth once daily. 1 tablet Oral Every day    multivitamin with minerals tablet Take 1 tablet by mouth.    rivastigmine (EXELON) 4.6 mg/24 hr PT24 Place 1 patch onto the skin once daily.    SENSIPAR 30 mg Tab Take 1 tablet by mouth once daily.    sertraline (ZOLOFT) 50 MG tablet Take 1 tablet (50 mg total) by mouth once daily.    sevelamer carbonate (RENVELA) 800 mg Tab Take 800 mg by mouth.    SPRYCEL 100 mg Tab Take 100 mg by mouth  once daily.     traZODone (DESYREL) 50 MG tablet TAKE ONE TABLET BY MOUTH EVERY EVENING     Family History     Problem Relation (Age of Onset)    Cancer Brother    Colon cancer Sister    Glaucoma Mother    Pancreatic cancer Brother    Prostate cancer Father        Social History Main Topics    Smoking status: Former Smoker     Types: Cigarettes     Quit date: 1/28/2000    Smokeless tobacco: Never Used    Alcohol use No    Drug use: No    Sexual activity: Not Currently     Birth control/ protection: None     Review of Systems   Unable to perform ROS: Dementia     Objective:     Vital Signs (Most Recent):  Temp: 98.2 °F (36.8 °C) (12/14/17 1549)  Pulse: 86 (12/14/17 2131)  Resp: 18 (12/14/17 2131)  BP: 112/76 (12/14/17 2131)  SpO2: 96 % (12/14/17 2131) Vital Signs (24h Range):  Temp:  [96.6 °F (35.9 °C)-98.2 °F (36.8 °C)] 98.2 °F (36.8 °C)  Pulse:  [] 86  Resp:  [14-18] 18  SpO2:  [87 %-99 %] 96 %  BP: (111-117)/(74-83) 112/76     Weight: 90.3 kg (199 lb)  Body mass index is 25.55 kg/m².    Physical Exam   Constitutional: He appears well-developed and well-nourished. No distress.   Pleasantly confused elderly male     HENT:   Head: Normocephalic and atraumatic.   Nose: Nose normal.   Eyes: Conjunctivae and EOM are normal. Pupils are equal, round, and reactive to light. No scleral icterus.   Neck: Normal range of motion. Neck supple. No tracheal deviation present.   Cardiovascular: Normal rate, regular rhythm and intact distal pulses.    Murmur heard.  Bilateral lower ext edema +3   Pulmonary/Chest: Effort normal. No stridor. No respiratory distress. He has no wheezes. He has no rales.   Course and decreased aeration throughout    Abdominal: Soft. Bowel sounds are normal. He exhibits no distension. There is no tenderness. There is no guarding.   Genitourinary:   Genitourinary Comments: Check stool for occult blood    Musculoskeletal: Normal range of motion. He exhibits no edema or deformity.   Neurological:  He is alert. No cranial nerve deficit.   Patient oriented to self and situation at this time  Able to follow some simple commands and answer some simple questions.     Family reports patient is at baseline     Skin: Skin is warm and dry. Capillary refill takes less than 2 seconds. No rash noted. He is not diaphoretic.   Psychiatric:   Unable to fully assess  No agitation or irritability at this time.   Patient calm and cooperative.    Nursing note and vitals reviewed.        CRANIAL NERVES     CN III, IV, VI   Pupils are equal, round, and reactive to light.  Extraocular motions are normal.        Significant Labs: All pertinent labs within the past 24 hours have been reviewed.   Results for orders placed or performed during the hospital encounter of 12/14/17   CBC auto differential   Result Value Ref Range    WBC 5.76 3.90 - 12.70 K/uL    RBC 4.28 (L) 4.60 - 6.20 M/uL    Hemoglobin 12.1 (L) 14.0 - 18.0 g/dL    Hematocrit 36.5 (L) 40.0 - 54.0 %    MCV 85 82 - 98 fL    MCH 28.3 27.0 - 31.0 pg    MCHC 33.2 32.0 - 36.0 g/dL    RDW 17.3 (H) 11.5 - 14.5 %    Platelets 117 (L) 150 - 350 K/uL    MPV 10.3 9.2 - 12.9 fL    Gran # 3.2 1.8 - 7.7 K/uL    Lymph # 1.9 1.0 - 4.8 K/uL    Mono # 0.7 0.3 - 1.0 K/uL    Eos # 0.0 0.0 - 0.5 K/uL    Baso # 0.02 0.00 - 0.20 K/uL    Gran% 54.7 38.0 - 73.0 %    Lymph% 33.0 18.0 - 48.0 %    Mono% 11.8 4.0 - 15.0 %    Eosinophil% 0.2 0.0 - 8.0 %    Basophil% 0.3 0.0 - 1.9 %    Differential Method Automated    Comprehensive metabolic panel   Result Value Ref Range    Sodium 140 136 - 145 mmol/L    Potassium 5.0 3.5 - 5.1 mmol/L    Chloride 99 95 - 110 mmol/L    CO2 29 23 - 29 mmol/L    Glucose 75 70 - 110 mg/dL    BUN, Bld 15 8 - 23 mg/dL    Creatinine 4.2 (H) 0.5 - 1.4 mg/dL    Calcium 8.3 (L) 8.7 - 10.5 mg/dL    Total Protein 8.8 (H) 6.0 - 8.4 g/dL    Albumin 2.8 (L) 3.5 - 5.2 g/dL    Total Bilirubin 0.6 0.1 - 1.0 mg/dL    Alkaline Phosphatase 92 55 - 135 U/L    AST 26 10 - 40 U/L    ALT 11  10 - 44 U/L    Anion Gap 12 8 - 16 mmol/L    eGFR if African American 16 (A) >60 mL/min/1.73 m^2    eGFR if non African American 14 (A) >60 mL/min/1.73 m^2   Protime-INR   Result Value Ref Range    Prothrombin Time 13.4 (H) 9.0 - 12.5 sec    INR 1.3 (H) 0.8 - 1.2   APTT   Result Value Ref Range    aPTT 30.6 21.0 - 32.0 sec   CK-MB   Result Value Ref Range    CPK 84 20 - 200 U/L    CPK MB 1.3 0.1 - 6.5 ng/mL    MB% 1.5 0.0 - 5.0 %   CPK   Result Value Ref Range    CPK 84 20 - 200 U/L   Brain natriuretic peptide   Result Value Ref Range    BNP >4,900 (H) 0 - 99 pg/mL   Troponin I   Result Value Ref Range    Troponin I 1.182 (H) 0.000 - 0.026 ng/mL   Phosphorus   Result Value Ref Range    Phosphorus 3.0 2.7 - 4.5 mg/dL   Magnesium   Result Value Ref Range    Magnesium 2.2 1.6 - 2.6 mg/dL   Hemoglobin   Result Value Ref Range    Hemoglobin 9.8 (L) 14.0 - 18.0 g/dL   Hematocrit   Result Value Ref Range    Hematocrit 29.8 (L) 40.0 - 54.0 %         Significant Imaging: I have reviewed all pertinent imaging results/findings within the past 24 hours.   Imaging Results          X-Ray Chest PA And Lateral (Final result)  Result time 12/14/17 19:03:04    Final result by Tevin Peña III, MD (12/14/17 19:03:04)                 Impression:     Stable cardiomegaly without evidence of overt alveolar edema.  Stable moderate right pleural effusion with adjacent airspace consolidation/compressive atelectasis.      Electronically signed by: TEVIN PEÑA MD  Date:     12/14/17  Time:    19:03              Narrative:    XR CHEST PA AND LATERAL    Clinical history: Shortness of breath    Comparison: 08/15/2017    Findings: Prior sternotomy is again noted.  There is stable cardiomegaly without evidence of overt alveolar edema. There has been no significant interval change in the moderate right pleural effusion with adjacent airspace consolidation/compressive atelectasis in the adjacent right mid to lower lung.  The left lung appears  grossly clear of active disease.  No acute osseous abnormality detected.

## 2017-12-15 NOTE — ASSESSMENT & PLAN NOTE
Elevated higher than baseline  likely related to demand from fluid overload, CHF  Continue statin, plavix, asa,  Trend  Repeat echo

## 2017-12-15 NOTE — HPI
Patient is a 62-year-old with ESRD on hemodialysis Monday Wednesday Friday in [place].  He has had multiple hospitalizations in the last 3 months.  He has chronic right-sided pleural effusion.  He also has chronic fluid overload situation as well.  He comes in with shortness of breath PND orthopnea and leg swelling.  His last dialysis was Wednesday.  He had only 1 kg removed with [facility] he is dialysis unit in [place], [place].    Patient is being admitted for further management from cardiology standpoint.  Patient needs dialysis while inpatient.  Nephrology consultation is requested.  Patient seen and examined in the emergency room at bedside.

## 2017-12-15 NOTE — ASSESSMENT & PLAN NOTE
Last EF in 4 months ago 40  Patient wife reports patient complaint with HD, diet, medical POC  Consider acute change, trend troponins.   EKG reviewed no ST elevation- appears to have had rhythm change.  -check electrolytes   Repeat ECHO  Consider mediation profile   Outpatient Cardiology follow up

## 2017-12-15 NOTE — PROGRESS NOTES
Pt to floor from ED at this time. Family present at bedside. Pt placed to tele monitor, SR at this time. HD session today with 3L off. /78   Pulse 91   Temp 97.7 °F (36.5 °C)   Resp 16   Wt 90.3 kg (199 lb)   SpO2 96%   BMI 25.55 kg/m²     Fall precautions in place, bed alarm activated at this time, and call bell with in reach

## 2017-12-15 NOTE — ASSESSMENT & PLAN NOTE
Last EF in 4 months ago 40  Patient wife reports patient complaint with HD, diet, medical POC  Consider acute change, trend troponins.   EKG reviewed no ST elevation- appears to have had rhythm change.  -check electrolytes   Repeat ECHO  Consider mediation profile   Cardiology consult

## 2017-12-15 NOTE — H&P
[facility] Hospital Medicine  History & Physical    Patient Name: David Hadley  MRN: [id number]  Admission Date: 12/14/2017  Attending Physician: Michael Guerra MD  Primary Care Provider: Isaac Fitch MD         Patient information was obtained from patient, past medical records and ER records.     Subjective:     Principal Problem:Acute on chronic diastolic congestive heart failure    Chief Complaint:   Chief Complaint   Patient presents with    Rectal Bleeding     rectal bleeding x one month        HPI: History is limited and obtained from the patients wife at bedside, due to patients dementia. David Hadley is a 62 y.o. male patient with a hx of dementia who presented to the Emergency Department for evaluation of SOB. The wife reports that they went to doctor Fitch's office today and was recommended to present to the ER for further evaluation. At this time the wife reports patients SOB is improved and the patient is in no resp. Distress at present. Symptoms are intermittent and moderate in severity. No mitigating or exacerbating factors reported. Associated sxs include increased foot swelling, fatigue, and SOB. Additionally the wife reports noticing blood (dark red) on the tissue paper when wiping the patient that started a couple of weeks ago and has had an increase in stools. Wife states patient does not complain of pain or show signs of pain when wiping him.   Pt has end-stage renal disease and goes to dialysis 3x a week. No further complaints or concerns at this time. . Patient denies any fever, chills, nausea, vomiting, abd pain, weakness/numbness, dizziness, HA, leg pain, chest pain, cough, and all other sxs at this time. Patient was evaluated in the ER, BNP >4900 and elevated troponin, likely in response to  CKD and CHF. Chest Xray Impression:Stable cardiomegaly without evidence of overt alveolar edema.  Stable moderate right pleural effusion with adjacent airspace consolidation/compressive  atelectasis. Patient admitted for Fluid overload in ESRD patient on HD with CHF exacerbation and for further evaluation of rectal bleeding    Dr. Fitch office visit note 12/14/17:  History of Present Illness:  Patient presents today with his wife and his sister they're complaining that patient has become increasingly short of breath and he is developing edema.  He has systolic heart failure with ejection fraction of 25%.  He also been having diarrhea for the last 1 month and over the last couple days he is having bloody diarrhea.  Denies any fever.  Patient has end-stage renal disease and is on dialysis also has advanced dementia     Plan:  Patient is tachycardic and dyspneic with underlying history of systolic heart failure and more recent gastrointestinal bleeding.  too unstable to be treated outpatient prescription with family recommend that he be sent to [facility] ED for further evaluation and treatment and admission to the hospital.     He is continuing to take his Plavix and aspirin inspite of the gastrointestinal bleed.  No orders of the defined types were placed in this encounter.           Past Medical History:   Diagnosis Date    After-cataract, unspecified - Left Eye 11/27/2013    Allergy     Arthritis     Cancer     CHF (congestive heart failure)     Chronic kidney disease     chemo/ dialias    Dementia     Diabetes mellitus     Hypertension     Myocardial infarction        Past Surgical History:   Procedure Laterality Date    AV FISTULA PLACEMENT      CARDIAC SURGERY      CATARACT EXTRACTION      CORONARY ARTERY BYPASS GRAFT  3-2014       Review of patient's allergies indicates:   Allergen Reactions    Benadryl decongestant Itching     Nothing with benadryl; wife states  No allergy to Benadryl       No current facility-administered medications on file prior to encounter.      Current Outpatient Prescriptions on File Prior to Encounter   Medication Sig    amlodipine (NORVASC) 5 MG tablet  Take 1 tablet (5 mg total) by mouth once daily.    aspirin (ECOTRIN) 81 MG EC tablet Take 81 mg by mouth.    atorvastatin (LIPITOR) 20 MG tablet TAKE ONE TABLET BY MOUTH ONCE DAILY    clopidogrel (PLAVIX) 75 mg tablet Take 1 tablet (75 mg total) by mouth once daily.    diphenoxylate-atropine 2.5-0.025 mg (LOMOTIL) 2.5-0.025 mg per tablet     fluticasone (FLONASE) 50 mcg/actuation nasal spray 1 spray by Each Nare route 2 (two) times daily.    FOLIC ACID/VIT BCOMP,C (JOSAFAT-JANICE ORAL) Take by mouth.    LORazepam (ATIVAN) 0.5 MG tablet TAKE ONE TABLET BY MOUTH EVERY 6 HOURS AS NEEDED FOR ANXIETY    losartan (COZAAR) 50 MG tablet Take 1 tablet (50 mg total) by mouth once daily. 1 tablet Oral Every day    multivitamin with minerals tablet Take 1 tablet by mouth.    rivastigmine (EXELON) 4.6 mg/24 hr PT24 Place 1 patch onto the skin once daily.    SENSIPAR 30 mg Tab Take 1 tablet by mouth once daily.    sertraline (ZOLOFT) 50 MG tablet Take 1 tablet (50 mg total) by mouth once daily.    sevelamer carbonate (RENVELA) 800 mg Tab Take 800 mg by mouth.    SPRYCEL 100 mg Tab Take 100 mg by mouth once daily.     traZODone (DESYREL) 50 MG tablet TAKE ONE TABLET BY MOUTH EVERY EVENING     Family History     Problem Relation (Age of Onset)    Cancer Brother    Colon cancer Sister    Glaucoma Mother    Pancreatic cancer Brother    Prostate cancer Father        Social History Main Topics    Smoking status: Former Smoker     Types: Cigarettes     Quit date: 1/28/2000    Smokeless tobacco: Never Used    Alcohol use No    Drug use: No    Sexual activity: Not Currently     Birth control/ protection: None     Review of Systems   Unable to perform ROS: Dementia     Objective:     Vital Signs (Most Recent):  Temp: 98.2 °F (36.8 °C) (12/14/17 1549)  Pulse: 86 (12/14/17 2131)  Resp: 18 (12/14/17 2131)  BP: 112/76 (12/14/17 2131)  SpO2: 96 % (12/14/17 2131) Vital Signs (24h Range):  Temp:  [96.6 °F (35.9 °C)-98.2 °F (36.8  °C)] 98.2 °F (36.8 °C)  Pulse:  [] 86  Resp:  [14-18] 18  SpO2:  [87 %-99 %] 96 %  BP: (111-117)/(74-83) 112/76     Weight: 90.3 kg (199 lb)  Body mass index is 25.55 kg/m².    Physical Exam   Constitutional: He appears well-developed and well-nourished. No distress.   Pleasantly confused elderly male     HENT:   Head: Normocephalic and atraumatic.   Nose: Nose normal.   Eyes: Conjunctivae and EOM are normal. Pupils are equal, round, and reactive to light. No scleral icterus.   Neck: Normal range of motion. Neck supple. No tracheal deviation present.   Cardiovascular: Normal rate, regular rhythm and intact distal pulses.    Murmur heard.  Bilateral lower ext edema +3   Pulmonary/Chest: Effort normal. No stridor. No respiratory distress. He has no wheezes. He has no rales.   Course and decreased aeration throughout    Abdominal: Soft. Bowel sounds are normal. He exhibits no distension. There is no tenderness. There is no guarding.   Genitourinary:   Genitourinary Comments: Check stool for occult blood    Musculoskeletal: Normal range of motion. He exhibits no edema or deformity.   Neurological: He is alert. No cranial nerve deficit.   Patient oriented to self and situation at this time  Able to follow some simple commands and answer some simple questions.     Family reports patient is at baseline     Skin: Skin is warm and dry. Capillary refill takes less than 2 seconds. No rash noted. He is not diaphoretic.   Psychiatric:   Unable to fully assess  No agitation or irritability at this time.   Patient calm and cooperative.    Nursing note and vitals reviewed.        CRANIAL NERVES     CN III, IV, VI   Pupils are equal, round, and reactive to light.  Extraocular motions are normal.        Significant Labs: All pertinent labs within the past 24 hours have been reviewed.   Results for orders placed or performed during the hospital encounter of 12/14/17   CBC auto differential   Result Value Ref Range    WBC 5.76  3.90 - 12.70 K/uL    RBC 4.28 (L) 4.60 - 6.20 M/uL    Hemoglobin 12.1 (L) 14.0 - 18.0 g/dL    Hematocrit 36.5 (L) 40.0 - 54.0 %    MCV 85 82 - 98 fL    MCH 28.3 27.0 - 31.0 pg    MCHC 33.2 32.0 - 36.0 g/dL    RDW 17.3 (H) 11.5 - 14.5 %    Platelets 117 (L) 150 - 350 K/uL    MPV 10.3 9.2 - 12.9 fL    Gran # 3.2 1.8 - 7.7 K/uL    Lymph # 1.9 1.0 - 4.8 K/uL    Mono # 0.7 0.3 - 1.0 K/uL    Eos # 0.0 0.0 - 0.5 K/uL    Baso # 0.02 0.00 - 0.20 K/uL    Gran% 54.7 38.0 - 73.0 %    Lymph% 33.0 18.0 - 48.0 %    Mono% 11.8 4.0 - 15.0 %    Eosinophil% 0.2 0.0 - 8.0 %    Basophil% 0.3 0.0 - 1.9 %    Differential Method Automated    Comprehensive metabolic panel   Result Value Ref Range    Sodium 140 136 - 145 mmol/L    Potassium 5.0 3.5 - 5.1 mmol/L    Chloride 99 95 - 110 mmol/L    CO2 29 23 - 29 mmol/L    Glucose 75 70 - 110 mg/dL    BUN, Bld 15 8 - 23 mg/dL    Creatinine 4.2 (H) 0.5 - 1.4 mg/dL    Calcium 8.3 (L) 8.7 - 10.5 mg/dL    Total Protein 8.8 (H) 6.0 - 8.4 g/dL    Albumin 2.8 (L) 3.5 - 5.2 g/dL    Total Bilirubin 0.6 0.1 - 1.0 mg/dL    Alkaline Phosphatase 92 55 - 135 U/L    AST 26 10 - 40 U/L    ALT 11 10 - 44 U/L    Anion Gap 12 8 - 16 mmol/L    eGFR if African American 16 (A) >60 mL/min/1.73 m^2    eGFR if non African American 14 (A) >60 mL/min/1.73 m^2   Protime-INR   Result Value Ref Range    Prothrombin Time 13.4 (H) 9.0 - 12.5 sec    INR 1.3 (H) 0.8 - 1.2   APTT   Result Value Ref Range    aPTT 30.6 21.0 - 32.0 sec   CK-MB   Result Value Ref Range    CPK 84 20 - 200 U/L    CPK MB 1.3 0.1 - 6.5 ng/mL    MB% 1.5 0.0 - 5.0 %   CPK   Result Value Ref Range    CPK 84 20 - 200 U/L   Brain natriuretic peptide   Result Value Ref Range    BNP >4,900 (H) 0 - 99 pg/mL   Troponin I   Result Value Ref Range    Troponin I 1.182 (H) 0.000 - 0.026 ng/mL   Phosphorus   Result Value Ref Range    Phosphorus 3.0 2.7 - 4.5 mg/dL   Magnesium   Result Value Ref Range    Magnesium 2.2 1.6 - 2.6 mg/dL   Hemoglobin   Result Value Ref  Range    Hemoglobin 9.8 (L) 14.0 - 18.0 g/dL   Hematocrit   Result Value Ref Range    Hematocrit 29.8 (L) 40.0 - 54.0 %         Significant Imaging: I have reviewed all pertinent imaging results/findings within the past 24 hours.   Imaging Results          X-Ray Chest PA And Lateral (Final result)  Result time 12/14/17 19:03:04    Final result by Tevin Peña III, MD (12/14/17 19:03:04)                 Impression:     Stable cardiomegaly without evidence of overt alveolar edema.  Stable moderate right pleural effusion with adjacent airspace consolidation/compressive atelectasis.      Electronically signed by: TEVIN PEÑA MD  Date:     12/14/17  Time:    19:03              Narrative:    XR CHEST PA AND LATERAL    Clinical history: Shortness of breath    Comparison: 08/15/2017    Findings: Prior sternotomy is again noted.  There is stable cardiomegaly without evidence of overt alveolar edema. There has been no significant interval change in the moderate right pleural effusion with adjacent airspace consolidation/compressive atelectasis in the adjacent right mid to lower lung.  The left lung appears grossly clear of active disease.  No acute osseous abnormality detected.                              I have personally reviewed the patients labs, imaging, ekg and discussed the patient case in detail with the Er provider      Assessment/Plan:     * Acute on chronic diastolic congestive heart failure    Last EF in 4 months ago 40  Patient wife reports patient complaint with HD, diet, medical POC  Consider acute change, trend troponins.   EKG reviewed no ST elevation- appears to have had rhythm change.  -check electrolytes   Repeat ECHO  Consider mediation profile   Cardiology consult              Pleural effusion on right    Patient no longer makes urine per family.   Diuresis with HD, renal consulted  Monitor for improvement with diuresis  May need thoracentesis and consider cellular evaluation of fluid (patient with  leukemia and is currently being treated with oral Sprycel)  Consider medication profile.   Consider consult to heme/oncology            Troponin I above reference range    Elevated higher than baseline  likely related to demand from fluid overload, CHF  Continue statin, plavix, asa,  Trend  Repeat echo        Rectal bleed    CBC stable   Monitor h/h  Check occult blood  Type and screen  Consider gi consult pending course.             Acute blood loss anemia    Likely source GI  Occult blood ordered  Drop in H/H noted   Type and Scree  Transfuse if need.   Gi consult.   Will continue plavix/ASA at this time and manage transfusion as need until further recommendation from cardiology to stop        Type 2 diabetes mellitus with renal complication    Check a1c  ssi   montior           ESRD (end stage renal disease) on dialysis    Renal consult for hd              VTE Risk Mitigation         Ordered     Pharmacological: none, acute bleed       12/14/17 2119     Medium Risk of VTE  Once     Non pharmacological: PABLITO/SCD 12/14/17 2119             Dickson Carr NP  Department of Hospital Medicine   [facility]

## 2017-12-15 NOTE — HOSPITAL COURSE
12/15 The patient was seen at bedside in the ER. Nephrology following, plans for dialysis today. 12/16 The patient reports improvement in symptoms overnight. The patient was dialyzed overnight with 3 liters off. Case discussed with Dr. Marks (Nephrology) who feels that the patient is not having enough fluid taken off. Cardiology following. 12/17 The patients family stated that they wished to be discharged home to resume outpatient dialysis if possible. This was discussed with Dr. Marks (Nephrology) who felt that the patient was ok for discharge. The patient was seen and examined today and deemed stable for discharge. The patient will resume outpatient dialysis. The patient needs to complete full dialysis treatment to prevent volume overload from occurring. Dr. Lazo (Cardiology) recommended holding his BP meds on the day of dialysis to allow him to complete treatment. On admission the patient reported dark red blood on tissue when wiping. The patients H/H remained stable during his admission. No rectal bleeding noted. Will have patient follow up with GI. The patient will also follow up with his PCP.

## 2017-12-15 NOTE — ASSESSMENT & PLAN NOTE
Likely source GI  Occult blood ordered  Drop in H/H noted   Type and Scree  Transfuse if need.   Gi consult.

## 2017-12-15 NOTE — SUBJECTIVE & OBJECTIVE
Interval History: The patient was seen at bedside in the ER. Nephrology following, plans for dialysis today.       Review of Systems   Unable to perform ROS: Dementia     Objective:     Vital Signs (Most Recent):  Temp: 97.8 °F (36.6 °C) (12/15/17 1222)  Pulse: 95 (12/15/17 1530)  Resp: 12 (12/15/17 1405)  BP: 94/67 (12/15/17 1530)  SpO2: 96 % (12/14/17 2131) Vital Signs (24h Range):  Temp:  [97.8 °F (36.6 °C)] 97.8 °F (36.6 °C)  Pulse:  [85-95] 95  Resp:  [12-18] 12  SpO2:  [96 %] 96 %  BP: ()/(62-83) 94/67     Weight: 90.3 kg (199 lb)  Body mass index is 25.55 kg/m².  No intake or output data in the 24 hours ending 12/15/17 1630   Physical Exam   Constitutional: He is oriented to person, place, and time. He appears well-developed and well-nourished. No distress.   Pleasantly confused elderly male     HENT:   Head: Normocephalic and atraumatic.   Nose: Nose normal.   Eyes: Conjunctivae and EOM are normal. Pupils are equal, round, and reactive to light. No scleral icterus.   Neck: Normal range of motion. Neck supple. No tracheal deviation present.   Cardiovascular: Normal rate, regular rhythm and intact distal pulses.    Murmur heard.  Bilateral lower ext edema +3   Pulmonary/Chest: Effort normal and breath sounds normal. No stridor. No respiratory distress. He has no wheezes. He has no rales.   Course and decreased aeration throughout    Abdominal: Soft. Bowel sounds are normal. He exhibits no distension. There is no tenderness. There is no guarding.   Genitourinary:   Genitourinary Comments: Check stool for occult blood    Musculoskeletal: Normal range of motion. He exhibits no edema, tenderness or deformity.   Neurological: He is alert and oriented to person, place, and time. No cranial nerve deficit.   Patient oriented to self and situation at this time  Able to follow some simple commands and answer some simple questions.     Family reports patient is at baseline     Skin: Skin is warm and dry. Capillary  refill takes less than 2 seconds. No rash noted. He is not diaphoretic. No erythema.   Psychiatric: He has a normal mood and affect. His behavior is normal.   Unable to fully assess  No agitation or irritability at this time.   Patient calm and cooperative.    Nursing note and vitals reviewed.      Significant Labs: All pertinent labs within the past 24 hours have been reviewed.    Significant Imaging:   Imaging Results          X-Ray Chest PA And Lateral (Final result)  Result time 12/14/17 19:03:04    Final result by Tevin Peña III, MD (12/14/17 19:03:04)                 Impression:     Stable cardiomegaly without evidence of overt alveolar edema.  Stable moderate right pleural effusion with adjacent airspace consolidation/compressive atelectasis.      Electronically signed by: TEVIN PEÑA MD  Date:     12/14/17  Time:    19:03              Narrative:    XR CHEST PA AND LATERAL    Clinical history: Shortness of breath    Comparison: 08/15/2017    Findings: Prior sternotomy is again noted.  There is stable cardiomegaly without evidence of overt alveolar edema. There has been no significant interval change in the moderate right pleural effusion with adjacent airspace consolidation/compressive atelectasis in the adjacent right mid to lower lung.  The left lung appears grossly clear of active disease.  No acute osseous abnormality detected.

## 2017-12-15 NOTE — PLAN OF CARE
Information obtained from patient daughter Malathi.   Patient lives with his spouse Nakia Hadley (spouse) [phone].  Patient is currently not independent with his ADL's .  No anticipated needs at present. Case mgt to follow up with d/c needs.     12/15/17 1448   Discharge Assessment   Assessment Type Discharge Planning Assessment   Confirmed/corrected address and phone number on facesheet? Yes   Assessment information obtained from? Medical Record;Other   Current cognitive status: Alert/Oriented   Current Functional Status: Needs Assistance   Lives With spouse   Able to Return to Prior Arrangements unable to determine at this time (comments)   Is patient able to care for self after discharge? No   Who are your caregiver(s) and their phone number(s)? nakia hadley (spouse) [phone]   Patient's perception of discharge disposition home or selfcare   Readmission Within The Last 30 Days no previous admission in last 30 days   Patient currently being followed by outpatient case management? No   Patient currently receives any other outside agency services? No   Equipment Currently Used at Home none   Do you have any problems affording any of your prescribed medications? No   Is the patient taking medications as prescribed? yes   Does the patient have transportation home? Yes   Transportation Available public transportation   Does the patient receive services at the [facility]? No   Discharge Plan A Home with family

## 2017-12-15 NOTE — PROGRESS NOTES
Pt completed 4 hours of HD removing 3L. Pt tolerated HD well. No signs of distress noted. De accessed per p&p  Each site held for 10mins, no post bleeding noted

## 2017-12-15 NOTE — CONSULTS
[facility] -   Nephrology  Consult Note    Patient Name: David Hadley  MRN: [id number]  Admission Date: 12/14/2017  Hospital Length of Stay: 1 days  Attending Provider: Dena Griffith MD   Primary Care Physician: Isaac Fitch MD  Principal Problem:Acute on chronic diastolic congestive heart failure    Consults  Subjective:     HPI: Patient is a 62-year-old with ESRD on hemodialysis Monday Wednesday Friday in [place].  He has had multiple hospitalizations in the last 3 months.  He has chronic right-sided pleural effusion.  He also has chronic fluid overload situation as well.  He comes in with shortness of breath PND orthopnea and leg swelling.  His last dialysis was Wednesday.  He had only 1 kg removed with dialysis in retirement he is dialysis unit in [place], [place].    Patient is being admitted for further management from cardiology standpoint.  Patient needs dialysis while inpatient.  Nephrology consultation is requested.  Patient seen and examined in the emergency room at bedside.      Past Medical History:   Diagnosis Date    After-cataract, unspecified - Left Eye 11/27/2013    Allergy     Arthritis     Cancer     CHF (congestive heart failure)     Chronic kidney disease     chemo/ dialias    Dementia     Diabetes mellitus     Hypertension     Myocardial infarction        Past Surgical History:   Procedure Laterality Date    AV FISTULA PLACEMENT      CARDIAC SURGERY      CATARACT EXTRACTION      CORONARY ARTERY BYPASS GRAFT  3-2014       Review of patient's allergies indicates:   Allergen Reactions    Benadryl decongestant Itching     Nothing with benadryl; wife states  No allergy to Benadryl       No current facility-administered medications on file prior to encounter.      Current Outpatient Prescriptions on File Prior to Encounter   Medication Sig    amlodipine (NORVASC) 5 MG tablet Take 1 tablet (5 mg total) by mouth once daily.    aspirin (ECOTRIN) 81 MG EC tablet Take 81 mg  by mouth.    atorvastatin (LIPITOR) 20 MG tablet TAKE ONE TABLET BY MOUTH ONCE DAILY    clopidogrel (PLAVIX) 75 mg tablet Take 1 tablet (75 mg total) by mouth once daily.    diphenoxylate-atropine 2.5-0.025 mg (LOMOTIL) 2.5-0.025 mg per tablet     fluticasone (FLONASE) 50 mcg/actuation nasal spray 1 spray by Each Nare route 2 (two) times daily.    FOLIC ACID/VIT BCOMP,C (JOSAFAT-JANICE ORAL) Take by mouth.    LORazepam (ATIVAN) 0.5 MG tablet TAKE ONE TABLET BY MOUTH EVERY 6 HOURS AS NEEDED FOR ANXIETY    losartan (COZAAR) 50 MG tablet Take 1 tablet (50 mg total) by mouth once daily. 1 tablet Oral Every day    multivitamin with minerals tablet Take 1 tablet by mouth.    rivastigmine (EXELON) 4.6 mg/24 hr PT24 Place 1 patch onto the skin once daily.    SENSIPAR 30 mg Tab Take 1 tablet by mouth once daily.    sertraline (ZOLOFT) 50 MG tablet Take 1 tablet (50 mg total) by mouth once daily.    sevelamer carbonate (RENVELA) 800 mg Tab Take 800 mg by mouth.    SPRYCEL 100 mg Tab Take 100 mg by mouth once daily.     traZODone (DESYREL) 50 MG tablet TAKE ONE TABLET BY MOUTH EVERY EVENING     Family History     Problem Relation (Age of Onset)    Cancer Brother    Colon cancer Sister    Glaucoma Mother    Pancreatic cancer Brother    Prostate cancer Father        Social History Main Topics    Smoking status: Former Smoker     Types: Cigarettes     Quit date: 1/28/2000    Smokeless tobacco: Never Used    Alcohol use No    Drug use: No    Sexual activity: Not Currently     Birth control/ protection: None     Review of Systems   Unable to perform ROS: Dementia     Objective:     Vital Signs (Most Recent):  Temp: 98.2 °F (36.8 °C) (12/14/17 1549)  Pulse: 85 (12/15/17 0620)  Resp: 12 (12/15/17 0620)  BP: 115/69 (12/15/17 0620)  SpO2: 96 % (12/14/17 2131) Vital Signs (24h Range):  Temp:  [96.6 °F (35.9 °C)-98.2 °F (36.8 °C)] 98.2 °F (36.8 °C)  Pulse:  [] 85  Resp:  [12-18] 12  SpO2:  [87 %-99 %] 96 %  BP:  (109-117)/(68-83) 115/69     Weight: 90.3 kg (199 lb)  Body mass index is 25.55 kg/m².    Physical Exam   Constitutional: He appears well-developed and well-nourished. No distress.   Pleasantly confused elderly male     HENT:   Head: Normocephalic and atraumatic.   Nose: Nose normal.   Eyes: Conjunctivae and EOM are normal. Pupils are equal, round, and reactive to light. No scleral icterus.   Neck: Normal range of motion. Neck supple. No tracheal deviation present.   Cardiovascular: Normal rate, regular rhythm and intact distal pulses.    Murmur heard.  Bilateral lower ext edema +3   Pulmonary/Chest: Effort normal. No stridor. No respiratory distress. He has no wheezes. He has no rales.   Course and decreased aeration throughout    Abdominal: Soft. Bowel sounds are normal. He exhibits no distension. There is no tenderness. There is no guarding.   Genitourinary:   Genitourinary Comments: Check stool for occult blood    Musculoskeletal: Normal range of motion. He exhibits no edema or deformity.   LUAF +B/T   Neurological: He is alert. No cranial nerve deficit.   Patient oriented to self and situation at this time  Able to follow some simple commands and answer some simple questions.     Family reports patient is at baseline     Skin: Skin is warm and dry. Capillary refill takes less than 2 seconds. No rash noted. He is not diaphoretic.   Psychiatric:   Unable to fully assess  No agitation or irritability at this time.   Patient calm and cooperative.    Nursing note and vitals reviewed.        CRANIAL NERVES     CN III, IV, VI   Pupils are equal, round, and reactive to light.  Extraocular motions are normal.        Significant Labs: All pertinent labs within the past 24 hours have been reviewed.   Results for orders placed or performed during the hospital encounter of 12/14/17   CBC auto differential   Result Value Ref Range    WBC 5.76 3.90 - 12.70 K/uL    RBC 4.28 (L) 4.60 - 6.20 M/uL    Hemoglobin 12.1 (L) 14.0 -  18.0 g/dL    Hematocrit 36.5 (L) 40.0 - 54.0 %    MCV 85 82 - 98 fL    MCH 28.3 27.0 - 31.0 pg    MCHC 33.2 32.0 - 36.0 g/dL    RDW 17.3 (H) 11.5 - 14.5 %    Platelets 117 (L) 150 - 350 K/uL    MPV 10.3 9.2 - 12.9 fL    Gran # 3.2 1.8 - 7.7 K/uL    Lymph # 1.9 1.0 - 4.8 K/uL    Mono # 0.7 0.3 - 1.0 K/uL    Eos # 0.0 0.0 - 0.5 K/uL    Baso # 0.02 0.00 - 0.20 K/uL    Gran% 54.7 38.0 - 73.0 %    Lymph% 33.0 18.0 - 48.0 %    Mono% 11.8 4.0 - 15.0 %    Eosinophil% 0.2 0.0 - 8.0 %    Basophil% 0.3 0.0 - 1.9 %    Differential Method Automated    Comprehensive metabolic panel   Result Value Ref Range    Sodium 140 136 - 145 mmol/L    Potassium 5.0 3.5 - 5.1 mmol/L    Chloride 99 95 - 110 mmol/L    CO2 29 23 - 29 mmol/L    Glucose 75 70 - 110 mg/dL    BUN, Bld 15 8 - 23 mg/dL    Creatinine 4.2 (H) 0.5 - 1.4 mg/dL    Calcium 8.3 (L) 8.7 - 10.5 mg/dL    Total Protein 8.8 (H) 6.0 - 8.4 g/dL    Albumin 2.8 (L) 3.5 - 5.2 g/dL    Total Bilirubin 0.6 0.1 - 1.0 mg/dL    Alkaline Phosphatase 92 55 - 135 U/L    AST 26 10 - 40 U/L    ALT 11 10 - 44 U/L    Anion Gap 12 8 - 16 mmol/L    eGFR if African American 16 (A) >60 mL/min/1.73 m^2    eGFR if non African American 14 (A) >60 mL/min/1.73 m^2   Protime-INR   Result Value Ref Range    Prothrombin Time 13.4 (H) 9.0 - 12.5 sec    INR 1.3 (H) 0.8 - 1.2   APTT   Result Value Ref Range    aPTT 30.6 21.0 - 32.0 sec   CK-MB   Result Value Ref Range    CPK 84 20 - 200 U/L    CPK MB 1.3 0.1 - 6.5 ng/mL    MB% 1.5 0.0 - 5.0 %   CPK   Result Value Ref Range    CPK 84 20 - 200 U/L   Brain natriuretic peptide   Result Value Ref Range    BNP >4,900 (H) 0 - 99 pg/mL   Troponin I   Result Value Ref Range    Troponin I 1.182 (H) 0.000 - 0.026 ng/mL   Occult blood x 1, stool   Result Value Ref Range    Occult Blood Positive (A) Negative   Phosphorus   Result Value Ref Range    Phosphorus 3.0 2.7 - 4.5 mg/dL   Magnesium   Result Value Ref Range    Magnesium 2.2 1.6 - 2.6 mg/dL   Hemoglobin   Result Value  Ref Range    Hemoglobin 9.8 (L) 14.0 - 18.0 g/dL   Hematocrit   Result Value Ref Range    Hematocrit 29.8 (L) 40.0 - 54.0 %   Troponin I   Result Value Ref Range    Troponin I 0.999 (H) 0.000 - 0.026 ng/mL   Hemoglobin   Result Value Ref Range    Hemoglobin 10.3 (L) 14.0 - 18.0 g/dL   Hematocrit   Result Value Ref Range    Hematocrit 31.5 (L) 40.0 - 54.0 %   CBC auto differential   Result Value Ref Range    WBC 4.04 3.90 - 12.70 K/uL    RBC 3.73 (L) 4.60 - 6.20 M/uL    Hemoglobin 10.3 (L) 14.0 - 18.0 g/dL    Hematocrit 31.5 (L) 40.0 - 54.0 %    MCV 85 82 - 98 fL    MCH 27.6 27.0 - 31.0 pg    MCHC 32.7 32.0 - 36.0 g/dL    RDW 17.5 (H) 11.5 - 14.5 %    Platelets 120 (L) 150 - 350 K/uL    MPV 10.3 9.2 - 12.9 fL    Gran # 1.9 1.8 - 7.7 K/uL    Lymph # 1.5 1.0 - 4.8 K/uL    Mono # 0.5 0.3 - 1.0 K/uL    Eos # 0.0 0.0 - 0.5 K/uL    Baso # 0.03 0.00 - 0.20 K/uL    Gran% 48.1 38.0 - 73.0 %    Lymph% 38.1 18.0 - 48.0 %    Mono% 12.6 4.0 - 15.0 %    Eosinophil% 0.5 0.0 - 8.0 %    Basophil% 0.7 0.0 - 1.9 %    Differential Method Automated    Platelet count   Result Value Ref Range    Platelets 107 (L) 150 - 350 K/uL    MPV 9.8 9.2 - 12.9 fL   TSH   Result Value Ref Range    TSH 2.966 0.400 - 4.000 uIU/mL   Troponin I   Result Value Ref Range    Troponin I 1.075 (H) 0.000 - 0.026 ng/mL   Comprehensive metabolic panel   Result Value Ref Range    Sodium 141 136 - 145 mmol/L    Potassium 3.6 3.5 - 5.1 mmol/L    Chloride 99 95 - 110 mmol/L    CO2 32 (H) 23 - 29 mmol/L    Glucose 65 (L) 70 - 110 mg/dL    BUN, Bld 18 8 - 23 mg/dL    Creatinine 4.7 (H) 0.5 - 1.4 mg/dL    Calcium 8.3 (L) 8.7 - 10.5 mg/dL    Total Protein 7.6 6.0 - 8.4 g/dL    Albumin 2.5 (L) 3.5 - 5.2 g/dL    Total Bilirubin 0.5 0.1 - 1.0 mg/dL    Alkaline Phosphatase 74 55 - 135 U/L    AST 20 10 - 40 U/L    ALT 6 (L) 10 - 44 U/L    Anion Gap 10 8 - 16 mmol/L    eGFR if African American 14 (A) >60 mL/min/1.73 m^2    eGFR if non African American 12 (A) >60 mL/min/1.73  m^2   Type & Screen   Result Value Ref Range    Group & Rh B POS     Indirect Noah NEG    Prepare RBC 2 Units; bleed   Result Value Ref Range    UNIT NUMBER [id number]     PRODUCT CODE L8062O30     DISPENSE STATUS CROSSMATCHED     CODING SYSTEM MINQ467     Unit Blood Type Code 7300     Unit Blood Type B POS     Unit Expiration [id number]     UNIT NUMBER [id number]     PRODUCT CODE E6252C62     DISPENSE STATUS CROSSMATCHED     CODING SYSTEM LQUC923     Unit Blood Type Code 7300     Unit Blood Type B POS     Unit Expiration [id number]          Significant Imaging: I have reviewed all pertinent imaging results/findings within the past 24 hours.   Imaging Results          X-Ray Chest PA And Lateral (Final result)  Result time 12/14/17 19:03:04    Final result by Tevin Peña III, MD (12/14/17 19:03:04)                 Impression:     Stable cardiomegaly without evidence of overt alveolar edema.  Stable moderate right pleural effusion with adjacent airspace consolidation/compressive atelectasis.      Electronically signed by: TEVIN PEÑA MD  Date:     12/14/17  Time:    19:03              Narrative:    XR CHEST PA AND LATERAL    Clinical history: Shortness of breath    Comparison: 08/15/2017    Findings: Prior sternotomy is again noted.  There is stable cardiomegaly without evidence of overt alveolar edema. There has been no significant interval change in the moderate right pleural effusion with adjacent airspace consolidation/compressive atelectasis in the adjacent right mid to lower lung.  The left lung appears grossly clear of active disease.  No acute osseous abnormality detected.                                Assessment/Plan:     ESRD (end stage renal disease) on dialysis    Patient has ESRD with fluid overload.  Patient probably will need aggressive dialysis starting today.  Agree with hospital management per hospital medicine.              Thank you for your consult.     Bernard Marks,  MD  Nephrology  [facility]

## 2017-12-15 NOTE — ASSESSMENT & PLAN NOTE
CBC stable   Monitor h/h  Check occult blood  Type and screen  Consider gi consult pending course.

## 2017-12-16 PROBLEM — I48.19 PERSISTENT ATRIAL FIBRILLATION: Status: ACTIVE | Noted: 2017-12-16

## 2017-12-16 LAB
ANION GAP SERPL CALC-SCNC: 14 MMOL/L
BASOPHILS # BLD AUTO: 0.02 K/UL
BASOPHILS NFR BLD: 0.5 %
BUN SERPL-MCNC: 14 MG/DL
CALCIUM SERPL-MCNC: 8.3 MG/DL
CHLORIDE SERPL-SCNC: 100 MMOL/L
CO2 SERPL-SCNC: 24 MMOL/L
CREAT SERPL-MCNC: 3.8 MG/DL
DIFFERENTIAL METHOD: ABNORMAL
EOSINOPHIL # BLD AUTO: 0 K/UL
EOSINOPHIL NFR BLD: 0.5 %
ERYTHROCYTE [DISTWIDTH] IN BLOOD BY AUTOMATED COUNT: 17.4 %
EST. GFR  (AFRICAN AMERICAN): 18 ML/MIN/1.73 M^2
EST. GFR  (NON AFRICAN AMERICAN): 16 ML/MIN/1.73 M^2
GLUCOSE SERPL-MCNC: 67 MG/DL
HCT VFR BLD AUTO: 32.4 %
HCT VFR BLD AUTO: 32.4 %
HCT VFR BLD AUTO: 33.3 %
HCT VFR BLD AUTO: 33.5 %
HCT VFR BLD AUTO: 35 %
HGB BLD-MCNC: 10.6 G/DL
HGB BLD-MCNC: 10.6 G/DL
HGB BLD-MCNC: 10.8 G/DL
HGB BLD-MCNC: 11 G/DL
HGB BLD-MCNC: 11.6 G/DL
LYMPHOCYTES # BLD AUTO: 1.5 K/UL
LYMPHOCYTES NFR BLD: 36.3 %
MAGNESIUM SERPL-MCNC: 1.8 MG/DL
MCH RBC QN AUTO: 27.8 PG
MCHC RBC AUTO-ENTMCNC: 32.7 G/DL
MCV RBC AUTO: 85 FL
MONOCYTES # BLD AUTO: 0.5 K/UL
MONOCYTES NFR BLD: 12.8 %
NEUTROPHILS # BLD AUTO: 2.1 K/UL
NEUTROPHILS NFR BLD: 49.9 %
PLATELET # BLD AUTO: 119 K/UL
PMV BLD AUTO: 10.1 FL
POTASSIUM SERPL-SCNC: 3.8 MMOL/L
RBC # BLD AUTO: 3.81 M/UL
SODIUM SERPL-SCNC: 138 MMOL/L
WBC # BLD AUTO: 4.13 K/UL

## 2017-12-16 PROCEDURE — 25000003 PHARM REV CODE 250: Performed by: NURSE PRACTITIONER

## 2017-12-16 PROCEDURE — 99232 SBSQ HOSP IP/OBS MODERATE 35: CPT | Mod: ,,, | Performed by: INTERNAL MEDICINE

## 2017-12-16 PROCEDURE — 85025 COMPLETE CBC W/AUTO DIFF WBC: CPT

## 2017-12-16 PROCEDURE — 83735 ASSAY OF MAGNESIUM: CPT

## 2017-12-16 PROCEDURE — 85018 HEMOGLOBIN: CPT

## 2017-12-16 PROCEDURE — 99223 1ST HOSP IP/OBS HIGH 75: CPT | Mod: ,,, | Performed by: INTERNAL MEDICINE

## 2017-12-16 PROCEDURE — 21400001 HC TELEMETRY ROOM

## 2017-12-16 PROCEDURE — 36415 COLL VENOUS BLD VENIPUNCTURE: CPT

## 2017-12-16 PROCEDURE — 80048 BASIC METABOLIC PNL TOTAL CA: CPT

## 2017-12-16 PROCEDURE — 85014 HEMATOCRIT: CPT

## 2017-12-16 RX ORDER — LOSARTAN POTASSIUM 25 MG/1
25 TABLET ORAL DAILY
Status: DISCONTINUED | OUTPATIENT
Start: 2017-12-16 | End: 2017-12-17 | Stop reason: HOSPADM

## 2017-12-16 RX ORDER — METOPROLOL TARTRATE 25 MG/1
25 TABLET, FILM COATED ORAL 2 TIMES DAILY
Status: DISCONTINUED | OUTPATIENT
Start: 2017-12-16 | End: 2017-12-17 | Stop reason: HOSPADM

## 2017-12-16 RX ADMIN — SERTRALINE HYDROCHLORIDE 50 MG: 50 TABLET ORAL at 09:12

## 2017-12-16 RX ADMIN — SEVELAMER CARBONATE 800 MG: 800 TABLET, FILM COATED ORAL at 05:12

## 2017-12-16 RX ADMIN — ASPIRIN 81 MG: 81 TABLET, COATED ORAL at 09:12

## 2017-12-16 RX ADMIN — Medication 1 CAPSULE: at 09:12

## 2017-12-16 RX ADMIN — ATORVASTATIN CALCIUM 20 MG: 10 TABLET, FILM COATED ORAL at 09:12

## 2017-12-16 RX ADMIN — SEVELAMER CARBONATE 800 MG: 800 TABLET, FILM COATED ORAL at 09:12

## 2017-12-16 RX ADMIN — RIVASTIGMINE TRANSDERMAL SYSTEM 1 PATCH: 4.6 PATCH, EXTENDED RELEASE TRANSDERMAL at 09:12

## 2017-12-16 RX ADMIN — AMLODIPINE BESYLATE 5 MG: 5 TABLET ORAL at 09:12

## 2017-12-16 RX ADMIN — METOPROLOL TARTRATE 25 MG: 25 TABLET ORAL at 09:12

## 2017-12-16 RX ADMIN — LOSARTAN POTASSIUM 25 MG: 25 TABLET, FILM COATED ORAL at 09:12

## 2017-12-16 RX ADMIN — CLOPIDOGREL BISULFATE 75 MG: 75 TABLET ORAL at 09:12

## 2017-12-16 RX ADMIN — CINACALCET HYDROCHLORIDE 30 MG: 30 TABLET, COATED ORAL at 09:12

## 2017-12-16 RX ADMIN — SEVELAMER CARBONATE 800 MG: 800 TABLET, FILM COATED ORAL at 11:12

## 2017-12-16 NOTE — CONSULTS
[facility]  Cardiology  Consult Note    Patient Name: David Hadley  MRN: [id number]  Admission Date: 12/14/2017  Hospital Length of Stay: 2 days  Code Status: Full Code   Attending Provider: Charlie Rodriguez MD   Consulting Provider: Sandor Lazo MD  Primary Care Physician: Isaac Fitch MD  Principal Problem:Acute on chronic diastolic congestive heart failure    Patient information was obtained from patient and ER records.     Inpatient consult to Cardiology  Consult performed by: SANDOR LAZO  Consult ordered by: CARINE KELLOGG  Reason for consult: elevated troponin        Subjective:     Chief Complaint:  SOB     HPI:    62 yo ,male,PMH CAD s/p cabg, HTN, persistent Afib, HLD, recurrent MRSA bacteremia, DM, ESRD on HD via left arm fistula and dementia.  Has been admitted two days ago due to SOB.  Could not tolerate HD recently due to hypotension. Denied chest pain and palpitation.  In ER, BNP > 4900, troponin 1.182 and flat. EKG afib and no acute stt change.    Past Medical History:   Diagnosis Date    After-cataract, unspecified - Left Eye 11/27/2013    Allergy     Arthritis     Cancer     CHF (congestive heart failure)     Chronic kidney disease     chemo/ dialias    Dementia     Diabetes mellitus     Hypertension     Myocardial infarction        Past Surgical History:   Procedure Laterality Date    AV FISTULA PLACEMENT      CARDIAC SURGERY      CATARACT EXTRACTION      CORONARY ARTERY BYPASS GRAFT  3-2014       Review of patient's allergies indicates:   Allergen Reactions    Benadryl decongestant Itching     Nothing with benadryl; wife states  No allergy to Benadryl       No current facility-administered medications on file prior to encounter.      Current Outpatient Prescriptions on File Prior to Encounter   Medication Sig    amlodipine (NORVASC) 5 MG tablet Take 1 tablet (5 mg total) by mouth once daily.    aspirin (ECOTRIN) 81 MG EC tablet Take 81 mg by mouth.    atorvastatin  (LIPITOR) 20 MG tablet TAKE ONE TABLET BY MOUTH ONCE DAILY    clopidogrel (PLAVIX) 75 mg tablet Take 1 tablet (75 mg total) by mouth once daily.    diphenoxylate-atropine 2.5-0.025 mg (LOMOTIL) 2.5-0.025 mg per tablet     fluticasone (FLONASE) 50 mcg/actuation nasal spray 1 spray by Each Nare route 2 (two) times daily.    FOLIC ACID/VIT BCOMP,C (JOSAFAT-JANICE ORAL) Take by mouth.    LORazepam (ATIVAN) 0.5 MG tablet TAKE ONE TABLET BY MOUTH EVERY 6 HOURS AS NEEDED FOR ANXIETY    losartan (COZAAR) 50 MG tablet Take 1 tablet (50 mg total) by mouth once daily. 1 tablet Oral Every day    multivitamin with minerals tablet Take 1 tablet by mouth.    rivastigmine (EXELON) 4.6 mg/24 hr PT24 Place 1 patch onto the skin once daily.    SENSIPAR 30 mg Tab Take 1 tablet by mouth once daily.    sertraline (ZOLOFT) 50 MG tablet Take 1 tablet (50 mg total) by mouth once daily.    sevelamer carbonate (RENVELA) 800 mg Tab Take 800 mg by mouth.    SPRYCEL 100 mg Tab Take 100 mg by mouth once daily.     traZODone (DESYREL) 50 MG tablet TAKE ONE TABLET BY MOUTH EVERY EVENING     Family History     Problem Relation (Age of Onset)    Cancer Brother    Colon cancer Sister    Glaucoma Mother    Pancreatic cancer Brother    Prostate cancer Father        Social History Main Topics    Smoking status: Former Smoker     Types: Cigarettes     Quit date: 1/28/2000    Smokeless tobacco: Never Used    Alcohol use No    Drug use: No    Sexual activity: Not Currently     Birth control/ protection: None     Review of Systems   Constitution: Negative for decreased appetite, diaphoresis, fever, weakness, malaise/fatigue and night sweats.   HENT: Negative for nosebleeds.    Eyes: Negative for blurred vision and double vision.   Cardiovascular: Positive for dyspnea on exertion and leg swelling. Negative for chest pain, claudication, irregular heartbeat, near-syncope, orthopnea, palpitations, paroxysmal nocturnal dyspnea and syncope.    Respiratory: Negative for cough, shortness of breath, sleep disturbances due to breathing, snoring, sputum production and wheezing.    Endocrine: Negative for cold intolerance and polyuria.   Hematologic/Lymphatic: Does not bruise/bleed easily.   Skin: Negative for rash.   Musculoskeletal: Negative for back pain, falls, joint pain, joint swelling and neck pain.   Gastrointestinal: Negative for abdominal pain, heartburn, nausea and vomiting.   Genitourinary: Negative for dysuria, frequency and hematuria.   Neurological: Negative for difficulty with concentration, dizziness, focal weakness, headaches, light-headedness, numbness and seizures.   Psychiatric/Behavioral: Negative for depression, memory loss and substance abuse. The patient does not have insomnia.    Allergic/Immunologic: Negative for HIV exposure and hives.     Objective:     Vital Signs (Most Recent):  Temp: 97.2 °F (36.2 °C) (12/16/17 1251)  Pulse: 80 (12/16/17 1251)  Resp: 18 (12/16/17 1251)  BP: (!) 115/56 (12/16/17 1251)  SpO2: 95 % (12/16/17 0747) Vital Signs (24h Range):  Temp:  [97.2 °F (36.2 °C)-98.4 °F (36.9 °C)] 97.2 °F (36.2 °C)  Pulse:  [] 80  Resp:  [12-18] 18  SpO2:  [90 %-96 %] 95 %  BP: ()/(56-79) 115/56     Weight: 83.2 kg (183 lb 6.8 oz)  Body mass index is 23.55 kg/m².    SpO2: 95 %  O2 Device (Oxygen Therapy): room air      Intake/Output Summary (Last 24 hours) at 12/16/17 1402  Last data filed at 12/15/17 1800   Gross per 24 hour   Intake              370 ml   Output             3500 ml   Net            -3130 ml       Lines/Drains/Airways     Drain                 Hemodialysis AV Fistula Left upper arm -- days          Peripheral Intravenous Line                 Peripheral IV - Single Lumen 08/10/17 Right Antecubital 128 days         Peripheral IV - Single Lumen 12/14/17 Right Antecubital 2 days                Physical Exam   Constitutional: He is oriented to person, place, and time. He appears well-nourished.   HENT:    Head: Normocephalic.   Eyes: Pupils are equal, round, and reactive to light.   Neck: Normal carotid pulses and no JVD present. Carotid bruit is not present. No thyromegaly present.   Cardiovascular: Normal rate, regular rhythm, normal heart sounds and normal pulses.   No extrasystoles are present. PMI is not displaced.  Exam reveals no gallop and no S3.    No murmur heard.  Pulmonary/Chest: Breath sounds normal. No stridor. No respiratory distress.   Rales on bases   Abdominal: Soft. Bowel sounds are normal. There is no tenderness. There is no rebound.   Musculoskeletal: Normal range of motion. He exhibits edema.   Trace edema   Neurological: He is alert and oriented to person, place, and time.   Skin: Skin is intact. No rash noted.   Psychiatric: His behavior is normal.       Significant Labs:   ABG: No results for input(s): PH, PCO2, HCO3, POCSATURATED, BE in the last 48 hours., Blood Culture: No results for input(s): LABBLOO in the last 48 hours., BMP:   Recent Labs  Lab 12/14/17  1830 12/15/17  0715 12/16/17  0457   GLU 75 65* 67*    141 138   K 5.0 3.6 3.8   CL 99 99 100   CO2 29 32* 24   BUN 15 18 14   CREATININE 4.2* 4.7* 3.8*   CALCIUM 8.3* 8.3* 8.3*   MG 2.2  --  1.8   , CMP   Recent Labs  Lab 12/14/17  1830 12/15/17  0715 12/16/17  0457    141 138   K 5.0 3.6 3.8   CL 99 99 100   CO2 29 32* 24   GLU 75 65* 67*   BUN 15 18 14   CREATININE 4.2* 4.7* 3.8*   CALCIUM 8.3* 8.3* 8.3*   PROT 8.8* 7.6  --    ALBUMIN 2.8* 2.5*  --    BILITOT 0.6 0.5  --    ALKPHOS 92 74  --    AST 26 20  --    ALT 11 6*  --    ANIONGAP 12 10 14   ESTGFRAFRICA 16* 14* 18*   EGFRNONAA 14* 12* 16*   , CBC   Recent Labs  Lab 12/14/17  1830 12/15/17  0200 12/15/17  0549  12/15/17  2350 12/16/17  0457 12/16/17  1204   WBC 5.76  --  4.04  --   --  4.13  --    HGB 12.1* 9.8* 10.3*  10.3*  < > 11.0* 10.6*  10.6* 10.8*   HCT 36.5* 29.8* 31.5*  31.5*  < > 33.5* 32.4*  32.4* 33.3*   * 107* 120*  --   --  119*  --    < >  = values in this interval not displayed., INR   Recent Labs  Lab 12/14/17  1830   INR 1.3*   , Lipid Panel   Recent Labs  Lab 12/15/17  0715   CHOL 126   HDL 45   LDLCALC 66.8   TRIG 71   CHOLHDL 35.7    and Troponin   Recent Labs  Lab 12/14/17  1830 12/15/17  0200 12/15/17  0715   TROPONINI 1.182* 0.999* 1.075*       Significant Imaging: X-Ray: CXR: X-Ray Chest 1 View (CXR):   Results for orders placed or performed during the hospital encounter of 12/14/17   X-Ray Chest 1 View    Narrative    Chest x-ray single view    Indication: Shortness of breath.    Findings: Comparison study 12/14/2017.  There is a stable right pleural fluid collection with haziness to the lower right hemithorax with likely superimposed airspace consolidation or atelectasis.  Since the prior study, there has been development of a zone of dense, somewhat wedge-shaped consolidation involving the peripheral left upper lobe/apex as well as hazy infiltrate within the peripheral mid left lung.  Atelectasis or consolidation at the left lung base is again noted.  Stable cardiomegaly status post CABG.  Stable central pulmonary vascular congestion.  No new knee    Impression     See above.          Electronically signed by: RAJESH CAMPOS  Date:     12/16/17  Time:    07:26      Assessment and Plan:     * Acute on chronic diastolic congestive heart failure    HD as needed.  Advise to hold amlodipine and Losartan at HD day to avoid hypotension.  DASh and fluid restriction 60 oz        Persistent atrial fibrillation    VR controlled.   Was in sinus per EKG done in .  Continue metoprolol, ASA and Plavix,  Advise to discuss with primary cardiologist for anticoagulation Rx.        Troponin I above reference range    Chronic elevation of troponin.  Demand ischemia due to CHF exacerbation        CAD with remote CABG    No chest pain and acute STT change on EKG  Continue ASA, Plavix, metoprolol and statin              VTE Risk Mitigation         Ordered      Medium Risk of VTE  Once      12/14/17 2303          Thank you for your consult. I will sign off. Please contact us if you have any additional questions.    Cr Lazo MD  Cardiology   [facility]

## 2017-12-16 NOTE — ASSESSMENT & PLAN NOTE
VR controlled.   Was in sinus per EKG done in .  Continue metoprolol, ASA and Plavix,  Advise to discuss with primary cardiologist for anticoagulation Rx.

## 2017-12-16 NOTE — ASSESSMENT & PLAN NOTE
Last EF in 4 months ago 40  Patient wife reports patient complaint with HD, diet, medical POC  Consider acute change, trend troponins.   EKG reviewed no ST elevation- appears to have had rhythm change.  -check electrolytes   Repeat ECHO  Consider mediation profile   Cardiology consulted

## 2017-12-16 NOTE — PLAN OF CARE
Problem: Patient Care Overview  Goal: Plan of Care Review  Outcome: Ongoing (interventions implemented as appropriate)  Patient stable.   Pt oriented to self.  Pt remained afebrile throughout this shift.   Pt remained free of falls this shift.   Pt wife at bedside.   Plan of care reviewed. Patient and wife verbalizes understanding.   Pt moving/turing independently.   Patient SR on monitor.   Bed low, side rails up x 2, wheels locked, call light in reach.   Hourly rounded completed.   Patient instructed to call for assistance.   Will continue to monitor.

## 2017-12-16 NOTE — SUBJECTIVE & OBJECTIVE
Interval History: Patient is less short of breath after dialysis we removed 3 kg on 12/15/17  Review of patient's allergies indicates:   Allergen Reactions    Benadryl decongestant Itching     Nothing with benadryl; wife states  No allergy to Benadryl     Current Facility-Administered Medications   Medication Frequency    amLODIPine tablet 5 mg Daily    aspirin EC tablet 81 mg Daily    atorvastatin tablet 20 mg Daily    cinacalcet tablet 30 mg Daily    clopidogrel tablet 75 mg Daily    losartan tablet 25 mg Daily    metoprolol tartrate (LOPRESSOR) tablet 25 mg BID    rivastigmine 4.6 mg/24 hr 1 patch Daily    sertraline tablet 50 mg Daily    sevelamer carbonate tablet 800 mg TID WM    vitamin renal formula (B-complex-vitamin c-folic acid) 1 mg per capsule 1 capsule Daily       Objective:     Vital Signs (Most Recent):  Temp: 97.6 °F (36.4 °C) (12/16/17 0747)  Pulse: 85 (12/16/17 0747)  Resp: 18 (12/16/17 0747)  BP: 100/65 (12/16/17 0747)  SpO2: 95 % (12/16/17 0747)  O2 Device (Oxygen Therapy): room air (12/15/17 1924) Vital Signs (24h Range):  Temp:  [97.6 °F (36.4 °C)-98.4 °F (36.9 °C)] 97.6 °F (36.4 °C)  Pulse:  [] 85  Resp:  [12-18] 18  SpO2:  [90 %-96 %] 95 %  BP: ()/(62-82) 100/65     Weight: 83.2 kg (183 lb 6.8 oz) (12/16/17 0411)  Body mass index is 23.55 kg/m².  Body surface area is 2.08 meters squared.    I/O last 3 completed shifts:  In: 370 [P.O.:120; Other:250]  Out: 3500 [Other:3500]    Physical Exam   Constitutional: He appears well-developed and well-nourished. No distress.   Pleasantly confused elderly male     HENT:   Head: Normocephalic and atraumatic.   Nose: Nose normal.   Eyes: Conjunctivae and EOM are normal. Pupils are equal, round, and reactive to light. No scleral icterus.   Neck: Normal range of motion. Neck supple. No tracheal deviation present.   Cardiovascular: Normal rate, regular rhythm and intact distal pulses.    Murmur heard.  Bilateral lower ext edema +3    Pulmonary/Chest: Effort normal. No stridor. No respiratory distress. He has no wheezes. He has no rales.   Course and decreased aeration throughout    Abdominal: Soft. Bowel sounds are normal. He exhibits no distension. There is no tenderness. There is no guarding.   Genitourinary:   Genitourinary Comments: Check stool for occult blood    Musculoskeletal: Normal range of motion. He exhibits no edema or deformity.   LUAF +B/T   Neurological: He is alert. No cranial nerve deficit.   Patient oriented to self and situation at this time  Able to follow some simple commands and answer some simple questions.     Family reports patient is at baseline     Skin: Skin is warm and dry. Capillary refill takes less than 2 seconds. No rash noted. He is not diaphoretic.   Psychiatric:   Unable to fully assess  No agitation or irritability at this time.   Patient calm and cooperative.    Nursing note and vitals reviewed.      Significant Labs:  All labs within the past 24 hours have been reviewed.     Significant Imaging:  Labs: Reviewed

## 2017-12-16 NOTE — ASSESSMENT & PLAN NOTE
Likely source GI  Occult blood ordered  Drop in H/H noted   Type and Scree  Transfuse if need.

## 2017-12-16 NOTE — PLAN OF CARE
Problem: Patient Care Overview  Goal: Plan of Care Review  Outcome: Ongoing (interventions implemented as appropriate)  POC reviewed. Pt remained free from falls, fall precautions in place. Pt oriented to self. Sister at bedside. Pt is NSR on monitor, 80-90s. VSS. No other c/o at this time. PIV intact. Call bell and personal belongings within reach. Hourly rounding complete. Reminded to call for assistance. Will continue to monitor.

## 2017-12-16 NOTE — PROGRESS NOTES
Patient with multiple runs of NSVT overnight (longest being 6 beat run).  Patient remained hemodynamically stable, and asymptomatic.  Metoprolol tartrate 25mg PO BID started.  Losartan decreased to 25mg daily to prevent hypotension given that patient's BP already running on the low side.  Will continue to monitor telemetry.      MASON GuzmanP

## 2017-12-16 NOTE — SUBJECTIVE & OBJECTIVE
Interval History: The patient reports improvement in symptoms overnight. The patient was dialyzed overnight with 3 liters off. Case discussed with Dr. Marks (Nephrology) who feels that the patient is not having enough fluid taken off. Cardiology following.       Review of Systems   Unable to perform ROS: Dementia     Objective:     Vital Signs (Most Recent):  Temp: 97.1 °F (36.2 °C) (12/16/17 1607)  Pulse: 68 (12/16/17 1607)  Resp: 18 (12/16/17 1607)  BP: 91/66 (12/16/17 1607)  SpO2: 95 % (12/16/17 0747) Vital Signs (24h Range):  Temp:  [97.1 °F (36.2 °C)-98.4 °F (36.9 °C)] 97.1 °F (36.2 °C)  Pulse:  [] 68  Resp:  [16-18] 18  SpO2:  [90 %-96 %] 95 %  BP: ()/(56-79) 91/66     Weight: 83.2 kg (183 lb 6.8 oz)  Body mass index is 23.55 kg/m².    Intake/Output Summary (Last 24 hours) at 12/16/17 1725  Last data filed at 12/16/17 1200   Gross per 24 hour   Intake              360 ml   Output                0 ml   Net              360 ml      Physical Exam   Constitutional: He is oriented to person, place, and time. He appears well-developed and well-nourished. No distress.   Pleasantly confused elderly male     HENT:   Head: Normocephalic and atraumatic.   Nose: Nose normal.   Eyes: Conjunctivae and EOM are normal. Pupils are equal, round, and reactive to light. No scleral icterus.   Neck: Normal range of motion. Neck supple. No tracheal deviation present.   Cardiovascular: Normal rate, regular rhythm and intact distal pulses.    Murmur heard.  Bilateral lower ext edema +3   Pulmonary/Chest: Effort normal and breath sounds normal. No stridor. No respiratory distress. He has no wheezes. He has no rales.   Course and decreased aeration throughout    Abdominal: Soft. Bowel sounds are normal. He exhibits no distension. There is no tenderness. There is no guarding.   Genitourinary:   Genitourinary Comments: Check stool for occult blood    Musculoskeletal: Normal range of motion. He exhibits no edema, tenderness or  deformity.   Neurological: He is alert and oriented to person, place, and time. No cranial nerve deficit.   Patient oriented to self and situation at this time  Able to follow some simple commands and answer some simple questions.     Family reports patient is at baseline     Skin: Skin is warm and dry. Capillary refill takes less than 2 seconds. No rash noted. He is not diaphoretic. No erythema.   Psychiatric: He has a normal mood and affect. His behavior is normal.   Unable to fully assess  No agitation or irritability at this time.   Patient calm and cooperative.    Nursing note and vitals reviewed.      Significant Labs: All pertinent labs within the past 24 hours have been reviewed.    Significant Imaging:   Imaging Results          X-Ray Chest 1 View (Final result)  Result time 12/16/17 07:26:38    Final result by Rajesh Vegas MD (12/16/17 07:26:38)                 Impression:     See above.          Electronically signed by: RAJESH VEGAS  Date:     12/16/17  Time:    07:26              Narrative:    Chest x-ray single view    Indication: Shortness of breath.    Findings: Comparison study 12/14/2017.  There is a stable right pleural fluid collection with haziness to the lower right hemithorax with likely superimposed airspace consolidation or atelectasis.  Since the prior study, there has been development of a zone of dense, somewhat wedge-shaped consolidation involving the peripheral left upper lobe/apex as well as hazy infiltrate within the peripheral mid left lung.  Atelectasis or consolidation at the left lung base is again noted.  Stable cardiomegaly status post CABG.  Stable central pulmonary vascular congestion.  No new knee                             X-Ray Chest PA And Lateral (Final result)  Result time 12/14/17 19:03:04    Final result by Rinku Cook III, MD (12/14/17 19:03:04)                 Impression:     Stable cardiomegaly without evidence of overt alveolar edema.  Stable moderate right  pleural effusion with adjacent airspace consolidation/compressive atelectasis.      Electronically signed by: TEVIN PEÑA MD  Date:     12/14/17  Time:    19:03              Narrative:    XR CHEST PA AND LATERAL    Clinical history: Shortness of breath    Comparison: 08/15/2017    Findings: Prior sternotomy is again noted.  There is stable cardiomegaly without evidence of overt alveolar edema. There has been no significant interval change in the moderate right pleural effusion with adjacent airspace consolidation/compressive atelectasis in the adjacent right mid to lower lung.  The left lung appears grossly clear of active disease.  No acute osseous abnormality detected.

## 2017-12-16 NOTE — PROGRESS NOTES
Pt had a 7 beat run of vtach. BENJAMIN Delatorre notified. Pt resting comfortably. Orders to follow.

## 2017-12-16 NOTE — ASSESSMENT & PLAN NOTE
No acute hemodialysis today.  Pending evaluation by cardiology and evaluation and management of right pleural effusion which is chronic

## 2017-12-16 NOTE — PROGRESS NOTES
[facility] -   Nephrology  Progress Note    Patient Name: David Hadley  MRN: [id number]  Admission Date: 12/14/2017  Hospital Length of Stay: 2 days  Attending Provider: Charlie Rodriguez MD   Primary Care Physician: Isaac Fitch MD  Principal Problem:Acute on chronic diastolic congestive heart failure    Subjective:     HPI: Patient is a 62-year-old with ESRD on hemodialysis Monday Wednesday Friday in [place].  He has had multiple hospitalizations in the last 3 months.  He has chronic right-sided pleural effusion.  He also has chronic fluid overload situation as well.  He comes in with shortness of breath PND orthopnea and leg swelling.  His last dialysis was Wednesday.  He had only 1 kg removed with dialysis in skilled nursing he is dialysis unit in [place], [place].    Patient is being admitted for further management from cardiology standpoint.  Patient needs dialysis while inpatient.  Nephrology consultation is requested.  Patient seen and examined in the emergency room at bedside.      Interval History: Patient is less short of breath after dialysis we removed 3 kg on 12/15/17  Review of patient's allergies indicates:   Allergen Reactions    Benadryl decongestant Itching     Nothing with benadryl; wife states  No allergy to Benadryl     Current Facility-Administered Medications   Medication Frequency    amLODIPine tablet 5 mg Daily    aspirin EC tablet 81 mg Daily    atorvastatin tablet 20 mg Daily    cinacalcet tablet 30 mg Daily    clopidogrel tablet 75 mg Daily    losartan tablet 25 mg Daily    metoprolol tartrate (LOPRESSOR) tablet 25 mg BID    rivastigmine 4.6 mg/24 hr 1 patch Daily    sertraline tablet 50 mg Daily    sevelamer carbonate tablet 800 mg TID WM    vitamin renal formula (B-complex-vitamin c-folic acid) 1 mg per capsule 1 capsule Daily       Objective:     Vital Signs (Most Recent):  Temp: 97.6 °F (36.4 °C) (12/16/17 0747)  Pulse: 85 (12/16/17 0747)  Resp: 18 (12/16/17  0747)  BP: 100/65 (12/16/17 0747)  SpO2: 95 % (12/16/17 0747)  O2 Device (Oxygen Therapy): room air (12/15/17 1924) Vital Signs (24h Range):  Temp:  [97.6 °F (36.4 °C)-98.4 °F (36.9 °C)] 97.6 °F (36.4 °C)  Pulse:  [] 85  Resp:  [12-18] 18  SpO2:  [90 %-96 %] 95 %  BP: ()/(62-82) 100/65     Weight: 83.2 kg (183 lb 6.8 oz) (12/16/17 0411)  Body mass index is 23.55 kg/m².  Body surface area is 2.08 meters squared.    I/O last 3 completed shifts:  In: 370 [P.O.:120; Other:250]  Out: 3500 [Other:3500]    Physical Exam   Constitutional: He appears well-developed and well-nourished. No distress.   Pleasantly confused elderly male     HENT:   Head: Normocephalic and atraumatic.   Nose: Nose normal.   Eyes: Conjunctivae and EOM are normal. Pupils are equal, round, and reactive to light. No scleral icterus.   Neck: Normal range of motion. Neck supple. No tracheal deviation present.   Cardiovascular: Normal rate, regular rhythm and intact distal pulses.    Murmur heard.  Bilateral lower ext edema +3   Pulmonary/Chest: Effort normal. No stridor. No respiratory distress. He has no wheezes. He has no rales.   Course and decreased aeration throughout    Abdominal: Soft. Bowel sounds are normal. He exhibits no distension. There is no tenderness. There is no guarding.   Genitourinary:   Genitourinary Comments: Check stool for occult blood    Musculoskeletal: Normal range of motion. He exhibits no edema or deformity.   LUAF +B/T   Neurological: He is alert. No cranial nerve deficit.   Patient oriented to self and situation at this time  Able to follow some simple commands and answer some simple questions.     Family reports patient is at baseline     Skin: Skin is warm and dry. Capillary refill takes less than 2 seconds. No rash noted. He is not diaphoretic.   Psychiatric:   Unable to fully assess  No agitation or irritability at this time.   Patient calm and cooperative.    Nursing note and vitals  reviewed.      Significant Labs:  All labs within the past 24 hours have been reviewed.     Significant Imaging:  Labs: Reviewed    Assessment/Plan:     ESRD (end stage renal disease) on dialysis    No acute hemodialysis today.  Pending evaluation by cardiology and evaluation and management of right pleural effusion which is chronic              Thank you for your consult.     Bernard Marks MD  Nephrology  [facility]

## 2017-12-16 NOTE — PROGRESS NOTES
[facility] Hospital Medicine  Progress Note    Patient Name: David Hadley  MRN: [id number]  Patient Class: IP- Inpatient   Admission Date: 12/14/2017  Length of Stay: 2 days  Attending Physician: Dena Griffith MD  Primary Care Provider: Isaac Fitch MD        Subjective:     Principal Problem:Acute on chronic diastolic congestive heart failure    HPI:  History is limited and obtained from the patients wife at bedside, due to patients dementia. David Hadley is a 62 y.o. male patient with a hx of dementia who presented to the Emergency Department for evaluation of SOB. The wife reports that they went to doctor Fitch's office today and was recommended to present to the ER for further evaluation. At this time the wife reports patients SOB is improved and the patient is in no resp. Distress at present. Symptoms are intermittent and moderate in severity. No mitigating or exacerbating factors reported. Associated sxs include increased foot swelling, fatigue, and SOB. Additionally the wife reports noticing blood (dark red) on the tissue paper when wiping the patient that started a couple of weeks ago and has had an increase in stools. Wife states patient does not complain of pain or show signs of pain when wiping him.   Pt has end-stage renal disease and goes to dialysis 3x a week. No further complaints or concerns at this time. . Patient denies any fever, chills, nausea, vomiting, abd pain, weakness/numbness, dizziness, HA, leg pain, chest pain, cough, and all other sxs at this time. Patient was evaluated in the ER, BNP >4900 and elevated troponin, likely in response to  CKD and CHF. Chest Xray Impression:Stable cardiomegaly without evidence of overt alveolar edema.  Stable moderate right pleural effusion with adjacent airspace consolidation/compressive atelectasis. Patient admitted for Fluid overload in ESRD patient on HD with CHF exacerbation and for further evaluation of rectal bleeding    Dr. Fitch  office visit note 12/14/17:  History of Present Illness:  Patient presents today with his wife and his sister they're complaining that patient has become increasingly short of breath and he is developing edema.  He has systolic heart failure with ejection fraction of 25%.  He also been having diarrhea for the last 1 month and over the last couple days he is having bloody diarrhea.  Denies any fever.  Patient has end-stage renal disease and is on dialysis also has advanced dementia     Plan:  Patient is tachycardic and dyspneic with underlying history of systolic heart failure and more recent gastrointestinal bleeding.  too unstable to be treated outpatient prescription with family recommend that he be sent to [facility] ED for further evaluation and treatment and admission to the hospital.     He is continuing to take his Plavix and aspirin inspite of the gastrointestinal bleed.  No orders of the defined types were placed in this encounter.           Hospital Course:  12/15 The patient was seen at bedside in the ER. Nephrology following, plans for dialysis today. 12/16 The patient reports improvement in symptoms overnight. The patient was dialyzed overnight with 3 liters off. Case discussed with Dr. Marks (Nephrology) who feels that the patient is not having enough fluid taken off. Cardiology following.     Interval History: The patient reports improvement in symptoms overnight. The patient was dialyzed overnight with 3 liters off. Case discussed with Dr. Marks (Nephrology) who feels that the patient is not having enough fluid taken off. Cardiology following.       Review of Systems   Unable to perform ROS: Dementia     Objective:     Vital Signs (Most Recent):  Temp: 97.1 °F (36.2 °C) (12/16/17 1607)  Pulse: 68 (12/16/17 1607)  Resp: 18 (12/16/17 1607)  BP: 91/66 (12/16/17 1607)  SpO2: 95 % (12/16/17 0747) Vital Signs (24h Range):  Temp:  [97.1 °F (36.2 °C)-98.4 °F (36.9 °C)] 97.1 °F (36.2 °C)  Pulse:  []  68  Resp:  [16-18] 18  SpO2:  [90 %-96 %] 95 %  BP: ()/(56-79) 91/66     Weight: 83.2 kg (183 lb 6.8 oz)  Body mass index is 23.55 kg/m².    Intake/Output Summary (Last 24 hours) at 12/16/17 1725  Last data filed at 12/16/17 1200   Gross per 24 hour   Intake              360 ml   Output                0 ml   Net              360 ml      Physical Exam   Constitutional: He is oriented to person, place, and time. He appears well-developed and well-nourished. No distress.   Pleasantly confused elderly male     HENT:   Head: Normocephalic and atraumatic.   Nose: Nose normal.   Eyes: Conjunctivae and EOM are normal. Pupils are equal, round, and reactive to light. No scleral icterus.   Neck: Normal range of motion. Neck supple. No tracheal deviation present.   Cardiovascular: Normal rate, regular rhythm and intact distal pulses.    Murmur heard.  Bilateral lower ext edema +3   Pulmonary/Chest: Effort normal and breath sounds normal. No stridor. No respiratory distress. He has no wheezes. He has no rales.   Course and decreased aeration throughout    Abdominal: Soft. Bowel sounds are normal. He exhibits no distension. There is no tenderness. There is no guarding.   Genitourinary:   Genitourinary Comments: Check stool for occult blood    Musculoskeletal: Normal range of motion. He exhibits no edema, tenderness or deformity.   Neurological: He is alert and oriented to person, place, and time. No cranial nerve deficit.   Patient oriented to self and situation at this time  Able to follow some simple commands and answer some simple questions.     Family reports patient is at baseline     Skin: Skin is warm and dry. Capillary refill takes less than 2 seconds. No rash noted. He is not diaphoretic. No erythema.   Psychiatric: He has a normal mood and affect. His behavior is normal.   Unable to fully assess  No agitation or irritability at this time.   Patient calm and cooperative.    Nursing note and vitals  reviewed.      Significant Labs: All pertinent labs within the past 24 hours have been reviewed.    Significant Imaging:   Imaging Results          X-Ray Chest 1 View (Final result)  Result time 12/16/17 07:26:38    Final result by Rajesh Vegas MD (12/16/17 07:26:38)                 Impression:     See above.          Electronically signed by: RAJESH VEGAS  Date:     12/16/17  Time:    07:26              Narrative:    Chest x-ray single view    Indication: Shortness of breath.    Findings: Comparison study 12/14/2017.  There is a stable right pleural fluid collection with haziness to the lower right hemithorax with likely superimposed airspace consolidation or atelectasis.  Since the prior study, there has been development of a zone of dense, somewhat wedge-shaped consolidation involving the peripheral left upper lobe/apex as well as hazy infiltrate within the peripheral mid left lung.  Atelectasis or consolidation at the left lung base is again noted.  Stable cardiomegaly status post CABG.  Stable central pulmonary vascular congestion.  No new knee                             X-Ray Chest PA And Lateral (Final result)  Result time 12/14/17 19:03:04    Final result by Tevin Peña III, MD (12/14/17 19:03:04)                 Impression:     Stable cardiomegaly without evidence of overt alveolar edema.  Stable moderate right pleural effusion with adjacent airspace consolidation/compressive atelectasis.      Electronically signed by: TEVIN PEÑA MD  Date:     12/14/17  Time:    19:03              Narrative:    XR CHEST PA AND LATERAL    Clinical history: Shortness of breath    Comparison: 08/15/2017    Findings: Prior sternotomy is again noted.  There is stable cardiomegaly without evidence of overt alveolar edema. There has been no significant interval change in the moderate right pleural effusion with adjacent airspace consolidation/compressive atelectasis in the adjacent right mid to lower lung.  The left lung  appears grossly clear of active disease.  No acute osseous abnormality detected.                                 Assessment/Plan:      * Acute on chronic diastolic congestive heart failure    Last EF in 4 months ago 40  Patient wife reports patient complaint with HD, diet, medical POC  Consider acute change, trend troponins.   EKG reviewed no ST elevation- appears to have had rhythm change.  -check electrolytes   Repeat ECHO  Consider mediation profile   Cardiology consulted              Acute blood loss anemia    Likely source GI  Occult blood ordered  Drop in H/H noted   Type and Scree  Transfuse if need.             Troponin I above reference range    Elevated higher than baseline  likely related to demand from fluid overload, CHF  Continue statin, plavix, asa,  Trend  Repeat echo        Rectal bleed    CBC stable   Monitor h/h  Check occult blood  Type and screen  Consider gi consult pending course.             Pleural effusion on right    Patient no longer makes urine per family.   Diuresis with HD, renal consulted  Monitor for improvement with diuresis  May need thoracentesis and consider cellular evaluation of fluid (patient with leukemia and is currently being treated with oral Sprycel)  Consider medication profile.   Consider consult to heme/oncology            Type 2 diabetes mellitus with renal complication    Check a1c  ssi   montior           ESRD (end stage renal disease) on dialysis    Renal consulted, Pt dialyzed yesterday, 3 liters off   CMP in am               VTE Risk Mitigation         Ordered     Medium Risk of VTE  Once      12/14/17 2116              Guilherme Solares NP  Department of Hospital Medicine   [facility]

## 2017-12-16 NOTE — ASSESSMENT & PLAN NOTE
HD as needed.  Advise to hold amlodipine and Losartan at HD day to avoid hypotension.  DASh and fluid restriction 60 oz

## 2017-12-16 NOTE — HPI
62 yo ,male,PMH CAD s/p cabg, HTN, persistent Afib, HLD, recurrent MRSA bacteremia, DM, ESRD on HD via left arm fistula and dementia.  Has been admitted two days ago due to SOB.  Could not tolerate HD recently due to hypotension. Denied chest pain and palpitation.  In ER, BNP > 4900, troponin 1.182 and flat. EKG afib and no acute stt change.

## 2017-12-16 NOTE — SUBJECTIVE & OBJECTIVE
Past Medical History:   Diagnosis Date    After-cataract, unspecified - Left Eye 11/27/2013    Allergy     Arthritis     Cancer     CHF (congestive heart failure)     Chronic kidney disease     chemo/ dialias    Dementia     Diabetes mellitus     Hypertension     Myocardial infarction        Past Surgical History:   Procedure Laterality Date    AV FISTULA PLACEMENT      CARDIAC SURGERY      CATARACT EXTRACTION      CORONARY ARTERY BYPASS GRAFT  3-2014       Review of patient's allergies indicates:   Allergen Reactions    Benadryl decongestant Itching     Nothing with benadryl; wife states  No allergy to Benadryl       No current facility-administered medications on file prior to encounter.      Current Outpatient Prescriptions on File Prior to Encounter   Medication Sig    amlodipine (NORVASC) 5 MG tablet Take 1 tablet (5 mg total) by mouth once daily.    aspirin (ECOTRIN) 81 MG EC tablet Take 81 mg by mouth.    atorvastatin (LIPITOR) 20 MG tablet TAKE ONE TABLET BY MOUTH ONCE DAILY    clopidogrel (PLAVIX) 75 mg tablet Take 1 tablet (75 mg total) by mouth once daily.    diphenoxylate-atropine 2.5-0.025 mg (LOMOTIL) 2.5-0.025 mg per tablet     fluticasone (FLONASE) 50 mcg/actuation nasal spray 1 spray by Each Nare route 2 (two) times daily.    FOLIC ACID/VIT BCOMP,C (JOSAFAT-JANICE ORAL) Take by mouth.    LORazepam (ATIVAN) 0.5 MG tablet TAKE ONE TABLET BY MOUTH EVERY 6 HOURS AS NEEDED FOR ANXIETY    losartan (COZAAR) 50 MG tablet Take 1 tablet (50 mg total) by mouth once daily. 1 tablet Oral Every day    multivitamin with minerals tablet Take 1 tablet by mouth.    rivastigmine (EXELON) 4.6 mg/24 hr PT24 Place 1 patch onto the skin once daily.    SENSIPAR 30 mg Tab Take 1 tablet by mouth once daily.    sertraline (ZOLOFT) 50 MG tablet Take 1 tablet (50 mg total) by mouth once daily.    sevelamer carbonate (RENVELA) 800 mg Tab Take 800 mg by mouth.    SPRYCEL 100 mg Tab Take 100 mg by mouth  once daily.     traZODone (DESYREL) 50 MG tablet TAKE ONE TABLET BY MOUTH EVERY EVENING     Family History     Problem Relation (Age of Onset)    Cancer Brother    Colon cancer Sister    Glaucoma Mother    Pancreatic cancer Brother    Prostate cancer Father        Social History Main Topics    Smoking status: Former Smoker     Types: Cigarettes     Quit date: 1/28/2000    Smokeless tobacco: Never Used    Alcohol use No    Drug use: No    Sexual activity: Not Currently     Birth control/ protection: None     Review of Systems   Constitution: Negative for decreased appetite, diaphoresis, fever, weakness, malaise/fatigue and night sweats.   HENT: Negative for nosebleeds.    Eyes: Negative for blurred vision and double vision.   Cardiovascular: Positive for dyspnea on exertion and leg swelling. Negative for chest pain, claudication, irregular heartbeat, near-syncope, orthopnea, palpitations, paroxysmal nocturnal dyspnea and syncope.   Respiratory: Negative for cough, shortness of breath, sleep disturbances due to breathing, snoring, sputum production and wheezing.    Endocrine: Negative for cold intolerance and polyuria.   Hematologic/Lymphatic: Does not bruise/bleed easily.   Skin: Negative for rash.   Musculoskeletal: Negative for back pain, falls, joint pain, joint swelling and neck pain.   Gastrointestinal: Negative for abdominal pain, heartburn, nausea and vomiting.   Genitourinary: Negative for dysuria, frequency and hematuria.   Neurological: Negative for difficulty with concentration, dizziness, focal weakness, headaches, light-headedness, numbness and seizures.   Psychiatric/Behavioral: Negative for depression, memory loss and substance abuse. The patient does not have insomnia.    Allergic/Immunologic: Negative for HIV exposure and hives.     Objective:     Vital Signs (Most Recent):  Temp: 97.2 °F (36.2 °C) (12/16/17 1251)  Pulse: 80 (12/16/17 1251)  Resp: 18 (12/16/17 1251)  BP: (!) 115/56 (12/16/17  1251)  SpO2: 95 % (12/16/17 0747) Vital Signs (24h Range):  Temp:  [97.2 °F (36.2 °C)-98.4 °F (36.9 °C)] 97.2 °F (36.2 °C)  Pulse:  [] 80  Resp:  [12-18] 18  SpO2:  [90 %-96 %] 95 %  BP: ()/(56-79) 115/56     Weight: 83.2 kg (183 lb 6.8 oz)  Body mass index is 23.55 kg/m².    SpO2: 95 %  O2 Device (Oxygen Therapy): room air      Intake/Output Summary (Last 24 hours) at 12/16/17 1402  Last data filed at 12/15/17 1800   Gross per 24 hour   Intake              370 ml   Output             3500 ml   Net            -3130 ml       Lines/Drains/Airways     Drain                 Hemodialysis AV Fistula Left upper arm -- days          Peripheral Intravenous Line                 Peripheral IV - Single Lumen 08/10/17 Right Antecubital 128 days         Peripheral IV - Single Lumen 12/14/17 Right Antecubital 2 days                Physical Exam   Constitutional: He is oriented to person, place, and time. He appears well-nourished.   HENT:   Head: Normocephalic.   Eyes: Pupils are equal, round, and reactive to light.   Neck: Normal carotid pulses and no JVD present. Carotid bruit is not present. No thyromegaly present.   Cardiovascular: Normal rate, regular rhythm, normal heart sounds and normal pulses.   No extrasystoles are present. PMI is not displaced.  Exam reveals no gallop and no S3.    No murmur heard.  Pulmonary/Chest: Breath sounds normal. No stridor. No respiratory distress.   Rales on bases   Abdominal: Soft. Bowel sounds are normal. There is no tenderness. There is no rebound.   Musculoskeletal: Normal range of motion. He exhibits edema.   Trace edema   Neurological: He is alert and oriented to person, place, and time.   Skin: Skin is intact. No rash noted.   Psychiatric: His behavior is normal.       Significant Labs:   ABG: No results for input(s): PH, PCO2, HCO3, POCSATURATED, BE in the last 48 hours., Blood Culture: No results for input(s): LABBLOO in the last 48 hours., BMP:   Recent Labs  Lab  12/14/17  1830 12/15/17  0715 12/16/17  0457   GLU 75 65* 67*    141 138   K 5.0 3.6 3.8   CL 99 99 100   CO2 29 32* 24   BUN 15 18 14   CREATININE 4.2* 4.7* 3.8*   CALCIUM 8.3* 8.3* 8.3*   MG 2.2  --  1.8   , CMP   Recent Labs  Lab 12/14/17  1830 12/15/17  0715 12/16/17  0457    141 138   K 5.0 3.6 3.8   CL 99 99 100   CO2 29 32* 24   GLU 75 65* 67*   BUN 15 18 14   CREATININE 4.2* 4.7* 3.8*   CALCIUM 8.3* 8.3* 8.3*   PROT 8.8* 7.6  --    ALBUMIN 2.8* 2.5*  --    BILITOT 0.6 0.5  --    ALKPHOS 92 74  --    AST 26 20  --    ALT 11 6*  --    ANIONGAP 12 10 14   ESTGFRAFRICA 16* 14* 18*   EGFRNONAA 14* 12* 16*   , CBC   Recent Labs  Lab 12/14/17  1830 12/15/17  0200 12/15/17  0549  12/15/17  2350 12/16/17  0457 12/16/17  1204   WBC 5.76  --  4.04  --   --  4.13  --    HGB 12.1* 9.8* 10.3*  10.3*  < > 11.0* 10.6*  10.6* 10.8*   HCT 36.5* 29.8* 31.5*  31.5*  < > 33.5* 32.4*  32.4* 33.3*   * 107* 120*  --   --  119*  --    < > = values in this interval not displayed., INR   Recent Labs  Lab 12/14/17 1830   INR 1.3*   , Lipid Panel   Recent Labs  Lab 12/15/17  0715   CHOL 126   HDL 45   LDLCALC 66.8   TRIG 71   CHOLHDL 35.7    and Troponin   Recent Labs  Lab 12/14/17  1830 12/15/17  0200 12/15/17  0715   TROPONINI 1.182* 0.999* 1.075*       Significant Imaging: X-Ray: CXR: X-Ray Chest 1 View (CXR):   Results for orders placed or performed during the hospital encounter of 12/14/17   X-Ray Chest 1 View    Narrative    Chest x-ray single view    Indication: Shortness of breath.    Findings: Comparison study 12/14/2017.  There is a stable right pleural fluid collection with haziness to the lower right hemithorax with likely superimposed airspace consolidation or atelectasis.  Since the prior study, there has been development of a zone of dense, somewhat wedge-shaped consolidation involving the peripheral left upper lobe/apex as well as hazy infiltrate within the peripheral mid left lung.  Atelectasis  or consolidation at the left lung base is again noted.  Stable cardiomegaly status post CABG.  Stable central pulmonary vascular congestion.  No new knee    Impression     See above.          Electronically signed by: RAJESH CAMPOS  Date:     12/16/17  Time:    07:26

## 2017-12-17 VITALS
DIASTOLIC BLOOD PRESSURE: 50 MMHG | HEIGHT: 74 IN | BODY MASS INDEX: 23.54 KG/M2 | HEART RATE: 71 BPM | RESPIRATION RATE: 20 BRPM | OXYGEN SATURATION: 93 % | TEMPERATURE: 97 F | SYSTOLIC BLOOD PRESSURE: 89 MMHG | WEIGHT: 183.44 LBS

## 2017-12-17 PROBLEM — D62 ACUTE BLOOD LOSS ANEMIA: Status: RESOLVED | Noted: 2017-12-15 | Resolved: 2017-12-17

## 2017-12-17 LAB
ALBUMIN SERPL BCP-MCNC: 2.6 G/DL
ALP SERPL-CCNC: 77 U/L
ALT SERPL W/O P-5'-P-CCNC: 11 U/L
ANION GAP SERPL CALC-SCNC: 12 MMOL/L
AST SERPL-CCNC: 29 U/L
BASOPHILS # BLD AUTO: 0.02 K/UL
BASOPHILS NFR BLD: 0.4 %
BILIRUB SERPL-MCNC: 0.6 MG/DL
BUN SERPL-MCNC: 20 MG/DL
CALCIUM SERPL-MCNC: 8.3 MG/DL
CHLORIDE SERPL-SCNC: 100 MMOL/L
CO2 SERPL-SCNC: 24 MMOL/L
CREAT SERPL-MCNC: 5 MG/DL
DIFFERENTIAL METHOD: ABNORMAL
EOSINOPHIL # BLD AUTO: 0 K/UL
EOSINOPHIL NFR BLD: 0.4 %
ERYTHROCYTE [DISTWIDTH] IN BLOOD BY AUTOMATED COUNT: 17.6 %
EST. GFR  (AFRICAN AMERICAN): 13 ML/MIN/1.73 M^2
EST. GFR  (NON AFRICAN AMERICAN): 11 ML/MIN/1.73 M^2
GLUCOSE SERPL-MCNC: 76 MG/DL
HCT VFR BLD AUTO: 32.2 %
HCT VFR BLD AUTO: 35 %
HGB BLD-MCNC: 10.3 G/DL
HGB BLD-MCNC: 11.4 G/DL
LYMPHOCYTES # BLD AUTO: 1.6 K/UL
LYMPHOCYTES NFR BLD: 35.5 %
MCH RBC QN AUTO: 27.9 PG
MCHC RBC AUTO-ENTMCNC: 32.6 G/DL
MCV RBC AUTO: 86 FL
MONOCYTES # BLD AUTO: 0.6 K/UL
MONOCYTES NFR BLD: 13.3 %
NEUTROPHILS # BLD AUTO: 2.3 K/UL
NEUTROPHILS NFR BLD: 50.4 %
PLATELET # BLD AUTO: 141 K/UL
PMV BLD AUTO: 10.5 FL
POTASSIUM SERPL-SCNC: 4.8 MMOL/L
PROT SERPL-MCNC: 8.2 G/DL
RBC # BLD AUTO: 4.08 M/UL
SODIUM SERPL-SCNC: 136 MMOL/L
WBC # BLD AUTO: 4.59 K/UL

## 2017-12-17 PROCEDURE — 80053 COMPREHEN METABOLIC PANEL: CPT

## 2017-12-17 PROCEDURE — 85018 HEMOGLOBIN: CPT

## 2017-12-17 PROCEDURE — 25000003 PHARM REV CODE 250: Performed by: NURSE PRACTITIONER

## 2017-12-17 PROCEDURE — 36415 COLL VENOUS BLD VENIPUNCTURE: CPT

## 2017-12-17 PROCEDURE — 85014 HEMATOCRIT: CPT

## 2017-12-17 PROCEDURE — 85025 COMPLETE CBC W/AUTO DIFF WBC: CPT

## 2017-12-17 PROCEDURE — 99232 SBSQ HOSP IP/OBS MODERATE 35: CPT | Mod: ,,, | Performed by: INTERNAL MEDICINE

## 2017-12-17 RX ORDER — METOPROLOL TARTRATE 25 MG/1
12.5 TABLET, FILM COATED ORAL 2 TIMES DAILY
Qty: 30 TABLET | Refills: 0 | Status: SHIPPED | OUTPATIENT
Start: 2017-12-17 | End: 2018-12-17

## 2017-12-17 RX ADMIN — AMLODIPINE BESYLATE 5 MG: 5 TABLET ORAL at 09:12

## 2017-12-17 RX ADMIN — CLOPIDOGREL BISULFATE 75 MG: 75 TABLET ORAL at 09:12

## 2017-12-17 RX ADMIN — ATORVASTATIN CALCIUM 20 MG: 10 TABLET, FILM COATED ORAL at 09:12

## 2017-12-17 RX ADMIN — SEVELAMER CARBONATE 800 MG: 800 TABLET, FILM COATED ORAL at 09:12

## 2017-12-17 RX ADMIN — CINACALCET HYDROCHLORIDE 30 MG: 30 TABLET, COATED ORAL at 09:12

## 2017-12-17 RX ADMIN — ASPIRIN 81 MG: 81 TABLET, COATED ORAL at 09:12

## 2017-12-17 RX ADMIN — METOPROLOL TARTRATE 25 MG: 25 TABLET ORAL at 09:12

## 2017-12-17 RX ADMIN — SERTRALINE HYDROCHLORIDE 50 MG: 50 TABLET ORAL at 09:12

## 2017-12-17 RX ADMIN — LOSARTAN POTASSIUM 25 MG: 25 TABLET, FILM COATED ORAL at 09:12

## 2017-12-17 RX ADMIN — RIVASTIGMINE TRANSDERMAL SYSTEM 1 PATCH: 4.6 PATCH, EXTENDED RELEASE TRANSDERMAL at 09:12

## 2017-12-17 RX ADMIN — SEVELAMER CARBONATE 800 MG: 800 TABLET, FILM COATED ORAL at 11:12

## 2017-12-17 RX ADMIN — Medication 1 CAPSULE: at 09:12

## 2017-12-17 NOTE — PLAN OF CARE
Problem: Patient Care Overview  Goal: Plan of Care Review  Outcome: Ongoing (interventions implemented as appropriate)  POC reviewed with patient and spouse , spouse verbalized understanding. Pt remains free from falls. Fall precautions in place. NS on cardiac monitor. Pt was hypotensive at the beginning of shift , however, pt was asymptomatic. Held pt 2100 dose of metoprolol . BP improved throughout my shift  No other complaints at this time. Call bell w/in reach. Pt family reminded to call for assistance. Q2 H turn schedule to prevent skin breakdown. Pt did pull out his IV and I was unable to get IV access. Informed charge nurse and they will attempt to get IV access this am. PT to go to HD this am as well.

## 2017-12-17 NOTE — SUBJECTIVE & OBJECTIVE
Interval History: Patient is less short of breath after dialysis we removed 3 kg on 12/15/17  Review of patient's allergies indicates:   Allergen Reactions    Benadryl decongestant Itching     Nothing with benadryl; wife states  No allergy to Benadryl     Current Facility-Administered Medications   Medication Frequency    amLODIPine tablet 5 mg Daily    aspirin EC tablet 81 mg Daily    atorvastatin tablet 20 mg Daily    cinacalcet tablet 30 mg Daily    clopidogrel tablet 75 mg Daily    losartan tablet 25 mg Daily    metoprolol tartrate (LOPRESSOR) tablet 25 mg BID    rivastigmine 4.6 mg/24 hr 1 patch Daily    sertraline tablet 50 mg Daily    sevelamer carbonate tablet 800 mg TID WM    vitamin renal formula (B-complex-vitamin c-folic acid) 1 mg per capsule 1 capsule Daily       Objective:     Vital Signs (Most Recent):  Temp: 97.3 °F (36.3 °C) (12/17/17 0732)  Pulse: 71 (12/17/17 1225)  Resp: 20 (12/17/17 0732)  BP: (!) 89/50 (12/17/17 1225)  SpO2: (!) 93 % (12/17/17 0732)  O2 Device (Oxygen Therapy): room air (12/17/17 0732) Vital Signs (24h Range):  Temp:  [97.1 °F (36.2 °C)-97.8 °F (36.6 °C)] 97.3 °F (36.3 °C)  Pulse:  [68-84] 71  Resp:  [16-20] 20  SpO2:  [93 %-95 %] 93 %  BP: ()/(50-69) 89/50     Weight: 83.2 kg (183 lb 6.8 oz) (12/16/17 0411)  Body mass index is 23.55 kg/m².  Body surface area is 2.08 meters squared.    I/O last 3 completed shifts:  In: 360 [P.O.:360]  Out: -     Physical Exam   Constitutional: He appears well-developed and well-nourished. No distress.   Pleasantly confused elderly male     HENT:   Head: Normocephalic and atraumatic.   Nose: Nose normal.   Eyes: Conjunctivae and EOM are normal. Pupils are equal, round, and reactive to light. No scleral icterus.   Neck: Normal range of motion. Neck supple. No tracheal deviation present.   Cardiovascular: Normal rate, regular rhythm and intact distal pulses.    Murmur heard.  Bilateral lower ext edema +3   Pulmonary/Chest:  Effort normal. No stridor. No respiratory distress. He has no wheezes. He has no rales.   Course and decreased aeration throughout    Abdominal: Soft. Bowel sounds are normal. He exhibits no distension. There is no tenderness. There is no guarding.   Genitourinary:   Genitourinary Comments: Check stool for occult blood    Musculoskeletal: Normal range of motion. He exhibits no edema or deformity.   LUAF +B/T   Neurological: He is alert. No cranial nerve deficit.   Patient oriented to self and situation at this time  Able to follow some simple commands and answer some simple questions.     Family reports patient is at baseline     Skin: Skin is warm and dry. Capillary refill takes less than 2 seconds. No rash noted. He is not diaphoretic.   Psychiatric:   Unable to fully assess  No agitation or irritability at this time.   Patient calm and cooperative.    Nursing note and vitals reviewed.      Significant Labs:  All labs within the past 24 hours have been reviewed.     Significant Imaging:  Labs: Reviewed

## 2017-12-17 NOTE — ASSESSMENT & PLAN NOTE
No acute hemodialysis today.  S/p evaluation by cardiology and evaluation and management of right pleural effusion which is chronic    Ok to d/c     Outpatient HD goals d/w Health system

## 2017-12-17 NOTE — PROGRESS NOTES
[facility] -   Nephrology  Progress Note    Patient Name: David Hadley  MRN: [id number]  Admission Date: 12/14/2017  Hospital Length of Stay: 3 days  Attending Provider: Dena Griffith MD   Primary Care Physician: Isaac Fitch MD  Principal Problem:Acute on chronic diastolic congestive heart failure    Subjective:     HPI: Patient is a 62-year-old with ESRD on hemodialysis Monday Wednesday Friday in [place].  He has had multiple hospitalizations in the last 3 months.  He has chronic right-sided pleural effusion.  He also has chronic fluid overload situation as well.  He comes in with shortness of breath PND orthopnea and leg swelling.  His last dialysis was Wednesday.  He had only 1 kg removed with dialysis in [facility] he is dialysis unit in [place], [place].    Patient is being admitted for further management from cardiology standpoint.  Patient needs dialysis while inpatient.  Nephrology consultation is requested.  Patient seen and examined in the emergency room at bedside.      Interval History: Patient is less short of breath after dialysis we removed 3 kg on 12/15/17  Review of patient's allergies indicates:   Allergen Reactions    Benadryl decongestant Itching     Nothing with benadryl; wife states  No allergy to Benadryl     Current Facility-Administered Medications   Medication Frequency    amLODIPine tablet 5 mg Daily    aspirin EC tablet 81 mg Daily    atorvastatin tablet 20 mg Daily    cinacalcet tablet 30 mg Daily    clopidogrel tablet 75 mg Daily    losartan tablet 25 mg Daily    metoprolol tartrate (LOPRESSOR) tablet 25 mg BID    rivastigmine 4.6 mg/24 hr 1 patch Daily    sertraline tablet 50 mg Daily    sevelamer carbonate tablet 800 mg TID WM    vitamin renal formula (B-complex-vitamin c-folic acid) 1 mg per capsule 1 capsule Daily       Objective:     Vital Signs (Most Recent):  Temp: 97.3 °F (36.3 °C) (12/17/17 0732)  Pulse: 71 (12/17/17 1225)  Resp: 20 (12/17/17  0732)  BP: (!) 89/50 (12/17/17 1225)  SpO2: (!) 93 % (12/17/17 0732)  O2 Device (Oxygen Therapy): room air (12/17/17 0732) Vital Signs (24h Range):  Temp:  [97.1 °F (36.2 °C)-97.8 °F (36.6 °C)] 97.3 °F (36.3 °C)  Pulse:  [68-84] 71  Resp:  [16-20] 20  SpO2:  [93 %-95 %] 93 %  BP: ()/(50-69) 89/50     Weight: 83.2 kg (183 lb 6.8 oz) (12/16/17 0411)  Body mass index is 23.55 kg/m².  Body surface area is 2.08 meters squared.    I/O last 3 completed shifts:  In: 360 [P.O.:360]  Out: -     Physical Exam   Constitutional: He appears well-developed and well-nourished. No distress.   Pleasantly confused elderly male     HENT:   Head: Normocephalic and atraumatic.   Nose: Nose normal.   Eyes: Conjunctivae and EOM are normal. Pupils are equal, round, and reactive to light. No scleral icterus.   Neck: Normal range of motion. Neck supple. No tracheal deviation present.   Cardiovascular: Normal rate, regular rhythm and intact distal pulses.    Murmur heard.  Bilateral lower ext edema +3   Pulmonary/Chest: Effort normal. No stridor. No respiratory distress. He has no wheezes. He has no rales.   Course and decreased aeration throughout    Abdominal: Soft. Bowel sounds are normal. He exhibits no distension. There is no tenderness. There is no guarding.   Genitourinary:   Genitourinary Comments: Check stool for occult blood    Musculoskeletal: Normal range of motion. He exhibits no edema or deformity.   LUAF +B/T   Neurological: He is alert. No cranial nerve deficit.   Patient oriented to self and situation at this time  Able to follow some simple commands and answer some simple questions.     Family reports patient is at baseline     Skin: Skin is warm and dry. Capillary refill takes less than 2 seconds. No rash noted. He is not diaphoretic.   Psychiatric:   Unable to fully assess  No agitation or irritability at this time.   Patient calm and cooperative.    Nursing note and vitals reviewed.      Significant Labs:  All labs  within the past 24 hours have been reviewed.     Significant Imaging:  Labs: Reviewed    Assessment/Plan:     ESRD (end stage renal disease) on dialysis    No acute hemodialysis today.  S/p evaluation by cardiology and evaluation and management of right pleural effusion which is chronic    Ok to d/c     Outpatient HD goals d/w [facility]               Thank you for your consult. I will sign off. Please contact us if you have any additional questions.    Bernard Marks MD  Nephrology  [facility]

## 2017-12-18 ENCOUNTER — TELEPHONE (OUTPATIENT)
Dept: FAMILY MEDICINE | Facility: CLINIC | Age: 62
End: 2017-12-18

## 2017-12-18 DIAGNOSIS — I50.23 ACUTE ON CHRONIC SYSTOLIC HEART FAILURE: ICD-10-CM

## 2017-12-18 DIAGNOSIS — F02.80 LATE ONSET ALZHEIMER'S DISEASE WITHOUT BEHAVIORAL DISTURBANCE: Primary | ICD-10-CM

## 2017-12-18 DIAGNOSIS — G30.1 LATE ONSET ALZHEIMER'S DISEASE WITHOUT BEHAVIORAL DISTURBANCE: Primary | ICD-10-CM

## 2017-12-18 DIAGNOSIS — N18.6 END STAGE RENAL DISEASE: ICD-10-CM

## 2017-12-18 NOTE — TELEPHONE ENCOUNTER
----- Message from Heike Romo sent at 12/18/2017  8:58 AM CST -----  Contact: Nakia/wife  Nakia called to speak with the nurse about orders for Hospice. She can be contacted at [phone].    Thanks,  Heike

## 2017-12-18 NOTE — PLAN OF CARE
12/18/17 0911   Final Note   Assessment Type Final Discharge Note   Discharge Disposition Home

## 2017-12-18 NOTE — TELEPHONE ENCOUNTER
----- Message from Lakisha Nixon sent at 12/18/2017  7:46 AM CST -----  Contact: Yesi Vergarasanjeev/daughter  States they need an order for [facility] [phone]. Please call Yesi Villa at [phone]. Thank you

## 2017-12-18 NOTE — TELEPHONE ENCOUNTER
Spoke with pt's daughter, Digna and let her know that I faxed over the orders at their request, she voiced understating

## 2017-12-19 LAB
BLD PROD TYP BPU: NORMAL
BLD PROD TYP BPU: NORMAL
BLOOD UNIT EXPIRATION DATE: NORMAL
BLOOD UNIT EXPIRATION DATE: NORMAL
BLOOD UNIT TYPE CODE: 7300
BLOOD UNIT TYPE CODE: 7300
BLOOD UNIT TYPE: NORMAL
BLOOD UNIT TYPE: NORMAL
CODING SYSTEM: NORMAL
CODING SYSTEM: NORMAL
DISPENSE STATUS: NORMAL
DISPENSE STATUS: NORMAL
NUM UNITS TRANS PACKED RBC: NORMAL
NUM UNITS TRANS PACKED RBC: NORMAL

## 2017-12-27 NOTE — PHYSICIAN QUERY
PT Name: David Hadley  MR #: [id number]     Physician Query Form - Documentation Clarification      CDS/: Marci Sol               Contact information:[email or web address]    This form is a permanent document in the medical record.     Query Date: December 27, 2017    By submitting this query, we are merely seeking further clarification of documentation. Please utilize your independent clinical judgment when addressing the question(s) below.    The Medical record reflects the following:    Supporting Clinical Findings Location in Medical Record   Acute on chronic diastolic congestive heart failure       Patient was evaluated in the ER, BNP >4900 and elevated  troponin, likely in response to  CKD and CHF. Chest Xray Impression:Stable cardiomegaly without evidence of overt alveolar edema.  Stable moderate right pleural effusion with adjacent airspace consolidation/compressive atelectasis. Patient admitted  for Fluid overload in ESRD patient on HD with CHF exacerbation and for further evaluation of rectal bleeding     He has systolic heart failure with ejection fraction of 25%     HM progress note 12-16                                                                                  Doctor, Please specify diagnosis or diagnoses associated with above clinical findings.  Please further specify the CHF diagnosis    Provider Use Only      [ x   ]  Acute on chronic systolic/diastolic CHF    [    ]  Other heart failure please specify    _______________________________________                                                                                                             [  ] Clinically undetermined

## 2018-01-12 ENCOUNTER — TELEPHONE (OUTPATIENT)
Dept: FAMILY MEDICINE | Facility: CLINIC | Age: 63
End: 2018-01-12

## 2019-07-08 NOTE — ASSESSMENT & PLAN NOTE
- Recurrent Staph bacteremia.  Last episode 4/2017; SALAZAR negative for vegetation.  - Blood culture drawn from AV fistula on 8/7 positive for gram positive cocci. Patient received IV Vanc during dialysis 8/9.  - Will admit to Inpatient.  - Blood cultures.  - Random Vanc level.  - IV Vanc; Pharmacy to dose.  - Daily labs.   Oxygen

## 2020-04-09 NOTE — ASSESSMENT & PLAN NOTE
DR NAVARRETE ON THE PHONE WITH ERMMILLIE

OK TO ADMIT TO TELE RM 

DX: COPD EXACERBATION

UNDER LIAKOVETSKY Elevated higher than baseline  likely related to demand from fluid overload, CHF  Continue statin, plavix, asa,  Trend  Repeat echo

## 2020-07-27 NOTE — SUBJECTIVE & OBJECTIVE
PT IN BED, DENIES WARM BLANKET. MEDS SET UP AT BEDSIDE FOR ERP. PT DENIES ANY 
NEEDS OR CONCERNS AT THIS TIME, CALL LIGHT IN REACH. Past Medical History:   Diagnosis Date    After-cataract, unspecified - Left Eye 11/27/2013    Allergy     Arthritis     Cancer     CHF (congestive heart failure)     Chronic kidney disease     chemo/ dialias    Dementia     Diabetes mellitus     Hypertension     Myocardial infarction        Past Surgical History:   Procedure Laterality Date    CATARACT EXTRACTION      CORONARY ARTERY BYPASS GRAFT  3-2014       Review of patient's allergies indicates:   Allergen Reactions    Benadryl decongestant Itching     Nothing with benadryl        Medications:  Prescriptions Prior to Admission   Medication Sig    atorvastatin (LIPITOR) 20 MG tablet Take 1 tablet (20 mg total) by mouth once daily.    FOLIC ACID/VIT BCOMP,C (JOSAFAT-JANICE ORAL) Take by mouth.    gabapentin (NEURONTIN) 100 MG capsule Take 1 capsule (100 mg total) by mouth 3 (three) times daily.    hydrocodone-acetaminophen 5-325mg (NORCO) 5-325 mg per tablet Take 1 tablet by mouth every 6 to 8 hours as needed for Pain.    losartan (COZAAR) 50 MG tablet Take by mouth. 1 tablet Oral Every day    metoprolol tartrate (LOPRESSOR) 25 MG tablet Take 1 tablet (25 mg total) by mouth 2 (two) times daily.    rivastigmine (EXELON) 4.6 mg/24 hr PT24 Place 1 patch onto the skin once daily.    SENSIPAR 30 mg Tab Take 1 tablet by mouth once daily.    sertraline (ZOLOFT) 50 MG tablet Take 1 tablet (50 mg total) by mouth once daily.    SPRYCEL 100 mg Tab Take 100 mg by mouth once daily.     trazodone (DESYREL) 50 MG tablet Take 1 tablet (50 mg total) by mouth every evening.     Antibiotics     Start     Stop Route Frequency Ordered    04/03/17 1300  ceftAZIDime (FORTAZ) 500 mg in dextrose 5 % 50 mL IVPB      -- IV Every 24 hours (non-standard times) 04/03/17 1150    04/05/17 2100  vancomycin 1 g in dextrose 5 % 250 mL IVPB (ready to mix system)      -- IV Every 48 hours (non-standard times) 04/03/17 1631        Antifungals     None        Antivirals     None            Immunization History   Administered Date(s) Administered    Influenza A (H1N1) 2009 Monovalent - IM 11/09/2009    Pneumococcal Conjugate - 13 Valent 02/18/2016    Pneumococcal Polysaccharide - 23 Valent 09/15/2009    Tdap 09/15/2009    Zoster 09/15/2009       Family History     Problem Relation (Age of Onset)    Cancer Brother    Colon cancer Sister    Glaucoma Mother    Pancreatic cancer Brother    Prostate cancer Father        Social History     Social History    Marital status:      Spouse name: N/A    Number of children: 1    Years of education: N/A     Social History Main Topics    Smoking status: Former Smoker     Types: Cigarettes     Quit date: 1/28/2000    Smokeless tobacco: Never Used    Alcohol use No    Drug use: No    Sexual activity: Not Currently     Birth control/ protection: None     Other Topics Concern    None     Social History Narrative     Review of Systems   Unable to perform ROS: Dementia (He did not talk much in the room-possible aphasia)   Constitutional: Negative for activity change and appetite change.   Eyes: Negative for discharge.   Respiratory: Negative for apnea.    Endocrine: Negative for cold intolerance.     Objective:     Vital Signs (Most Recent):  Temp: 98.8 °F (37.1 °C) (04/04/17 0109)  Pulse: 73 (04/04/17 0109)  Resp: 18 (04/04/17 0109)  BP: (!) 152/87 (04/04/17 0109)  SpO2: 95 % (04/04/17 0109) Vital Signs (24h Range):  Temp:  [97.4 °F (36.3 °C)-98.8 °F (37.1 °C)] 98.8 °F (37.1 °C)  Pulse:  [66-79] 73  Resp:  [18] 18  SpO2:  [94 %-99 %] 95 %  BP: (140-177)/(83-98) 152/87     Weight: 97.1 kg (214 lb)  Body mass index is 27.48 kg/(m^2).    Estimated Creatinine Clearance: 10.1 mL/min (based on Cr of 8.7).    Physical Exam   Constitutional: He appears well-developed.   HENT:   Head: Normocephalic and atraumatic.   Nose: Nose normal.   Eyes: Conjunctivae and EOM are normal. Pupils are equal, round, and reactive to light.   Neck: Normal range of motion.  Neck supple. No JVD present. No tracheal deviation present. No thyromegaly present.   Cardiovascular: Normal rate, regular rhythm, normal heart sounds and intact distal pulses.  Exam reveals no gallop and no friction rub.    No murmur heard.  Pulmonary/Chest: Effort normal. No stridor. No respiratory distress. He has no wheezes. He has no rales. He exhibits no tenderness.   Abdominal: Soft. Bowel sounds are normal. He exhibits no distension. There is no tenderness. There is no rebound and no guarding.   Musculoskeletal: Normal range of motion. He exhibits no edema, tenderness or deformity.   Neurological: He is alert. He has normal reflexes. No cranial nerve deficit. Coordination normal.   He did not talk much    Skin: Skin is warm and dry. No rash noted. No erythema. No pallor.   L UE AVF   Nursing note and vitals reviewed.      Significant Labs:   Blood Culture:   Recent Labs  Lab 01/31/17  1830 01/31/17  1835   LABBLOO Gram stain aer bottle: Gram positive cocci in clusters resembling Staph   Results called to and read back by: Lula Marinelli RN  02/01/2017  16:22  METHICILLIN RESISTANT STAPHYLOCOCCUS AUREUS Gram stain aer bottle: Gram positive cocci in clusters resembling Staph  Gram stain mike bottle: Gram positive cocci in clusters resembling Staph   Results called to and read back by: Lula Marinelli RN  02/01/2017  16:22  METHICILLIN RESISTANT STAPHYLOCOCCUS AUREUSFor susceptibility see order #[id number]     BMP:   Recent Labs  Lab 04/03/17  2141   *  115*   *  134*   K 4.5  4.5   CL 95  95   CO2 26  26   BUN 33*  33*   CREATININE 8.7*  8.7*   CALCIUM 9.0  9.0   MG 2.0     CBC:   Recent Labs  Lab 04/03/17  1200 04/03/17  2141   WBC 9.19 7.31   HGB 13.2* 13.1*   HCT 40.7 40.8    183     All pertinent labs within the past 24 hours have been reviewed.    Significant Imaging: I have reviewed all pertinent imaging results/findings within the past 24 hours.

## 2021-04-12 NOTE — ASSESSMENT & PLAN NOTE
- Dialysis M,W,F.  - Nephrology following.   Intake assessment completed. The patient self presented to our ER requesting help with addiction problem and a mental health evaluation. The patient states that he is addicted to heroin and fentanyl. He typically injects 1 gram per day, last used on 4/11/2021. He also reports occasional use of methamphetamine, when opiate supply not possible. He also states that he has been feeling depressed since having  Divorce 2 years ago. He states that for the past few weeks he has felt paranoid. He feels like someone is following him around and wanting to kill him. He reports sometimes he hears sound like explosions or gun shots. He denies thoughts of suicide or homicide. He rates depression 10/10 and anxiety 10/10 subjectively. He denies further substance use. Reports poor appetite and poor sleep.